# Patient Record
Sex: MALE | Race: WHITE | NOT HISPANIC OR LATINO | Employment: UNEMPLOYED | URBAN - METROPOLITAN AREA
[De-identification: names, ages, dates, MRNs, and addresses within clinical notes are randomized per-mention and may not be internally consistent; named-entity substitution may affect disease eponyms.]

---

## 2023-07-07 ENCOUNTER — TELEPHONE (OUTPATIENT)
Dept: ORTHOPEDIC SURGERY | Facility: CLINIC | Age: 53
End: 2023-07-07

## 2023-07-07 NOTE — TELEPHONE ENCOUNTER
PATIENT SEEN IN THE [facility] ER 7/6/23, RT SHOULDER / HUMERUS INJURY. NOT COMP OR AUTO, XRAY'S [facility], PATIENT IS EXPERIENCING NUMBNESS AND COLD FEELING IN THE ARM. PATIENT REQUESTING APPOINTMENT, PLEASE ADVISE!

## 2023-07-10 ENCOUNTER — APPOINTMENT (OUTPATIENT)
Dept: CARDIOLOGY | Facility: HOSPITAL | Age: 53
DRG: 548 | End: 2023-07-10
Payer: COMMERCIAL

## 2023-07-10 ENCOUNTER — HOSPITAL ENCOUNTER (INPATIENT)
Facility: HOSPITAL | Age: 53
LOS: 13 days | Discharge: HOME HEALTH | DRG: 548 | End: 2023-07-24
Attending: EMERGENCY MEDICINE | Admitting: INTERNAL MEDICINE
Payer: COMMERCIAL

## 2023-07-10 ENCOUNTER — APPOINTMENT (OUTPATIENT)
Dept: CT IMAGING | Facility: HOSPITAL | Age: 53
DRG: 548 | End: 2023-07-10
Payer: COMMERCIAL

## 2023-07-10 ENCOUNTER — APPOINTMENT (OUTPATIENT)
Dept: GENERAL RADIOLOGY | Facility: HOSPITAL | Age: 53
DRG: 548 | End: 2023-07-10
Payer: COMMERCIAL

## 2023-07-10 DIAGNOSIS — L03.114 CELLULITIS OF LEFT UPPER EXTREMITY: ICD-10-CM

## 2023-07-10 DIAGNOSIS — A41.02 SEPSIS DUE TO METHICILLIN RESISTANT STAPHYLOCOCCUS AUREUS (MRSA), UNSPECIFIED WHETHER ACUTE ORGAN DYSFUNCTION PRESENT: ICD-10-CM

## 2023-07-10 DIAGNOSIS — R26.2 DIFFICULTY WALKING: ICD-10-CM

## 2023-07-10 DIAGNOSIS — S49.91XA SHOULDER INJURY, RIGHT, INITIAL ENCOUNTER: ICD-10-CM

## 2023-07-10 DIAGNOSIS — I50.21 ACUTE HFREF (HEART FAILURE WITH REDUCED EJECTION FRACTION): Primary | ICD-10-CM

## 2023-07-10 DIAGNOSIS — S46.209A INJURY OF TENDON OF BICEPS: ICD-10-CM

## 2023-07-10 DIAGNOSIS — Z78.9 DECREASED ACTIVITIES OF DAILY LIVING (ADL): ICD-10-CM

## 2023-07-10 DIAGNOSIS — A41.9 SEPSIS, DUE TO UNSPECIFIED ORGANISM, UNSPECIFIED WHETHER ACUTE ORGAN DYSFUNCTION PRESENT: ICD-10-CM

## 2023-07-10 LAB
ALBUMIN SERPL-MCNC: 2.8 G/DL (ref 3.5–5.2)
ALBUMIN/GLOB SERPL: 0.7 G/DL
ALP SERPL-CCNC: 444 U/L (ref 39–117)
ALT SERPL W P-5'-P-CCNC: 65 U/L (ref 1–41)
ANION GAP SERPL CALCULATED.3IONS-SCNC: 13.5 MMOL/L (ref 5–15)
AST SERPL-CCNC: 50 U/L (ref 1–40)
BASOPHILS # BLD AUTO: 0.1 10*3/MM3 (ref 0–0.2)
BASOPHILS NFR BLD AUTO: 0.8 % (ref 0–1.5)
BILIRUB SERPL-MCNC: 0.9 MG/DL (ref 0–1.2)
BUN SERPL-MCNC: 18 MG/DL (ref 6–20)
BUN/CREAT SERPL: 20.5 (ref 7–25)
CALCIUM SPEC-SCNC: 9.3 MG/DL (ref 8.6–10.5)
CHLORIDE SERPL-SCNC: 100 MMOL/L (ref 98–107)
CO2 SERPL-SCNC: 22.5 MMOL/L (ref 22–29)
CREAT SERPL-MCNC: 0.88 MG/DL (ref 0.76–1.27)
D-LACTATE SERPL-SCNC: 2.5 MMOL/L (ref 0.5–2)
DEPRECATED RDW RBC AUTO: 44.8 FL (ref 37–54)
EGFRCR SERPLBLD CKD-EPI 2021: 103.5 ML/MIN/1.73
EOSINOPHIL # BLD AUTO: 0.28 10*3/MM3 (ref 0–0.4)
EOSINOPHIL NFR BLD AUTO: 2.4 % (ref 0.3–6.2)
ERYTHROCYTE [DISTWIDTH] IN BLOOD BY AUTOMATED COUNT: 12.9 % (ref 12.3–15.4)
GLOBULIN UR ELPH-MCNC: 4.1 GM/DL
GLUCOSE SERPL-MCNC: 89 MG/DL (ref 65–99)
HCT VFR BLD AUTO: 44 % (ref 37.5–51)
HGB BLD-MCNC: 14.5 G/DL (ref 13–17.7)
HOLD SPECIMEN: NORMAL
HOLD SPECIMEN: NORMAL
IMM GRANULOCYTES # BLD AUTO: 0.06 10*3/MM3 (ref 0–0.05)
IMM GRANULOCYTES NFR BLD AUTO: 0.5 % (ref 0–0.5)
INR PPP: 1.26 (ref 0.86–1.15)
LYMPHOCYTES # BLD AUTO: 0.6 10*3/MM3 (ref 0.7–3.1)
LYMPHOCYTES NFR BLD AUTO: 5.1 % (ref 19.6–45.3)
MAGNESIUM SERPL-MCNC: 1.8 MG/DL (ref 1.6–2.6)
MCH RBC QN AUTO: 31.1 PG (ref 26.6–33)
MCHC RBC AUTO-ENTMCNC: 33 G/DL (ref 31.5–35.7)
MCV RBC AUTO: 94.4 FL (ref 79–97)
MONOCYTES # BLD AUTO: 1.25 10*3/MM3 (ref 0.1–0.9)
MONOCYTES NFR BLD AUTO: 10.5 % (ref 5–12)
NEUTROPHILS NFR BLD AUTO: 80.7 % (ref 42.7–76)
NEUTROPHILS NFR BLD AUTO: 9.57 10*3/MM3 (ref 1.7–7)
NRBC BLD AUTO-RTO: 0 /100 WBC (ref 0–0.2)
PLAT MORPH BLD: NORMAL
PLATELET # BLD AUTO: 241 10*3/MM3 (ref 140–450)
PMV BLD AUTO: 9.8 FL (ref 6–12)
POTASSIUM SERPL-SCNC: 4.5 MMOL/L (ref 3.5–5.2)
PROT SERPL-MCNC: 6.9 G/DL (ref 6–8.5)
PROTHROMBIN TIME: 15.8 SECONDS (ref 11.8–14.9)
RBC # BLD AUTO: 4.66 10*6/MM3 (ref 4.14–5.8)
RBC MORPH BLD: NORMAL
SODIUM SERPL-SCNC: 136 MMOL/L (ref 136–145)
TROPONIN T SERPL HS-MCNC: 9 NG/L
WBC MORPH BLD: NORMAL
WBC NRBC COR # BLD: 11.86 10*3/MM3 (ref 3.4–10.8)
WHOLE BLOOD HOLD COAG: NORMAL
WHOLE BLOOD HOLD SPECIMEN: NORMAL

## 2023-07-10 PROCEDURE — 93971 EXTREMITY STUDY: CPT

## 2023-07-10 PROCEDURE — 87186 SC STD MICRODIL/AGAR DIL: CPT | Performed by: EMERGENCY MEDICINE

## 2023-07-10 PROCEDURE — 93005 ELECTROCARDIOGRAM TRACING: CPT | Performed by: EMERGENCY MEDICINE

## 2023-07-10 PROCEDURE — 93005 ELECTROCARDIOGRAM TRACING: CPT

## 2023-07-10 PROCEDURE — 71045 X-RAY EXAM CHEST 1 VIEW: CPT

## 2023-07-10 PROCEDURE — 80053 COMPREHEN METABOLIC PANEL: CPT

## 2023-07-10 PROCEDURE — 70450 CT HEAD/BRAIN W/O DYE: CPT

## 2023-07-10 PROCEDURE — 87147 CULTURE TYPE IMMUNOLOGIC: CPT | Performed by: EMERGENCY MEDICINE

## 2023-07-10 PROCEDURE — 71260 CT THORAX DX C+: CPT

## 2023-07-10 PROCEDURE — 85610 PROTHROMBIN TIME: CPT | Performed by: NURSE PRACTITIONER

## 2023-07-10 PROCEDURE — 87040 BLOOD CULTURE FOR BACTERIA: CPT

## 2023-07-10 PROCEDURE — 85025 COMPLETE CBC W/AUTO DIFF WBC: CPT

## 2023-07-10 PROCEDURE — 93010 ELECTROCARDIOGRAM REPORT: CPT | Performed by: INTERNAL MEDICINE

## 2023-07-10 PROCEDURE — 83735 ASSAY OF MAGNESIUM: CPT

## 2023-07-10 PROCEDURE — 99285 EMERGENCY DEPT VISIT HI MDM: CPT

## 2023-07-10 PROCEDURE — 83605 ASSAY OF LACTIC ACID: CPT

## 2023-07-10 PROCEDURE — 88312 SPECIAL STAINS GROUP 1: CPT | Performed by: EMERGENCY MEDICINE

## 2023-07-10 PROCEDURE — 25510000001 IOPAMIDOL PER 1 ML: Performed by: EMERGENCY MEDICINE

## 2023-07-10 PROCEDURE — 87150 DNA/RNA AMPLIFIED PROBE: CPT | Performed by: EMERGENCY MEDICINE

## 2023-07-10 PROCEDURE — 93971 EXTREMITY STUDY: CPT | Performed by: SURGERY

## 2023-07-10 PROCEDURE — 85007 BL SMEAR W/DIFF WBC COUNT: CPT

## 2023-07-10 PROCEDURE — 84484 ASSAY OF TROPONIN QUANT: CPT

## 2023-07-10 RX ORDER — SODIUM CHLORIDE 0.9 % (FLUSH) 0.9 %
10 SYRINGE (ML) INJECTION AS NEEDED
Status: DISCONTINUED | OUTPATIENT
Start: 2023-07-10 | End: 2023-07-12

## 2023-07-10 RX ADMIN — IOPAMIDOL 100 ML: 755 INJECTION, SOLUTION INTRAVENOUS at 23:23

## 2023-07-11 ENCOUNTER — APPOINTMENT (OUTPATIENT)
Dept: GENERAL RADIOLOGY | Facility: HOSPITAL | Age: 53
DRG: 548 | End: 2023-07-11
Payer: COMMERCIAL

## 2023-07-11 PROBLEM — A41.9 SEPSIS: Status: ACTIVE | Noted: 2023-07-11

## 2023-07-11 LAB
BACTERIA BLD CULT: ABNORMAL
BACTERIA UR QL AUTO: ABNORMAL /HPF
BH CV UPPER VENOUS LEFT INTERNAL JUGULAR AUGMENT: NORMAL
BH CV UPPER VENOUS LEFT INTERNAL JUGULAR COMPRESS: NORMAL
BH CV UPPER VENOUS LEFT INTERNAL JUGULAR PHASIC: NORMAL
BH CV UPPER VENOUS LEFT INTERNAL JUGULAR SPONT: NORMAL
BH CV UPPER VENOUS LEFT SUBCLAVIAN AUGMENT: NORMAL
BH CV UPPER VENOUS LEFT SUBCLAVIAN COMPRESS: NORMAL
BH CV UPPER VENOUS LEFT SUBCLAVIAN PHASIC: NORMAL
BH CV UPPER VENOUS LEFT SUBCLAVIAN SPONT: NORMAL
BH CV UPPER VENOUS RIGHT AXILLARY AUGMENT: NORMAL
BH CV UPPER VENOUS RIGHT AXILLARY COMPRESS: NORMAL
BH CV UPPER VENOUS RIGHT AXILLARY PHASIC: NORMAL
BH CV UPPER VENOUS RIGHT AXILLARY SPONT: NORMAL
BH CV UPPER VENOUS RIGHT BASILIC FOREARM COMPRESS: NORMAL
BH CV UPPER VENOUS RIGHT BASILIC UPPER COMPRESS: NORMAL
BH CV UPPER VENOUS RIGHT BRACHIAL COMPRESS: NORMAL
BH CV UPPER VENOUS RIGHT CEPHALIC FOREARM COMPRESS: NORMAL
BH CV UPPER VENOUS RIGHT CEPHALIC UPPER COMPRESS: NORMAL
BH CV UPPER VENOUS RIGHT INTERNAL JUGULAR AUGMENT: NORMAL
BH CV UPPER VENOUS RIGHT INTERNAL JUGULAR COMPRESS: NORMAL
BH CV UPPER VENOUS RIGHT INTERNAL JUGULAR PHASIC: NORMAL
BH CV UPPER VENOUS RIGHT INTERNAL JUGULAR SPONT: NORMAL
BH CV UPPER VENOUS RIGHT RADIAL COMPRESS: NORMAL
BH CV UPPER VENOUS RIGHT SUBCLAVIAN AUGMENT: NORMAL
BH CV UPPER VENOUS RIGHT SUBCLAVIAN COMPRESS: NORMAL
BH CV UPPER VENOUS RIGHT SUBCLAVIAN PHASIC: NORMAL
BH CV UPPER VENOUS RIGHT SUBCLAVIAN SPONT: NORMAL
BH CV UPPER VENOUS RIGHT ULNAR COMPRESS: NORMAL
BH CV VAS PRELIMINARY FINDINGS SCRIPTING: 1
BILIRUB UR QL STRIP: ABNORMAL
CLARITY UR: CLEAR
COLOR UR: ABNORMAL
D-LACTATE SERPL-SCNC: 1.8 MMOL/L (ref 0.5–2)
GLUCOSE UR STRIP-MCNC: NEGATIVE MG/DL
HGB UR QL STRIP.AUTO: ABNORMAL
HYALINE CASTS UR QL AUTO: ABNORMAL /LPF
KETONES UR QL STRIP: ABNORMAL
LEUKOCYTE ESTERASE UR QL STRIP.AUTO: ABNORMAL
MRSA DNA SPEC QL NAA+PROBE: ABNORMAL
NITRITE UR QL STRIP: NEGATIVE
PH UR STRIP.AUTO: 6.5 [PH] (ref 5–8)
PROT UR QL STRIP: ABNORMAL
QT INTERVAL: 334 MS
RBC # UR STRIP: ABNORMAL /HPF
REF LAB TEST METHOD: ABNORMAL
SP GR UR STRIP: >1.03 (ref 1–1.03)
SQUAMOUS #/AREA URNS HPF: ABNORMAL /HPF
UROBILINOGEN UR QL STRIP: ABNORMAL
WBC # UR STRIP: ABNORMAL /HPF

## 2023-07-11 PROCEDURE — 87641 MR-STAPH DNA AMP PROBE: CPT | Performed by: INTERNAL MEDICINE

## 2023-07-11 PROCEDURE — 73080 X-RAY EXAM OF ELBOW: CPT

## 2023-07-11 PROCEDURE — 25010000002 VANCOMYCIN 5 G RECONSTITUTED SOLUTION: Performed by: EMERGENCY MEDICINE

## 2023-07-11 PROCEDURE — 25010000002 VANCOMYCIN 5 G RECONSTITUTED SOLUTION: Performed by: INTERNAL MEDICINE

## 2023-07-11 PROCEDURE — 81001 URINALYSIS AUTO W/SCOPE: CPT | Performed by: EMERGENCY MEDICINE

## 2023-07-11 PROCEDURE — 25010000002 PIPERACILLIN SOD-TAZOBACTAM PER 1 G: Performed by: INTERNAL MEDICINE

## 2023-07-11 PROCEDURE — 25010000002 HYDROMORPHONE 1 MG/ML SOLUTION: Performed by: INTERNAL MEDICINE

## 2023-07-11 PROCEDURE — 25010000002 HYDROMORPHONE 1 MG/ML SOLUTION: Performed by: EMERGENCY MEDICINE

## 2023-07-11 PROCEDURE — 25010000002 ONDANSETRON PER 1 MG: Performed by: EMERGENCY MEDICINE

## 2023-07-11 PROCEDURE — 83605 ASSAY OF LACTIC ACID: CPT | Performed by: EMERGENCY MEDICINE

## 2023-07-11 PROCEDURE — 99222 1ST HOSP IP/OBS MODERATE 55: CPT | Performed by: INTERNAL MEDICINE

## 2023-07-11 PROCEDURE — 25010000002 PIPERACILLIN SOD-TAZOBACTAM PER 1 G: Performed by: EMERGENCY MEDICINE

## 2023-07-11 RX ORDER — TRAMADOL HYDROCHLORIDE 50 MG/1
50 TABLET ORAL EVERY 6 HOURS PRN
Status: DISCONTINUED | OUTPATIENT
Start: 2023-07-11 | End: 2023-07-12

## 2023-07-11 RX ORDER — NITROGLYCERIN 0.4 MG/1
0.4 TABLET SUBLINGUAL
Status: DISCONTINUED | OUTPATIENT
Start: 2023-07-11 | End: 2023-07-24 | Stop reason: HOSPADM

## 2023-07-11 RX ORDER — POLYETHYLENE GLYCOL 3350 17 G/17G
17 POWDER, FOR SOLUTION ORAL DAILY PRN
Status: DISCONTINUED | OUTPATIENT
Start: 2023-07-11 | End: 2023-07-24 | Stop reason: HOSPADM

## 2023-07-11 RX ORDER — AMOXICILLIN 250 MG
2 CAPSULE ORAL 2 TIMES DAILY
Status: DISCONTINUED | OUTPATIENT
Start: 2023-07-11 | End: 2023-07-24 | Stop reason: HOSPADM

## 2023-07-11 RX ORDER — DICLOFENAC SODIUM 75 MG/1
75 TABLET, DELAYED RELEASE ORAL 2 TIMES DAILY
COMMUNITY
End: 2023-07-24 | Stop reason: HOSPADM

## 2023-07-11 RX ORDER — MECLIZINE HYDROCHLORIDE 25 MG/1
25 TABLET ORAL 3 TIMES DAILY PRN
COMMUNITY

## 2023-07-11 RX ORDER — BISACODYL 10 MG
10 SUPPOSITORY, RECTAL RECTAL DAILY PRN
Status: DISCONTINUED | OUTPATIENT
Start: 2023-07-11 | End: 2023-07-24 | Stop reason: HOSPADM

## 2023-07-11 RX ORDER — SODIUM CHLORIDE 9 MG/ML
40 INJECTION, SOLUTION INTRAVENOUS AS NEEDED
Status: DISCONTINUED | OUTPATIENT
Start: 2023-07-11 | End: 2023-07-12

## 2023-07-11 RX ORDER — SODIUM CHLORIDE 0.9 % (FLUSH) 0.9 %
10 SYRINGE (ML) INJECTION AS NEEDED
Status: DISCONTINUED | OUTPATIENT
Start: 2023-07-11 | End: 2023-07-12

## 2023-07-11 RX ORDER — BISACODYL 5 MG/1
5 TABLET, DELAYED RELEASE ORAL DAILY PRN
Status: DISCONTINUED | OUTPATIENT
Start: 2023-07-11 | End: 2023-07-24 | Stop reason: HOSPADM

## 2023-07-11 RX ORDER — SODIUM CHLORIDE, SODIUM LACTATE, POTASSIUM CHLORIDE, CALCIUM CHLORIDE 600; 310; 30; 20 MG/100ML; MG/100ML; MG/100ML; MG/100ML
100 INJECTION, SOLUTION INTRAVENOUS CONTINUOUS
Status: DISCONTINUED | OUTPATIENT
Start: 2023-07-11 | End: 2023-07-12

## 2023-07-11 RX ORDER — ONDANSETRON 2 MG/ML
4 INJECTION INTRAMUSCULAR; INTRAVENOUS ONCE
Status: COMPLETED | OUTPATIENT
Start: 2023-07-11 | End: 2023-07-11

## 2023-07-11 RX ORDER — ACETAMINOPHEN 325 MG/1
650 TABLET ORAL EVERY 4 HOURS PRN
Status: DISCONTINUED | OUTPATIENT
Start: 2023-07-11 | End: 2023-07-24 | Stop reason: HOSPADM

## 2023-07-11 RX ORDER — SODIUM CHLORIDE 0.9 % (FLUSH) 0.9 %
10 SYRINGE (ML) INJECTION EVERY 12 HOURS SCHEDULED
Status: DISCONTINUED | OUTPATIENT
Start: 2023-07-11 | End: 2023-07-24 | Stop reason: HOSPADM

## 2023-07-11 RX ORDER — ONDANSETRON 2 MG/ML
4 INJECTION INTRAMUSCULAR; INTRAVENOUS EVERY 6 HOURS PRN
Status: DISCONTINUED | OUTPATIENT
Start: 2023-07-11 | End: 2023-07-24 | Stop reason: HOSPADM

## 2023-07-11 RX ORDER — ACETAMINOPHEN 160 MG/5ML
650 SOLUTION ORAL EVERY 4 HOURS PRN
Status: DISCONTINUED | OUTPATIENT
Start: 2023-07-11 | End: 2023-07-24 | Stop reason: HOSPADM

## 2023-07-11 RX ORDER — ACETAMINOPHEN 650 MG/1
650 SUPPOSITORY RECTAL EVERY 4 HOURS PRN
Status: DISCONTINUED | OUTPATIENT
Start: 2023-07-11 | End: 2023-07-24 | Stop reason: HOSPADM

## 2023-07-11 RX ORDER — MULTIPLE VITAMINS W/ MINERALS TAB 9MG-400MCG
1 TAB ORAL DAILY
COMMUNITY
End: 2023-08-02

## 2023-07-11 RX ORDER — NALOXONE HCL 0.4 MG/ML
0.4 VIAL (ML) INJECTION
Status: DISCONTINUED | OUTPATIENT
Start: 2023-07-11 | End: 2023-07-12

## 2023-07-11 RX ADMIN — HYDROMORPHONE HYDROCHLORIDE 0.5 MG: 1 INJECTION, SOLUTION INTRAMUSCULAR; INTRAVENOUS; SUBCUTANEOUS at 14:07

## 2023-07-11 RX ADMIN — SODIUM CHLORIDE 1000 ML: 9 INJECTION, SOLUTION INTRAVENOUS at 00:59

## 2023-07-11 RX ADMIN — SODIUM CHLORIDE, POTASSIUM CHLORIDE, SODIUM LACTATE AND CALCIUM CHLORIDE 100 ML/HR: 600; 310; 30; 20 INJECTION, SOLUTION INTRAVENOUS at 04:52

## 2023-07-11 RX ADMIN — HYDROMORPHONE HYDROCHLORIDE 0.5 MG: 1 INJECTION, SOLUTION INTRAMUSCULAR; INTRAVENOUS; SUBCUTANEOUS at 18:25

## 2023-07-11 RX ADMIN — PIPERACILLIN AND TAZOBACTAM 3.38 G: 3; .375 INJECTION, POWDER, LYOPHILIZED, FOR SOLUTION INTRAVENOUS at 00:59

## 2023-07-11 RX ADMIN — VANCOMYCIN HYDROCHLORIDE 1250 MG: 5 INJECTION, POWDER, LYOPHILIZED, FOR SOLUTION INTRAVENOUS at 18:21

## 2023-07-11 RX ADMIN — HYDROMORPHONE HYDROCHLORIDE 0.5 MG: 1 INJECTION, SOLUTION INTRAMUSCULAR; INTRAVENOUS; SUBCUTANEOUS at 21:22

## 2023-07-11 RX ADMIN — PIPERACILLIN AND TAZOBACTAM 3.38 G: 3; .375 INJECTION, POWDER, LYOPHILIZED, FOR SOLUTION INTRAVENOUS at 18:22

## 2023-07-11 RX ADMIN — PIPERACILLIN AND TAZOBACTAM 3.38 G: 3; .375 INJECTION, POWDER, LYOPHILIZED, FOR SOLUTION INTRAVENOUS at 06:30

## 2023-07-11 RX ADMIN — HYDROMORPHONE HYDROCHLORIDE 1 MG: 1 INJECTION, SOLUTION INTRAMUSCULAR; INTRAVENOUS; SUBCUTANEOUS at 00:58

## 2023-07-11 RX ADMIN — ONDANSETRON 4 MG: 2 INJECTION INTRAMUSCULAR; INTRAVENOUS at 00:58

## 2023-07-11 RX ADMIN — ACETAMINOPHEN 650 MG: 325 TABLET ORAL at 19:21

## 2023-07-11 RX ADMIN — VANCOMYCIN HYDROCHLORIDE 1750 MG: 5 INJECTION, POWDER, LYOPHILIZED, FOR SOLUTION INTRAVENOUS at 04:51

## 2023-07-11 NOTE — ED PROVIDER NOTES
"Time: 9:40 PM EDT  Date of encounter:  7/10/2023  Independent Historian/Clinical History and Information was obtained by:   Patient and Family    History is limited by: N/A      Chief complaint: Right arm pain, swelling and redness        History of Present Illness:  Patient is a 52 y.o. year old male who presents to the emergency department for evaluation of RUE pain and swelling. Fell out of a bucket truck last week. Patient was seen here 7/6/2023 and discharged to follow up with orthopedics.  The wife states that the patient has not felt well all day.  She notes that he has been very fatigued.  The patient notes right upper arm swelling and severe pain. Wife states patient has not had any pain medication today.  They note the swelling starts at the shoulder and progresses down to the elbow.  There is redness of the anterior aspect of the shoulder arm and specifically the medial aspect of the elbow..  He is unable to flex his elbow due to the pain and swelling.  The patient's had chills and muscle aches.  There is no documented fever.  The patient denies any neck pain or headache.  The patient denies any chest pain or shortness of breath.  The patient has no abdominal pain.  Patient denies any back pain    HPI    Patient Care Team  Primary Care Provider: Marilyn Eugene APRN    Past Medical History:     No Known Allergies  Past Medical History:   Diagnosis Date    Alpha 1-antitrypsin PiMS phenotype     \"alpha 1\" per pt and wife. unsure of what type.    Arthritis      Past Surgical History:   Procedure Laterality Date    LIVER BIOPSY      TOOTH EXTRACTION       History reviewed. No pertinent family history.    Home Medications:  Prior to Admission medications    Medication Sig Start Date End Date Taking? Authorizing Provider   HYDROcodone-acetaminophen (NORCO) 5-325 MG per tablet Take 1 tablet by mouth Every 6 (Six) Hours As Needed for Moderate Pain. 7/7/23   Aaron Dixon MD   ketorolac (TORADOL) 10 MG " "tablet Take 1 tablet by mouth Every 6 (Six) Hours As Needed for Moderate Pain. 7/7/23   Shabbir, Yusra, PA   sildenafil (VIAGRA) 100 MG tablet Take 1 tablet by mouth Daily. 3/9/23   Provider, MD Rubia        Social History:   Social History     Tobacco Use    Smoking status: Never    Smokeless tobacco: Never   Vaping Use    Vaping Use: Never used   Substance Use Topics    Alcohol use: Never    Drug use: Never         Review of Systems:  Review of Systems   Constitutional:  Positive for chills and fatigue. Negative for diaphoresis and fever.   HENT:  Negative for congestion, postnasal drip, rhinorrhea and sore throat.    Eyes:  Negative for photophobia.   Respiratory:  Negative for cough, chest tightness and shortness of breath.    Cardiovascular:  Negative for chest pain, palpitations and leg swelling.   Gastrointestinal:  Negative for abdominal pain, diarrhea, nausea and vomiting.   Genitourinary:  Negative for difficulty urinating, dysuria, flank pain, frequency, hematuria and urgency.   Musculoskeletal:  Positive for arthralgias and myalgias. Negative for neck pain and neck stiffness.   Skin:  Positive for rash. Negative for pallor.   Neurological:  Negative for dizziness, syncope, weakness, numbness and headaches.   Hematological:  Negative for adenopathy. Does not bruise/bleed easily.   Psychiatric/Behavioral: Negative.        Physical Exam:  /79 (BP Location: Right arm, Patient Position: Lying)   Pulse 93   Temp 98.1 °F (36.7 °C) (Oral)   Resp 18   Ht 180.3 cm (71\")   SpO2 98%   BMI 26.44 kg/m²         Physical Exam  Vitals and nursing note reviewed.   Constitutional:       General: He is not in acute distress.     Appearance: Normal appearance. He is not ill-appearing, toxic-appearing or diaphoretic.   HENT:      Head: Normocephalic and atraumatic.      Mouth/Throat:      Mouth: Mucous membranes are moist.   Eyes:      Pupils: Pupils are equal, round, and reactive to light.   Cardiovascular: "      Rate and Rhythm: Normal rate and regular rhythm.      Pulses: Normal pulses.           Carotid pulses are 2+ on the right side and 2+ on the left side.       Radial pulses are 2+ on the right side and 2+ on the left side.        Femoral pulses are 2+ on the right side and 2+ on the left side.       Popliteal pulses are 2+ on the right side and 2+ on the left side.        Dorsalis pedis pulses are 2+ on the right side and 2+ on the left side.        Posterior tibial pulses are 2+ on the right side and 2+ on the left side.      Heart sounds: Normal heart sounds. No murmur heard.  Pulmonary:      Effort: Pulmonary effort is normal. No accessory muscle usage, respiratory distress or retractions.      Breath sounds: Normal breath sounds. No wheezing, rhonchi or rales.   Abdominal:      General: Abdomen is flat. There is no distension.      Palpations: Abdomen is soft. There is no mass or pulsatile mass.      Tenderness: There is no abdominal tenderness. There is no right CVA tenderness, left CVA tenderness, guarding or rebound.      Comments: No rigidity   Musculoskeletal:         General: No swelling, tenderness or deformity.      Right shoulder: Swelling and tenderness present. No deformity. Decreased range of motion.      Left shoulder: Normal.      Right upper arm: Swelling, edema and tenderness present.      Left upper arm: Normal.      Right elbow: Swelling and effusion present. Decreased range of motion. Tenderness present.      Left elbow: Normal.      Right forearm: Normal.      Left forearm: Normal.      Cervical back: Neck supple. No tenderness.      Right lower leg: No edema.      Left lower leg: No edema.   Skin:     General: Skin is warm and dry.      Capillary Refill: Capillary refill takes less than 2 seconds.      Coloration: Skin is not jaundiced or pale.      Findings: Rash present. No erythema.             Comments: The patient has redness that extends from the anterior shoulder down to the  elbow.  It is warm to touch blanches.  There is no obvious crepitance or gas.  The worst amount of erythema is located at the anterior medial aspect of the elbow.   Neurological:      General: No focal deficit present.      Mental Status: He is alert and oriented to person, place, and time. Mental status is at baseline.      Cranial Nerves: Cranial nerves 2-12 are intact. No cranial nerve deficit.      Sensory: Sensation is intact. No sensory deficit.      Motor: Motor function is intact. No weakness or pronator drift.      Coordination: Coordination is intact. Coordination normal.   Psychiatric:         Mood and Affect: Mood normal.         Behavior: Behavior normal.                    Procedures:  Procedures      Medical Decision Making:      Comorbidities that affect care:    None    External Notes reviewed:    None      The following orders were placed and all results were independently analyzed by me:  Orders Placed This Encounter   Procedures    Blood Culture - Blood,    Blood Culture - Blood,    MRSA Screen, PCR (Inpatient) - Swab, Nares    Blood Culture ID, PCR - Blood,    XR Chest 1 View    CT Head Without Contrast    CT Chest With Contrast Diagnostic    XR Elbow 3+ View Right    Lactic Acid, Plasma    Power Draw    Comprehensive Metabolic Panel    Single High Sensitivity Troponin T    Magnesium    Urinalysis With Microscopic If Indicated (No Culture) - Urine, Clean Catch    CBC Auto Differential    Protime-INR    Scan Slide    STAT Lactic Acid, Reflex    Urinalysis, Microscopic Only - Urine, Clean Catch    CBC (No Diff)    Comprehensive Metabolic Panel    Vancomycin, Random    Diet: Regular/House Diet; Texture: Regular Texture (IDDSI 7); Fluid Consistency: Thin (IDDSI 0)    Undress & Gown    Vital Signs    Orthostatic Blood Pressure    Continuous Pulse Oximetry    Vital Signs    Vital Signs    Intake & Output    Weigh Patient    Oral Care    Place Sequential Compression Device    Maintain Sequential  Compression Device    Telemetry - Maintain IV Access    Telemetry - Place Orders & Notify Provider of Results When Patient Experiences Acute Chest Pain, Dysrhythmia or Respiratory Distress    Activity - Ad Lorena    Notify Provider (With Default Parameters)    Code Status and Medical Interventions:    Inpatient Hospitalist Consult    Oxygen Therapy- Nasal Cannula; Titrate 1-6 LPM Per SpO2; 90 - 95%    Oxygen Therapy- Nasal Cannula; Titrate 1-6 LPM Per SpO2; 90 - 95%    POC Glucose Once    POC Glucose Once    ECG 12 Lead ED Triage Standing Order; Weak / Dizzy / AMS    Insert Peripheral IV    Insert Peripheral IV    Insert Peripheral IV    Inpatient Admission    Fall Precautions    Fall Precautions    CBC & Differential    Green Top (Gel)    Lavender Top    Gold Top - SST    Light Blue Top       Medications Given in the Emergency Department:  Medications   sodium chloride 0.9 % flush 10 mL (has no administration in time range)   sodium chloride 0.9 % flush 10 mL (has no administration in time range)   sodium chloride 0.9 % flush 10 mL (10 mL Intravenous Not Given 7/12/23 0029)   sodium chloride 0.9 % flush 10 mL (has no administration in time range)   sodium chloride 0.9 % infusion 40 mL (has no administration in time range)   sennosides-docusate (PERICOLACE) 8.6-50 MG per tablet 2 tablet (2 tablets Oral Not Given 7/11/23 2122)     And   polyethylene glycol (MIRALAX) packet 17 g (has no administration in time range)     And   bisacodyl (DULCOLAX) EC tablet 5 mg (has no administration in time range)     And   bisacodyl (DULCOLAX) suppository 10 mg (has no administration in time range)   Pharmacy to Dose Zosyn (has no administration in time range)   Pharmacy to dose vancomycin (has no administration in time range)   nitroglycerin (NITROSTAT) SL tablet 0.4 mg (has no administration in time range)   lactated ringers infusion (100 mL/hr Intravenous New Bag 7/11/23 8078)   acetaminophen (TYLENOL) tablet 650 mg (650 mg Oral  Given 7/11/23 1921)     Or   acetaminophen (TYLENOL) 160 MG/5ML solution 650 mg ( Oral Not Given:  See Alt 7/11/23 1921)     Or   acetaminophen (TYLENOL) suppository 650 mg ( Rectal Not Given:  See Alt 7/11/23 1921)   traMADol (ULTRAM) tablet 50 mg (has no administration in time range)   HYDROmorphone (DILAUDID) injection 0.5 mg (0.5 mg Intravenous Given 7/11/23 2122)     And   naloxone (NARCAN) injection 0.4 mg (has no administration in time range)   ondansetron (ZOFRAN) injection 4 mg (has no administration in time range)   vancomycin 1250 mg/250 mL 0.9% NS IVPB ([facility]) (1,250 mg Intravenous New Bag 7/11/23 1821)   piperacillin-tazobactam (ZOSYN) 3.375 g/100 mL 0.9% NS IVPB ([facility]) (3.375 g Intravenous New Bag 7/12/23 0029)   iopamidol (ISOVUE-370) 76 % injection 100 mL (100 mL Intravenous Given 7/10/23 2323)   sodium chloride 0.9 % bolus 1,000 mL (1,000 mL Intravenous New Bag 7/11/23 0059)   HYDROmorphone (DILAUDID) injection 1 mg (1 mg Intravenous Given 7/11/23 0058)   ondansetron (ZOFRAN) injection 4 mg (4 mg Intravenous Given 7/11/23 0058)   piperacillin-tazobactam (ZOSYN) 3.375 g/100 mL 0.9% NS IVPB ([facility]) (0 g Intravenous Stopped 7/11/23 0129)   vancomycin 1750 mg/500 mL 0.9% NS IVPB ([facility]) (1,750 mg Intravenous New Bag 7/11/23 0451)        ED Course:    The patient was initially evaluated in the triage area where orders were placed. The patient was later dispositioned by Carlyle Lockhart DO.      The patient was advised to stay for completion of workup which includes but is not limited to communication of labs and radiological results, reassessment and plan. The patient was advised that leaving prior to disposition by a provider could result in critical findings that are not communicated to the patient.     ED Course as of 07/12/23 0258   Mon Jul 10, 2023   1015 The patient was seen and examined by me, SMITA Melgar, while in triage. Orders placed. Patient is awaiting disposition.   [AR]      ED Course User  Index  [AR] Natty Valladares, SMITA       Labs:    Lab Results (last 24 hours)       Procedure Component Value Units Date/Time    MRSA Screen, PCR (Inpatient) - Swab, Nares [[id number]]  (Abnormal) Collected: 07/11/23 0629    Specimen: Swab from Nares Updated: 07/11/23 0840     MRSA PCR MRSA Detected    Narrative:      The negative predictive value of this diagnostic test is high and should only be used to consider de-escalating anti-MRSA therapy. A positive result may indicate colonization with MRSA and must be correlated clinically.             Imaging:    No Radiology Exams Resulted Within Past 24 Hours      Differential Diagnosis and Discussion:      Joint Pain: Differential diagnosis includes but is not limited to polyarticular arthritis, gout, tendinitis, hemarthrosis, septic arthritis, rheumatoid arthritis, bursitis, degenerative joint disease, joint effusion, autoimmune disorder, trauma, and occult neoplasm.    All labs were reviewed and interpreted by me.  All X-rays impressions were independently interpreted by me.  CT scan radiology impression was interpreted by me.    MDM  Number of Diagnoses or Management Options  Cellulitis of left upper extremity  Injury of tendon of biceps  Shoulder injury, right, initial encounter  Diagnosis management comments:   i Reviewed the preliminary report of the vascular study done in the right upper extremity it was negative for DVT    Sepsis was not present in the emergency department or on arrival. This is supported as the patient does not either meet two out of the four SIRS criteria or has an obvious bacterial infection.      SIRS criteria considered:   1.                     Temperature > 100.4 or <98.6    2.                     Heart Rate > 90    3.                     Respiratory Rate > 22    4.                     WBC > 12K or <4K.    The patient's CBC was reviewed and shows no abnormalities of critical concern.  Of note, there is no anemia requiring a blood  transfusion and the platelet count is acceptable    The patient's CMP was reviewed and shows no abnormalities of critical concern.  Of note, the patient's sodium and potassium are acceptable.     The patient's renal function including creatinine is preserved.  The patient has a normal anion gap.  Patient had very mild elevation in liver enzymes.    The patient's lactate was 2.5    Blood cultures were ordered and pending at the time of admission    Urinalysis appeared negative for infection    X-ray of the elbow demonstrated no obvious acute fracture or effusion.  The patient did have diffuse soft tissue swelling    The patient was treated with IV fluids, Dilaudid, Zofran and Zosyn for suspected cellulitis.    At the time of admission, the patient was resting comfortably.  The patient did not appear toxic patient's vital signs are stable other than mild tachycardia.    CT of the head demonstrated no acute abnormalities       Amount and/or Complexity of Data Reviewed  Clinical lab tests: reviewed  Tests in the radiology section of CPT®: reviewed  Tests in the medicine section of CPT®: reviewed  Decide to obtain previous medical records or to obtain history from someone other than the patient: yes (i Reviewed the patient's x-ray of the humerus that was performed on July 6, 2023.  There is no evidence of fracture    Reviewed the patient's x-ray of the shoulder on the right formed on July 6.  There is no fracture or malalignment.  There was a right glenohumeral joint effusion)  Discuss the patient with other providers: yes (02:07 EDT  I discussed the case with the hospitalist.  We have discussed the patient's presenting symptoms, laboratory values, imaging and condition at the time of admission.  They will evaluate the patient in the emergency room and admit the patient to the hospital)         Social Determinants of Health:    Patient is independent, reliable, and has access to care.       Disposition and Care  Coordination:    Admit:   Through independent evaluation of the patient's history, physical, and imperical data, the patient meets criteria for observation/admission to the hospital.        Final diagnoses:   Cellulitis of left upper extremity   Shoulder injury, right, initial encounter   Injury of tendon of biceps        ED Disposition       ED Disposition   Decision to Admit    Condition   --    Comment   Level of Care: Remote Telemetry [26]   Diagnosis: Sepsis [[id number]]   Certification: I Certify That Inpatient Hospital Services Are Medically Necessary For Greater Than 2 Midnights                 This medical record created using voice recognition software.             Carlyle Lockhart DO  07/12/23 0258

## 2023-07-11 NOTE — PLAN OF CARE
Goal Outcome Evaluation:      VSS. Complaints of pain, see eMAR. Currently resting with family bedside. No issues at this time.

## 2023-07-11 NOTE — H&P
[facility]  [facility]   Patient Name: Yfn Ray  : 1970  MRN: [id number]  Primary Care Physician:  Marilyn Eugene APRN  Date of admission: 7/10/2023    Subjective   Subjective     Chief Complaint: Right upper extremity pain    HPI:    Yfn Ray is a 52 y.o. male no past medical history presents to the emergency department for evaluation of right upper extremity pain.  Patient states he fell off a truck 5 days ago and was seen initially and discharged outpatient with outpatient follow-up.  States that yesterday he noted his arm swelling and turning red which got progressively worse prompting him to seek further medical attention.  He denies any fevers, chills, sweats, nausea, vomiting, chest pain, shortness of breath, palpitations, abdominal pain diarrhea constipation or dysuria, weakness.  In the emergency department felt to have cellulitis and started on vancomycin and Zosyn and will be admitted for ongoing monitoring management.      Personal History     Past Medical History:  No past medical history on file.  Denies    Past Surgical History:  Denies    Family History:   Denies history of VTE    Social History:   Denies alcohol tobacco or illicit drug use    Home Medications:  HYDROcodone-acetaminophen, ketorolac, and sildenafil    Allergies:  No Known Allergies    Review of Systems   All systems were reviewed and negative except for: Right upper extremity pain, swelling, erythema    Objective   Objective     Vitals:   Temp:  [98.1 °F (36.7 °C)] 98.1 °F (36.7 °C)  Heart Rate:  [] 111  Resp:  [20] 20  BP: ()/(62-76) 109/67    Physical Exam    Constitutional: Awake, alert, no acute distress   Eyes: Pupils equal, sclerae anicteric, no conjunctival injection   HENT: NCAT, mucous membranes moist   Neck: Supple, no thyromegaly, no lymphadenopathy, trachea midline   Respiratory: Clear to auscultation bilaterally, nonlabored respirations     Cardiovascular: RRR, no murmurs, rubs, or gallops, palpable pedal pulses bilaterally   Gastrointestinal: Positive bowel sounds, soft, nontender, nondistended   Musculoskeletal: Limited range of motion right upper extremity   Psychiatric: Appropriate affect, cooperative   Neurologic: Oriented x 3, strength symmetric in all extremities, Cranial Nerves grossly intact to confrontation, speech clear   Skin: Right upper extremity swelling, warmth, erythema    Result Review    Result Review:  I have personally reviewed the results from the time of this admission to 7/11/2023 02:29 EDT and agree with these findings:  [x]  Laboratory  []  Microbiology  [x]  Radiology  []  EKG/Telemetry   []  Cardiology/Vascular   []  Pathology  []  Old records  []  Other:      Assessment & Plan   Assessment / Plan     Assessment/Plan:   right upper extremity cellulitis: Continue vancomycin and Zosyn.  Consider orthopedic consultation.  Supportive care and pain control.  Follow cultures already obtained.      DVT prophylaxis:  SCDs     CODE STATUS:    Code Status (Patient has no pulse and is not breathing): CPR (Attempt to Resuscitate)  Medical Interventions (Patient has pulse or is breathing): Full Support      Admission Status:  I believe this patient meets observation status.    Electronically signed by Trino Curiel Jr, MD, 07/11/23, 2:29 AM EDT.

## 2023-07-11 NOTE — PLAN OF CARE
Goal Outcome Evaluation:         New admit this shift. Ivfs and antibiotics started as ordered. Cellulitis to right arm marked where possible. Pt oriented when awake but has been very drowsy and much difficulty staying awake. Wife at bedside. No extra pain meds given once admitted, as pt has been asleep.

## 2023-07-11 NOTE — ED NOTES
Pt was here last week due to falling off bucket truck, hurting his right arm. Pt was cleared but is now having redness and swelling in right arm and is lethargic and hard to arouse. Pt's wife states he did not take any pain medication today.

## 2023-07-11 NOTE — PROGRESS NOTES
"[facility] Clinical Pharmacy Services: Vancomycin Pharmacokinetic Initial Consult Note    Yfn Ray is a 52 y.o. male who is on day 1 of pharmacy to dose vancomycin for Sepsis.    Consult Information  Consulting Provider: Moisés  Planned Duration of Therapy: 10 days   Was Patient Receiving Prior to Admission/Consult?: No  Loading Dose Ordered or Given: 1750 mg on 23 at 0500  PK/PD Target: -600 mg/L.hr   Relevant ID History: no  Other Antimicrobials: Piperacillin/Tazobactam    Imaging Reviewed?: Yes    Microbiology Data  MRSA PCR performed: 23; Result: Pending  Culture/Source:       Vitals/Labs  Ht: 180.3 cm (71\"); Wt:    Temp (24hrs), Av.3 °F (36.8 °C), Min:98.1 °F (36.7 °C), Max:98.4 °F (36.9 °C)   Estimated Creatinine Clearance: 119.4 mL/min (by C-G formula based on SCr of 0.88 mg/dL).       Results from last 7 days   Lab Units 07/10/23  2154   CREATININE mg/dL 0.88   WBC 10*3/mm3 11.86*     Assessment/Plan:    Vancomycin Dose:  1250 mg IV every 12 hours; which provides the following predicted parameters:  Exposure target: AUC24 (range)400-600 mg/L.hr   AUC24,ss: 555 mg/L.hr  PAUC*: 81 %  Ctrough,ss: 17.2 mg/L  Pconc*: 38 %  Tox.: 13 %  Vanc Random ordered for 23 at 1400  Patient has order for Complete Metabolic Panel    Pharmacy will follow patient's kidney function and will adjust doses and obtain levels as necessary. Thank you for involving pharmacy in this patient's care. Please contact pharmacy with any questions or concerns.                           Carter Best formerly [facility]  Clinical Pharmacist   "

## 2023-07-12 ENCOUNTER — APPOINTMENT (OUTPATIENT)
Dept: MRI IMAGING | Facility: HOSPITAL | Age: 53
DRG: 548 | End: 2023-07-12
Payer: COMMERCIAL

## 2023-07-12 LAB
ALBUMIN SERPL-MCNC: 2.3 G/DL (ref 3.5–5.2)
ALBUMIN/GLOB SERPL: 0.7 G/DL
ALP SERPL-CCNC: 449 U/L (ref 39–117)
ALT SERPL W P-5'-P-CCNC: 40 U/L (ref 1–41)
ANION GAP SERPL CALCULATED.3IONS-SCNC: 9 MMOL/L (ref 5–15)
AST SERPL-CCNC: 24 U/L (ref 1–40)
BILIRUB SERPL-MCNC: 0.6 MG/DL (ref 0–1.2)
BUN SERPL-MCNC: 20 MG/DL (ref 6–20)
BUN/CREAT SERPL: 22.2 (ref 7–25)
CALCIUM SPEC-SCNC: 8.7 MG/DL (ref 8.6–10.5)
CHLORIDE SERPL-SCNC: 101 MMOL/L (ref 98–107)
CO2 SERPL-SCNC: 22 MMOL/L (ref 22–29)
CREAT SERPL-MCNC: 0.9 MG/DL (ref 0.76–1.27)
DEPRECATED RDW RBC AUTO: 45.5 FL (ref 37–54)
EGFRCR SERPLBLD CKD-EPI 2021: 102.8 ML/MIN/1.73
ERYTHROCYTE [DISTWIDTH] IN BLOOD BY AUTOMATED COUNT: 13.2 % (ref 12.3–15.4)
GLOBULIN UR ELPH-MCNC: 3.5 GM/DL
GLUCOSE SERPL-MCNC: 116 MG/DL (ref 65–99)
HCT VFR BLD AUTO: 37.4 % (ref 37.5–51)
HGB BLD-MCNC: 12.4 G/DL (ref 13–17.7)
MCH RBC QN AUTO: 31.1 PG (ref 26.6–33)
MCHC RBC AUTO-ENTMCNC: 33.2 G/DL (ref 31.5–35.7)
MCV RBC AUTO: 93.7 FL (ref 79–97)
PLATELET # BLD AUTO: 271 10*3/MM3 (ref 140–450)
PMV BLD AUTO: 9.7 FL (ref 6–12)
POTASSIUM SERPL-SCNC: 4.1 MMOL/L (ref 3.5–5.2)
PROT SERPL-MCNC: 5.8 G/DL (ref 6–8.5)
RBC # BLD AUTO: 3.99 10*6/MM3 (ref 4.14–5.8)
SODIUM SERPL-SCNC: 132 MMOL/L (ref 136–145)
VANCOMYCIN SERPL-MCNC: 13.7 MCG/ML (ref 5–40)
WBC NRBC COR # BLD: 15.54 10*3/MM3 (ref 3.4–10.8)

## 2023-07-12 PROCEDURE — A9577 INJ MULTIHANCE: HCPCS | Performed by: INTERNAL MEDICINE

## 2023-07-12 PROCEDURE — 25010000002 HYDROMORPHONE 1 MG/ML SOLUTION: Performed by: INTERNAL MEDICINE

## 2023-07-12 PROCEDURE — 80202 ASSAY OF VANCOMYCIN: CPT | Performed by: INTERNAL MEDICINE

## 2023-07-12 PROCEDURE — 99233 SBSQ HOSP IP/OBS HIGH 50: CPT | Performed by: INTERNAL MEDICINE

## 2023-07-12 PROCEDURE — 80053 COMPREHEN METABOLIC PANEL: CPT | Performed by: INTERNAL MEDICINE

## 2023-07-12 PROCEDURE — 73223 MRI JOINT UPR EXTR W/O&W/DYE: CPT

## 2023-07-12 PROCEDURE — 0 GADOBENATE DIMEGLUMINE 529 MG/ML SOLUTION: Performed by: INTERNAL MEDICINE

## 2023-07-12 PROCEDURE — 25010000002 PIPERACILLIN SOD-TAZOBACTAM PER 1 G: Performed by: INTERNAL MEDICINE

## 2023-07-12 PROCEDURE — 85027 COMPLETE CBC AUTOMATED: CPT | Performed by: INTERNAL MEDICINE

## 2023-07-12 PROCEDURE — 25010000002 VANCOMYCIN 5 G RECONSTITUTED SOLUTION: Performed by: INTERNAL MEDICINE

## 2023-07-12 RX ORDER — NALOXONE HCL 0.4 MG/ML
0.4 VIAL (ML) INJECTION
Status: DISCONTINUED | OUTPATIENT
Start: 2023-07-12 | End: 2023-07-24 | Stop reason: HOSPADM

## 2023-07-12 RX ORDER — OXYCODONE AND ACETAMINOPHEN 7.5; 325 MG/1; MG/1
1 TABLET ORAL EVERY 6 HOURS PRN
Status: DISCONTINUED | OUTPATIENT
Start: 2023-07-12 | End: 2023-07-13

## 2023-07-12 RX ADMIN — PIPERACILLIN AND TAZOBACTAM 3.38 G: 3; .375 INJECTION, POWDER, LYOPHILIZED, FOR SOLUTION INTRAVENOUS at 00:29

## 2023-07-12 RX ADMIN — VANCOMYCIN HYDROCHLORIDE 1250 MG: 5 INJECTION, POWDER, LYOPHILIZED, FOR SOLUTION INTRAVENOUS at 17:59

## 2023-07-12 RX ADMIN — SODIUM CHLORIDE, POTASSIUM CHLORIDE, SODIUM LACTATE AND CALCIUM CHLORIDE 100 ML/HR: 600; 310; 30; 20 INJECTION, SOLUTION INTRAVENOUS at 04:50

## 2023-07-12 RX ADMIN — OXYCODONE HYDROCHLORIDE AND ACETAMINOPHEN 1 TABLET: 7.5; 325 TABLET ORAL at 17:59

## 2023-07-12 RX ADMIN — HYDROMORPHONE HYDROCHLORIDE 0.5 MG: 1 INJECTION, SOLUTION INTRAMUSCULAR; INTRAVENOUS; SUBCUTANEOUS at 04:51

## 2023-07-12 RX ADMIN — HYDROMORPHONE HYDROCHLORIDE 0.5 MG: 1 INJECTION, SOLUTION INTRAMUSCULAR; INTRAVENOUS; SUBCUTANEOUS at 22:35

## 2023-07-12 RX ADMIN — GADOBENATE DIMEGLUMINE 15 ML: 529 INJECTION, SOLUTION INTRAVENOUS at 22:03

## 2023-07-12 RX ADMIN — ACETAMINOPHEN 650 MG: 325 TABLET ORAL at 10:56

## 2023-07-12 RX ADMIN — VANCOMYCIN HYDROCHLORIDE 1250 MG: 5 INJECTION, POWDER, LYOPHILIZED, FOR SOLUTION INTRAVENOUS at 05:27

## 2023-07-12 RX ADMIN — Medication 10 ML: at 22:35

## 2023-07-12 RX ADMIN — HYDROMORPHONE HYDROCHLORIDE 0.5 MG: 1 INJECTION, SOLUTION INTRAMUSCULAR; INTRAVENOUS; SUBCUTANEOUS at 13:10

## 2023-07-12 NOTE — PLAN OF CARE
Goal Outcome Evaluation:         Temp up to 101+ at start of shift tonight. Tylenol brought down. Dilaudid x 2 doses for c/o severe pain, 8-9/10. Pt remains quite lethargic. Rouses easily, but nods off to sleep again even while speaking with staff. Wife assisted to toilet x 2 tonight. Right arm redness/heat/edema unchanged. Left arm is beginning to have noted edema as well.   ST on monitor, 100-120's.

## 2023-07-12 NOTE — PROGRESS NOTES
[facility]   Hospitalist Progress Note  Date: 2023  Patient Name: Yfn Ray  : 1970  MRN: [id number]  Date of admission: 7/10/2023      Subjective   Subjective     Chief Complaint: right arm infection     Summary:  52 y.o. male no past medical history presents to the emergency department for evaluation of right upper extremity pain.  Patient states he fell off a truck 5 days ago and was seen initially and discharged outpatient with outpatient follow-up.  States that yesterday he noted his arm swelling and turning red which got progressively worse prompting him to seek further medical attention.  He denies any fevers, chills, sweats, nausea, vomiting, chest pain, shortness of breath, palpitations, abdominal pain diarrhea constipation or dysuria, weakness.  In the emergency department felt to have cellulitis and started on vancomycin and Zosyn and will be admitted for ongoing monitoring management.     Interval Followup:   Patient continues to have significant pain in his right arm, patient's wife suspects a rotator cuff tear given the extreme fall that he had  Patient's blood cultures are growing MRSA.  Patient remains on vancomycin    Review of Systems   All systems were reviewed and negative except for: right shoulder pain and erythema     Objective   Objective     Vitals:   Temp:  [98.1 °F (36.7 °C)-101.6 °F (38.7 °C)] 98.9 °F (37.2 °C)  Heart Rate:  [] 90  Resp:  [16-20] 16  BP: (118-148)/(75-86) 126/84  Flow (L/min):  [1-2] 1  Physical Exam    Constitutional: Awake, alert, mild distress from the pain    Eyes: Pupils equal, sclerae anicteric, no conjunctival injection   HENT: NCAT, mucous membranes moist   Neck: Supple, no thyromegaly, no lymphadenopathy, trachea midline   Respiratory: Clear to auscultation bilaterally, nonlabored respirations    Cardiovascular: RRR, no murmurs, rubs, or gallops, palpable pedal pulses bilaterally   Gastrointestinal: Positive bowel sounds, soft,  nontender, nondistended   Musculoskeletal: Full range of motion except for right shoulder which she is unable to move   Psychiatric: Appropriate affect, cooperative   Neurologic: Oriented x 3, strength symmetric in all extremities, Cranial Nerves grossly intact to confrontation, speech clear   Skin: right axillary region shows erythema and edema. Very TTP    Result Review    Result Review:  I have personally reviewed the results from the time of this admission to 7/12/2023 08:35 EDT and agree with these findings:  [x]  Laboratory  BMP          7/10/2023    21:54 7/12/2023    05:22   BMP   BUN 18  20    Creatinine 0.88  0.90    Sodium 136  132    Potassium 4.5  4.1    Chloride 100  101    CO2 22.5  22.0    Calcium 9.3  8.7      CBC          7/10/2023    21:54 7/12/2023    05:22   CBC   WBC 11.86  15.54    RBC 4.66  3.99    Hemoglobin 14.5  12.4    Hematocrit 44.0  37.4    MCV 94.4  93.7    MCH 31.1  31.1    MCHC 33.0  33.2    RDW 12.9  13.2    Platelets 241  271        [x]  Microbiology  Blood Culture   Date Value Ref Range Status   07/10/2023 Staphylococcus aureus, MRSA (C)  Preliminary     Comment:       Infectious disease consultation is highly recommended to rule out distant foci of infection.  Methicillin resistant Staphylococcus aureus, Patient may be an isolation risk.     BCID, PCR   Date Value Ref Range Status   07/10/2023 (C) Negative by BCID PCR. Culture to Follow. Final    Staph aureus. mecA/C and MREJ (methicillin resistance gene) detected. Identification by BCID2 PCR.     No results found for: CULTURES, HSVCX, URCX  No results found for: EYECULTURE, GCCX, HSVCULTURE, LABHSV  No results found for: LEGIONELLA, MRSACX, MUMPSCX, MYCOPLASCX  No results found for: NOCARDIACX, STOOLCX  No results found for: THROATCX, UNSTIMCULT, URINECX, CULTURE, VZVCULTUR  No results found for: VIRALCULTU, WOUNDCX    []  Radiology  []  EKG/Telemetry   []  Cardiology/Vascular   []  Pathology  []  Old records  []   Other:    Assessment & Plan   Assessment / Plan     Assessment/Plan:  MRSA bacteremia  MRSA cellulitis of right shoulder  Traumatic injury of right shoulder, concern for rotator cuff tear    PLAN  -- Continue patient on IV vancomycin.  We will repeat blood cultures tomorrow  --Obtain echocardiogram.  May need cardiology evaluation for TAYLOR  --Continue pain control.  We will add oral pain medication  --Obtain MRI of right shoulder given inability to move it.     Discussed plan with RN.    DVT prophylaxis:  Mechanical DVT prophylaxis orders are present.    CODE STATUS:   Code Status (Patient has no pulse and is not breathing): CPR (Attempt to Resuscitate)  Medical Interventions (Patient has pulse or is breathing): Full Support        Electronically signed by Raj Allen DO, 07/12/23, 8:35 AM EDT.

## 2023-07-12 NOTE — PLAN OF CARE
Goal Outcome Evaluation:                      Complaints of pain, see eMAR. Continues on IV antibiotics. Currently resting with family bedside.

## 2023-07-12 NOTE — PROGRESS NOTES
"[facility] Clinical Pharmacy Services: Vancomycin Monitoring Note    Yfn Ray is a 52 y.o. male who is on day 2/10 of pharmacy to dose vancomycin for Sepsis.    Previous Vancomycin Dose:   1250 mg IV every  12  hours  Imaging Reviewed?: Yes  Updated Cultures and Sensitivities:   7/10 Blood cx : MRSA   MRSA PCR +    Vitals/Labs  Ht: 180.3 cm (71\"); Wt:     Temp (24hrs), Av.8 °F (37.7 °C), Min:98.1 °F (36.7 °C), Max:101.6 °F (38.7 °C)   Estimated Creatinine Clearance: 116.8 mL/min (by C-G formula based on SCr of 0.9 mg/dL).       Results from last 7 days   Lab Units 23  1402 23  0522 07/10/23  2154   VANCOMYCIN RM mcg/mL 13.70  --   --    CREATININE mg/dL  --  0.90 0.88   WBC 10*3/mm3  --  15.54* 11.86*     Assessment/Plan  Vancomycin random was 13.7. Will continue current regimen.   Current Vancomycin Dose:  1250 mg IV every 12 hours; which provides the following predicted parameters:  AUC24,ss: 496 mg/L.hr  PAUC*: 85 %  Ctrough,ss: 14.9 mg/L  Pconc*: 19 %  Tox.: 10 %  Will need follow-up level toward end of week   We will continue to monitor patient changes and renal function     Thank you for involving pharmacy in this patient's care. Please contact pharmacy with any questions or concerns.    Jasmyn Andrew [facility]  Clinical Pharmacist  "

## 2023-07-13 ENCOUNTER — APPOINTMENT (OUTPATIENT)
Dept: CARDIOLOGY | Facility: HOSPITAL | Age: 53
DRG: 548 | End: 2023-07-13
Payer: COMMERCIAL

## 2023-07-13 ENCOUNTER — HOSPITAL ENCOUNTER (OUTPATIENT)
Facility: HOSPITAL | Age: 53
Setting detail: HOSPITAL OUTPATIENT SURGERY
DRG: 548 | End: 2023-07-13
Attending: STUDENT IN AN ORGANIZED HEALTH CARE EDUCATION/TRAINING PROGRAM | Admitting: STUDENT IN AN ORGANIZED HEALTH CARE EDUCATION/TRAINING PROGRAM
Payer: COMMERCIAL

## 2023-07-13 ENCOUNTER — APPOINTMENT (OUTPATIENT)
Dept: INTERVENTIONAL RADIOLOGY/VASCULAR | Facility: HOSPITAL | Age: 53
DRG: 548 | End: 2023-07-13
Payer: COMMERCIAL

## 2023-07-13 PROBLEM — S49.91XA SHOULDER INJURY, RIGHT, INITIAL ENCOUNTER: Status: ACTIVE | Noted: 2023-07-13

## 2023-07-13 LAB
ANION GAP SERPL CALCULATED.3IONS-SCNC: 8.7 MMOL/L (ref 5–15)
APPEARANCE FLD: ABNORMAL
BACTERIA SPEC AEROBE CULT: ABNORMAL
BACTERIA SPEC AEROBE CULT: ABNORMAL
BH CV ECHO MEAS - AO MAX PG: 10 MMHG
BH CV ECHO MEAS - AO MEAN PG: 6.5 MMHG
BH CV ECHO MEAS - AO ROOT DIAM: 3.1 CM
BH CV ECHO MEAS - AO V2 MAX: 161 CM/SEC
BH CV ECHO MEAS - AO V2 VTI: 30.8 CM
BH CV ECHO MEAS - AVA(I,D): 1.18 CM2
BH CV ECHO MEAS - EDV(CUBED): 283 ML
BH CV ECHO MEAS - EDV(MOD-SP2): 141 ML
BH CV ECHO MEAS - EDV(MOD-SP4): 205 ML
BH CV ECHO MEAS - EF(MOD-BP): 16.4 %
BH CV ECHO MEAS - EF(MOD-SP2): 19.9 %
BH CV ECHO MEAS - EF(MOD-SP4): 17.6 %
BH CV ECHO MEAS - ESV(CUBED): 200.8 ML
BH CV ECHO MEAS - ESV(MOD-SP2): 113 ML
BH CV ECHO MEAS - ESV(MOD-SP4): 169 ML
BH CV ECHO MEAS - FS: 10.8 %
BH CV ECHO MEAS - IVS/LVPW: 0.88 CM
BH CV ECHO MEAS - IVSD: 0.95 CM
BH CV ECHO MEAS - LA DIMENSION: 4.3 CM
BH CV ECHO MEAS - LAT PEAK E' VEL: 11.9 CM/SEC
BH CV ECHO MEAS - LV DIASTOLIC VOL/BSA (35-75): 99.6 CM2
BH CV ECHO MEAS - LV MASS(C)D: 294 GRAMS
BH CV ECHO MEAS - LV MAX PG: 1.88 MMHG
BH CV ECHO MEAS - LV MEAN PG: 0.96 MMHG
BH CV ECHO MEAS - LV SYSTOLIC VOL/BSA (12-30): 82.1 CM2
BH CV ECHO MEAS - LV V1 MAX: 68.6 CM/SEC
BH CV ECHO MEAS - LV V1 VTI: 11.7 CM
BH CV ECHO MEAS - LVIDD: 6.6 CM
BH CV ECHO MEAS - LVIDS: 5.9 CM
BH CV ECHO MEAS - LVOT AREA: 3.1 CM2
BH CV ECHO MEAS - LVOT DIAM: 1.99 CM
BH CV ECHO MEAS - LVPWD: 1.08 CM
BH CV ECHO MEAS - MED PEAK E' VEL: 7.5 CM/SEC
BH CV ECHO MEAS - MR MAX PG: 71.5 MMHG
BH CV ECHO MEAS - MR MAX VEL: 422.7 CM/SEC
BH CV ECHO MEAS - MR MEAN PG: 47.3 MMHG
BH CV ECHO MEAS - MR MEAN VEL: 324.4 CM/SEC
BH CV ECHO MEAS - MR VTI: 132.8 CM
BH CV ECHO MEAS - MV A MAX VEL: 102.2 CM/SEC
BH CV ECHO MEAS - MV DEC SLOPE: 607.8 CM/SEC2
BH CV ECHO MEAS - MV DEC TIME: 0.14 MSEC
BH CV ECHO MEAS - MV E MAX VEL: 79 CM/SEC
BH CV ECHO MEAS - MV E/A: 0.77
BH CV ECHO MEAS - MV MAX PG: 5 MMHG
BH CV ECHO MEAS - MV MEAN PG: 2.34 MMHG
BH CV ECHO MEAS - MV P1/2T: 55.8 MSEC
BH CV ECHO MEAS - MV V2 VTI: 26.5 CM
BH CV ECHO MEAS - MVA(P1/2T): 3.9 CM2
BH CV ECHO MEAS - MVA(VTI): 1.37 CM2
BH CV ECHO MEAS - RVDD: 2.37 CM
BH CV ECHO MEAS - SI(MOD-SP2): 13.6 ML/M2
BH CV ECHO MEAS - SI(MOD-SP4): 17.5 ML/M2
BH CV ECHO MEAS - SV(LVOT): 36.3 ML
BH CV ECHO MEAS - SV(MOD-SP2): 28 ML
BH CV ECHO MEAS - SV(MOD-SP4): 36 ML
BH CV ECHO MEASUREMENTS AVERAGE E/E' RATIO: 8.14
BUN SERPL-MCNC: 19 MG/DL (ref 6–20)
BUN/CREAT SERPL: 24.4 (ref 7–25)
CALCIUM SPEC-SCNC: 8.6 MG/DL (ref 8.6–10.5)
CHLORIDE SERPL-SCNC: 98 MMOL/L (ref 98–107)
CO2 SERPL-SCNC: 24.3 MMOL/L (ref 22–29)
COLOR FLD: ABNORMAL
CREAT SERPL-MCNC: 0.78 MG/DL (ref 0.76–1.27)
DEPRECATED RDW RBC AUTO: 45.7 FL (ref 37–54)
EGFRCR SERPLBLD CKD-EPI 2021: 107.3 ML/MIN/1.73
ERYTHROCYTE [DISTWIDTH] IN BLOOD BY AUTOMATED COUNT: 13.3 % (ref 12.3–15.4)
GLUCOSE SERPL-MCNC: 101 MG/DL (ref 65–99)
GRAM STN SPEC: ABNORMAL
HCT VFR BLD AUTO: 36.9 % (ref 37.5–51)
HGB BLD-MCNC: 12.3 G/DL (ref 13–17.7)
ISOLATED FROM: ABNORMAL
ISOLATED FROM: ABNORMAL
IVRT: 77 MSEC
LEFT ATRIUM VOLUME INDEX: 30.6 ML/M2
LYMPHOCYTES NFR FLD MANUAL: 4 %
MACROPHAGE FLUID: 3 %
MAGNESIUM SERPL-MCNC: 2 MG/DL (ref 1.6–2.6)
MCH RBC QN AUTO: 30.9 PG (ref 26.6–33)
MCHC RBC AUTO-ENTMCNC: 33.3 G/DL (ref 31.5–35.7)
MCV RBC AUTO: 92.7 FL (ref 79–97)
MONOCYTES NFR FLD: 3 %
NEUTROPHILS NFR FLD MANUAL: 90 %
NUC CELL # FLD: ABNORMAL /MM3
PLATELET # BLD AUTO: 297 10*3/MM3 (ref 140–450)
PMV BLD AUTO: 9.6 FL (ref 6–12)
POTASSIUM SERPL-SCNC: 4.6 MMOL/L (ref 3.5–5.2)
RBC # BLD AUTO: 3.98 10*6/MM3 (ref 4.14–5.8)
RBC # FLD AUTO: ABNORMAL /MM3
SODIUM SERPL-SCNC: 131 MMOL/L (ref 136–145)
WBC NRBC COR # BLD: 22.97 10*3/MM3 (ref 3.4–10.8)

## 2023-07-13 PROCEDURE — 87186 SC STD MICRODIL/AGAR DIL: CPT | Performed by: INTERNAL MEDICINE

## 2023-07-13 PROCEDURE — 83735 ASSAY OF MAGNESIUM: CPT | Performed by: INTERNAL MEDICINE

## 2023-07-13 PROCEDURE — 93306 TTE W/DOPPLER COMPLETE: CPT

## 2023-07-13 PROCEDURE — 25010000002 HYDROMORPHONE 1 MG/ML SOLUTION: Performed by: INTERNAL MEDICINE

## 2023-07-13 PROCEDURE — 93306 TTE W/DOPPLER COMPLETE: CPT | Performed by: INTERNAL MEDICINE

## 2023-07-13 PROCEDURE — 89051 BODY FLUID CELL COUNT: CPT | Performed by: INTERNAL MEDICINE

## 2023-07-13 PROCEDURE — 85027 COMPLETE CBC AUTOMATED: CPT | Performed by: INTERNAL MEDICINE

## 2023-07-13 PROCEDURE — 87070 CULTURE OTHR SPECIMN AEROBIC: CPT | Performed by: INTERNAL MEDICINE

## 2023-07-13 PROCEDURE — 77002 NEEDLE LOCALIZATION BY XRAY: CPT

## 2023-07-13 PROCEDURE — 25010000002 VANCOMYCIN 5 G RECONSTITUTED SOLUTION: Performed by: INTERNAL MEDICINE

## 2023-07-13 PROCEDURE — 80048 BASIC METABOLIC PNL TOTAL CA: CPT | Performed by: INTERNAL MEDICINE

## 2023-07-13 PROCEDURE — 99233 SBSQ HOSP IP/OBS HIGH 50: CPT | Performed by: INTERNAL MEDICINE

## 2023-07-13 PROCEDURE — 87040 BLOOD CULTURE FOR BACTERIA: CPT | Performed by: INTERNAL MEDICINE

## 2023-07-13 PROCEDURE — 87147 CULTURE TYPE IMMUNOLOGIC: CPT | Performed by: INTERNAL MEDICINE

## 2023-07-13 PROCEDURE — 87205 SMEAR GRAM STAIN: CPT | Performed by: INTERNAL MEDICINE

## 2023-07-13 RX ORDER — OXYCODONE HYDROCHLORIDE AND ACETAMINOPHEN 5; 325 MG/1; MG/1
1 TABLET ORAL EVERY 6 HOURS PRN
Status: DISCONTINUED | OUTPATIENT
Start: 2023-07-13 | End: 2023-07-14

## 2023-07-13 RX ORDER — VANCOMYCIN/0.9 % SOD CHLORIDE 1.5G/250ML
1500 PLASTIC BAG, INJECTION (ML) INTRAVENOUS EVERY 12 HOURS
Status: DISCONTINUED | OUTPATIENT
Start: 2023-07-13 | End: 2023-07-14

## 2023-07-13 RX ORDER — SODIUM CHLORIDE 9 MG/ML
75 INJECTION, SOLUTION INTRAVENOUS CONTINUOUS
Status: DISCONTINUED | OUTPATIENT
Start: 2023-07-13 | End: 2023-07-18

## 2023-07-13 RX ADMIN — ACETAMINOPHEN 650 MG: 325 TABLET ORAL at 23:59

## 2023-07-13 RX ADMIN — ACETAMINOPHEN 650 MG: 325 TABLET ORAL at 20:03

## 2023-07-13 RX ADMIN — SODIUM CHLORIDE 75 ML/HR: 9 INJECTION, SOLUTION INTRAVENOUS at 15:18

## 2023-07-13 RX ADMIN — Medication 10 ML: at 09:15

## 2023-07-13 RX ADMIN — HYDROMORPHONE HYDROCHLORIDE 0.5 MG: 1 INJECTION, SOLUTION INTRAMUSCULAR; INTRAVENOUS; SUBCUTANEOUS at 17:56

## 2023-07-13 RX ADMIN — HYDROMORPHONE HYDROCHLORIDE 0.5 MG: 1 INJECTION, SOLUTION INTRAMUSCULAR; INTRAVENOUS; SUBCUTANEOUS at 06:37

## 2023-07-13 RX ADMIN — OXYCODONE HYDROCHLORIDE AND ACETAMINOPHEN 1 TABLET: 7.5; 325 TABLET ORAL at 02:58

## 2023-07-13 RX ADMIN — VANCOMYCIN HYDROCHLORIDE 1250 MG: 5 INJECTION, POWDER, LYOPHILIZED, FOR SOLUTION INTRAVENOUS at 06:37

## 2023-07-13 RX ADMIN — Medication 10 ML: at 20:03

## 2023-07-13 RX ADMIN — VANCOMYCIN HYDROCHLORIDE 1500 MG: 5 INJECTION, POWDER, LYOPHILIZED, FOR SOLUTION INTRAVENOUS at 17:54

## 2023-07-13 RX ADMIN — HYDROMORPHONE HYDROCHLORIDE 0.5 MG: 1 INJECTION, SOLUTION INTRAMUSCULAR; INTRAVENOUS; SUBCUTANEOUS at 21:58

## 2023-07-13 RX ADMIN — SENNOSIDES AND DOCUSATE SODIUM 2 TABLET: 50; 8.6 TABLET ORAL at 20:03

## 2023-07-13 NOTE — PLAN OF CARE
Goal Outcome Evaluation:         Vss. Dilaudid and Percocet for pain control. Pt rested well. No other changes. Will continue plan of care. Wife at bedside.

## 2023-07-13 NOTE — PROGRESS NOTES
"[facility] Clinical Pharmacy Services: Vancomycin Monitoring Note    Yfn Ray is a 52 y.o. male who is on day 3/TBD of pharmacy to dose vancomycin for Bacteremia and Sepsis.    Previous Vancomycin Dose:   1250 mg IV every  12  hours  Imaging Reviewed?: Yes  Updated Cultures and Sensitivities:   7/10 Blood cx : MRSA   MRSA PCR +    Vitals/Labs  Ht: 180.3 cm (71\"); Wt:     Temp (24hrs), Av.9 °F (37.2 °C), Min:98.1 °F (36.7 °C), Max:99.9 °F (37.7 °C)   Estimated Creatinine Clearance: 134.8 mL/min (by C-G formula based on SCr of 0.78 mg/dL).       Results from last 7 days   Lab Units 23  0522 23  1402 23  0522 07/10/23  2154   VANCOMYCIN RM mcg/mL  --  13.70  --   --    CREATININE mg/dL 0.78  --  0.90 0.88   WBC 10*3/mm3 22.97*  --  15.54* 11.86*     Assessment/Plan    Current Vancomycin Dose:  1500 mg IV every 12 hours; which provides the following predicted parameters:  AUC24,ss: 531 mg/L.hr  PAUC*: 91 %  Ctrough,ss: 15.5 mg/L  Pconc*: 24 %  Tox.: 11 %  Next level prior to 4th dose of new regimen.   We will continue to monitor patient changes and renal function     Thank you for involving pharmacy in this patient's care. Please contact pharmacy with any questions or concerns.    Jasmyn Andrew, [facility]  Clinical Pharmacist    "

## 2023-07-13 NOTE — PROGRESS NOTES
[facility]   Hospitalist Progress Note  Date: 2023  Patient Name: Yfn Ray  : 1970  MRN: [id number]  Date of admission: 7/10/2023      Subjective   Subjective     Chief Complaint: right arm infection     Summary:  52 y.o. male no past medical history presents to the emergency department for evaluation of right upper extremity pain.  Patient states he fell off a truck 5 days ago and was seen initially and discharged outpatient with outpatient follow-up.  States that yesterday he noted his arm swelling and turning red which got progressively worse prompting him to seek further medical attention.  He denies any fevers, chills, sweats, nausea, vomiting, chest pain, shortness of breath, palpitations, abdominal pain diarrhea constipation or dysuria, weakness.  In the emergency department felt to have cellulitis and started on vancomycin and Zosyn and will be admitted for ongoing monitoring management.     Interval Followup:   Patient's erythema of the arm has improved however he has significant right arm/shoulder pain and is unable to move it at all.  MRI of right shoulder has been done however it has not been read yet  Repeat blood cultures today.  Patient remains on IV antibiotics    Patient is afebrile.  Patient's white blood cell count has jumped up to 22,000 today    Review of Systems   All systems were reviewed and negative except for: right shoulder pain and erythema     Objective   Objective     Vitals:   Temp:  [98.1 °F (36.7 °C)-100.2 °F (37.9 °C)] 98.8 °F (37.1 °C)  Heart Rate:  [] 96  Resp:  [16-20] 18  BP: (119-133)/(60-85) 119/74  Flow (L/min):  [2] 2  Physical Exam    Constitutional: Sleeping in bed. Wife at the bedside    Eyes: Pupils equal, sclerae anicteric, no conjunctival injection   HENT: NCAT, mucous membranes moist   Neck: Supple, no thyromegaly, no lymphadenopathy, trachea midline   Respiratory: Clear to auscultation bilaterally, nonlabored respirations     Cardiovascular: RRR, no murmurs, rubs, or gallops, palpable pedal pulses bilaterally   Gastrointestinal: Positive bowel sounds, soft, nontender, nondistended   Musculoskeletal: Full range of motion except for right shoulder which she is unable to move   Psychiatric: Appropriate affect, cooperative   Neurologic: Oriented x 3, strength symmetric in all extremities, Cranial Nerves grossly intact to confrontation, speech clear   Skin: right axillary region shows erythema and edema. Very TTP    Result Review    Result Review:  I have personally reviewed the results from the time of this admission to 7/13/2023 08:42 EDT and agree with these findings:  [x]  Laboratory  BMP          7/10/2023    21:54 7/12/2023    05:22 7/13/2023    05:22   BMP   BUN 18  20  19    Creatinine 0.88  0.90  0.78    Sodium 136  132  131    Potassium 4.5  4.1  4.6    Chloride 100  101  98    CO2 22.5  22.0  24.3    Calcium 9.3  8.7  8.6      CBC          7/10/2023    21:54 7/12/2023    05:22 7/13/2023    05:22   CBC   WBC 11.86  15.54  22.97    RBC 4.66  3.99  3.98    Hemoglobin 14.5  12.4  12.3    Hematocrit 44.0  37.4  36.9    MCV 94.4  93.7  92.7    MCH 31.1  31.1  30.9    MCHC 33.0  33.2  33.3    RDW 12.9  13.2  13.3    Platelets 241  271  297        [x]  Microbiology  Blood Culture   Date Value Ref Range Status   07/10/2023 Staphylococcus aureus, MRSA (C)  Preliminary     Comment:       Infectious disease consultation is highly recommended to rule out distant foci of infection.  Methicillin resistant Staphylococcus aureus, Patient may be an isolation risk.     BCID, PCR   Date Value Ref Range Status   07/10/2023 (C) Negative by BCID PCR. Culture to Follow. Final    Staph aureus. mecA/C and MREJ (methicillin resistance gene) detected. Identification by BCID2 PCR.     No results found for: CULTURES, HSVCX, URCX  No results found for: EYECULTURE, GCCX, HSVCULTURE, LABHSV  No results found for: LEGIONELLA, MRSACX, MUMPSCX, MYCOPLASCX  No  results found for: NOCARDIACX, STOOLCX  No results found for: THROATCX, UNSTIMCULT, URINECX, CULTURE, VZVCULTUR  No results found for: VIRALCULTU, WOUNDCX    []  Radiology  []  EKG/Telemetry   []  Cardiology/Vascular   []  Pathology  []  Old records  []  Other:    Assessment & Plan   Assessment / Plan     Assessment/Plan:  MRSA bacteremia  MRSA cellulitis of right shoulder  Traumatic injury of right shoulder, concern for rotator cuff tear    PLAN  -- Continue patient on IV vancomycin.  We will repeat blood cultures today   --Echo is done and read is pending.  May need cardiology evaluation for TAYLOR  --Continue pain control.  We will add oral pain medication  -- MRI of right shoulder done and read is currently pending    Discussed plan with RN.    DVT prophylaxis:  Mechanical DVT prophylaxis orders are present.    CODE STATUS:   Code Status (Patient has no pulse and is not breathing): CPR (Attempt to Resuscitate)  Medical Interventions (Patient has pulse or is breathing): Full Support

## 2023-07-13 NOTE — PLAN OF CARE
Goal Outcome Evaluation:         Pt vss. Pt went down to IR for aspiration and injection. Continue plan of care

## 2023-07-14 LAB
ANION GAP SERPL CALCULATED.3IONS-SCNC: 8.8 MMOL/L (ref 5–15)
BUN SERPL-MCNC: 19 MG/DL (ref 6–20)
BUN/CREAT SERPL: 30.2 (ref 7–25)
CALCIUM SPEC-SCNC: 8.4 MG/DL (ref 8.6–10.5)
CHLORIDE SERPL-SCNC: 99 MMOL/L (ref 98–107)
CO2 SERPL-SCNC: 22.2 MMOL/L (ref 22–29)
CREAT SERPL-MCNC: 0.63 MG/DL (ref 0.76–1.27)
DEPRECATED RDW RBC AUTO: 48.4 FL (ref 37–54)
EGFRCR SERPLBLD CKD-EPI 2021: 114.5 ML/MIN/1.73
ERYTHROCYTE [DISTWIDTH] IN BLOOD BY AUTOMATED COUNT: 13.6 % (ref 12.3–15.4)
GLUCOSE SERPL-MCNC: 95 MG/DL (ref 65–99)
HCT VFR BLD AUTO: 39.1 % (ref 37.5–51)
HGB BLD-MCNC: 12.7 G/DL (ref 13–17.7)
MAGNESIUM SERPL-MCNC: 2.2 MG/DL (ref 1.6–2.6)
MCH RBC QN AUTO: 31.1 PG (ref 26.6–33)
MCHC RBC AUTO-ENTMCNC: 32.5 G/DL (ref 31.5–35.7)
MCV RBC AUTO: 95.8 FL (ref 79–97)
NT-PROBNP SERPL-MCNC: 2809 PG/ML (ref 0–900)
PLATELET # BLD AUTO: 308 10*3/MM3 (ref 140–450)
PMV BLD AUTO: 9.3 FL (ref 6–12)
POTASSIUM SERPL-SCNC: 4.3 MMOL/L (ref 3.5–5.2)
RBC # BLD AUTO: 4.08 10*6/MM3 (ref 4.14–5.8)
SODIUM SERPL-SCNC: 130 MMOL/L (ref 136–145)
WBC NRBC COR # BLD: 22.68 10*3/MM3 (ref 3.4–10.8)

## 2023-07-14 PROCEDURE — 25010000002 VANCOMYCIN 1 G RECONSTITUTED SOLUTION: Performed by: STUDENT IN AN ORGANIZED HEALTH CARE EDUCATION/TRAINING PROGRAM

## 2023-07-14 PROCEDURE — 87205 SMEAR GRAM STAIN: CPT | Performed by: STUDENT IN AN ORGANIZED HEALTH CARE EDUCATION/TRAINING PROGRAM

## 2023-07-14 PROCEDURE — 25010000002 HYDROMORPHONE 1 MG/ML SOLUTION: Performed by: NURSE ANESTHETIST, CERTIFIED REGISTERED

## 2023-07-14 PROCEDURE — 87015 SPECIMEN INFECT AGNT CONCNTJ: CPT | Performed by: STUDENT IN AN ORGANIZED HEALTH CARE EDUCATION/TRAINING PROGRAM

## 2023-07-14 PROCEDURE — 83880 ASSAY OF NATRIURETIC PEPTIDE: CPT | Performed by: INTERNAL MEDICINE

## 2023-07-14 PROCEDURE — 83735 ASSAY OF MAGNESIUM: CPT | Performed by: INTERNAL MEDICINE

## 2023-07-14 PROCEDURE — 85027 COMPLETE CBC AUTOMATED: CPT | Performed by: INTERNAL MEDICINE

## 2023-07-14 PROCEDURE — 87070 CULTURE OTHR SPECIMN AEROBIC: CPT | Performed by: STUDENT IN AN ORGANIZED HEALTH CARE EDUCATION/TRAINING PROGRAM

## 2023-07-14 PROCEDURE — 29822 SHO ARTHRS SRG LMTD DBRDMT: CPT | Performed by: STUDENT IN AN ORGANIZED HEALTH CARE EDUCATION/TRAINING PROGRAM

## 2023-07-14 PROCEDURE — 3E1U48Z IRRIGATION OF JOINTS USING IRRIGATING SUBSTANCE, PERCUTANEOUS ENDOSCOPIC APPROACH: ICD-10-PCS | Performed by: STUDENT IN AN ORGANIZED HEALTH CARE EDUCATION/TRAINING PROGRAM

## 2023-07-14 PROCEDURE — 0 MEPERIDINE PER 100 MG: Performed by: NURSE ANESTHETIST, CERTIFIED REGISTERED

## 2023-07-14 PROCEDURE — 25010000002 MIDAZOLAM PER 1MG: Performed by: ANESTHESIOLOGY

## 2023-07-14 PROCEDURE — 99222 1ST HOSP IP/OBS MODERATE 55: CPT | Performed by: STUDENT IN AN ORGANIZED HEALTH CARE EDUCATION/TRAINING PROGRAM

## 2023-07-14 PROCEDURE — 87075 CULTR BACTERIA EXCEPT BLOOD: CPT | Performed by: STUDENT IN AN ORGANIZED HEALTH CARE EDUCATION/TRAINING PROGRAM

## 2023-07-14 PROCEDURE — 25010000002 VANCOMYCIN 5 G RECONSTITUTED SOLUTION: Performed by: STUDENT IN AN ORGANIZED HEALTH CARE EDUCATION/TRAINING PROGRAM

## 2023-07-14 PROCEDURE — 87147 CULTURE TYPE IMMUNOLOGIC: CPT | Performed by: STUDENT IN AN ORGANIZED HEALTH CARE EDUCATION/TRAINING PROGRAM

## 2023-07-14 PROCEDURE — 87186 SC STD MICRODIL/AGAR DIL: CPT | Performed by: STUDENT IN AN ORGANIZED HEALTH CARE EDUCATION/TRAINING PROGRAM

## 2023-07-14 PROCEDURE — 25010000002 HYDROMORPHONE 1 MG/ML SOLUTION: Performed by: INTERNAL MEDICINE

## 2023-07-14 PROCEDURE — 29822 SHO ARTHRS SRG LMTD DBRDMT: CPT | Performed by: PHYSICIAN ASSISTANT

## 2023-07-14 PROCEDURE — 99233 SBSQ HOSP IP/OBS HIGH 50: CPT | Performed by: INTERNAL MEDICINE

## 2023-07-14 PROCEDURE — 25010000002 VANCOMYCIN 5 G RECONSTITUTED SOLUTION: Performed by: INTERNAL MEDICINE

## 2023-07-14 PROCEDURE — 80048 BASIC METABOLIC PNL TOTAL CA: CPT | Performed by: INTERNAL MEDICINE

## 2023-07-14 PROCEDURE — 97602 WOUND(S) CARE NON-SELECTIVE: CPT | Performed by: STUDENT IN AN ORGANIZED HEALTH CARE EDUCATION/TRAINING PROGRAM

## 2023-07-14 PROCEDURE — 25010000002 HYDROMORPHONE 1 MG/ML SOLUTION: Performed by: STUDENT IN AN ORGANIZED HEALTH CARE EDUCATION/TRAINING PROGRAM

## 2023-07-14 RX ORDER — SODIUM CHLORIDE, SODIUM LACTATE, POTASSIUM CHLORIDE, CALCIUM CHLORIDE 600; 310; 30; 20 MG/100ML; MG/100ML; MG/100ML; MG/100ML
9 INJECTION, SOLUTION INTRAVENOUS CONTINUOUS PRN
Status: DISCONTINUED | OUTPATIENT
Start: 2023-07-14 | End: 2023-07-24

## 2023-07-14 RX ORDER — HYDROCODONE BITARTRATE AND ACETAMINOPHEN 5; 325 MG/1; MG/1
1 TABLET ORAL ONCE AS NEEDED
Status: DISCONTINUED | OUTPATIENT
Start: 2023-07-14 | End: 2023-07-14 | Stop reason: HOSPADM

## 2023-07-14 RX ORDER — ACETAMINOPHEN 500 MG
1000 TABLET ORAL ONCE
Status: COMPLETED | OUTPATIENT
Start: 2023-07-14 | End: 2023-07-14

## 2023-07-14 RX ORDER — MIDAZOLAM HYDROCHLORIDE 2 MG/2ML
2 INJECTION, SOLUTION INTRAMUSCULAR; INTRAVENOUS ONCE
Status: COMPLETED | OUTPATIENT
Start: 2023-07-14 | End: 2023-07-14

## 2023-07-14 RX ORDER — ONDANSETRON 2 MG/ML
4 INJECTION INTRAMUSCULAR; INTRAVENOUS ONCE AS NEEDED
Status: DISCONTINUED | OUTPATIENT
Start: 2023-07-14 | End: 2023-07-14 | Stop reason: HOSPADM

## 2023-07-14 RX ORDER — PROMETHAZINE HYDROCHLORIDE 12.5 MG/1
25 TABLET ORAL ONCE AS NEEDED
Status: DISCONTINUED | OUTPATIENT
Start: 2023-07-14 | End: 2023-07-14 | Stop reason: HOSPADM

## 2023-07-14 RX ORDER — PROMETHAZINE HYDROCHLORIDE 25 MG/1
25 SUPPOSITORY RECTAL ONCE AS NEEDED
Status: DISCONTINUED | OUTPATIENT
Start: 2023-07-14 | End: 2023-07-14 | Stop reason: HOSPADM

## 2023-07-14 RX ORDER — OXYCODONE HYDROCHLORIDE AND ACETAMINOPHEN 5; 325 MG/1; MG/1
2 TABLET ORAL EVERY 4 HOURS PRN
Status: DISCONTINUED | OUTPATIENT
Start: 2023-07-14 | End: 2023-07-19 | Stop reason: SDUPTHER

## 2023-07-14 RX ORDER — OXYCODONE HYDROCHLORIDE AND ACETAMINOPHEN 5; 325 MG/1; MG/1
1 TABLET ORAL EVERY 4 HOURS PRN
Status: DISCONTINUED | OUTPATIENT
Start: 2023-07-14 | End: 2023-07-24 | Stop reason: HOSPADM

## 2023-07-14 RX ORDER — MEPERIDINE HYDROCHLORIDE 25 MG/ML
12.5 INJECTION INTRAMUSCULAR; INTRAVENOUS; SUBCUTANEOUS
Status: DISCONTINUED | OUTPATIENT
Start: 2023-07-14 | End: 2023-07-14 | Stop reason: HOSPADM

## 2023-07-14 RX ORDER — HEPARIN SODIUM 5000 [USP'U]/ML
5000 INJECTION, SOLUTION INTRAVENOUS; SUBCUTANEOUS EVERY 8 HOURS SCHEDULED
Status: DISCONTINUED | OUTPATIENT
Start: 2023-07-15 | End: 2023-07-24 | Stop reason: HOSPADM

## 2023-07-14 RX ORDER — VANCOMYCIN HYDROCHLORIDE 1 G/20ML
INJECTION, POWDER, LYOPHILIZED, FOR SOLUTION INTRAVENOUS AS NEEDED
Status: DISCONTINUED | OUTPATIENT
Start: 2023-07-14 | End: 2023-07-14 | Stop reason: HOSPADM

## 2023-07-14 RX ADMIN — HYDROMORPHONE HYDROCHLORIDE 0.5 MG: 1 INJECTION, SOLUTION INTRAMUSCULAR; INTRAVENOUS; SUBCUTANEOUS at 14:30

## 2023-07-14 RX ADMIN — MEPERIDINE HYDROCHLORIDE 12.5 MG: 25 INJECTION INTRAMUSCULAR; INTRAVENOUS; SUBCUTANEOUS at 14:15

## 2023-07-14 RX ADMIN — ACETAMINOPHEN 1000 MG: 500 TABLET ORAL at 11:04

## 2023-07-14 RX ADMIN — HYDROMORPHONE HYDROCHLORIDE 0.5 MG: 1 INJECTION, SOLUTION INTRAMUSCULAR; INTRAVENOUS; SUBCUTANEOUS at 14:52

## 2023-07-14 RX ADMIN — SODIUM CHLORIDE 75 ML/HR: 9 INJECTION, SOLUTION INTRAVENOUS at 10:27

## 2023-07-14 RX ADMIN — VANCOMYCIN HYDROCHLORIDE 1750 MG: 5 INJECTION, POWDER, LYOPHILIZED, FOR SOLUTION INTRAVENOUS at 20:17

## 2023-07-14 RX ADMIN — HYDROMORPHONE HYDROCHLORIDE 0.5 MG: 1 INJECTION, SOLUTION INTRAMUSCULAR; INTRAVENOUS; SUBCUTANEOUS at 10:26

## 2023-07-14 RX ADMIN — SODIUM CHLORIDE 75 ML/HR: 9 INJECTION, SOLUTION INTRAVENOUS at 16:55

## 2023-07-14 RX ADMIN — HYDROMORPHONE HYDROCHLORIDE 0.5 MG: 1 INJECTION, SOLUTION INTRAMUSCULAR; INTRAVENOUS; SUBCUTANEOUS at 05:42

## 2023-07-14 RX ADMIN — Medication 10 ML: at 07:37

## 2023-07-14 RX ADMIN — HYDROMORPHONE HYDROCHLORIDE 0.5 MG: 1 INJECTION, SOLUTION INTRAMUSCULAR; INTRAVENOUS; SUBCUTANEOUS at 16:51

## 2023-07-14 RX ADMIN — OXYCODONE AND ACETAMINOPHEN 2 TABLET: 5; 325 TABLET ORAL at 22:15

## 2023-07-14 RX ADMIN — ACETAMINOPHEN 650 MG: 325 TABLET ORAL at 20:16

## 2023-07-14 RX ADMIN — MIDAZOLAM HYDROCHLORIDE 2 MG: 1 INJECTION, SOLUTION INTRAMUSCULAR; INTRAVENOUS at 11:56

## 2023-07-14 RX ADMIN — Medication 10 ML: at 20:17

## 2023-07-14 RX ADMIN — VANCOMYCIN HYDROCHLORIDE 1500 MG: 5 INJECTION, POWDER, LYOPHILIZED, FOR SOLUTION INTRAVENOUS at 06:05

## 2023-07-14 NOTE — PROGRESS NOTES
[facility]   Hospitalist Progress Note  Date: 2023  Patient Name: Yfn Ray  : 1970  MRN: [id number]  Date of admission: 7/10/2023      Subjective   Subjective     Chief Complaint: right arm infection     Summary:  52 y.o. male no past medical history presents to the emergency department for evaluation of right upper extremity pain.  Patient states he fell off a truck 5 days ago and was seen initially and discharged outpatient with outpatient follow-up.  States that yesterday he noted his arm swelling and turning red which got progressively worse prompting him to seek further medical attention.  He denies any fevers, chills, sweats, nausea, vomiting, chest pain, shortness of breath, palpitations, abdominal pain diarrhea constipation or dysuria, weakness.  In the emergency department felt to have cellulitis and started on vancomycin and Zosyn and will be admitted for ongoing monitoring management.     Interval Followup:   Patient's erythema of the arm has improved however he has significant right arm/shoulder pain and is unable to move it at all.  MRI of right shoulder shows multiple abnormalities as seen in report.  However of concern is patient has abscess/fluid collection.  Patient was sent down to interventional radiology on  and the fluid was purulent in nature.  Patient is going to the OR today for washout of his shoulder.      Patient remains on IV antibiotics.  Blood cultures remain positive    Review of Systems   All systems were reviewed and negative except for: right shoulder pain and erythema     Objective   Objective     Vitals:   Temp:  [98.4 °F (36.9 °C)-101.9 °F (38.8 °C)] 98.4 °F (36.9 °C)  Heart Rate:  [] 76  Resp:  [16-18] 16  BP: (108-145)/() 145/100  Flow (L/min):  [2] 2  Physical Exam    Constitutional: Getting ready to get in wheelchair to be transported to surgery wife at the bedside    Eyes: Pupils equal, sclerae anicteric, no conjunctival  injection   HENT: NCAT, mucous membranes moist   Neck: Supple, no thyromegaly, no lymphadenopathy, trachea midline   Respiratory: Clear to auscultation bilaterally, nonlabored respirations    Cardiovascular: RRR, no murmurs, rubs, or gallops, palpable pedal pulses bilaterally   Gastrointestinal: Positive bowel sounds, soft, nontender, nondistended   Musculoskeletal: Full range of motion except for right shoulder which he is unable to move   Psychiatric: Appropriate affect, cooperative   Neurologic: Oriented x 3, strength symmetric in all extremities, Cranial Nerves grossly intact to confrontation, speech clear   Skin: right axillary region shows erythema and edema. Very TTP    Result Review    Result Review:  I have personally reviewed the results from the time of this admission to 7/14/2023 09:30 EDT and agree with these findings:  [x]  Laboratory  BMP          7/12/2023    05:22 7/13/2023    05:22 7/14/2023    05:36   BMP   BUN 20  19  19    Creatinine 0.90  0.78  0.63    Sodium 132  131  130    Potassium 4.1  4.6  4.3    Chloride 101  98  99    CO2 22.0  24.3  22.2    Calcium 8.7  8.6  8.4      CBC          7/12/2023    05:22 7/13/2023    05:22 7/14/2023    05:36   CBC   WBC 15.54  22.97  22.68    RBC 3.99  3.98  4.08    Hemoglobin 12.4  12.3  12.7    Hematocrit 37.4  36.9  39.1    MCV 93.7  92.7  95.8    MCH 31.1  30.9  31.1    MCHC 33.2  33.3  32.5    RDW 13.2  13.3  13.6    Platelets 271  297  308        [x]  Microbiology  Blood Culture   Date Value Ref Range Status   07/10/2023 Staphylococcus aureus, MRSA (C)  Preliminary     Comment:       Infectious disease consultation is highly recommended to rule out distant foci of infection.  Methicillin resistant Staphylococcus aureus, Patient may be an isolation risk.     BCID, PCR   Date Value Ref Range Status   07/10/2023 (C) Negative by BCID PCR. Culture to Follow. Final    Staph aureus. mecA/C and MREJ (methicillin resistance gene) detected. Identification by  BCID2 PCR.     No results found for: CULTURES, HSVCX, URCX  No results found for: EYECULTURE, GCCX, HSVCULTURE, LABHSV  No results found for: LEGIONELLA, MRSACX, MUMPSCX, MYCOPLASCX  No results found for: NOCARDIACX, STOOLCX  No results found for: THROATCX, UNSTIMCULT, URINECX, CULTURE, VZVCULTUR  No results found for: VIRALCULTU, WOUNDCX    []  Radiology  []  EKG/Telemetry   []  Cardiology/Vascular   []  Pathology  []  Old records  []  Other:    Assessment & Plan   Assessment / Plan     Assessment/Plan:  MRSA bacteremia  Right-sided rotator cuff tear  Septic right shoulder joint due to MRSA  MRSA cellulitis of right shoulder  Traumatic injury of right shoulder, concern for rotator cuff tear  Systolic congestive heart failure with an EF of 20%, new diagnosis    PLAN  -- Continue patient on IV vancomycin.    --Echo is done and is showing significant systolic heart failure.  This is a new diagnosis for the patient.  We will obtain cardiology consult in am since patient is in surgery most of the day   --Continue pain control.    -- MRI of right shoulder reviewed  --ortho consulted and patient to go for washout today     Discussed plan with RN.    DVT prophylaxis:  Mechanical DVT prophylaxis orders are present.    CODE STATUS:   Code Status (Patient has no pulse and is not breathing): CPR (Attempt to Resuscitate)  Medical Interventions (Patient has pulse or is breathing): Full Support

## 2023-07-14 NOTE — CONSULTS
"  [facility]   Consult Note    Patient Name: Yfn Ray  : 1970  MRN: [id number]  Primary Care Physician:  Marilyn Eugene APRN  Referring Physician: No ref. provider found  Date of admission: 7/10/2023    Subjective   Subjective     Reason for Consult/ Chief Complaint: Fall, right shoulder pain, secondary infection    HPI:  Yfn Ray is a 52 y.o. male who sustained a fall onto the right upper extremity over a week ago.  He was standing on a bucket and fell onto an outstretched arm.  He presented to the ED.  No fractures were noted and he had scheduled outpatient follow-up.  He subsequently developed cellulitis and became febrile.  He was admitted to the hospital.  He became septic and an MRI of the right shoulder was obtained.  This was concerning for infection.  A joint aspiration was performed and this was infectious as well.  He denies any pain other than the right upper extremity.  He does not report any prior skin infection.  He did have MRSA growing from his blood cultures and has gram-positive's on the Gram stain from his shoulder aspiration.    Review of Systems   14 Point ROS is negative except as noted above.     Personal History     Past Medical History:   Diagnosis Date   • Alpha 1-antitrypsin PiMS phenotype     \"alpha 1\" per pt and wife. unsure of what type.   • Arthritis        Past Surgical History:   Procedure Laterality Date   • LIVER BIOPSY     • TOOTH EXTRACTION         Family History: family history is not on file. Otherwise pertinent FHx was reviewed and not pertinent to current issue.    Social History:  reports that he has never smoked. He has never used smokeless tobacco. He reports that he does not drink alcohol and does not use drugs.    Home Medications:  HYDROcodone-acetaminophen, diclofenac, ketorolac, meclizine, multivitamin with minerals, and sildenafil    Allergies:  No Known Allergies    Objective    Objective     Vitals:   Temp:  [98.7 °F (37.1 °C)-101.9 °F (38.8 " °C)] 98.7 °F (37.1 °C)  Heart Rate:  [] 74  Resp:  [16-18] 16  BP: (108-140)/(70-90) 130/83  Flow (L/min):  [2] 2    Physical Exam:   Constitutional: Awake, alert   HENT: Atraumatic, Normocephalic   Respiratory: Nonlabored respirations    Cardiovascular: Intact peripheral pulses    Musculoskeletal:     Right upper extremity: Erythema and swelling extending from the shoulder to the axilla and down the biceps anteriorly.  No wounds.  No area of drainage.  Warmth.  Pain with active and passive shoulder motion.  Edema extends into the forearm.  No pain with passive elbow range of motion.  Neurovascular intact in the hand.  Axillary nerve sensation intact.  Palpable radial pulse.     Imaging:  Imaging Results (Last 24 Hours)       Procedure Component Value Units Date/Time    IR Inject/asp large joint or bursa [[id number]] Collected: 07/13/23 1639     Updated: 07/13/23 1651    Narrative:      PROCEDURE: IR INJECT/ASP LARGE JOINT OR BURSA     COMPARISON: None     INDICATIONS: Right SHOULDER Joint effusion. 1 FLUORO IMAGE. 0.2mGy.  FLUORO TIME 0.1 MINUTES.    DIAGNOSTIC.     CONSENT:   Prior to the procedure, the risks, benefits and alternatives were thoroughly explained.    This included the risk of bleeding, infection, and injury to associated structures.  Also the risk   of allergic reaction was explained and the possibility of doing the procedure without intrarticular   contrast.  The patient expressed understanding and wished to continue.  Informed written consent   was obtained.        PROCEDURE:   The skin overlying the anterior aspect of the right shoulder joint was prepped and   draped in normal sterile fashion.  Lidocaine was injected along the anticipated tract of the   needle.  A 20 gauge spinal needle was advanced into the fluid collection adjacent to the right   shoulder.  Five cc of blood tinged cloudy purulent-appearing fluid was aspirated.  The sample sent   to the lab for analysis.  The needle was  withdrawn. The patient demonstrated no complication.      Less than 0.1 minutes fluoroscopy time was utilized.          Impression:       Successful fluoroscopically guided aspiration of fluid collection adjacent to right   shoulder.  5 cubic centimeters of cloudy purulent-appearing fluid was aspirated and sent to the lab   for analysis.         GRETCHEN MOSHER MD         Electronically Signed and Approved By: GRETCHEN MOSHER MD on 7/13/2023 at 16:48                     MRI Shoulder Right With & Without Contrast [[id number]] Collected: 07/13/23 1508     Updated: 07/13/23 1511    Narrative:      PROCEDURE: MRI SHOULDER RIGHT W WO CONTRAST     COMPARISON:  None  INDICATIONS: Shoulder pain, rotator cuff disorder suspected, xray done     CONTRAST: 15 ml  Multihance I.V.     TECHNIQUE: A variety of imaging planes and parameters were utilized for visualization of suspected   pathology.  Images were performed without and with intravenous gadolinium.     FINDINGS:   There is complete disruption of the supraspinatus component of rotator cuff with proximal   retraction of the torn fibers towards the acromial humeral space.  There is also complete   disruption of the superior and middle fibers of the subscapularis component of the cuff.  The   inferior fibers appear to remain at least partially intact.     A large subacromial/subdeltoid bursal collection is seen.  There is also a large joint effusion.    On the postcontrast images, there is thickened nodular synovial enhancement associated with the   joint space as well as the bursa.  There is also an abnormal fluid collection which extends from   the inferior and anterior margin of the joint space and bursa into the overlying deltoid muscular   soft tissues.  These findings extend into the anterior deltoid musculature.  There is significant   edema and enhancement within the surrounding soft tissues.  These findings indicate changes of   septic arthropathy and septic  bursitis with associated developing abscess which extends into the   overlying anterior deltoid musculature.  The fluid collection within the overlying soft tissues   measures approximately 10.4 x 2.6 x 4.6 cm.  The inferior margin fluid collection extends beyond   the field of view for this study.     There is also heterogeneous abnormal bone marrow edema and enhancement involving the proximal   humerus.  These findings indicate changes of developing osteomyelitis.  No large associated   cortical erosion or destruction is seen.     There is marked abnormal signal and morphology involving the proximal biceps tendon.  The findings   suggest changes of tendinopathy and superimposed high-grade partial thickness tear.  There does not   appear to be complete disruption or distal retraction.  The biceps anchor appears to remain at   least partially intact.  There is also medial subluxation of the biceps tendon from the   intertubercular sulcus.     There is abnormal signal involving the superior labrum extending to the posterior superior labrum.    Given the abnormality of the biceps tendon, the findings suggest changes of a slap type 4 tear.     There are underlying changes of mild osteoarthritis involving glenohumeral joint.  There is   irregularity and abnormal signal involving the inferior glenohumeral ligament suggesting changes of   partial injury.  There does not appear to be complete avulsion.  The changes are most pronounced   associated with the posterior component of the inferior glenohumeral ligament.  There is also   likely partial injury of the inferior joint capsule.     The acromioclavicular joint is intact.  Mild hypertrophic age related changes are noted.  There is   moderate to severe atrophy throughout the rotator cuff musculature.  There is also edema and   enhancement which extends into the rotator cuff musculature suggesting spread of infection to   involve the rotator cuff muscular soft tissues.        Impression:         1. Findings indicating changes of septic arthropathy of the glenohumeral joint with associated   septic subacromial/subdeltoid bursitis.  There is marked edema and enhancement within the   surrounding muscular soft tissues as well as overlying subcutaneous soft tissues suggesting   surrounding infectious myositis and soft tissue cellulitis.  A large abscess extends into the   anterior deltoid musculature and soft tissues at the anterior margin of the shoulder.  The abscess   measures up to approximately 10.4 cm.  The abscess extends inferior to the field of view for this   study.  There is also involvement of infection involving the rotator cuff muscular soft tissues.  2. Evidence for associated developing osteomyelitis involving the proximal humerus.  3. Large full-thickness tear involving the entirety of the supraspinatus component of the rotator   cuff with partial retraction of the torn tendon.  There is also full-thickness tear involving the   anterior and middle fibers of the subscapularis component.  The inferior fibers remain at least   partially intact.  4. Evidence for tendinopathy and superimposed high-grade partial-thickness tear involving the   biceps tendon.  There does not appear to be complete disruption or distal retraction.  There is   also partial medial subluxation of the biceps tendon from the intertubercular sulcus.  5. Evidence for a slap type 4 tear of the labrum.  6. Evidence of partial tear of the inferior glenohumeral ligament with partial injury of the   inferior joint capsule.  7. Mild osteoarthritis of the glenohumeral joint.  Mild age related changes of the   acromioclavicular joint are noted.               NEREYDA STRICKLAND MD         Electronically Signed and Approved By: NEREYDA STRICKLAND MD on 7/13/2023 at 15:08                              Result Review    Result Review:  I have personally reviewed the results from the time of this admission to 7/14/2023 07:24 EDT  and agree with these findings:  [x]  Laboratory  []  Microbiology  [x]  Radiology  []  EKG/Telemetry   []  Cardiology/Vascular   []  Pathology  []  Old records  []  Other:      Assessment & Plan   Assessment / Plan     Brief Patient Summary:  Yfn Ray is a 52 y.o. male with MRSA sepsis and right shoulder septic arthritis secondary to hematogenous spread after a fall.    Active Hospital Problems:  Active Hospital Problems    Diagnosis    • **Sepsis    • Shoulder injury, right, initial encounter      Plan:   We discussed his infection and treatment options.  We discussed operative management.  We specifically discussed arthroscopic irrigation and debridement of the right shoulder.  We discussed that accessory incisions may have to be made as he he appears to have infection tracking along the deltopectoral interval.  We discussed that the primary benefit of surgery is treating the infection.  We discussed surgery risk including bleeding, damage to nerves and blood vessels, persistent/recurrent infection, anesthesia risk including mortality, DVT/PE, functional limitations, posttraumatic/postinfectious arthropathy, and need for additional procedures.  He elected to proceed with surgical management and consent was obtained.    N.p.o. for surgery today  Pain control  IV antibiotics per primary team, appreciate assistance  Please hold DVT chemoprophylaxis prior to surgery    Electronically signed by Orville Gallegos MD, 07/14/23, 7:24 AM EDT.

## 2023-07-14 NOTE — PROGRESS NOTES
"[facility] Clinical Pharmacy Services: Vancomycin Monitoring Note    Yfn Ray is a 52 y.o. male who is on day 4/TBD of pharmacy to dose vancomycin for Bacteremia and Sepsis, MRSA bacteremia 2/2 right shoulder septic arthritis 2/2 hematogenous spread after a fall.    Previous Vancomycin Dose:   1500 mg IV every  12  hours  Imaging Reviewed?: Yes  Updated Cultures and Sensitivities:    Body fluid culture, right shoulder: moderate growth of MRSA    Bcx 2: in process   MRSA PCR: positive  7/10 Bcx 2: MRSA    Vitals/Labs  Ht: 180.3 cm (71\"); Wt:   86 kg  Temp (24hrs), Av.7 °F (37.6 °C), Min:98.4 °F (36.9 °C), Max:101.9 °F (38.8 °C)   Estimated Creatinine Clearance: 166.8 mL/min (A) (by C-G formula based on SCr of 0.63 mg/dL (L)).     Results from last 7 days   Lab Units 23  0536 23  0522 23  1402 23  0522   VANCOMYCIN RM mcg/mL  --   --  13.70  --    CREATININE mg/dL 0.63* 0.78  --  0.90   WBC 10*3/mm3 22.68* 22.97*  --  15.54*     Assessment/Plan    Current Vancomycin Dose:  1750 mg IV every 12 hours; which provides the following predicted parameters:  AUC24,ss: 565 mg/L.hr  PAUC*: 95 %  Ctrough,ss: 16 mg/L  Pconc*: 28 %  Tox.: 11 %  --Increasing dose to obtain AUC >500  Next Vanc Trough ordered for 7/15 at 0500 with AM labs; renal function panel x1 ordered for 7/15  We will continue to monitor patient changes and renal function     Thank you for involving pharmacy in this patient's care. Please contact pharmacy with any questions or concerns.    Sandee Mosqueda [facility]  Clinical Pharmacist    "

## 2023-07-14 NOTE — PLAN OF CARE
Goal Outcome Evaluation:      Patient was pleasant from shift. Came from PACU. Alert and oriented. Family at bedside. Bed in lowest locked position. Call light and table within reach. No concerns per patient at this time.   Camila Gonzalez RN

## 2023-07-14 NOTE — OP NOTE
SHOULDER ARTHROSCOPY  Procedure Report    Patient Name:  Yfn Harryots  YOB: 1970    Date of Surgery:  7/14/2023     Indications: Yfn is a 52-year-old male with a history of right shoulder trauma and secondary septic arthritis.  We discussed treatment options.  We discussed the risk, benefits, indications, and alternatives to arthroscopic irrigation and debridement of the right shoulder.  He elected to proceed with surgery and consent was obtained.    Pre-op Diagnosis:   Shoulder injury, right, initial encounter [S49.91XA]  Sepsis, due to unspecified organism, unspecified whether acute organ dysfunction present [A41.9]       Post-Op Diagnosis Codes:     * Shoulder injury, right, initial encounter [S49.91XA]     * Sepsis, due to unspecified organism, unspecified whether acute organ dysfunction present [A41.9]    Procedure/CPT® Codes:      Procedure(s):  SHOULDER ARTHROSCOPY WITH WASHOUT AND DEBRIDMENT          Staff:  Surgeon(s):  Orville Gallegos MD    Assistant: Tamar Abreu PA-C    Anesthesia: General    Estimated Blood Loss:  50 mL    Implants:    Nothing was implanted during the procedure    Specimen:          Specimens       ID Source Type Tests Collected By Collected At Frozen?    1 Shoulder, Right Wound ANAEROBIC CULTURE  WOUND CULTURE   Orville Gallegos MD 7/14/23 1250     Description: right shoulder wound    2 Shoulder, Right Wound ANAEROBIC CULTURE  WOUND CULTURE   Orville Gallegos MD 7/14/23 1321     Description: right anterior shoulder wound                Findings: Purulent septic arthritis, anterior abscess in the deltopectoral interval    Complications: None    Description of Procedure: The patient was met in the proper holding and the operative extremity was marked.  The patient was transferred to the operating room and laid supine on the operating room table.  General anesthesia was induced.  The patient is then carefully placed in the left lateral decubitus position and secured with a  beanbag.  All extremities well-padded.  The right upper extremity was prepped and draped in usual sterile fashion with an arm holding device.  Timeout was taken to ensure the appropriate patient, procedure, and procedural site.  All were in agreement.  The patient was on scheduled vancomycin, no additional antibiotics were administered.  I made a vertical incision for a standard posterior portal.  The arthroscope was inserted into the glenohumeral joint.  Grossly purulent fluid was encountered upon entering the glenohumeral joint.  This was cultured.  A vertical incision for an anterior working portal was created after needle localization.  The shaver was introduced.  I thoroughly irrigated the glenohumeral joint with 12 L of normal saline.  There was full-thickness chondral loss over both the humeral head and glenoid.  The biceps tendon was torn and retracted.  A full-thickness and retracted tear of the supraspinatus tendon was identified.  I moved into the subacromial space and performed a subacromial bursectomy in this region.  No additional purulent fluid was visualized.  I then passed a drain intra-articularly through the anterior portal and sutured this in place.  The arthroscope was removed.  I moved to the anterior arm.  I made a 5 cm incision over the distal third of the deltopectoral interval.  Blunt dissection was carried down into the subcutaneous tissues.  Grossly purulent fluid was encountered in the deltopectoral interval.  This was cultured.  The cephalic vein was retracted with the deltoid.  Purulent fluid extended beneath the deltoid and along the pectoralis major.  I thoroughly irrigated this region with 3 L of normal saline.  I bluntly debrided the tissues with a curette (subcutaneous tissue less than 20 sq cm).  The fluid tracked along the biceps tendon.  This was expressed.  At I then irrigated this interval with Irrisept solution followed by an additional 3 L of normal saline.  No additional  purulent fluid was identified in this region.  I then passed 2 drains through the interval exiting the deltoid and pectoralis major respectively.  1 g of vancomycin powder was applied.  The subcutaneous tissues were closed with 0 PDS.  Skin was closed with 3-0 nylon.  Drains were sutured in place.  Wounds were dressed with Xeroform, 4 x 4, ABD, and tape.  The patient was placed into a sling.  He was extubated and transferred to the recovery room under anesthesia guidance.  All surgical counts were correct.    Assistant: Tamar Abreu PA-C  was responsible for performing the following activities: Retraction, Suction, Irrigation, Suturing, Closing, and Placing Dressing and their skilled assistance was necessary for the success of this case.    Orville Gallegos MD     Date: 7/14/2023  Time: 14:02 EDT

## 2023-07-15 LAB
ALBUMIN SERPL-MCNC: 2 G/DL (ref 3.5–5.2)
ANION GAP SERPL CALCULATED.3IONS-SCNC: 8.1 MMOL/L (ref 5–15)
BACTERIA FLD CULT: ABNORMAL
BACTERIA SPEC AEROBE CULT: ABNORMAL
BUN SERPL-MCNC: 18 MG/DL (ref 6–20)
BUN/CREAT SERPL: 27.7 (ref 7–25)
CALCIUM SPEC-SCNC: 7.9 MG/DL (ref 8.6–10.5)
CHLORIDE SERPL-SCNC: 100 MMOL/L (ref 98–107)
CO2 SERPL-SCNC: 22.9 MMOL/L (ref 22–29)
CREAT SERPL-MCNC: 0.65 MG/DL (ref 0.76–1.27)
DEPRECATED RDW RBC AUTO: 47.1 FL (ref 37–54)
EGFRCR SERPLBLD CKD-EPI 2021: 113.4 ML/MIN/1.73
ERYTHROCYTE [DISTWIDTH] IN BLOOD BY AUTOMATED COUNT: 13.7 % (ref 12.3–15.4)
GLUCOSE SERPL-MCNC: 124 MG/DL (ref 65–99)
GRAM STN SPEC: ABNORMAL
HCT VFR BLD AUTO: 35.2 % (ref 37.5–51)
HGB BLD-MCNC: 11.7 G/DL (ref 13–17.7)
ISOLATED FROM: ABNORMAL
MAGNESIUM SERPL-MCNC: 2 MG/DL (ref 1.6–2.6)
MCH RBC QN AUTO: 31.4 PG (ref 26.6–33)
MCHC RBC AUTO-ENTMCNC: 33.2 G/DL (ref 31.5–35.7)
MCV RBC AUTO: 94.4 FL (ref 79–97)
PHOSPHATE SERPL-MCNC: 3.4 MG/DL (ref 2.5–4.5)
PLATELET # BLD AUTO: 332 10*3/MM3 (ref 140–450)
PMV BLD AUTO: 9.3 FL (ref 6–12)
POTASSIUM SERPL-SCNC: 4.3 MMOL/L (ref 3.5–5.2)
RBC # BLD AUTO: 3.73 10*6/MM3 (ref 4.14–5.8)
SODIUM SERPL-SCNC: 131 MMOL/L (ref 136–145)
VANCOMYCIN TROUGH SERPL-MCNC: 13.9 MCG/ML (ref 5–20)
WBC NRBC COR # BLD: 32.95 10*3/MM3 (ref 3.4–10.8)

## 2023-07-15 PROCEDURE — 25010000002 VANCOMYCIN 5 G RECONSTITUTED SOLUTION: Performed by: STUDENT IN AN ORGANIZED HEALTH CARE EDUCATION/TRAINING PROGRAM

## 2023-07-15 PROCEDURE — 85027 COMPLETE CBC AUTOMATED: CPT | Performed by: INTERNAL MEDICINE

## 2023-07-15 PROCEDURE — 99024 POSTOP FOLLOW-UP VISIT: CPT | Performed by: STUDENT IN AN ORGANIZED HEALTH CARE EDUCATION/TRAINING PROGRAM

## 2023-07-15 PROCEDURE — 83735 ASSAY OF MAGNESIUM: CPT | Performed by: INTERNAL MEDICINE

## 2023-07-15 PROCEDURE — 99233 SBSQ HOSP IP/OBS HIGH 50: CPT | Performed by: INTERNAL MEDICINE

## 2023-07-15 PROCEDURE — 25010000002 HYDROMORPHONE 1 MG/ML SOLUTION: Performed by: STUDENT IN AN ORGANIZED HEALTH CARE EDUCATION/TRAINING PROGRAM

## 2023-07-15 PROCEDURE — 80069 RENAL FUNCTION PANEL: CPT | Performed by: STUDENT IN AN ORGANIZED HEALTH CARE EDUCATION/TRAINING PROGRAM

## 2023-07-15 PROCEDURE — 80202 ASSAY OF VANCOMYCIN: CPT | Performed by: STUDENT IN AN ORGANIZED HEALTH CARE EDUCATION/TRAINING PROGRAM

## 2023-07-15 PROCEDURE — 25010000002 HEPARIN (PORCINE) PER 1000 UNITS: Performed by: STUDENT IN AN ORGANIZED HEALTH CARE EDUCATION/TRAINING PROGRAM

## 2023-07-15 RX ADMIN — HYDROMORPHONE HYDROCHLORIDE 0.5 MG: 1 INJECTION, SOLUTION INTRAMUSCULAR; INTRAVENOUS; SUBCUTANEOUS at 03:31

## 2023-07-15 RX ADMIN — SENNOSIDES AND DOCUSATE SODIUM 2 TABLET: 50; 8.6 TABLET ORAL at 08:45

## 2023-07-15 RX ADMIN — HYDROMORPHONE HYDROCHLORIDE 0.5 MG: 1 INJECTION, SOLUTION INTRAMUSCULAR; INTRAVENOUS; SUBCUTANEOUS at 18:28

## 2023-07-15 RX ADMIN — SODIUM CHLORIDE 75 ML/HR: 9 INJECTION, SOLUTION INTRAVENOUS at 06:03

## 2023-07-15 RX ADMIN — OXYCODONE AND ACETAMINOPHEN 2 TABLET: 5; 325 TABLET ORAL at 10:05

## 2023-07-15 RX ADMIN — SENNOSIDES AND DOCUSATE SODIUM 2 TABLET: 50; 8.6 TABLET ORAL at 22:42

## 2023-07-15 RX ADMIN — SODIUM CHLORIDE 75 ML/HR: 9 INJECTION, SOLUTION INTRAVENOUS at 22:44

## 2023-07-15 RX ADMIN — OXYCODONE AND ACETAMINOPHEN 2 TABLET: 5; 325 TABLET ORAL at 14:24

## 2023-07-15 RX ADMIN — HEPARIN SODIUM 5000 UNITS: 5000 INJECTION INTRAVENOUS; SUBCUTANEOUS at 22:42

## 2023-07-15 RX ADMIN — VANCOMYCIN HYDROCHLORIDE 1750 MG: 5 INJECTION, POWDER, LYOPHILIZED, FOR SOLUTION INTRAVENOUS at 17:16

## 2023-07-15 RX ADMIN — HYDROMORPHONE HYDROCHLORIDE 0.5 MG: 1 INJECTION, SOLUTION INTRAMUSCULAR; INTRAVENOUS; SUBCUTANEOUS at 22:43

## 2023-07-15 RX ADMIN — OXYCODONE AND ACETAMINOPHEN 2 TABLET: 5; 325 TABLET ORAL at 05:19

## 2023-07-15 RX ADMIN — Medication 10 ML: at 22:43

## 2023-07-15 RX ADMIN — Medication 10 ML: at 08:45

## 2023-07-15 RX ADMIN — VANCOMYCIN HYDROCHLORIDE 1750 MG: 5 INJECTION, POWDER, LYOPHILIZED, FOR SOLUTION INTRAVENOUS at 06:03

## 2023-07-15 RX ADMIN — HEPARIN SODIUM 5000 UNITS: 5000 INJECTION INTRAVENOUS; SUBCUTANEOUS at 14:24

## 2023-07-15 RX ADMIN — HEPARIN SODIUM 5000 UNITS: 5000 INJECTION INTRAVENOUS; SUBCUTANEOUS at 05:17

## 2023-07-15 NOTE — PROGRESS NOTES
[facility]     Progress Note    Patient Name: Yfn Ray  : 1970  MRN: [id number]  Primary Care Physician:  Marilyn Eugene APRN  Date of admission: 7/10/2023    Subjective   Subjective     HPI:  Patient reports his pain is improved from prior surgery.  Tmax 101.7 at 1700 yesterday.  No additional fevers overnight.  Denies chest pain or shortness of breath.  Denies numbness.  130 cc out from drains.    Review of Systems   See HPI    Objective   Objective     Vitals:   Temp:  [97.7 °F (36.5 °C)-101.7 °F (38.7 °C)] 98.3 °F (36.8 °C)  Heart Rate:  [] 88  Resp:  [16-23] 23  BP: ()/(56-91) 104/56  Flow (L/min):  [2] 2  Physical Exam    General: Alert, no acute distress   Right upper extremity: Bandages in place with no active drainage noted.  Multiple drains with serosanguineous type output.  Erythema and induration extending down the anterior bicep.  No palpable fluid collections.  Swelling moderate.  Lower arm compartments soft.  Intact active motor function in the hand without deficit.  Axillary sensation intact.  Palpable radial pulse.    Result Review      WBC   Date Value Ref Range Status   07/15/2023 32.95 (C) 3.40 - 10.80 10*3/mm3 Final     RBC   Date Value Ref Range Status   07/15/2023 3.73 (L) 4.14 - 5.80 10*6/mm3 Final     Hemoglobin   Date Value Ref Range Status   07/15/2023 11.7 (L) 13.0 - 17.7 g/dL Final     Hematocrit   Date Value Ref Range Status   07/15/2023 35.2 (L) 37.5 - 51.0 % Final     MCV   Date Value Ref Range Status   07/15/2023 94.4 79.0 - 97.0 fL Final     MCH   Date Value Ref Range Status   07/15/2023 31.4 26.6 - 33.0 pg Final     MCHC   Date Value Ref Range Status   07/15/2023 33.2 31.5 - 35.7 g/dL Final     RDW   Date Value Ref Range Status   07/15/2023 13.7 12.3 - 15.4 % Final     RDW-SD   Date Value Ref Range Status   07/15/2023 47.1 37.0 - 54.0 fl Final     MPV   Date Value Ref Range Status   07/15/2023 9.3 6.0 - 12.0 fL Final     Platelets   Date Value Ref  Range Status   07/15/2023 332 140 - 450 10*3/mm3 Final        Result Review:  I have personally reviewed the results from the time of this admission to 7/15/2023 09:24 EDT and agree with these findings:  [x]  Laboratory  []  Microbiology  [x]  Radiology  []  EKG/Telemetry   []  Cardiology/Vascular   []  Pathology  []  Old records  []  Other:      Assessment & Plan   Assessment / Plan     Brief Patient Summary:  Yfn Ray is a 52 y.o. male with right shoulder septic arthritis and an anterior abscess in the deltopectoral interval extending down the bicep status post arthroscopic I&D on 7/14.  Multiple drains in place.  Blood cultures growing MRSA.  Right shoulder aspirate from 7/13 growing MRSA.  Active Hospital Problems:  Active Hospital Problems    Diagnosis    • **Sepsis    • Shoulder injury, right, initial encounter      Plan:   Hemoglobin 11.7-monitor  White blood cell count 32.95-likely reactive postsurgery, continue to trend  Monitor fevers -101.7 at 1700 on 7/14  Continue IV antibiotics  Recommend infectious disease consultation   Monitor drain output  Light activity as tolerated with right upper extremity  Ice/elevate to help with swelling  Sling as needed  DVT prophylaxis: Subcutaneous heparin  Further care per primary team, appreciate assistance       DVT prophylaxis:  Medical and mechanical DVT prophylaxis orders are present.    CODE STATUS:   Code Status (Patient has no pulse and is not breathing): CPR (Attempt to Resuscitate)  Medical Interventions (Patient has pulse or is breathing): Full Support      Electronically signed by Orville Gallegos MD, 07/15/23, 9:24 AM EDT.

## 2023-07-15 NOTE — PROGRESS NOTES
[facility]   Hospitalist Progress Note  Date: 7/15/2023  Patient Name: Yfn Ray  : 1970  MRN: [id number]  Date of admission: 7/10/2023      Subjective   Subjective     Chief Complaint: right arm infection     Summary:  52 y.o. male no past medical history presents to the emergency department for evaluation of right upper extremity pain.  Patient states he fell off a truck 5 days ago and was seen initially and discharged outpatient with outpatient follow-up.  States that yesterday he noted his arm swelling and turning red which got progressively worse prompting him to seek further medical attention.  He denies any fevers, chills, sweats, nausea, vomiting, chest pain, shortness of breath, palpitations, abdominal pain diarrhea constipation or dysuria, weakness.  In the emergency department felt to have cellulitis and started on vancomycin and Zosyn and will be admitted for ongoing monitoring management.     Interval Followup:     Patient is status post washout of his right shoulder on .  Patient tolerated the procedure well.  Patient has 2 drains in place.  Patient states that his pain is much improved  Patient's blood cultures continue to be positive.  We will repeat tomorrow since patient has now got the infection cleaned out.  Echocardiogram also shows patient's heart function is decreased.  This is a new finding per patient's wife    Wbc count remains elevated     Review of Systems   All systems were reviewed and negative except for: right shoulder pain and erythema     Objective   Objective     Vitals:   Temp:  [97.7 °F (36.5 °C)-101.7 °F (38.7 °C)] 100.4 °F (38 °C)  Heart Rate:  [] 108  Resp:  [16-23] 23  BP: ()/(56-89) 127/75  Flow (L/min):  [2] 2  Physical Exam    Constitutional: Resting in bed. Wife at the bedside    Eyes: Pupils equal, sclerae anicteric, no conjunctival injection   HENT: NCAT, mucous membranes moist   Neck: Supple, no thyromegaly, no lymphadenopathy,  trachea midline   Respiratory: Clear to auscultation bilaterally, nonlabored respirations    Cardiovascular: RRR, no murmurs, rubs, or gallops, palpable pedal pulses bilaterally   Gastrointestinal: Positive bowel sounds, soft, nontender, nondistended   Musculoskeletal: Full range of motion except for right shoulder which he is unable to move   Psychiatric: Appropriate affect, cooperative   Neurologic: Oriented x 3, strength symmetric in all extremities, Cranial Nerves grossly intact to confrontation, speech clear   Skin: erythema has improved   Result Review    Result Review:  I have personally reviewed the results from the time of this admission to 7/15/2023 11:29 EDT and agree with these findings:  [x]  Laboratory  BMP          7/13/2023    05:22 7/14/2023    05:36 7/15/2023    05:07   BMP   BUN 19  19  18    Creatinine 0.78  0.63  0.65    Sodium 131  130  131    Potassium 4.6  4.3  4.3    Chloride 98  99  100    CO2 24.3  22.2  22.9    Calcium 8.6  8.4  7.9      CBC          7/13/2023    05:22 7/14/2023    05:36 7/15/2023    05:07   CBC   WBC 22.97  22.68  32.95    RBC 3.98  4.08  3.73    Hemoglobin 12.3  12.7  11.7    Hematocrit 36.9  39.1  35.2    MCV 92.7  95.8  94.4    MCH 30.9  31.1  31.4    MCHC 33.3  32.5  33.2    RDW 13.3  13.6  13.7    Platelets 297  308  332        [x]  Microbiology  Blood Culture   Date Value Ref Range Status   07/10/2023 Staphylococcus aureus, MRSA (C)  Preliminary     Comment:       Infectious disease consultation is highly recommended to rule out distant foci of infection.  Methicillin resistant Staphylococcus aureus, Patient may be an isolation risk.     BCID, PCR   Date Value Ref Range Status   07/10/2023 (C) Negative by BCID PCR. Culture to Follow. Final    Staph aureus. mecA/C and MREJ (methicillin resistance gene) detected. Identification by BCID2 PCR.     No results found for: CULTURES, HSVCX, URCX  No results found for: EYECULTURE, GCCX, HSVCULTURE, LABHSV  No results found  for: LEGIONELLA, MRSACX, MUMPSCX, MYCOPLASCX  No results found for: NOCARDIACX, STOOLCX  No results found for: THROATCX, UNSTIMCULT, URINECX, CULTURE, VZVCULTUR  No results found for: VIRALCULTU, WOUNDCX    []  Radiology  []  EKG/Telemetry   []  Cardiology/Vascular   []  Pathology  []  Old records  []  Other:    Assessment & Plan   Assessment / Plan     Assessment/Plan:  MRSA bacteremia  Right-sided rotator cuff tear  Septic right shoulder joint due to MRSA  MRSA cellulitis of right shoulder  Traumatic injury of right shoulder, concern for rotator cuff tear  Systolic congestive heart failure with an EF of 20%, new diagnosis    PLAN  -- Continue patient on IV vancomycin.    --Will repeat blood cultures in am. Will call ID again if wbc remains elevated.   --Echo is done and is showing significant systolic heart failure.  This is a new diagnosis for the patient.  We will obtain cardiology consult    --Continue pain control.    -- MRI of right shoulder reviewed  --ortho consulted and patient is s/p washout of shoulder     Discussed plan with RN.    DVT prophylaxis:  Medical and mechanical DVT prophylaxis orders are present.    CODE STATUS:   Code Status (Patient has no pulse and is not breathing): CPR (Attempt to Resuscitate)  Medical Interventions (Patient has pulse or is breathing): Full Support

## 2023-07-15 NOTE — PROGRESS NOTES
"[facility] Clinical Pharmacy Services: Vancomycin Monitoring Note    Yfn Ray is a 52 y.o. male who is on day 5/10 of pharmacy to dose vancomycin for Sepsis.    Previous Vancomycin Dose:   1750 mg IV every  12  hours  Imaging Reviewed?: N/A  Updated Cultures and Sensitivities:   23: WOUND CX, RIGHT SHOULDER: GPC IN PRS AND CLUSTERS  23: WOUND CX, RIGHT SHOULDER: GPC IN PRS & CLUSTERS  23: BODY FLUID CX, RIGHT SHOULDER FLUID: MRSA  23: BLOOD CX, LEFT ARM: GPC IN CLUSTERS  23: BLOOD CX, RIGHT HAND: NGD1  23: MRSA PCR: MRSA DETECTED  7-10-23: BLOOD CX, LEFT ARM: MRSA  7-10-23: BLOOD CX, LEFT ARM: MRSA    Vitals/Labs  Ht: 180.3 cm (70.98\"); Wt: 86 kg (189 lb 9.5 oz)     Temp (24hrs), Av.4 °F (37.4 °C), Min:97.7 °F (36.5 °C), Max:101.7 °F (38.7 °C)    Estimated Creatinine Clearance: 161.7 mL/min (A) (by C-G formula based on SCr of 0.65 mg/dL (L)).       Results from last 7 days   Lab Units 07/15/23  0507 23  0536 23  0522 23  1402   VANCOMYCIN RM mcg/mL  --   --   --  13.70   VANCOMYCIN TR mcg/mL 13.90  --   --   --    CREATININE mg/dL 0.65* 0.63* 0.78  --    WBC 10*3/mm3 32.95* 22.68* 22.97*  --      Assessment/Plan    Current Vancomycin Dose:  1750 mg IV every 12 hours; which provides the following predicted parameters:  AUC24,ss: 490 mg/L.hr  PAUC*: 90 %  Ctrough,ss: 11.4 mg/L  Pconc*: 1 %  Tox.: 7 %  No change in dose needed  We will continue to monitor patient changes and renal function     Thank you for involving pharmacy in this patient's care. Please contact pharmacy with any questions or concerns.    Chanell Gay  Clinical Pharmacist    "

## 2023-07-16 ENCOUNTER — APPOINTMENT (OUTPATIENT)
Dept: CT IMAGING | Facility: HOSPITAL | Age: 53
DRG: 548 | End: 2023-07-16
Payer: COMMERCIAL

## 2023-07-16 ENCOUNTER — APPOINTMENT (OUTPATIENT)
Dept: GENERAL RADIOLOGY | Facility: HOSPITAL | Age: 53
DRG: 548 | End: 2023-07-16
Payer: COMMERCIAL

## 2023-07-16 PROBLEM — I50.22 CHRONIC HFREF (HEART FAILURE WITH REDUCED EJECTION FRACTION): Status: ACTIVE | Noted: 2023-07-16

## 2023-07-16 LAB
ANION GAP SERPL CALCULATED.3IONS-SCNC: 9.3 MMOL/L (ref 5–15)
BACTERIA SPEC AEROBE CULT: ABNORMAL
BACTERIA SPEC AEROBE CULT: ABNORMAL
BILIRUB UR QL STRIP: NEGATIVE
BUN SERPL-MCNC: 16 MG/DL (ref 6–20)
BUN/CREAT SERPL: 24.6 (ref 7–25)
CALCIUM SPEC-SCNC: 7.9 MG/DL (ref 8.6–10.5)
CHLORIDE SERPL-SCNC: 97 MMOL/L (ref 98–107)
CLARITY UR: CLEAR
CO2 SERPL-SCNC: 21.7 MMOL/L (ref 22–29)
COLOR UR: YELLOW
CREAT SERPL-MCNC: 0.65 MG/DL (ref 0.76–1.27)
DEPRECATED RDW RBC AUTO: 47.7 FL (ref 37–54)
EGFRCR SERPLBLD CKD-EPI 2021: 113.4 ML/MIN/1.73
EOSINOPHIL # BLD MANUAL: 0.3 10*3/MM3 (ref 0–0.4)
EOSINOPHIL NFR BLD MANUAL: 1 % (ref 0.3–6.2)
ERYTHROCYTE [DISTWIDTH] IN BLOOD BY AUTOMATED COUNT: 13.6 % (ref 12.3–15.4)
GLUCOSE SERPL-MCNC: 86 MG/DL (ref 65–99)
GLUCOSE UR STRIP-MCNC: NEGATIVE MG/DL
GRAM STN SPEC: ABNORMAL
HCT VFR BLD AUTO: 36.6 % (ref 37.5–51)
HGB BLD-MCNC: 12 G/DL (ref 13–17.7)
HGB UR QL STRIP.AUTO: NEGATIVE
KETONES UR QL STRIP: NEGATIVE
LARGE PLATELETS: ABNORMAL
LEUKOCYTE ESTERASE UR QL STRIP.AUTO: NEGATIVE
LYMPHOCYTES # BLD MANUAL: 2.4 10*3/MM3 (ref 0.7–3.1)
LYMPHOCYTES NFR BLD MANUAL: 2 % (ref 5–12)
MAGNESIUM SERPL-MCNC: 1.9 MG/DL (ref 1.6–2.6)
MCH RBC QN AUTO: 31 PG (ref 26.6–33)
MCHC RBC AUTO-ENTMCNC: 32.8 G/DL (ref 31.5–35.7)
MCV RBC AUTO: 94.6 FL (ref 79–97)
METAMYELOCYTES NFR BLD MANUAL: 2 % (ref 0–0)
MONOCYTES # BLD: 0.6 10*3/MM3 (ref 0.1–0.9)
NEUTROPHILS # BLD AUTO: 26.06 10*3/MM3 (ref 1.7–7)
NEUTROPHILS NFR BLD MANUAL: 83 % (ref 42.7–76)
NEUTS BAND NFR BLD MANUAL: 4 % (ref 0–5)
NITRITE UR QL STRIP: NEGATIVE
PH UR STRIP.AUTO: 6 [PH] (ref 5–8)
PLATELET # BLD AUTO: 388 10*3/MM3 (ref 140–450)
PMV BLD AUTO: 9.8 FL (ref 6–12)
POTASSIUM SERPL-SCNC: 4.5 MMOL/L (ref 3.5–5.2)
PROT UR QL STRIP: NEGATIVE
RBC # BLD AUTO: 3.87 10*6/MM3 (ref 4.14–5.8)
RBC MORPH BLD: NORMAL
SCAN SLIDE: NORMAL
SMALL PLATELETS BLD QL SMEAR: ABNORMAL
SODIUM SERPL-SCNC: 128 MMOL/L (ref 136–145)
SP GR UR STRIP: 1.02 (ref 1–1.03)
TOXIC GRANULATION: ABNORMAL
UROBILINOGEN UR QL STRIP: NORMAL
VARIANT LYMPHS NFR BLD MANUAL: 3 % (ref 0–5)
VARIANT LYMPHS NFR BLD MANUAL: 5 % (ref 19.6–45.3)
WBC NRBC COR # BLD: 29.95 10*3/MM3 (ref 3.4–10.8)

## 2023-07-16 PROCEDURE — 94799 UNLISTED PULMONARY SVC/PX: CPT

## 2023-07-16 PROCEDURE — 94761 N-INVAS EAR/PLS OXIMETRY MLT: CPT

## 2023-07-16 PROCEDURE — 25010000002 HEPARIN (PORCINE) PER 1000 UNITS: Performed by: STUDENT IN AN ORGANIZED HEALTH CARE EDUCATION/TRAINING PROGRAM

## 2023-07-16 PROCEDURE — 73201 CT UPPER EXTREMITY W/DYE: CPT

## 2023-07-16 PROCEDURE — 83735 ASSAY OF MAGNESIUM: CPT | Performed by: INTERNAL MEDICINE

## 2023-07-16 PROCEDURE — 63710000001 DIPHENHYDRAMINE PER 50 MG: Performed by: FAMILY MEDICINE

## 2023-07-16 PROCEDURE — 71045 X-RAY EXAM CHEST 1 VIEW: CPT

## 2023-07-16 PROCEDURE — 25010000002 CEFEPIME PER 500 MG: Performed by: FAMILY MEDICINE

## 2023-07-16 PROCEDURE — 25510000001 IOPAMIDOL PER 1 ML: Performed by: INTERNAL MEDICINE

## 2023-07-16 PROCEDURE — 87040 BLOOD CULTURE FOR BACTERIA: CPT | Performed by: FAMILY MEDICINE

## 2023-07-16 PROCEDURE — 99222 1ST HOSP IP/OBS MODERATE 55: CPT | Performed by: INTERNAL MEDICINE

## 2023-07-16 PROCEDURE — 25010000002 VANCOMYCIN 5 G RECONSTITUTED SOLUTION: Performed by: STUDENT IN AN ORGANIZED HEALTH CARE EDUCATION/TRAINING PROGRAM

## 2023-07-16 PROCEDURE — 99233 SBSQ HOSP IP/OBS HIGH 50: CPT | Performed by: INTERNAL MEDICINE

## 2023-07-16 PROCEDURE — 85007 BL SMEAR W/DIFF WBC COUNT: CPT | Performed by: INTERNAL MEDICINE

## 2023-07-16 PROCEDURE — 80048 BASIC METABOLIC PNL TOTAL CA: CPT | Performed by: INTERNAL MEDICINE

## 2023-07-16 PROCEDURE — 85025 COMPLETE CBC W/AUTO DIFF WBC: CPT | Performed by: INTERNAL MEDICINE

## 2023-07-16 PROCEDURE — 99024 POSTOP FOLLOW-UP VISIT: CPT | Performed by: STUDENT IN AN ORGANIZED HEALTH CARE EDUCATION/TRAINING PROGRAM

## 2023-07-16 PROCEDURE — 81003 URINALYSIS AUTO W/O SCOPE: CPT | Performed by: FAMILY MEDICINE

## 2023-07-16 RX ORDER — DIPHENHYDRAMINE HCL 25 MG
25 CAPSULE ORAL ONCE
Status: COMPLETED | OUTPATIENT
Start: 2023-07-16 | End: 2023-07-16

## 2023-07-16 RX ADMIN — HEPARIN SODIUM 5000 UNITS: 5000 INJECTION INTRAVENOUS; SUBCUTANEOUS at 14:50

## 2023-07-16 RX ADMIN — SACUBITRIL AND VALSARTAN 1 TABLET: 24; 26 TABLET, FILM COATED ORAL at 21:27

## 2023-07-16 RX ADMIN — SENNOSIDES AND DOCUSATE SODIUM 2 TABLET: 50; 8.6 TABLET ORAL at 21:27

## 2023-07-16 RX ADMIN — OXYCODONE AND ACETAMINOPHEN 2 TABLET: 5; 325 TABLET ORAL at 23:06

## 2023-07-16 RX ADMIN — ACETAMINOPHEN 650 MG: 325 TABLET ORAL at 02:23

## 2023-07-16 RX ADMIN — OXYCODONE AND ACETAMINOPHEN 2 TABLET: 5; 325 TABLET ORAL at 14:50

## 2023-07-16 RX ADMIN — SENNOSIDES AND DOCUSATE SODIUM 2 TABLET: 50; 8.6 TABLET ORAL at 09:06

## 2023-07-16 RX ADMIN — IOPAMIDOL 100 ML: 755 INJECTION, SOLUTION INTRAVENOUS at 22:05

## 2023-07-16 RX ADMIN — EMPAGLIFLOZIN 10 MG: 10 TABLET, FILM COATED ORAL at 14:51

## 2023-07-16 RX ADMIN — CEFEPIME 1000 MG: 1 INJECTION, POWDER, FOR SOLUTION INTRAMUSCULAR; INTRAVENOUS at 11:42

## 2023-07-16 RX ADMIN — VANCOMYCIN HYDROCHLORIDE 1750 MG: 5 INJECTION, POWDER, LYOPHILIZED, FOR SOLUTION INTRAVENOUS at 17:23

## 2023-07-16 RX ADMIN — Medication 10 ML: at 21:33

## 2023-07-16 RX ADMIN — HEPARIN SODIUM 5000 UNITS: 5000 INJECTION INTRAVENOUS; SUBCUTANEOUS at 21:27

## 2023-07-16 RX ADMIN — DIPHENHYDRAMINE HYDROCHLORIDE 25 MG: 25 CAPSULE ORAL at 19:37

## 2023-07-16 RX ADMIN — CEFEPIME 1000 MG: 1 INJECTION, POWDER, FOR SOLUTION INTRAMUSCULAR; INTRAVENOUS at 03:33

## 2023-07-16 RX ADMIN — HEPARIN SODIUM 5000 UNITS: 5000 INJECTION INTRAVENOUS; SUBCUTANEOUS at 06:23

## 2023-07-16 RX ADMIN — Medication 10 ML: at 09:07

## 2023-07-16 RX ADMIN — SODIUM CHLORIDE 75 ML/HR: 9 INJECTION, SOLUTION INTRAVENOUS at 11:42

## 2023-07-16 RX ADMIN — OXYCODONE AND ACETAMINOPHEN 2 TABLET: 5; 325 TABLET ORAL at 19:05

## 2023-07-16 RX ADMIN — OXYCODONE AND ACETAMINOPHEN 2 TABLET: 5; 325 TABLET ORAL at 09:06

## 2023-07-16 RX ADMIN — VANCOMYCIN HYDROCHLORIDE 1750 MG: 5 INJECTION, POWDER, LYOPHILIZED, FOR SOLUTION INTRAVENOUS at 06:24

## 2023-07-16 NOTE — PROGRESS NOTES
[facility]     Progress Note    Patient Name: Yfn Ray  : 1970  MRN: [id number]  Primary Care Physician:  Marilyn Eugene APRN  Date of admission: 7/10/2023    Subjective   Subjective     HPI:  No fevers during the day yesterday, but temperature increased to one 1.4 overnight.  This is down to 98.3 this morning.  Cefepime added to his antibiotic regimen.  New blood cultures obtained.  Patient reports his pain is significantly improved from prior to surgery.  He denies any new sources of pain.  90 cc out of his drains in the last 24 hours.    Review of Systems   See HPI    Objective   Objective     Vitals:   Temp:  [98.3 °F (36.8 °C)-101.4 °F (38.6 °C)] 98.3 °F (36.8 °C)  Heart Rate:  [] 101  Resp:  [18-23] 20  BP: (105-136)/(63-83) 134/75  Physical Exam    General: Alert, no acute distress   Right upper extremity: Bandages and 3 drains in place.  Serosanguineous type drainage noted in the drain tubing.  No drainage on the bandages.  No obvious purulence.  Erythema and induration extending down the anterior bicep appears stable.  No palpable fluid collections.  Swelling moderate.  Lower arm compartments soft.  Intact active motor function in the hand without deficit.  Axillary sensation intact.  Palpable radial pulse.    Result Review      WBC   Date Value Ref Range Status   2023 29.95 (C) 3.40 - 10.80 10*3/mm3 Final     RBC   Date Value Ref Range Status   2023 3.87 (L) 4.14 - 5.80 10*6/mm3 Final     Hemoglobin   Date Value Ref Range Status   2023 12.0 (L) 13.0 - 17.7 g/dL Final     Hematocrit   Date Value Ref Range Status   2023 36.6 (L) 37.5 - 51.0 % Final     MCV   Date Value Ref Range Status   2023 94.6 79.0 - 97.0 fL Final     MCH   Date Value Ref Range Status   2023 31.0 26.6 - 33.0 pg Final     MCHC   Date Value Ref Range Status   2023 32.8 31.5 - 35.7 g/dL Final     RDW   Date Value Ref Range Status   2023 13.6 12.3 - 15.4 % Final      RDW-SD   Date Value Ref Range Status   07/16/2023 47.7 37.0 - 54.0 fl Final     MPV   Date Value Ref Range Status   07/16/2023 9.8 6.0 - 12.0 fL Final     Platelets   Date Value Ref Range Status   07/16/2023 388 140 - 450 10*3/mm3 Final     Neutrophils Absolute   Date Value Ref Range Status   07/16/2023 26.06 (H) 1.70 - 7.00 10*3/mm3 Final     Eosinophils Absolute   Date Value Ref Range Status   07/16/2023 0.30 0.00 - 0.40 10*3/mm3 Final        Result Review:  I have personally reviewed the results from the time of this admission to 7/16/2023 09:35 EDT and agree with these findings:  [x]  Laboratory  []  Microbiology  [x]  Radiology  []  EKG/Telemetry   []  Cardiology/Vascular   []  Pathology  []  Old records  []  Other:      Assessment & Plan   Assessment / Plan     Brief Patient Summary:  Yfn Ray is a 52 y.o. male with right shoulder septic arthritis and an anterior abscess in the deltopectoral interval extending down the bicep status post arthroscopic I&D on 7/14.  Multiple drains in place.  Blood cultures growing MRSA.  Right shoulder aspirate from 7/13 growing MRSA.  Active Hospital Problems:  Active Hospital Problems    Diagnosis    • **Sepsis    • Shoulder injury, right, initial encounter      Plan:   If persistent fevers and positive cultures despite initial I&D, may consider advanced imaging distally down the humerus to evaluate for other sources of abscess/infection.  Monitor for now.  Hemoglobin 12.0-monitor  White blood cell count slightly decreased to 2 9.95-continue to trend  Monitor fevers -101.4 at 0300 on 7/16  Continue IV antibiotics  Recommend infectious disease consultation   Monitor drain output  Light activity as tolerated with right upper extremity  Ice/elevate to help with swelling  Sling as needed  DVT prophylaxis: Subcutaneous heparin  Further care per primary team, appreciate assistance       DVT prophylaxis:  Medical and mechanical DVT prophylaxis orders are present.    CODE  STATUS:   Code Status (Patient has no pulse and is not breathing): CPR (Attempt to Resuscitate)  Medical Interventions (Patient has pulse or is breathing): Full Support      Electronically signed by Orville Gallegos MD, 07/16/23, 9:38 AM EDT.

## 2023-07-16 NOTE — CONSULTS
"Cardiology Consult Note  [facility] 5TH FLOOR SURGICAL TELEMETRY UNIT          Patient Identification:  Yfn Ray      [id number]  52 y.o.        male  1970           Reason for Consultation: CHF    PCP: Marilyn Eugene APRN    History of Present Illness:     Patient is a 52-year-old with a prior history of osteoarthritis and alpha 1 antitrypsin mutation who presented with right shoulder plain after a traumatic injury he had been having recurrent issues of this found to have evidence of cellulitis.  Subsequent blood cultures also did show MRSA and the patient has undergone surgery for drainage and still has drain tubes in.  He also had increased swelling in that area as well.  Work-up with echocardiogram for endocarditis had also revealed reduced ejection fraction which is new.  The patient himself has not reported any increase symptoms of dyspnea exertion or problems with PND orthopnea.  He is wife noted that he has had issues with what sounds like some apnea at night longstanding and also just increased fatigue over the last year.  He did have a COVID infection last year as well which was not associated any prolonged hospitalization but he was seen and kept overnight.  He denies any problems with chest discomfort.  He has not had fever chills or night sweats.  He has not had any history of substance abuse    Past History:  Past Medical History:   Diagnosis Date    Alpha 1-antitrypsin PiMS phenotype     \"alpha 1\" per pt and wife. unsure of what type.    Arthritis      Past Surgical History:   Procedure Laterality Date    LIVER BIOPSY      SHOULDER ARTHROSCOPY Right 7/14/2023    Procedure: SHOULDER ARTHROSCOPY WITH WASHOUT AND DEBRIDMENT;  Surgeon: Orville Gallegos MD;  [facility]: [facility] OR [facility] [place] [facility];  Service: Orthopedics;  Laterality: Right;    TOOTH EXTRACTION       No Known Allergies  Social History     Socioeconomic History    Marital status:    Tobacco Use    Smoking status: Never    " Smokeless tobacco: Never   Vaping Use    Vaping Use: Never used   Substance and Sexual Activity    Alcohol use: Never    Drug use: Never    Sexual activity: Defer     Family History   Problem Relation Age of Onset    Malig Hyperthermia Neg Hx        Medications:  Prior to Admission medications    Medication Sig Start Date End Date Taking? Authorizing Provider   meclizine (ANTIVERT) 25 MG tablet Take 1 tablet by mouth 3 (Three) Times a Day As Needed for Dizziness.   Yes Rubia Kahn MD   diclofenac (VOLTAREN) 75 MG EC tablet Take 1 tablet by mouth 2 (Two) Times a Day.    Rubia Kahn MD   HYDROcodone-acetaminophen (NORCO) 5-325 MG per tablet Take 1 tablet by mouth Every 6 (Six) Hours As Needed for Moderate Pain. 7/7/23   Aaron Dixon MD   ketorolac (TORADOL) 10 MG tablet Take 1 tablet by mouth Every 6 (Six) Hours As Needed for Moderate Pain. 7/7/23   Shabbir, Yusra, PA   multivitamin with minerals tablet tablet Take 1 tablet by mouth Daily.    ProviderRubia MD   sildenafil (VIAGRA) 100 MG tablet Take 1 tablet by mouth Daily. 3/9/23   Rubia Kahn MD      Current medications:  cefepime, 1,000 mg, Intravenous, Q8H  heparin (porcine), 5,000 Units, Subcutaneous, Q8H  senna-docusate sodium, 2 tablet, Oral, BID  sodium chloride, 10 mL, Intravenous, Q12H  vancomycin, 1,750 mg, Intravenous, Q12H      Current IV drips:  lactated ringers, 9 mL/hr  Pharmacy to dose vancomycin,   sodium chloride, 75 mL/hr, Last Rate: 75 mL/hr (07/16/23 1142)        Review of Systems   Constitutional: Negative for chills, fever and weight loss.   HENT:  Negative for congestion and nosebleeds.    Cardiovascular:  Negative for orthopnea and paroxysmal nocturnal dyspnea.   Respiratory:  Negative for cough and shortness of breath.    Endocrine: Negative for cold intolerance and heat intolerance.   Skin:  Negative for rash.   Musculoskeletal:  Positive for joint pain. Negative for back pain and muscle  "weakness.   Gastrointestinal:  Negative for abdominal pain, nausea and vomiting.   Genitourinary:  Negative for dysuria and nocturia.   Neurological:  Negative for dizziness and light-headedness.   Psychiatric/Behavioral:  Negative for altered mental status and hallucinations.        Physical Exam    /77 (BP Location: Left arm, Patient Position: Lying)   Pulse 99   Temp 98.6 °F (37 °C) (Oral)   Resp 20   Ht 180.3 cm (70.98\")   Wt 86 kg (189 lb 9.5 oz)   SpO2 98%   BMI 26.46 kg/m²  Body mass index is 26.46 kg/m².   Oxygen saturation   @FLOWAN(10::1)@ SpO2  Min: 98 %  Max: 99 %    General Appearance:   no acute distress  Alert and oriented x3  HENT:   lips not cyanotic  Atraumatic  Neck:  thyroid not enlarged  supple  Respiratory:  no respiratory distress  normal breath sounds  no rales  Cardiovascular:  no jugular venous distention  regular rhythm  apical impulse normal  S1 normal, S2 normal  no S3, no S4   no murmur  no rub, no thrill  no carotid bruit  pedal pulses normal  lower extremity edema: none    Gastrointestinal:   bowel sounds normal  non-tender  no hepatomegaly, no splenomegaly  Musculoskeletal:  no clubbing of fingers.   normocephalic, head atraumatic  Right arm swollen is bandaged and has drains coming out of it  Skin:   warm, dry  No rashes  Neuro/Psychiatric:  normal mood and affect  judgement and insight appropriate      Cardiographics:     ECG  (personally reviewed)    Telemetry:  (personally reviewed) normal sinus rhythm and sinus tachycardia   Results for orders placed during the hospital encounter of 07/10/23    Adult Transthoracic Echo Complete W/ Cont if Necessary Per Protocol    Interpretation Summary    Left ventricular systolic function is severely decreased. Left ventricular ejection fraction appears to be less than 20%.    The left ventricular cavity is moderately dilated.    Left ventricular diastolic function is consistent with (grade I) impaired relaxation.    The left " atrial cavity is mildly dilated.    Abnormal mitral valve structure consistent with dilated annulus.    Mild mitral regurgitation    No overt evidence of cardioembolic source by TTE    No prior echo for comparison         No results found for this or any previous visit.      Cardiolite (Tc-99m Sestamibi) stress test   Lab Review:       CBC          7/14/2023    05:36 7/15/2023    05:07 7/16/2023    02:29   CBC   WBC 22.68  32.95  29.95    RBC 4.08  3.73  3.87    Hemoglobin 12.7  11.7  12.0    Hematocrit 39.1  35.2  36.6    MCV 95.8  94.4  94.6    MCH 31.1  31.4  31.0    MCHC 32.5  33.2  32.8    RDW 13.6  13.7  13.6    Platelets 308  332  388        CMP          7/14/2023    05:36 7/15/2023    05:07 7/16/2023    02:29   CMP   Glucose 95  124  86    BUN 19  18  16    Creatinine 0.63  0.65  0.65    EGFR 114.5  113.4  113.4    Sodium 130  131  128    Potassium 4.3  4.3  4.5    Chloride 99  100  97    Calcium 8.4  7.9  7.9    Albumin  2.0     BUN/Creatinine Ratio 30.2  27.7  24.6    Anion Gap 8.8  8.1  9.3         CARDIAC LABS:      Lab 07/14/23  0536 07/10/23  2154   PROBNP 2,809.0*  --    HSTROP T  --  9   PROTIME  --  15.8*   INR  --  1.26*      No results found for: DIGOXIN   No results found for: TSH        Invalid input(s): LDLCALC  No results found for: POCTROP  No components found for: DDIMERQUAN  Lab Results   Component Value Date    MG 1.9 07/16/2023             CARDIAC LABS:      Lab 07/14/23  0536 07/10/23  2154   PROBNP 2,809.0*  --    HSTROP T  --  9   PROTIME  --  15.8*   INR  --  1.26*        Imaging:  CXR   No acute infiltrate is appreciated.    Assessment:    Sepsis    Chronic HFrEF (heart failure with reduced ejection fraction)    Shoulder injury, right, initial encounter      HFrEF new diagnosis no previous history of systolic dysfunction patient does not appear to be acute decompensated or significantly volume overloaded on exam.  Overall the echocardiogram appears to be considered with a nonischemic  cardiomyopathic process although there does not appear to be a defined clear etiology he did have COVID infection last year or so could be postviral in nature.  At this point appears idiopathic in nature would recommend the start of GDMT therapy with advancement as tolerated.  Patient will also need ischemic work-up would favor heart catheterization but can be done outpatient after infectious issues have resolved as he is not having any ongoing symptoms of ischemia.    Plan:  1.  Starting Entresto 24 6 twice daily today  2.  Jardiance 10 mg daily  3.  Continue advancement of GDMT therapy as tolerated      Thank you for allowing us to share in [facility].            Cristiano Holliday MD   7/16/2023    14:15 EDT

## 2023-07-16 NOTE — PROGRESS NOTES
"[facility] Clinical Pharmacy Services: Vancomycin Monitoring Note    Yfn Ray is a 52 y.o. male who is on day 6/10 of pharmacy to dose vancomycin for Sepsis.    Previous Vancomycin Dose:   1750 mg IV every  12  hours  Imaging Reviewed?: Yes  Updated Cultures and Sensitivities:   23: WOUND CX, RIGHT SHOULDER: MRSA  23: WOUND CX, RIGHT SHOULDER: MRSA  23: BODY FLUID CX, RIGHT SHOULDER FLUID: MRSA  23: BLOOD CX, LEFT ARM: MRSA  23: BLOOD CX, RIGHT HAND: NGD2  23: MRSA PCR: MRSA DETECTED  7-10-23: BLOOD CX, LEFT ARM: MRSA  7-10-23: BLOOD CX, LEFT ARM: MRSA    Vitals/Labs  Ht: 180.3 cm (70.98\"); Wt: 86 kg (189 lb 9.5 oz)     Temp (24hrs), Av.4 °F (37.4 °C), Min:98.3 °F (36.8 °C), Max:101.4 °F (38.6 °C)    Estimated Creatinine Clearance: 161.7 mL/min (A) (by C-G formula based on SCr of 0.65 mg/dL (L)).       Results from last 7 days   Lab Units 23  0229 07/15/23  0507 23  0536 23  0522 23  1402   VANCOMYCIN RM mcg/mL  --   --   --   --  13.70   VANCOMYCIN TR mcg/mL  --  13.90  --   --   --    CREATININE mg/dL 0.65* 0.65* 0.63*   < >  --    WBC 10*3/mm3 29.95* 32.95* 22.68*   < >  --     < > = values in this interval not displayed.     Assessment/Plan    Current Vancomycin Dose:  1750 mg IV every 12 hours; which provides the following predicted parameters:  AUC24,ss: 490 mg/L.hr  PAUC*: 90 %  Ctrough,ss: 11.3 mg/L  Pconc*: 0 %  Tox.: 7 %  No change in dose needed  We will continue to monitor patient changes and renal function     Thank you for involving pharmacy in this patient's care. Please contact pharmacy with any questions or concerns.    Chanell Gay  Clinical Pharmacist    "

## 2023-07-16 NOTE — PLAN OF CARE
Goal Outcome Evaluation:     Pt screened positive for sepsis during the shift. Pt had temp of 101.4 x 1 during shift. Cefepime added per MD. Wife at bedside. Ice packs provided PRN.

## 2023-07-16 NOTE — PROGRESS NOTES
I was notified by the nursing staff that the patient had become positive during their sepsis screening evaluation.  His white blood cell count increased from 22-32 and it began running a fever greater than 102 this evening.    I obtained a urine analysis which was subsequently negative and a chest x-ray which showed no active infiltrates.  Despite this I did broaden the patient's antibiotics adding cefepime and obtaining new blood cultures.

## 2023-07-16 NOTE — PROGRESS NOTES
[facility]   Hospitalist Progress Note  Date: 2023  Patient Name: Yfn Ray  : 1970  MRN: [id number]  Date of admission: 7/10/2023      Subjective   Subjective     Chief Complaint: right arm infection     Summary:  52 y.o. male no past medical history presents to the emergency department for evaluation of right upper extremity pain.  Patient states he fell off a truck 5 days ago and was seen initially and discharged outpatient with outpatient follow-up.  States that yesterday he noted his arm swelling and turning red which got progressively worse prompting him to seek further medical attention.  He denies any fevers, chills, sweats, nausea, vomiting, chest pain, shortness of breath, palpitations, abdominal pain diarrhea constipation or dysuria, weakness.  In the emergency department felt to have cellulitis and started on vancomycin and Zosyn and will be admitted for ongoing monitoring management.     Interval Followup:     Patient is status post washout of his right shoulder on .  Patient tolerated the procedure well.  Patient has 3 drains in place.  Patient states that his pain is much improved  Patient's blood cultures continue to be positive.  Repeat ones are pending   Echocardiogram also shows patient's heart function is decreased.  This is a new finding per patient's wife. Cardiology consulted  He continues to have a fever     Wbc count remains elevated     Review of Systems   All systems were reviewed and negative except for: right shoulder pain and erythema     Objective   Objective     Vitals:   Temp:  [98.3 °F (36.8 °C)-101.4 °F (38.6 °C)] 98.3 °F (36.8 °C)  Heart Rate:  [] 101  Resp:  [18-23] 20  BP: (105-136)/(63-83) 134/75  Physical Exam    Constitutional: Resting in bed. Wife at the bedside    Eyes: Pupils equal, sclerae anicteric, no conjunctival injection   HENT: NCAT, mucous membranes moist   Neck: Supple, no thyromegaly, no lymphadenopathy, trachea  midline   Respiratory: Clear to auscultation bilaterally, nonlabored respirations    Cardiovascular: RRR, no murmurs, rubs, or gallops, palpable pedal pulses bilaterally   Gastrointestinal: Positive bowel sounds, soft, nontender, nondistended   Musculoskeletal: Full range of motion except for right shoulder which he is unable to move. Dressings and drains in place    Psychiatric: Appropriate affect, cooperative   Neurologic: Oriented x 3, strength symmetric in all extremities, Cranial Nerves grossly intact to confrontation, speech clear   Skin: erythema has improved   Result Review    Result Review:  I have personally reviewed the results from the time of this admission to 7/16/2023 10:46 EDT and agree with these findings:  [x]  Laboratory  BMP          7/14/2023    05:36 7/15/2023    05:07 7/16/2023    02:29   BMP   BUN 19  18  16    Creatinine 0.63  0.65  0.65    Sodium 130  131  128    Potassium 4.3  4.3  4.5    Chloride 99  100  97    CO2 22.2  22.9  21.7    Calcium 8.4  7.9  7.9      CBC          7/14/2023    05:36 7/15/2023    05:07 7/16/2023    02:29   CBC   WBC 22.68  32.95  29.95    RBC 4.08  3.73  3.87    Hemoglobin 12.7  11.7  12.0    Hematocrit 39.1  35.2  36.6    MCV 95.8  94.4  94.6    MCH 31.1  31.4  31.0    MCHC 32.5  33.2  32.8    RDW 13.6  13.7  13.6    Platelets 308  332  388        [x]  Microbiology  Blood Culture   Date Value Ref Range Status   07/10/2023 Staphylococcus aureus, MRSA (C)  Preliminary     Comment:       Infectious disease consultation is highly recommended to rule out distant foci of infection.  Methicillin resistant Staphylococcus aureus, Patient may be an isolation risk.     BCID, PCR   Date Value Ref Range Status   07/10/2023 (C) Negative by BCID PCR. Culture to Follow. Final    Staph aureus. mecA/C and MREJ (methicillin resistance gene) detected. Identification by BCID2 PCR.     No results found for: CULTURES, HSVCX, URCX  No results found for: EYECULTURE, GCCX, HSVCULTURE,  LABHSV  No results found for: LEGIONELLA, MRSACX, MUMPSCX, MYCOPLASCX  No results found for: NOCARDIACX, STOOLCX  No results found for: THROATCX, UNSTIMCULT, URINECX, CULTURE, VZVCULTUR  No results found for: VIRALCULTU, WOUNDCX    []  Radiology  []  EKG/Telemetry   []  Cardiology/Vascular   []  Pathology  []  Old records  []  Other:    Assessment & Plan   Assessment / Plan     Assessment/Plan:  MRSA bacteremia  Right-sided rotator cuff tear  Septic right shoulder joint due to MRSA  MRSA cellulitis of right shoulder  Traumatic injury of right shoulder, concern for rotator cuff tear  Systolic congestive heart failure with an EF of 20%, new diagnosis    PLAN  -- Continue patient on IV vancomycin.  Patient was started on cefepime overnight given his continued fever  --We will reach out to infectious disease to discuss plan of care.  Patient may require repeat imaging of his right arm further down into his forearm.  --Repeat cultures are pending.   --Echo is done and is showing significant systolic heart failure.  This is a new diagnosis for the patient.  We will obtain cardiology consult    --Continue pain control.    -- MRI of right shoulder reviewed  --ortho consulted and patient is s/p washout of shoulder     Discussed plan with RN.    DVT prophylaxis:  Medical and mechanical DVT prophylaxis orders are present.    CODE STATUS:   Code Status (Patient has no pulse and is not breathing): CPR (Attempt to Resuscitate)  Medical Interventions (Patient has pulse or is breathing): Full Support

## 2023-07-17 LAB
ANION GAP SERPL CALCULATED.3IONS-SCNC: 8 MMOL/L (ref 5–15)
BASOPHILS # BLD AUTO: 0.13 10*3/MM3 (ref 0–0.2)
BASOPHILS NFR BLD AUTO: 0.5 % (ref 0–1.5)
BUN SERPL-MCNC: 12 MG/DL (ref 6–20)
BUN/CREAT SERPL: 19.7 (ref 7–25)
CALCIUM SPEC-SCNC: 8.4 MG/DL (ref 8.6–10.5)
CHLORIDE SERPL-SCNC: 100 MMOL/L (ref 98–107)
CO2 SERPL-SCNC: 24 MMOL/L (ref 22–29)
CREAT SERPL-MCNC: 0.61 MG/DL (ref 0.76–1.27)
DEPRECATED RDW RBC AUTO: 48.5 FL (ref 37–54)
EGFRCR SERPLBLD CKD-EPI 2021: 115.6 ML/MIN/1.73
EOSINOPHIL # BLD AUTO: 0.66 10*3/MM3 (ref 0–0.4)
EOSINOPHIL NFR BLD AUTO: 2.6 % (ref 0.3–6.2)
ERYTHROCYTE [DISTWIDTH] IN BLOOD BY AUTOMATED COUNT: 13.9 % (ref 12.3–15.4)
GLUCOSE SERPL-MCNC: 89 MG/DL (ref 65–99)
HCT VFR BLD AUTO: 35.3 % (ref 37.5–51)
HGB BLD-MCNC: 11.7 G/DL (ref 13–17.7)
IMM GRANULOCYTES # BLD AUTO: 1.94 10*3/MM3 (ref 0–0.05)
IMM GRANULOCYTES NFR BLD AUTO: 7.5 % (ref 0–0.5)
LYMPHOCYTES # BLD AUTO: 2.77 10*3/MM3 (ref 0.7–3.1)
LYMPHOCYTES NFR BLD AUTO: 10.7 % (ref 19.6–45.3)
MAGNESIUM SERPL-MCNC: 2.2 MG/DL (ref 1.6–2.6)
MCH RBC QN AUTO: 31.5 PG (ref 26.6–33)
MCHC RBC AUTO-ENTMCNC: 33.1 G/DL (ref 31.5–35.7)
MCV RBC AUTO: 95.1 FL (ref 79–97)
MONOCYTES # BLD AUTO: 1.37 10*3/MM3 (ref 0.1–0.9)
MONOCYTES NFR BLD AUTO: 5.3 % (ref 5–12)
NEUTROPHILS NFR BLD AUTO: 18.92 10*3/MM3 (ref 1.7–7)
NEUTROPHILS NFR BLD AUTO: 73.4 % (ref 42.7–76)
NRBC BLD AUTO-RTO: 0 /100 WBC (ref 0–0.2)
PLATELET # BLD AUTO: 498 10*3/MM3 (ref 140–450)
PMV BLD AUTO: 9.4 FL (ref 6–12)
POTASSIUM SERPL-SCNC: 4.5 MMOL/L (ref 3.5–5.2)
RBC # BLD AUTO: 3.71 10*6/MM3 (ref 4.14–5.8)
SODIUM SERPL-SCNC: 132 MMOL/L (ref 136–145)
WBC NRBC COR # BLD: 25.79 10*3/MM3 (ref 3.4–10.8)

## 2023-07-17 PROCEDURE — 97161 PT EVAL LOW COMPLEX 20 MIN: CPT

## 2023-07-17 PROCEDURE — 83735 ASSAY OF MAGNESIUM: CPT | Performed by: INTERNAL MEDICINE

## 2023-07-17 PROCEDURE — 99232 SBSQ HOSP IP/OBS MODERATE 35: CPT | Performed by: INTERNAL MEDICINE

## 2023-07-17 PROCEDURE — 25010000002 HEPARIN (PORCINE) PER 1000 UNITS: Performed by: STUDENT IN AN ORGANIZED HEALTH CARE EDUCATION/TRAINING PROGRAM

## 2023-07-17 PROCEDURE — 80048 BASIC METABOLIC PNL TOTAL CA: CPT | Performed by: INTERNAL MEDICINE

## 2023-07-17 PROCEDURE — 25010000002 VANCOMYCIN 5 G RECONSTITUTED SOLUTION: Performed by: STUDENT IN AN ORGANIZED HEALTH CARE EDUCATION/TRAINING PROGRAM

## 2023-07-17 PROCEDURE — 63710000001 DIPHENHYDRAMINE PER 50 MG: Performed by: FAMILY MEDICINE

## 2023-07-17 PROCEDURE — 99233 SBSQ HOSP IP/OBS HIGH 50: CPT | Performed by: INTERNAL MEDICINE

## 2023-07-17 PROCEDURE — 99024 POSTOP FOLLOW-UP VISIT: CPT | Performed by: STUDENT IN AN ORGANIZED HEALTH CARE EDUCATION/TRAINING PROGRAM

## 2023-07-17 PROCEDURE — 85025 COMPLETE CBC W/AUTO DIFF WBC: CPT | Performed by: INTERNAL MEDICINE

## 2023-07-17 PROCEDURE — 25010000002 HYDROMORPHONE 1 MG/ML SOLUTION: Performed by: STUDENT IN AN ORGANIZED HEALTH CARE EDUCATION/TRAINING PROGRAM

## 2023-07-17 PROCEDURE — 97165 OT EVAL LOW COMPLEX 30 MIN: CPT

## 2023-07-17 RX ORDER — DIPHENHYDRAMINE HCL 25 MG
25 CAPSULE ORAL 3 TIMES DAILY PRN
Status: DISCONTINUED | OUTPATIENT
Start: 2023-07-17 | End: 2023-07-24 | Stop reason: HOSPADM

## 2023-07-17 RX ORDER — CARVEDILOL 3.12 MG/1
3.12 TABLET ORAL EVERY 12 HOURS SCHEDULED
Status: DISCONTINUED | OUTPATIENT
Start: 2023-07-17 | End: 2023-07-24 | Stop reason: HOSPADM

## 2023-07-17 RX ORDER — DIPHENHYDRAMINE HYDROCHLORIDE, ZINC ACETATE 2; .1 G/100G; G/100G
1 CREAM TOPICAL 3 TIMES DAILY PRN
Status: DISCONTINUED | OUTPATIENT
Start: 2023-07-17 | End: 2023-07-24 | Stop reason: HOSPADM

## 2023-07-17 RX ADMIN — SENNOSIDES AND DOCUSATE SODIUM 2 TABLET: 50; 8.6 TABLET ORAL at 09:38

## 2023-07-17 RX ADMIN — DIPHENHYDRAMINE HYDROCHLORIDE, ZINC ACETATE 1 APPLICATION: 2; .1 CREAM TOPICAL at 19:40

## 2023-07-17 RX ADMIN — DIPHENHYDRAMINE HYDROCHLORIDE 25 MG: 25 CAPSULE ORAL at 18:41

## 2023-07-17 RX ADMIN — VANCOMYCIN HYDROCHLORIDE 1750 MG: 5 INJECTION, POWDER, LYOPHILIZED, FOR SOLUTION INTRAVENOUS at 05:25

## 2023-07-17 RX ADMIN — HEPARIN SODIUM 5000 UNITS: 5000 INJECTION INTRAVENOUS; SUBCUTANEOUS at 05:25

## 2023-07-17 RX ADMIN — HEPARIN SODIUM 5000 UNITS: 5000 INJECTION INTRAVENOUS; SUBCUTANEOUS at 13:07

## 2023-07-17 RX ADMIN — OXYCODONE AND ACETAMINOPHEN 2 TABLET: 5; 325 TABLET ORAL at 04:42

## 2023-07-17 RX ADMIN — DIPHENHYDRAMINE HYDROCHLORIDE 25 MG: 25 CAPSULE ORAL at 06:29

## 2023-07-17 RX ADMIN — VANCOMYCIN HYDROCHLORIDE 1750 MG: 5 INJECTION, POWDER, LYOPHILIZED, FOR SOLUTION INTRAVENOUS at 19:42

## 2023-07-17 RX ADMIN — SODIUM CHLORIDE 75 ML/HR: 9 INJECTION, SOLUTION INTRAVENOUS at 01:44

## 2023-07-17 RX ADMIN — SODIUM CHLORIDE 75 ML/HR: 9 INJECTION, SOLUTION INTRAVENOUS at 14:45

## 2023-07-17 RX ADMIN — HYDROMORPHONE HYDROCHLORIDE 0.5 MG: 1 INJECTION, SOLUTION INTRAMUSCULAR; INTRAVENOUS; SUBCUTANEOUS at 07:58

## 2023-07-17 RX ADMIN — CARVEDILOL 3.12 MG: 3.12 TABLET, FILM COATED ORAL at 09:38

## 2023-07-17 RX ADMIN — Medication 10 ML: at 19:39

## 2023-07-17 RX ADMIN — SACUBITRIL AND VALSARTAN 1 TABLET: 24; 26 TABLET, FILM COATED ORAL at 22:33

## 2023-07-17 RX ADMIN — Medication 10 ML: at 22:38

## 2023-07-17 RX ADMIN — EMPAGLIFLOZIN 10 MG: 10 TABLET, FILM COATED ORAL at 09:38

## 2023-07-17 RX ADMIN — ACETAMINOPHEN 650 MG: 325 TABLET ORAL at 22:34

## 2023-07-17 RX ADMIN — SENNOSIDES AND DOCUSATE SODIUM 2 TABLET: 50; 8.6 TABLET ORAL at 22:33

## 2023-07-17 RX ADMIN — SACUBITRIL AND VALSARTAN 1 TABLET: 24; 26 TABLET, FILM COATED ORAL at 09:38

## 2023-07-17 RX ADMIN — CARVEDILOL 3.12 MG: 3.12 TABLET, FILM COATED ORAL at 22:33

## 2023-07-17 RX ADMIN — HEPARIN SODIUM 5000 UNITS: 5000 INJECTION INTRAVENOUS; SUBCUTANEOUS at 22:32

## 2023-07-17 NOTE — PLAN OF CARE
Goal Outcome Evaluation:  Plan of Care Reviewed With: (P) patient           Outcome Evaluation: (P) At baseline, patient is independent with ADLs, transfers, and gait. Today he does not present with any limitations that impede his ability to safely ambulate and transfer. Inpatient PT services are not indicated at this time.      Anticipated Discharge Disposition (PT): (P) home with outpatient therapy services

## 2023-07-17 NOTE — PLAN OF CARE
Goal Outcome Evaluation:  Plan of Care Reviewed With: patient, spouse        Progress: no change  Outcome Evaluation: Patient presents with limitations in self-care, endurance, and RUE ROM/strength. He  would benefit from continued skilled occupational therapy services to maximize independence with ADLs/functional transfers.      Anticipated Discharge Disposition (OT): home with assist

## 2023-07-17 NOTE — PROGRESS NOTES
[facility]     Progress Note    Patient Name: Yfn Ray  : 1970  MRN: [id number]  Primary Care Physician:  Marilyn Eugene APRN  Date of admission: 7/10/2023    Subjective   Subjective     HPI:  Afebrile for 24 hours.  CT of the right upper extremity change last night.  Pain continues to steadily improve.  No chest pain or shortness of breath this morning.    Review of Systems   See HPI    Objective   Objective     Vitals:   Temp:  [98.1 °F (36.7 °C)-98.9 °F (37.2 °C)] 98.9 °F (37.2 °C)  Heart Rate:  [] 88  Resp:  [16-20] 16  BP: (122-138)/(69-82) 122/69  Flow (L/min):  [2] 2  Physical Exam    General: Alert, no acute distress   Right upper extremity: Bandages and 3 drains removed without complication.  Serous type drainage from the incisions.  No redness proximally.  Induration down the anterior bicep and erythema is improving.  No palpable fluid collections.  No pain with passive elbow range of motion.  Sensation intact in the axillary nerve distribution.  Full active motor function in the hand.  Palpable radial pulse.    Result Review      WBC   Date Value Ref Range Status   2023 25.79 (H) 3.40 - 10.80 10*3/mm3 Final     RBC   Date Value Ref Range Status   2023 3.71 (L) 4.14 - 5.80 10*6/mm3 Final     Hemoglobin   Date Value Ref Range Status   2023 11.7 (L) 13.0 - 17.7 g/dL Final     Hematocrit   Date Value Ref Range Status   2023 35.3 (L) 37.5 - 51.0 % Final     MCV   Date Value Ref Range Status   2023 95.1 79.0 - 97.0 fL Final     MCH   Date Value Ref Range Status   2023 31.5 26.6 - 33.0 pg Final     MCHC   Date Value Ref Range Status   2023 33.1 31.5 - 35.7 g/dL Final     RDW   Date Value Ref Range Status   2023 13.9 12.3 - 15.4 % Final     RDW-SD   Date Value Ref Range Status   2023 48.5 37.0 - 54.0 fl Final     MPV   Date Value Ref Range Status   2023 9.4 6.0 - 12.0 fL Final     Platelets   Date Value Ref Range Status    07/17/2023 498 (H) 140 - 450 10*3/mm3 Final     Neutrophil %   Date Value Ref Range Status   07/17/2023 73.4 42.7 - 76.0 % Final     Lymphocyte %   Date Value Ref Range Status   07/17/2023 10.7 (L) 19.6 - 45.3 % Final     Monocyte %   Date Value Ref Range Status   07/17/2023 5.3 5.0 - 12.0 % Final     Eosinophil %   Date Value Ref Range Status   07/17/2023 2.6 0.3 - 6.2 % Final     Basophil %   Date Value Ref Range Status   07/17/2023 0.5 0.0 - 1.5 % Final     Immature Grans %   Date Value Ref Range Status   07/17/2023 7.5 (H) 0.0 - 0.5 % Final     Neutrophils, Absolute   Date Value Ref Range Status   07/17/2023 18.92 (H) 1.70 - 7.00 10*3/mm3 Final     Lymphocytes, Absolute   Date Value Ref Range Status   07/17/2023 2.77 0.70 - 3.10 10*3/mm3 Final     Monocytes, Absolute   Date Value Ref Range Status   07/17/2023 1.37 (H) 0.10 - 0.90 10*3/mm3 Final     Eosinophils, Absolute   Date Value Ref Range Status   07/17/2023 0.66 (H) 0.00 - 0.40 10*3/mm3 Final     Basophils, Absolute   Date Value Ref Range Status   07/17/2023 0.13 0.00 - 0.20 10*3/mm3 Final     Immature Grans, Absolute   Date Value Ref Range Status   07/17/2023 1.94 (H) 0.00 - 0.05 10*3/mm3 Final     nRBC   Date Value Ref Range Status   07/17/2023 0.0 0.0 - 0.2 /100 WBC Final        Result Review:  I have personally reviewed the results from the time of this admission to 7/17/2023 07:34 EDT and agree with these findings:  [x]  Laboratory  []  Microbiology  [x]  Radiology  []  EKG/Telemetry   []  Cardiology/Vascular   []  Pathology  []  Old records  []  Other:      Assessment & Plan   Assessment / Plan     Brief Patient Summary:  Yfn Ray is a 52 y.o. male with right shoulder septic arthritis and an anterior abscess in the deltopectoral interval extending down the bicep status post arthroscopic I&D on 7/14.  Multiple drains in place.  Blood cultures growing MRSA.  Right shoulder aspirate from 7/13 growing MRSA.  Drains removed 7/17.  Active Hospital  Problems:  Active Hospital Problems    Diagnosis    • **Sepsis    • Chronic HFrEF (heart failure with reduced ejection fraction)    • Shoulder injury, right, initial encounter      Plan:   I reviewed the CT scan this morning.  No definitive abscess, but there is some fluid along the biceps in the anterior aspect of the arm.  Clinically appears improved this morning, will continue to monitor.  Hemoglobin 11.7-monitor  White blood cell count down to 25.79-continue to monitor  Afebrile x24 hours-continue to monitor  Continue IV antibiotics  Light activity as tolerated with right upper extremity  Ice/elevate to help with swelling  Sling as needed  Reinforce dressings as needed  DVT prophylaxis: Subcutaneous heparin  Further care per primary team, appreciate assistance       DVT prophylaxis:  Medical and mechanical DVT prophylaxis orders are present.    CODE STATUS:   Code Status (Patient has no pulse and is not breathing): CPR (Attempt to Resuscitate)  Medical Interventions (Patient has pulse or is breathing): Full Support    Electronically signed by Orville Gallegos MD, 07/17/23, 7:37 AM EDT.

## 2023-07-17 NOTE — PROGRESS NOTES
CARDIOLOGY  INPATIENT PROGRESS NOTE                [facility] 5TH FLOOR SURGICAL TELEMETRY UNIT    7/17/2023      PATIENT IDENTIFICATION:   Name:  Yfn Ray      MRN:  [id number]     52 y.o.  male                 SUBJECTIVE:   Patient with no big events overnight denies any shortness of breath issues this morning    OBJECTIVE:  Vitals:    07/16/23 2255 07/16/23 2349 07/17/23 0317 07/17/23 0740   BP: 138/82  122/69 123/68   BP Location: Left arm  Left arm Left arm   Patient Position: Lying  Lying Lying   Pulse: 102 91 88 91   Resp: 16 16 16 20   Temp: 98.1 °F (36.7 °C)  98.9 °F (37.2 °C) 98.5 °F (36.9 °C)   TempSrc: Oral  Oral Oral   SpO2: 97% 97% 94% 98%   Weight:       Height:               Body mass index is 26.46 kg/m².    Intake/Output Summary (Last 24 hours) at 7/17/2023 0918  Last data filed at 7/17/2023 0535  Gross per 24 hour   Intake 85300.25 ml   Output 4530 ml   Net 5661.25 ml           Physical Exam  General Appearance:   no acute distress  Alert and oriented x3  HENT:   lips not cyanotic  Atraumatic  Neck:  No jvd   supple  Respiratory:  no respiratory distress  normal breath sounds  no rales  Cardiovascular:    regular rhythm  no S3, no S4   no murmur  no rub  lower extremity edema: Trace to mild  Skin:   warm, dry  No rashes      No Known Allergies  Scheduled meds:  empagliflozin, 10 mg, Oral, Daily  heparin (porcine), 5,000 Units, Subcutaneous, Q8H  sacubitril-valsartan, 1 tablet, Oral, Q12H  senna-docusate sodium, 2 tablet, Oral, BID  sodium chloride, 10 mL, Intravenous, Q12H  vancomycin, 1,750 mg, Intravenous, Q12H      IV meds:                      lactated ringers, 9 mL/hr  Pharmacy to dose vancomycin,   sodium chloride, 75 mL/hr, Last Rate: 75 mL/hr (07/17/23 0144)      Data Review:  CBC          7/15/2023    05:07 7/16/2023    02:29 7/17/2023    05:43   CBC   WBC 32.95  29.95  25.79    RBC 3.73  3.87  3.71    Hemoglobin 11.7  12.0  11.7    Hematocrit 35.2  36.6  35.3    MCV 94.4   94.6  95.1    MCH 31.4  31.0  31.5    MCHC 33.2  32.8  33.1    RDW 13.7  13.6  13.9    Platelets 332  388  498      CMP          7/15/2023    05:07 7/16/2023    02:29 7/17/2023    05:43   CMP   Glucose 124  86  89    BUN 18  16  12    Creatinine 0.65  0.65  0.61    EGFR 113.4  113.4  115.6    Sodium 131  128  132    Potassium 4.3  4.5  4.5    Chloride 100  97  100    Calcium 7.9  7.9  8.4    Albumin 2.0      BUN/Creatinine Ratio 27.7  24.6  19.7    Anion Gap 8.1  9.3  8.0       CARDIAC LABS:      Lab 07/14/23  0536 07/10/23  2154   PROBNP 2,809.0*  --    HSTROP T  --  9   PROTIME  --  15.8*   INR  --  1.26*        No results found for: DIGOXIN   No results found for: TSH        Invalid input(s): LDLCALC  No results found for: POCTROP  Lab Results   Component Value Date    TROPONINT 9 07/10/2023   (  Lab Results   Component Value Date    MG 2.2 07/17/2023     Results for orders placed during the hospital encounter of 07/10/23    Adult Transthoracic Echo Complete W/ Cont if Necessary Per Protocol    Interpretation Summary    Left ventricular systolic function is severely decreased. Left ventricular ejection fraction appears to be less than 20%.    The left ventricular cavity is moderately dilated.    Left ventricular diastolic function is consistent with (grade I) impaired relaxation.    The left atrial cavity is mildly dilated.    Abnormal mitral valve structure consistent with dilated annulus.    Mild mitral regurgitation    No overt evidence of cardioembolic source by TTE    No prior echo for comparison           ASSESSMENT:    Sepsis    Chronic HFrEF (heart failure with reduced ejection fraction)    Shoulder injury, right, initial encounter        PLAN:  1.  Add Coreg 3.125 twice daily          Cristiano Holliday MD  7/17/2023    09:18 EDT

## 2023-07-17 NOTE — PLAN OF CARE
Goal Outcome Evaluation:      Patient pleasant through shift. No complaints of pain at this time. Sleeping in between care. VSS. Monitoring temperature. Alert and oriented. Bed in lowest locked position. Call light and table within reach.   Camila Gonzalez RN

## 2023-07-17 NOTE — THERAPY EVALUATION
"Patient Name: Yfn Ray  : 1970    MRN: [id number]                              Today's Date: 2023       Admit Date: 7/10/2023    Visit Dx:     ICD-10-CM ICD-9-CM   1. Cellulitis of left upper extremity  L03.114 682.3   2. Shoulder injury, right, initial encounter  S49.91XA 959.2   3. Injury of tendon of biceps  S46.209A 959.2   4. Sepsis, due to unspecified organism, unspecified whether acute organ dysfunction present  A41.9 038.9     995.91   5. Difficulty walking  R26.2 719.7   6. Decreased activities of daily living (ADL)  Z78.9 V49.89     Patient Active Problem List   Diagnosis    Sepsis    Shoulder injury, right, initial encounter    Chronic HFrEF (heart failure with reduced ejection fraction)     Past Medical History:   Diagnosis Date    Alpha 1-antitrypsin PiMS phenotype     \"alpha 1\" per pt and wife. unsure of what type.    Arthritis      Past Surgical History:   Procedure Laterality Date    LIVER BIOPSY      SHOULDER ARTHROSCOPY Right 2023    Procedure: SHOULDER ARTHROSCOPY WITH WASHOUT AND DEBRIDMENT;  Surgeon: Orville Gallegos MD;  Location: [facility] OR [facility];  Service: Orthopedics;  Laterality: Right;    TOOTH EXTRACTION        General Information       Row Name 23 1506          OT Time and Intention    Document Type evaluation  -LF     Mode of Treatment individual therapy;occupational therapy  -       Row Name 23 1506          General Information    Patient Profile Reviewed yes  -LF     Prior Level of Function --  (I) with ADLs, ambulated w/o a device, has a walk-in shower where he stands to shower, elevated commode, stands to groom, drives, no home, and has his own tree trimming service.  -LF     Existing Precautions/Restrictions fall  -LF     Barriers to Rehab none identified  -LF       Row Name 23 1506          Occupational Profile    Reason for Services/Referral (Occupational Profile) Patient is a 52 year old male currently status post right shoulder arthroscopy " with washout and debridement on July 10th, 2023. Occupational therapy consulted due to recent decline in ADLs/functional transfers. No previous occupational therapy services for current condition.  -       Row Name 07/17/23 1506          Living Environment    People in Home spouse;child(adrianna), adult  -       Row Name 07/17/23 1506          Home Main Entrance    Number of Stairs, Main Entrance five  -     Stair Railings, Main Entrance railings on both sides of stairs  -[facility] Name 07/17/23 1506          Cognition    Orientation Status (Cognition) oriented x 4  -       Row Name 07/17/23 1506          Safety Issues, Functional Mobility    Impairments Affecting Function (Mobility) pain;range of motion (ROM);strength;endurance/activity tolerance  -               User Key  (r) = Recorded By, (t) = Taken By, (c) = Cosigned By      Initials Name Provider Type     Anju Salgado OT Occupational Therapist                     Mobility/ADL's       Row Name 07/17/23 1510          Bed Mobility    Bed Mobility bed mobility (all) activities  -     All Activities, [place] (Bed Mobility) independent  -     Supine-Sit [place] (Bed Mobility) independent  -[facility] Name 07/17/23 1510          Transfers    Transfers sit-stand transfer;stand-sit transfer  -[facility] Name 07/17/23 1510          Sit-Stand Transfer    Sit-Stand [place] (Transfers) supervision  -[facility] Name 07/17/23 1510          Stand-Sit Transfer    Stand-Sit [place] (Transfers) supervision  -LF       Row Name 07/17/23 1510          Activities of Daily Living    BADL Assessment/Intervention bathing;upper body dressing;lower body dressing;grooming;feeding;toileting  -[facility] Name 07/17/23 1510          Bathing Assessment/Intervention    [place] Level (Bathing) bathing skills;upper body;lower body;moderate assist (50% patient effort)  -[facility] Name 07/17/23 1510          Upper Body Dressing  Assessment/Training    [place] Level (Upper Body Dressing) upper body dressing skills;moderate assist (50% patient effort)  -[facility] Name 07/17/23 1510          Lower Body Dressing Assessment/Training    [place] Level (Lower Body Dressing) lower body dressing skills;moderate assist (50% patient effort)  -LF       Row Name 07/17/23 1510          Grooming Assessment/Training    [place] Level (Grooming) grooming skills;set up  -LF       Row Name 07/17/23 1510          Self-Feeding Assessment/Training    [place] Level (Feeding) feeding skills;set up  -LF       Row Name 07/17/23 1510          Toileting Assessment/Training    [place] Level (Toileting) toileting skills;minimum assist (75% patient effort)  -               User Key  (r) = Recorded By, (t) = Taken By, (c) = Cosigned By      Initials Name Provider Type     Anju Salgado OT Occupational Therapist                   Obj/Interventions       Row Name 07/17/23 1512          Sensory Assessment (Somatosensory)    Sensory Assessment (Somatosensory) UE sensation intact  -LF       Row Name 07/17/23 1512          Vision Assessment/Intervention    Visual Impairment/Limitations WFL  -LF       Row Name 07/17/23 1512          Range of Motion Comprehensive    Comment, General Range of Motion Full AROM of LUE throughout. Full AROM of right wrist/hand and full AAROM of right elbow, and PROM of right shoulder.  -LF       Row Name 07/17/23 1512          Strength Comprehensive (MMT)    Comment, General Manual Muscle Testing (MMT) Assessment 5/5 LUE throughout and 5/5 right ; further proximal testing deferred due to recent sx and pain from torn r.cuff.  -LF       Row Name 07/17/23 1512          Motor Skills    Motor Skills coordination;functional endurance  -     Coordination WFL  -     Functional Endurance Fair+  -LF       Row Name 07/17/23 1512          Balance    Balance Assessment sitting dynamic balance;standing dynamic balance  -      Dynamic Sitting Balance supervision  -LF     Dynamic Standing Balance supervision  -LF               User Key  (r) = Recorded By, (t) = Taken By, (c) = Cosigned By      Initials Name Provider Type     Anju Salgado, OT Occupational Therapist                   Goals/Plan       Row Name 07/17/23 1515          Bed Mobility Goal 1 (OT)    Activity/Assistive Device (Bed Mobility Goal 1, OT) --  -LF     [place] Level/Cues Needed (Bed Mobility Goal 1, OT) --  -LF     Time Frame (Bed Mobility Goal 1, OT) --  -LF       Row Name 07/17/23 1515          Transfer Goal 1 (OT)    Activity/Assistive Device (Transfer Goal 1, OT) transfers, all  -LF     [place] Level/Cues Needed (Transfer Goal 1, OT) independent  -LF     Time Frame (Transfer Goal 1, OT) long term goal (LTG);10 days  -       Row Name 07/17/23 1515          Bathing Goal 1 (OT)    Activity/Device (Bathing Goal 1, OT) bathing skills, all  -LF     [place] Level/Cues Needed (Bathing Goal 1, OT) independent  -LF     Time Frame (Bathing Goal 1, OT) long term goal (LTG);10 days  -       Row Name 07/17/23 1515          Dressing Goal 1 (OT)    Activity/Device (Dressing Goal 1, OT) dressing skills, all  -LF     [place]/Cues Needed (Dressing Goal 1, OT) independent  -LF     Time Frame (Dressing Goal 1, OT) long term goal (LTG);10 days  -       Row Name 07/17/23 1515          Toileting Goal 1 (OT)    Activity/Device (Toileting Goal 1, OT) toileting skills, all  -LF     [place] Level/Cues Needed (Toileting Goal 1, OT) independent  -LF     Time Frame (Toileting Goal 1, OT) long term goal (LTG);10 days  -       Row Name 07/17/23 1515          Grooming Goal 1 (OT)    Activity/Device (Grooming Goal 1, OT) grooming skills, all  -LF     [place] (Grooming Goal 1, OT) independent  -LF     Time Frame (Grooming Goal 1, OT) long term goal (LTG);10 days  -       Row Name 07/17/23 1515          Problem Specific Goal 1 (OT)    Problem Specific  Goal 1 (OT) Patient will demonstrate good endurance to support ADLs/functional transfers.  -     Time Frame (Problem Specific Goal 1, OT) long term goal (LTG);10 days  -       Row Name 07/17/23 1518          Therapy Assessment/Plan (OT)    Planned Therapy Interventions (OT) activity tolerance training;patient/caregiver education/training;BADL retraining;functional balance retraining;occupation/activity based interventions;ROM/therapeutic exercise;strengthening exercise;transfer/mobility retraining  -               User Key  (r) = Recorded By, (t) = Taken By, (c) = Cosigned By      Initials Name Provider Type     Anju Salgado OT Occupational Therapist                   Clinical Impression       Row Name 07/17/23 1514          Pain Assessment    Additional Documentation Pain Scale: FACES Pre/Post-Treatment (Group)  -LF       Row Name 07/17/23 1514          Pain Scale: FACES Pre/Post-Treatment    Pain: FACES Scale, Pretreatment 2-->hurts little bit  -LF     Posttreatment Pain Rating 4-->hurts little more  -LF     Pain Location - Side/Orientation Right  -     Pain Location - shoulder  -LF       Row Name 07/17/23 1514          Plan of Care Review    Plan of Care Reviewed With patient;spouse  -     Progress no change  -     Outcome Evaluation Patient presents with limitations in self-care, endurance, and RUE ROM/strength. He  would benefit from continued skilled occupational therapy services to maximize independence with ADLs/functional transfers.  -       Row Name 07/17/23 1513          Therapy Assessment/Plan (OT)    Patient/Family Therapy Goal Statement (OT) To maximize independence.  -     Rehab Potential (OT) good, to achieve stated therapy goals  -     Criteria for Skilled Therapeutic Interventions Met (OT) yes;meets criteria;skilled treatment is necessary  -     Therapy Frequency (OT) 5 times/wk  -       Row Name 07/17/23 5585          Therapy Plan Review/Discharge Plan (OT)     Anticipated Discharge Disposition (OT) home with assist  -       Row Name 07/17/23 1514          Vital Signs    O2 Delivery Pre Treatment nasal cannula  -LF     O2 Delivery Intra Treatment nasal cannula  -LF     O2 Delivery Post Treatment nasal cannula  -LF               User Key  (r) = Recorded By, (t) = Taken By, (c) = Cosigned By      Initials Name Provider Type    LF Anju Salgado, OT Occupational Therapist                   Outcome Measures       Row Name 07/17/23 1515          How much help from another is currently needed...    Putting on and taking off regular lower body clothing? 2  -LF     Bathing (including washing, rinsing, and drying) 2  -LF     Toileting (which includes using toilet bed pan or urinal) 3  -LF     Putting on and taking off regular upper body clothing 2  -LF     Taking care of personal grooming (such as brushing teeth) 3  -LF     Eating meals 4  -LF     AM-PAC 6 Clicks Score (OT) 16  -LF       Row Name 07/17/23 1100 07/17/23 0715       How much help from another person do you currently need...    Turning from your back to your side while in flat bed without using bedrails? 4 (P)   -KD 4  -LW    Moving from lying on back to sitting on the side of a flat bed without bedrails? 4 (P)   -KD 3  -LW    Moving to and from a bed to a chair (including a wheelchair)? 4 (P)   -KD 3  -LW    Standing up from a chair using your arms (e.g., wheelchair, bedside chair)? 4 (P)   -KD 3  -LW    Climbing 3-5 steps with a railing? 4 (P)   -KD 3  -LW    To walk in hospital room? 3 (P)   -KD 3  -LW    AM-PAC 6 Clicks Score (PT) 23 (P)   -KD 19  -LW    Highest level of mobility 7 --> Walked 25 feet or more (P)   -KD 6 --> Walked 10 steps or more  -LW      Row Name 07/17/23 1515 07/17/23 1100       Functional Assessment    Outcome Measure Options AM-PAC 6 Clicks Daily Activity (OT);Optimal Instrument  -LF AM-PAC 6 Clicks Basic Mobility (PT) (P)   -KD      Row Name 07/17/23 1515          Optimal Instrument     Optimal Instrument Optimal - 3  -LF     Bending/Stooping 2  -LF     Standing 2  -LF     Reaching 3  -LF     From the list, choose the 3 activities you would most like to be able to do without any difficulty Bending/stooping;Standing;Reaching  -LF     Total Score Optimal - 3 7  -LF               User Key  (r) = Recorded By, (t) = Taken By, (c) = Cosigned By      Initials Name Provider Type     Anju Salgado, TODD Occupational Therapist    Camila Josue, RN Registered Nurse    Debra Doran, PT Student PT Student                    Occupational Therapy Education       Title: PT OT SLP Therapies (Done)       Topic: Occupational Therapy (Done)       Point: ADL training (Done)       Description:   Instruct learner(s) on proper safety adaptation and remediation techniques during self care or transfers.   Instruct in proper use of assistive devices.                  Learning Progress Summary             Patient Acceptance, E,TB, VU by  at 7/17/2023 1517   Significant Other Acceptance, E,TB, VU by  at 7/17/2023 1517                         Point: Precautions (Done)       Description:   Instruct learner(s) on prescribed precautions during self-care and functional transfers.                  Learning Progress Summary             Patient Acceptance, E,TB, VU by  at 7/17/2023 1517   Significant Other Acceptance, E,TB, VU by  at 7/17/2023 1517                         Point: Body mechanics (Done)       Description:   Instruct learner(s) on proper positioning and spine alignment during self-care, functional mobility activities and/or exercises.                  Learning Progress Summary             Patient Acceptance, E,TB, VU by  at 7/17/2023 1517   Significant Other Acceptance, E,TB, VU by  at 7/17/2023 1517                                         User Key       Initials Effective Dates Name Provider Type Discipline     06/16/21 -  Anju Salgado OT Occupational Therapist OT                  OT  Recommendation and Plan  Planned Therapy Interventions (OT): activity tolerance training, patient/caregiver education/training, BADL retraining, functional balance retraining, occupation/activity based interventions, ROM/therapeutic exercise, strengthening exercise, transfer/mobility retraining  Therapy Frequency (OT): 5 times/wk  Plan of Care Review  Plan of Care Reviewed With: patient, spouse  Progress: no change  Outcome Evaluation: Patient presents with limitations in self-care, endurance, and RUE ROM/strength. He  would benefit from continued skilled occupational therapy services to maximize independence with ADLs/functional transfers.     Time Calculation:    Time Calculation- OT       Row Name 07/17/23 1517             Time Calculation- OT    OT Received On 07/17/23  -LF      OT Goal Re-Cert Due Date 07/26/23  -LF         Untimed Charges    OT Eval/Re-eval Minutes 34  -LF         Total Minutes    Untimed Charges Total Minutes 34  -LF       Total Minutes 34  -LF                User Key  (r) = Recorded By, (t) = Taken By, (c) = Cosigned By      Initials Name Provider Type    LF Anju Salgado OT Occupational Therapist                  Therapy Charges for Today       Code Description Service Date Service Provider Modifiers Qty    [id number] HC OT EVAL LOW COMPLEXITY 3 7/17/2023 Anju Salgado OT GO 1                 Anju Salgado OT  7/17/2023

## 2023-07-17 NOTE — PROGRESS NOTES
[facility]   Hospitalist Progress Note  Date: 2023  Patient Name: Yfn Ray  : 1970  MRN: [id number]  Date of admission: 7/10/2023      Subjective   Subjective     Chief Complaint: right arm infection     Summary:  52 y.o. male no past medical history presents to the emergency department for evaluation of right upper extremity pain.  Patient states he fell off a truck 5 days ago and was seen initially and discharged outpatient with outpatient follow-up.  States that yesterday he noted his arm swelling and turning red which got progressively worse prompting him to seek further medical attention.  He denies any fevers, chills, sweats, nausea, vomiting, chest pain, shortness of breath, palpitations, abdominal pain diarrhea constipation or dysuria, weakness.  In the emergency department felt to have cellulitis and started on vancomycin and Zosyn and will be admitted for ongoing monitoring management.     Interval Followup:     Patient is status post washout of his right shoulder on .  Patient tolerated the procedure well.  Patient has 3 drains in place which have been removed today.   Patient's blood cultures finally are showing no growth.  Continue vancomycin.  Will eventually need a PICC line placed for outpatient IV antibiotics  Echocardiogram also shows patient's heart function is decreased.  This is a new finding per patient's wife. Cardiology consulted.  Patient will need left heart catheterization once acute issues resolve  Patient is afebrile  Patient's white blood cell count is improving  Patient had CT scanning of his arm on .  No definitive abscess.  At this time orthopedics is holding off on further surgery.        Review of Systems   All systems were reviewed and negative except for: right shoulder pain and rash on back     Objective   Objective     Vitals:   Temp:  [98.1 °F (36.7 °C)-98.9 °F (37.2 °C)] 98.5 °F (36.9 °C)  Heart Rate:  [] 91  Resp:  [16-20] 20  BP:  (122-138)/(68-82) 123/68  Flow (L/min):  [1-2] 1  Physical Exam    Constitutional: Resting in bed. Wife at the bedside    Eyes: Pupils equal, sclerae anicteric, no conjunctival injection   HENT: NCAT, mucous membranes moist   Neck: Supple, no thyromegaly, no lymphadenopathy, trachea midline   Respiratory: Clear to auscultation bilaterally, nonlabored respirations    Cardiovascular: RRR, no murmurs, rubs, or gallops, palpable pedal pulses bilaterally   Gastrointestinal: Positive bowel sounds, soft, nontender, nondistended   Musculoskeletal: Full range of motion except for right shoulder which he is unable to move. Dressings in place. Drain removed    Psychiatric: Appropriate affect, cooperative   Neurologic: Oriented x 3, strength symmetric in all extremities, Cranial Nerves grossly intact to confrontation, speech clear   Skin: erythema has improved.   Result Review    Result Review:  I have personally reviewed the results from the time of this admission to 7/17/2023 08:07 EDT and agree with these findings:  [x]  Laboratory  BMP          7/15/2023    05:07 7/16/2023    02:29 7/17/2023    05:43   BMP   BUN 18  16  12    Creatinine 0.65  0.65  0.61    Sodium 131  128  132    Potassium 4.3  4.5  4.5    Chloride 100  97  100    CO2 22.9  21.7  24.0    Calcium 7.9  7.9  8.4      CBC          7/15/2023    05:07 7/16/2023    02:29 7/17/2023    05:43   CBC   WBC 32.95  29.95  25.79    RBC 3.73  3.87  3.71    Hemoglobin 11.7  12.0  11.7    Hematocrit 35.2  36.6  35.3    MCV 94.4  94.6  95.1    MCH 31.4  31.0  31.5    MCHC 33.2  32.8  33.1    RDW 13.7  13.6  13.9    Platelets 332  388  498        [x]  Microbiology  Blood Culture   Date Value Ref Range Status   07/10/2023 Staphylococcus aureus, MRSA (C)  Preliminary     Comment:       Infectious disease consultation is highly recommended to rule out distant foci of infection.  Methicillin resistant Staphylococcus aureus, Patient may be an isolation risk.     BCID, PCR   Date  Value Ref Range Status   07/10/2023 (C) Negative by BCID PCR. Culture to Follow. Final    Staph aureus. mecA/C and MREJ (methicillin resistance gene) detected. Identification by BCID2 PCR.     No results found for: CULTURES, HSVCX, URCX  No results found for: EYECULTURE, GCCX, HSVCULTURE, LABHSV  No results found for: LEGIONELLA, MRSACX, MUMPSCX, MYCOPLASCX  No results found for: NOCARDIACX, STOOLCX  No results found for: THROATCX, UNSTIMCULT, URINECX, CULTURE, VZVCULTUR  No results found for: VIRALCULTU, WOUNDCX    []  Radiology  []  EKG/Telemetry   []  Cardiology/Vascular   []  Pathology  []  Old records  []  Other:    Assessment & Plan   Assessment / Plan     Assessment/Plan:  MRSA bacteremia  Right-sided rotator cuff tear  Septic right shoulder joint due to MRSA  MRSA cellulitis of right shoulder  Traumatic injury of right shoulder, concern for rotator cuff tear  Systolic congestive heart failure with an EF of 20%, new diagnosis    PLAN  -- Continue patient on IV vancomycin.  At some point will need a PICC line  -- Discussed plan with infectious disease.  Reviewed CT imaging of right arm  --Repeat cultures are showing no growth currently.  --Echo is done and is showing significant systolic heart failure.  This is a new diagnosis for the patient.  Cardiology consulted.  Patient has been started on Coreg, Jardiance and Entresto.  Patient will need outpatient heart catheterization once acute issues resolved.  --Continue pain control.    -- MRI of right shoulder reviewed  --ortho consulted and patient is s/p washout of shoulder     Discussed plan with RN.    DVT prophylaxis:  Medical and mechanical DVT prophylaxis orders are present.    CODE STATUS:   Code Status (Patient has no pulse and is not breathing): CPR (Attempt to Resuscitate)  Medical Interventions (Patient has pulse or is breathing): Full Support

## 2023-07-17 NOTE — THERAPY EVALUATION
"Acute Care - Physical Therapy Initial Evaluation   [place]     Patient Name: Yfn Ray  : 1970  MRN: [id number]  Today's Date: 2023      Visit Dx: Admit date: 7/10/2023     Referring Physician: Raj Allen DO     Surgery Date:7/10/2023 - 2023   Procedure(s) (LRB):  SHOULDER ARTHROSCOPY WITH WASHOUT AND DEBRIDMENT (Right)       ICD-10-CM ICD-9-CM   1. Cellulitis of left upper extremity  L03.114 682.3   2. Shoulder injury, right, initial encounter  S49.91XA 959.2   3. Injury of tendon of biceps  S46.209A 959.2   4. Sepsis, due to unspecified organism, unspecified whether acute organ dysfunction present  A41.9 038.9     995.91   5. Difficulty walking  R26.2 719.7     Patient Active Problem List   Diagnosis    Sepsis    Shoulder injury, right, initial encounter    Chronic HFrEF (heart failure with reduced ejection fraction)     Past Medical History:   Diagnosis Date    Alpha 1-antitrypsin PiMS phenotype     \"alpha 1\" per pt and wife. unsure of what type.    Arthritis      Past Surgical History:   Procedure Laterality Date    LIVER BIOPSY      SHOULDER ARTHROSCOPY Right 2023    Procedure: SHOULDER ARTHROSCOPY WITH WASHOUT AND DEBRIDMENT;  Surgeon: Orville Gallegos MD;  [place] [facility] [place] [facility] [place]  Service: Orthopedics;  Laterality: Right;    TOOTH EXTRACTION       PT Assessment (last 12 hours)       PT Evaluation and Treatment       Row Name 23 1024          Physical Therapy Time and Intention    Subjective Information complains of;nausea/vomiting (P)   -KD     Document Type evaluation (P)   -KD     Mode of Treatment individual therapy;physical therapy (P)   -KD     Patient Effort good (P)   -KD       Row Name 23 1024          General Information    Patient Profile Reviewed yes (P)   -KD     Patient Observations alert;cooperative;agree to therapy (P)   -KD     Prior Level of Function independent:;all household mobility;ADL's;driving;bathing;dressing;grooming (P)   -KD     Equipment " Currently Used at Home none (P)   -KD     Existing Precautions/Restrictions fall (P)   -KD     Barriers to Rehab none identified (P)   -KD       Row Name 07/17/23 1024          Living Environment    Current Living Arrangements home (P)   -KD     Home Accessibility stairs to enter home (P)   -KD     People in Home spouse (P)   -KD     Primary Care Provided by self (P)   -KD       Row Name 07/17/23 1024          Home Main Entrance    Number of Stairs, Main Entrance two (P)   -KD       Row Name 07/17/23 1024          Home Use of Assistive/Adaptive Equipment    Equipment Currently Used at Home none (P)   -KD       Row Name 07/17/23 1024          Range of Motion (ROM)    Range of Motion bilateral lower extremities;ROM is WFL (P)   -KD       [facility] Name 07/17/23 1024          Strength (Manual Muscle Testing)    Strength (Manual Muscle Testing) bilateral lower extremities (P)   BLE: 4+/5  -KD       Row Name 07/17/23 1024          Bed Mobility    Bed Mobility supine-sit;sit-supine;scooting/bridging (P)   -KD     Scooting/Bridging [place] (Bed Mobility) independent (P)   -KD     Supine-Sit [place] (Bed Mobility) independent (P)   -KD     Sit-Supine [place] (Bed Mobility) independent (P)   -KD       Row Name 07/17/23 1024          Transfers    Transfers sit-stand transfer;stand-sit transfer (P)   -KD       Row Name 07/17/23 1024          Sit-Stand Transfer    Sit-Stand [place] (Transfers) independent (P)   -KD     Assistive Device (Sit-Stand Transfers) other (see comments) (P)   none  -KD       Row Name 07/17/23 1024          Stand-Sit Transfer    Stand-Sit [place] (Transfers) independent (P)   -KD     Assistive Device (Stand-Sit Transfers) other (see comments) (P)   none  -KD       Row Name 07/17/23 1024          Gait/Stairs (Locomotion)    Gait/Stairs Locomotion gait/ambulation independence (P)   -KD     [place] Level (Gait) standby assist (P)   -KD     Assistive Device (Gait) other (see comments)  (P)   none  -KD     Distance in Feet (Gait) 600 (P)   -KD     Pattern (Gait) step-through (P)   -KD     Deviations/Abnormal Patterns (Gait) gait speed decreased;stride length decreased;reji decreased (P)   -KD       Row Name 07/17/23 1024          Balance    Balance Assessment standing dynamic balance (P)   -KD     Dynamic Standing Balance standby assist (P)   -KD     Position/Device Used, Standing Balance unsupported (P)   -KD       Row Name             Wound Right shoulder Incision    Wound - Properties Group Side: Right  -NC Location: shoulder  -NC Primary Wound Type: Incision  -NC    Retired Wound - Properties Group Side: Right  -NC Location: shoulder  -NC Primary Wound Type: Incision  -NC    Retired Wound - Properties Group Side: Right  -NC Location: shoulder  -NC Primary Wound Type: Incision  -NC      Row Name 07/17/23 1024          Plan of Care Review    Plan of Care Reviewed With patient (P)   -KD     Outcome Evaluation At baseline, patient is independent with ADLs, transfers, and gait. Today he does not present with any limitations that impede his ability to safely ambulate and transfer. Inpatient PT services are not indicated at this time. (P)   -KD       Row Name 07/17/23 1024          Therapy Assessment/Plan (PT)    Criteria for Skilled Interventions Met (PT) no;no problems identified which require skilled intervention (P)   -KD     Therapy Frequency (PT) evaluation only (P)   -KD       Row Name 07/17/23 1024          PT Evaluation Complexity    History, PT Evaluation Complexity no personal factors and/or comorbidities (P)   -KD     Examination of Body Systems (PT Eval Complexity) total of 4 or more elements (P)   -KD     Clinical Presentation (PT Evaluation Complexity) stable (P)   -KD     Clinical Decision Making (PT Evaluation Complexity) low complexity (P)   -KD     Overall Complexity (PT Evaluation Complexity) low complexity (P)   -KD               User Key  (r) = Recorded By, (t) = Taken By, (c)  = Cosigned By      Initials Name Provider Type    Julianna Jimenez, RN Registered Nurse    Debra Doran, PT Student PT Student                      PT Recommendation and Plan  Anticipated Discharge Disposition (PT): (P) home with outpatient therapy services  Therapy Frequency (PT): (P) evaluation only  Plan of Care Reviewed With: (P) patient  Outcome Evaluation: (P) At baseline, patient is independent with ADLs, transfers, and gait. Today he does not present with any limitations that impede his ability to safely ambulate and transfer. Inpatient PT services are not indicated at this time.   Outcome Measures       Row Name 07/17/23 1100             How much help from another person do you currently need...    Turning from your back to your side while in flat bed without using bedrails? 4 (P)   -KD      Moving from lying on back to sitting on the side of a flat bed without bedrails? 4 (P)   -KD      Moving to and from a bed to a chair (including a wheelchair)? 4 (P)   -KD      Standing up from a chair using your arms (e.g., wheelchair, bedside chair)? 4 (P)   -KD      Climbing 3-5 steps with a railing? 4 (P)   -KD      To walk in hospital room? 3 (P)   -KD      AM-PAC 6 Clicks Score (PT) 23 (P)   -KD         Functional Assessment    Outcome Measure Options AM-PAC 6 Clicks Basic Mobility (PT) (P)   -KD                User Key  (r) = Recorded By, (t) = Taken By, (c) = Cosigned By      Initials Name Provider Type    Debra Doran, PT Student PT Student                     Time Calculation:    PT Charges       Row Name 07/17/23 1121             Time Calculation    PT Received On 07/17/23 (P)   -KD         Untimed Charges    PT Eval/Re-eval Minutes 40 (P)   -KD         Total Minutes    Untimed Charges Total Minutes 40 (P)   -KD       Total Minutes 40 (P)   -KD                User Key  (r) = Recorded By, (t) = Taken By, (c) = Cosigned By      Initials Name Provider Type    Debra Doran, PT Student PT Student                       PT G-Codes  Outcome Measure Options: (P) AM-PAC 6 Clicks Basic Mobility (PT)  AM-PAC 6 Clicks Score (PT): (P) 23    Debra Valladares, PT Student  7/17/2023

## 2023-07-18 PROBLEM — I50.21 ACUTE HFREF (HEART FAILURE WITH REDUCED EJECTION FRACTION): Status: ACTIVE | Noted: 2023-07-18

## 2023-07-18 LAB
ANION GAP SERPL CALCULATED.3IONS-SCNC: 9.2 MMOL/L (ref 5–15)
BACTERIA SPEC AEROBE CULT: NORMAL
BUN SERPL-MCNC: 14 MG/DL (ref 6–20)
BUN/CREAT SERPL: 23.7 (ref 7–25)
CALCIUM SPEC-SCNC: 8.1 MG/DL (ref 8.6–10.5)
CHLORIDE SERPL-SCNC: 101 MMOL/L (ref 98–107)
CO2 SERPL-SCNC: 21.8 MMOL/L (ref 22–29)
CREAT SERPL-MCNC: 0.59 MG/DL (ref 0.76–1.27)
CRP SERPL-MCNC: 9.22 MG/DL (ref 0–0.5)
DEPRECATED RDW RBC AUTO: 46.9 FL (ref 37–54)
EGFRCR SERPLBLD CKD-EPI 2021: 116.7 ML/MIN/1.73
ERYTHROCYTE [DISTWIDTH] IN BLOOD BY AUTOMATED COUNT: 13.8 % (ref 12.3–15.4)
GLUCOSE SERPL-MCNC: 105 MG/DL (ref 65–99)
HCT VFR BLD AUTO: 36.4 % (ref 37.5–51)
HGB BLD-MCNC: 11.9 G/DL (ref 13–17.7)
MAGNESIUM SERPL-MCNC: 2.1 MG/DL (ref 1.6–2.6)
MCH RBC QN AUTO: 30.6 PG (ref 26.6–33)
MCHC RBC AUTO-ENTMCNC: 32.7 G/DL (ref 31.5–35.7)
MCV RBC AUTO: 93.6 FL (ref 79–97)
PLATELET # BLD AUTO: 682 10*3/MM3 (ref 140–450)
PMV BLD AUTO: 9.1 FL (ref 6–12)
POTASSIUM SERPL-SCNC: 4.4 MMOL/L (ref 3.5–5.2)
RBC # BLD AUTO: 3.89 10*6/MM3 (ref 4.14–5.8)
SODIUM SERPL-SCNC: 132 MMOL/L (ref 136–145)
VANCOMYCIN TROUGH SERPL-MCNC: 14.79 MCG/ML (ref 5–20)
WBC NRBC COR # BLD: 22.98 10*3/MM3 (ref 3.4–10.8)

## 2023-07-18 PROCEDURE — 25010000002 HEPARIN (PORCINE) PER 1000 UNITS: Performed by: STUDENT IN AN ORGANIZED HEALTH CARE EDUCATION/TRAINING PROGRAM

## 2023-07-18 PROCEDURE — 25010000002 VANCOMYCIN 5 G RECONSTITUTED SOLUTION: Performed by: INTERNAL MEDICINE

## 2023-07-18 PROCEDURE — 99231 SBSQ HOSP IP/OBS SF/LOW 25: CPT | Performed by: INTERNAL MEDICINE

## 2023-07-18 PROCEDURE — 80202 ASSAY OF VANCOMYCIN: CPT | Performed by: STUDENT IN AN ORGANIZED HEALTH CARE EDUCATION/TRAINING PROGRAM

## 2023-07-18 PROCEDURE — 86140 C-REACTIVE PROTEIN: CPT | Performed by: STUDENT IN AN ORGANIZED HEALTH CARE EDUCATION/TRAINING PROGRAM

## 2023-07-18 PROCEDURE — 25010000002 VANCOMYCIN 5 G RECONSTITUTED SOLUTION: Performed by: STUDENT IN AN ORGANIZED HEALTH CARE EDUCATION/TRAINING PROGRAM

## 2023-07-18 PROCEDURE — 99232 SBSQ HOSP IP/OBS MODERATE 35: CPT | Performed by: INTERNAL MEDICINE

## 2023-07-18 PROCEDURE — 85027 COMPLETE CBC AUTOMATED: CPT | Performed by: INTERNAL MEDICINE

## 2023-07-18 PROCEDURE — 94762 N-INVAS EAR/PLS OXIMTRY CONT: CPT

## 2023-07-18 PROCEDURE — 25010000002 HYDROMORPHONE 1 MG/ML SOLUTION: Performed by: STUDENT IN AN ORGANIZED HEALTH CARE EDUCATION/TRAINING PROGRAM

## 2023-07-18 PROCEDURE — 80048 BASIC METABOLIC PNL TOTAL CA: CPT | Performed by: INTERNAL MEDICINE

## 2023-07-18 PROCEDURE — 99024 POSTOP FOLLOW-UP VISIT: CPT | Performed by: STUDENT IN AN ORGANIZED HEALTH CARE EDUCATION/TRAINING PROGRAM

## 2023-07-18 PROCEDURE — 83735 ASSAY OF MAGNESIUM: CPT | Performed by: INTERNAL MEDICINE

## 2023-07-18 PROCEDURE — 94799 UNLISTED PULMONARY SVC/PX: CPT

## 2023-07-18 RX ADMIN — CARVEDILOL 3.12 MG: 3.12 TABLET, FILM COATED ORAL at 20:46

## 2023-07-18 RX ADMIN — Medication 10 ML: at 20:46

## 2023-07-18 RX ADMIN — SACUBITRIL AND VALSARTAN 1 TABLET: 24; 26 TABLET, FILM COATED ORAL at 20:46

## 2023-07-18 RX ADMIN — HEPARIN SODIUM 5000 UNITS: 5000 INJECTION INTRAVENOUS; SUBCUTANEOUS at 05:52

## 2023-07-18 RX ADMIN — HYDROMORPHONE HYDROCHLORIDE 0.5 MG: 1 INJECTION, SOLUTION INTRAMUSCULAR; INTRAVENOUS; SUBCUTANEOUS at 11:43

## 2023-07-18 RX ADMIN — HEPARIN SODIUM 5000 UNITS: 5000 INJECTION INTRAVENOUS; SUBCUTANEOUS at 22:06

## 2023-07-18 RX ADMIN — OXYCODONE AND ACETAMINOPHEN 2 TABLET: 5; 325 TABLET ORAL at 09:33

## 2023-07-18 RX ADMIN — VANCOMYCIN HYDROCHLORIDE 1750 MG: 5 INJECTION, POWDER, LYOPHILIZED, FOR SOLUTION INTRAVENOUS at 05:26

## 2023-07-18 RX ADMIN — SODIUM CHLORIDE 75 ML/HR: 9 INJECTION, SOLUTION INTRAVENOUS at 08:42

## 2023-07-18 RX ADMIN — VANCOMYCIN HYDROCHLORIDE 1750 MG: 5 INJECTION, POWDER, LYOPHILIZED, FOR SOLUTION INTRAVENOUS at 18:41

## 2023-07-18 RX ADMIN — CARVEDILOL 3.12 MG: 3.12 TABLET, FILM COATED ORAL at 08:42

## 2023-07-18 RX ADMIN — SENNOSIDES AND DOCUSATE SODIUM 2 TABLET: 50; 8.6 TABLET ORAL at 08:42

## 2023-07-18 RX ADMIN — OXYCODONE AND ACETAMINOPHEN 2 TABLET: 5; 325 TABLET ORAL at 20:46

## 2023-07-18 RX ADMIN — HEPARIN SODIUM 5000 UNITS: 5000 INJECTION INTRAVENOUS; SUBCUTANEOUS at 15:54

## 2023-07-18 RX ADMIN — EMPAGLIFLOZIN 10 MG: 10 TABLET, FILM COATED ORAL at 08:42

## 2023-07-18 RX ADMIN — DIPHENHYDRAMINE HYDROCHLORIDE, ZINC ACETATE 1 APPLICATION: 2; .1 CREAM TOPICAL at 11:41

## 2023-07-18 RX ADMIN — SENNOSIDES AND DOCUSATE SODIUM 2 TABLET: 50; 8.6 TABLET ORAL at 20:46

## 2023-07-18 RX ADMIN — SACUBITRIL AND VALSARTAN 1 TABLET: 24; 26 TABLET, FILM COATED ORAL at 08:42

## 2023-07-18 NOTE — CONSULTS
"Nutrition Services    Patient Name: Yfn Ray  YOB: 1970  MRN: [id number]  Admission date: 7/10/2023      CLINICAL NUTRITION ASSESSMENT      Reason for Assessment  LOS   H&P:    Past Medical History:   Diagnosis Date    Alpha 1-antitrypsin PiMS phenotype     \"alpha 1\" per pt and wife. unsure of what type.    Arthritis         Current Problems:   Active Hospital Problems    Diagnosis     **Sepsis     Chronic HFrEF (heart failure with reduced ejection fraction)     Shoulder injury, right, initial encounter         Nutrition/Diet History         Narrative     Pt followed by RD for LOS x 8 days. Pt is at low risk per nutrition risk screening. No acute nutrition concerns or interventions at this time. RD will continue to follow and monitor per protocol.     Anthropometrics        Current Height, Weight Height: 180.3 cm (70.98\")  Weight: 86 kg (189 lb 9.5 oz)   Current BMI Body mass index is 26.46 kg/m².       Weight Hx  Wt Readings from Last 30 Encounters:   07/14/23 2301 86 kg (189 lb 9.5 oz)   07/06/23 2057 86 kg (189 lb 9.5 oz)            Wt Change Observation Stable x 2 weeks     Estimated/Assessed Needs       Energy Requirements 25-30 kcal/kg   EST Needs (kcal/day) 5311-0736 kcal       Protein Requirements 0.8-1.0 g/kg   EST Daily Needs (g/day) 69-86 g       Fluid Requirements 25 ml/kg    Estimated Needs (mL/day) 2150 ml     Labs/Medications         Pertinent Labs Reviewed.   Results from last 7 days   Lab Units 07/18/23  0518 07/17/23  0543 07/16/23  0229 07/13/23  0522 07/12/23  0522   SODIUM mmol/L 132* 132* 128*   < > 132*   POTASSIUM mmol/L 4.4 4.5 4.5   < > 4.1   CHLORIDE mmol/L 101 100 97*   < > 101   CO2 mmol/L 21.8* 24.0 21.7*   < > 22.0   BUN mg/dL 14 12 16   < > 20   CREATININE mg/dL 0.59* 0.61* 0.65*   < > 0.90   CALCIUM mg/dL 8.1* 8.4* 7.9*   < > 8.7   BILIRUBIN mg/dL  --   --   --   --  0.6   ALK PHOS U/L  --   --   --   --  449*   ALT (SGPT) U/L  --   --   --   --  40   AST (SGOT) " U/L  --   --   --   --  24   GLUCOSE mg/dL 105* 89 86   < > 116*    < > = values in this interval not displayed.     Results from last 7 days   Lab Units 07/18/23  0518 07/17/23  0543 07/16/23  0229 07/15/23  0507   MAGNESIUM mg/dL 2.1 2.2 1.9 2.0   PHOSPHORUS mg/dL  --   --   --  3.4   HEMOGLOBIN g/dL 11.9* 11.7* 12.0* 11.7*   HEMATOCRIT % 36.4* 35.3* 36.6* 35.2*     No results found for: COVID19  No results found for: HGBA1C      Pertinent Medications Reviewed.     Current Nutrition Orders & Evaluation of Intake       Oral Nutrition     Current PO Diet Diet: Regular/House Diet; Texture: Regular Texture (IDDSI 7); Fluid Consistency: Thin (IDDSI 0)   Supplement Orders Placed This Encounter      DIET MESSAGE Caregiver guest tray      Dietary Nutrition Supplements Boost Plus (Ensure Plus); vanilla       Malnutrition Severity Assessment                Nutrition Diagnosis         Nutrition Dx Problem 1 No nutrition diagnosis at this time.     Nutrition Intervention         No intervention indicated.      Medical Nutrition Therapy/Nutrition Education          Learner     Readiness N/A  N/A     Method     Response N/A  N/A     Monitor/Evaluation        Monitor Per protocol.     Nutrition Discharge Plan         No nutrition needs identified at this time.     Electronically signed by:  Emily Vera RD  07/18/23 15:19 EDT

## 2023-07-18 NOTE — PROGRESS NOTES
[facility]   Hospitalist Progress Note  Date: 2023  Patient Name: Yfn Ray  : 1970  MRN: [id number]  Date of admission: 7/10/2023      Subjective   Subjective     Chief Complaint: right arm infection     Summary:  52 y.o. male no past medical history presents to the emergency department for evaluation of right upper extremity pain.  Patient states he fell off a truck 5 days ago and was seen initially and discharged outpatient with outpatient follow-up.  States that yesterday he noted his arm swelling and turning red which got progressively worse prompting him to seek further medical attention.  He denies any fevers, chills, sweats, nausea, vomiting, chest pain, shortness of breath, palpitations, abdominal pain diarrhea constipation or dysuria, weakness.  In the emergency department felt to have cellulitis and started on vancomycin and Zosyn and will be admitted for ongoing monitoring management.     Interval Followup:   Tmax 101.1.  Did feel feverish last night.  Continues to have rash all over the back.  Not itching  Patient is status post washout of his right shoulder on .  Patient tolerated the procedure well.     Patient's blood cultures finally are showing no growth.  Continue vancomycin.  Will eventually need a PICC line placed for outpatient IV antibiotics  Echocardiogram also shows patient's heart function is decreased.  This is a new finding per patient's wife. Cardiology consulted.  Patient will need left heart catheterization once acute issues resolve  Patient's white blood cell count is improving  Patient had CT scanning of his arm on .  No definitive abscess.  At this time orthopedics is holding off on further surgery.        Review of Systems   All systems were reviewed and negative except for: right shoulder pain and rash on back     Objective   Objective     Vitals:   Temp:  [97.6 °F (36.4 °C)-101.1 °F (38.4 °C)] 97.9 °F (36.6 °C)  Heart Rate:  [76-93] 76  Resp:   [16-18] 18  BP: ()/(57-80) 94/57  Physical Exam    Constitutional: Resting in bed. Wife at the bedside    Eyes: Pupils equal, sclerae anicteric, no conjunctival injection   HENT: NCAT, mucous membranes moist   Neck: Supple, no thyromegaly, no lymphadenopathy, trachea midline   Respiratory: Clear to auscultation bilaterally, nonlabored respirations    Cardiovascular: RRR, no murmurs, rubs, or gallops, palpable pedal pulses bilaterally   Gastrointestinal: Positive bowel sounds, soft, nontender, nondistended   Musculoskeletal: Full range of motion except for right shoulder which he is unable to move. Dressings in place. Drain removed.  Remain swollen right upper extremity with redness proximally.  Wound dressed   Psychiatric: Appropriate affect, cooperative   Neurologic: Oriented x 3, strength symmetric in all extremities, Cranial Nerves grossly intact to confrontation, speech clear   Skin: erythema all over the back  Result Review    Result Review:  I have personally reviewed the results from the time of this admission to 7/18/2023 17:05 EDT and agree with these findings:  [x]  Laboratory  BMP          7/16/2023    02:29 7/17/2023    05:43 7/18/2023    05:18   BMP   BUN 16  12  14    Creatinine 0.65  0.61  0.59    Sodium 128  132  132    Potassium 4.5  4.5  4.4    Chloride 97  100  101    CO2 21.7  24.0  21.8    Calcium 7.9  8.4  8.1      CBC          7/16/2023    02:29 7/17/2023    05:43 7/18/2023    05:18   CBC   WBC 29.95  25.79  22.98    RBC 3.87  3.71  3.89    Hemoglobin 12.0  11.7  11.9    Hematocrit 36.6  35.3  36.4    MCV 94.6  95.1  93.6    MCH 31.0  31.5  30.6    MCHC 32.8  33.1  32.7    RDW 13.6  13.9  13.8    Platelets 388  498  682      [x]  Microbiology  Blood Culture   Date Value Ref Range Status   07/10/2023 Staphylococcus aureus, MRSA (C)  Preliminary     Comment:       Infectious disease consultation is highly recommended to rule out distant foci of infection.  Methicillin resistant  Staphylococcus aureus, Patient may be an isolation risk.     BCID, PCR   Date Value Ref Range Status   07/10/2023 (C) Negative by BCID PCR. Culture to Follow. Final    Staph aureus. mecA/C and MREJ (methicillin resistance gene) detected. Identification by BCID2 PCR.     No results found for: CULTURES, HSVCX, URCX  No results found for: EYECULTURE, GCCX, HSVCULTURE, LABHSV  No results found for: LEGIONELLA, MRSACX, MUMPSCX, MYCOPLASCX  No results found for: NOCARDIACX, STOOLCX  No results found for: THROATCX, UNSTIMCULT, URINECX, CULTURE, VZVCULTUR  No results found for: VIRALCULTU, WOUNDCX    []  Radiology  []  EKG/Telemetry   []  Cardiology/Vascular   []  Pathology  []  Old records  []  Other:    Assessment & Plan   Assessment / Plan     Assessment/Plan:  MRSA bacteremia  Right-sided rotator cuff tear  Septic right shoulder joint due to MRSA.  S/p right shoulder washout July 14  MRSA cellulitis of right shoulder  Traumatic injury of right shoulder, concern for rotator cuff tear  Systolic congestive heart failure with an EF of 20%, new diagnosis    PLAN  -- Continue patient on IV vancomycin.  At some point will need a PICC line  -- Discussed plan with infectious disease.  Reviewed CT imaging of right arm  --Repeat cultures are showing no growth currently.  --Echo is done and is showing significant systolic heart failure.  This is a new diagnosis for the patient.  Cardiology consulted.  Patient has been started on Coreg, Jardiance and Entresto.  Patient will need outpatient heart catheterization once acute issues resolved.  --Continue pain control.    -- MRI of right shoulder reviewed  --ortho consulted and patient is s/p washout of shoulder   -DC IV fluid  -White cell count improving.  Trend CBC  -Pain control with IV and oral narcotics  Discussed plan with RN and .  Home health with IV antibiotics when stable.    DVT prophylaxis:  Medical and mechanical DVT prophylaxis orders are present.    CODE  STATUS:   Code Status (Patient has no pulse and is not breathing): CPR (Attempt to Resuscitate)  Medical Interventions (Patient has pulse or is breathing): Full Support          Electronically signed by Jae Andrews MD, 07/18/23, 5:09 PM EDT.

## 2023-07-18 NOTE — PLAN OF CARE
Goal Outcome Evaluation:      Patient resting in bed throughout shift, wife at bedside. IV fluids and antibiotics continue. Patient tolerating food and liquids. R arm ROM remains significantly limited due to pain. Dressing in place to R shoulder, no drainage. Patient reports pain is managed with tylenol. Temp 101.1 early in shift, VS otherwise stable. Ice packs, cool cloths, PO ice and fluids, tylenol provided. Rash to back appears worse with sweat and clears when washed with cool cloth. Diphenhydramine cream applied.

## 2023-07-18 NOTE — PROGRESS NOTES
[facility]     Progress Note    Patient Name: Yfn Ray  : 1970  MRN: [id number]  Primary Care Physician:  Marilyn Eugene APRN  Date of admission: 7/10/2023    Subjective   Subjective     HPI:  Febrile to 101.1 at 2100.  Now resolved.  Painful but controlled.  Denies new areas of pain.  Denies chest pain or shortness of breath currently.    Review of Systems   See HPI    Objective   Objective     Vitals:   Temp:  [98.5 °F (36.9 °C)-101.1 °F (38.4 °C)] 99 °F (37.2 °C)  Heart Rate:  [] 92  Resp:  [16-20] 16  BP: (106-148)/(57-75) 118/69  Flow (L/min):  [1] 1  Physical Exam    General: Alert, no acute distress   Right upper extremity: Incisions evaluated.  Mild serous and seropurulent drainage from the anterior drain site.  No palpable fluid collections.  Minimal expressible fluid.  Erythema and induration down the anterior arm is improving.  No pain with passive elbow range of motion.  Mild swelling extends to the hand.  Neurovascular intact.  Compartments soft.    Result Review      WBC   Date Value Ref Range Status   2023 22.98 (H) 3.40 - 10.80 10*3/mm3 Final     RBC   Date Value Ref Range Status   2023 3.89 (L) 4.14 - 5.80 10*6/mm3 Final     Hemoglobin   Date Value Ref Range Status   2023 11.9 (L) 13.0 - 17.7 g/dL Final     Hematocrit   Date Value Ref Range Status   2023 36.4 (L) 37.5 - 51.0 % Final     MCV   Date Value Ref Range Status   2023 93.6 79.0 - 97.0 fL Final     MCH   Date Value Ref Range Status   2023 30.6 26.6 - 33.0 pg Final     MCHC   Date Value Ref Range Status   2023 32.7 31.5 - 35.7 g/dL Final     RDW   Date Value Ref Range Status   2023 13.8 12.3 - 15.4 % Final     RDW-SD   Date Value Ref Range Status   2023 46.9 37.0 - 54.0 fl Final     MPV   Date Value Ref Range Status   2023 9.1 6.0 - 12.0 fL Final     Platelets   Date Value Ref Range Status   2023 682 (H) 140 - 450 10*3/mm3 Final     Neutrophil %   Date  Value Ref Range Status   07/17/2023 73.4 42.7 - 76.0 % Final     Lymphocyte %   Date Value Ref Range Status   07/17/2023 10.7 (L) 19.6 - 45.3 % Final     Monocyte %   Date Value Ref Range Status   07/17/2023 5.3 5.0 - 12.0 % Final     Eosinophil %   Date Value Ref Range Status   07/17/2023 2.6 0.3 - 6.2 % Final     Basophil %   Date Value Ref Range Status   07/17/2023 0.5 0.0 - 1.5 % Final     Immature Grans %   Date Value Ref Range Status   07/17/2023 7.5 (H) 0.0 - 0.5 % Final     Neutrophils, Absolute   Date Value Ref Range Status   07/17/2023 18.92 (H) 1.70 - 7.00 10*3/mm3 Final     Lymphocytes, Absolute   Date Value Ref Range Status   07/17/2023 2.77 0.70 - 3.10 10*3/mm3 Final     Monocytes, Absolute   Date Value Ref Range Status   07/17/2023 1.37 (H) 0.10 - 0.90 10*3/mm3 Final     Eosinophils, Absolute   Date Value Ref Range Status   07/17/2023 0.66 (H) 0.00 - 0.40 10*3/mm3 Final     Basophils, Absolute   Date Value Ref Range Status   07/17/2023 0.13 0.00 - 0.20 10*3/mm3 Final     Immature Grans, Absolute   Date Value Ref Range Status   07/17/2023 1.94 (H) 0.00 - 0.05 10*3/mm3 Final     nRBC   Date Value Ref Range Status   07/17/2023 0.0 0.0 - 0.2 /100 WBC Final        Result Review:  I have personally reviewed the results from the time of this admission to 7/18/2023 06:56 EDT and agree with these findings:  [x]  Laboratory  []  Microbiology  [x]  Radiology  []  EKG/Telemetry   []  Cardiology/Vascular   []  Pathology  []  Old records  []  Other:      Assessment & Plan   Assessment / Plan     Brief Patient Summary:  Yfn Ray is a 52 y.o. male with right shoulder septic arthritis and an anterior abscess in the deltopectoral interval extending down the bicep status post arthroscopic I&D on 7/14.  Multiple drains in place.  Blood cultures growing MRSA.  Right shoulder aspirate from 7/13 growing MRSA.  Drains removed 7/17.  Active Hospital Problems:  Active Hospital Problems    Diagnosis    • **Sepsis    •  Chronic HFrEF (heart failure with reduced ejection fraction)    • Shoulder injury, right, initial encounter      Plan:   Trend CRP  Monitor fevers.  101.1 at 2100 on 7/17.  Hemoglobin 11.9-monitor  White blood cell count trending down-continue to monitor   Continue IV antibiotics  Light activity as tolerated with right upper extremity  Ice/elevate to help with swelling  Sling as needed  Daily dressing changes reinforce as needed  DVT prophylaxis: Subcutaneous heparin  Further care per primary team, appreciate assistance    Dispo: No discharge plans at this time, continue to monitor clinically.       DVT prophylaxis:  Medical and mechanical DVT prophylaxis orders are present.    CODE STATUS:   Code Status (Patient has no pulse and is not breathing): CPR (Attempt to Resuscitate)  Medical Interventions (Patient has pulse or is breathing): Full Support    Electronically signed by Orville Gallegos MD, 07/18/23, 6:59 AM EDT.

## 2023-07-18 NOTE — PLAN OF CARE
Goal Outcome Evaluation:               Patient not feeling well this morning. Complaints of a headache and general malaise. Relief with PRN medication. Feeling improved by the afternoon. Ambulated well in the shook with wife at bedside. Bed in lowest locked position. Call light and tray table within reach.

## 2023-07-18 NOTE — PROGRESS NOTES
"[facility] Clinical Pharmacy Services: Vancomycin Monitoring Note    Yfn Ray is a 52 y.o. male who is on day 8 of pharmacy to dose vancomycin for Bacteremia and Bone and/or Joint Infection.    Previous Vancomycin Dose:   1750 mg IV every  12  hours  Imaging Reviewed?: Yes  Updated Cultures and Sensitivities:   23: BLOOD CX-NGTD  23: WOUND CX- RIGHT SHOULDER: MRSA  23: WOUND CX RIGHT SHOULDER: MRSA  23: BODY FLUID CX- RIGHT SHOULDER FLUID: MRSA  23: BLOOD CX-1/2  MRSA  7-10-23: BLOOD CX-2/2 MRSA    Vitals/Labs  Ht: 180.3 cm (70.98\"); Wt: 86 kg (189 lb 9.5 oz)   Temp (24hrs), Av.2 °F (37.3 °C), Min:97.6 °F (36.4 °C), Max:101.1 °F (38.4 °C)   Estimated Creatinine Clearance: 178.2 mL/min (A) (by C-G formula based on SCr of 0.59 mg/dL (L)).       Results from last 7 days   Lab Units 23  0518 23  0543 23  0229 07/15/23  0507 23  0522 23  1402   VANCOMYCIN RM mcg/mL  --   --   --   --   --  13.70   VANCOMYCIN TR mcg/mL 14.79  --   --  13.90  --   --    CREATININE mg/dL 0.59* 0.61* 0.65* 0.65*   < >  --    WBC 10*3/mm3 22.98* 25.79* 29.95* 32.95*   < >  --     < > = values in this interval not displayed.     Assessment/Plan    Current Vancomycin Dose:  1750 mg IV every 12 hours; which provides the following predicted parameters:  Exposure target: AUC24 (range)400-600 mg/L.hr   AUC24,ss: 503 mg/L.hr  PAUC*: 93 %  Ctrough,ss: 12.2 mg/L  Pconc*: 0 %  Next Vanc Trough planned in 2-3 days   We will continue to monitor patient changes and renal function       Thank you for involving pharmacy in this patient's care. Please contact pharmacy with any questions or concerns.    Rowan Monique [facility]  Clinical Pharmacist      "

## 2023-07-18 NOTE — PROGRESS NOTES
CARDIOLOGY  INPATIENT PROGRESS NOTE                [facility] 5TH FLOOR SURGICAL TELEMETRY UNIT    7/18/2023      PATIENT IDENTIFICATION:   Name:  Yfn Ray      MRN:  [id number]     52 y.o.  male                 SUBJECTIVE:   Patient not feeling well in general but does not have a focal complaint this morning  OBJECTIVE:  Vitals:    07/18/23 0747 07/18/23 0930 07/18/23 1138 07/18/23 1537   BP: 126/80  114/75 94/57   BP Location: Left arm  Left arm Left arm   Patient Position: Lying  Lying Lying   Pulse: 93 87 84 76   Resp: 16 18 18   Temp: 98.8 °F (37.1 °C)  97.8 °F (36.6 °C) 97.9 °F (36.6 °C)   TempSrc: Oral  Axillary Oral   SpO2: 95% 95% 94% 97%   Weight:       Height:               Body mass index is 26.46 kg/m².    Intake/Output Summary (Last 24 hours) at 7/18/2023 1552  Last data filed at 7/18/2023 0928  Gross per 24 hour   Intake 2033.75 ml   Output 600 ml   Net 1433.75 ml       Telemetry: Normal sinus rhythm    Physical Exam  General Appearance:   no acute distress  Alert and oriented x3  HENT:   lips not cyanotic  Atraumatic  Neck:  No jvd   supple  Respiratory:  no respiratory distress  normal breath sounds  no rales  Cardiovascular:    regular rhythm  no S3, no S4   no murmur  no rub  lower extremity edema: none    Skin:   warm, dry  No rashes      No Known Allergies  Scheduled meds:  carvedilol, 3.125 mg, Oral, Q12H  empagliflozin, 10 mg, Oral, Daily  heparin (porcine), 5,000 Units, Subcutaneous, Q8H  sacubitril-valsartan, 1 tablet, Oral, Q12H  senna-docusate sodium, 2 tablet, Oral, BID  sodium chloride, 10 mL, Intravenous, Q12H  vancomycin, 1,750 mg, Intravenous, Q12H      IV meds:                      lactated ringers, 9 mL/hr  Pharmacy to dose vancomycin,       Data Review:  CBC          7/16/2023    02:29 7/17/2023    05:43 7/18/2023    05:18   CBC   WBC 29.95  25.79  22.98    RBC 3.87  3.71  3.89    Hemoglobin 12.0  11.7  11.9    Hematocrit 36.6  35.3  36.4    MCV 94.6  95.1  93.6     MCH 31.0  31.5  30.6    MCHC 32.8  33.1  32.7    RDW 13.6  13.9  13.8    Platelets 388  498  682      CMP          7/16/2023    02:29 7/17/2023    05:43 7/18/2023    05:18   CMP   Glucose 86  89  105    BUN 16  12  14    Creatinine 0.65  0.61  0.59    EGFR 113.4  115.6  116.7    Sodium 128  132  132    Potassium 4.5  4.5  4.4    Chloride 97  100  101    Calcium 7.9  8.4  8.1    BUN/Creatinine Ratio 24.6  19.7  23.7    Anion Gap 9.3  8.0  9.2       CARDIAC LABS:      Lab 07/14/23  0536   PROBNP 2,809.0*        No results found for: DIGOXIN   No results found for: TSH        Invalid input(s): LDLCALC  No results found for: POCTROP  Lab Results   Component Value Date    TROPONINT 9 07/10/2023   (  Lab Results   Component Value Date    MG 2.1 07/18/2023     Results for orders placed during the hospital encounter of 07/10/23    Adult Transthoracic Echo Complete W/ Cont if Necessary Per Protocol    Interpretation Summary    Left ventricular systolic function is severely decreased. Left ventricular ejection fraction appears to be less than 20%.    The left ventricular cavity is moderately dilated.    Left ventricular diastolic function is consistent with (grade I) impaired relaxation.    The left atrial cavity is mildly dilated.    Abnormal mitral valve structure consistent with dilated annulus.    Mild mitral regurgitation    No overt evidence of cardioembolic source by TTE    No prior echo for comparison           ASSESSMENT:    Sepsis    Chronic HFrEF (heart failure with reduced ejection fraction)    Shoulder injury, right, initial encounter        PLAN:  1.  Continue with Coreg 3.2 therapy  2.  Jardiance 10 mg daily  3.  Entresto 24-26 twice daily we will hold off any further changes as patient is not feeling well in general he appears to be stable from a volume standpoint          Cristiano Holliday MD  7/18/2023    15:52 EDT

## 2023-07-18 NOTE — PROGRESS NOTES
"Enter Query Response Below      Query Response: Nonexcisional debridement             If applicable, please update the problem list.       Patient: Yfn Turner        : 1970  Account: [id number]           Admit Date:         How to Respond to this query:       a. Click New Note     b. Answer query within the yellow box.                c. Update the Problem List, if applicable.      If you have any questions about this query contact me at: Sincerely,    Gautam Wallace RN, BSN  Clinical Documentation Integrity  [facility]  [email or web address]       ,      52 year old male presented with right shoulder pain after fall from a truck, found to have right rotator cuff tear with MRSA bacteremia and sepsis.  \" Shoulder Arthroscopy With Washout And Debridement\" was performed 2023.  Operative note states \"The cephalic vein was retracted with the deltoid.  Purulent fluid extended beneath the deltoid and along the pectoralis  major.  I thoroughly irrigated this region with 3 L of normal saline.  I bluntly debrided the tissues with a curette.  The fluid tracked along the biceps tendon.\"  For greater specification of the procedure performed, can  type and level  of debridement  be further clarified?    *Excisional debridement down to and including ________(please specify depth)  *Non-excisional debridement   *Other_______  *Clinically undetermined    By submitting this query, we are merely seeking further clarification of documentation to accurately reflect all conditions that you are monitoring, evaluating, treating or that extend the hospitalization or utilize additional resources of care. Please utilize your independent clinical judgment when addressing the question(s) above.     This query and your response, once completed, will be entered into the legal medical record.    Sincerely,  Gautam Wallace  Clinical Documentation Integrity Program     "

## 2023-07-19 LAB
BACTERIA SPEC ANAEROBE CULT: NORMAL
BACTERIA SPEC ANAEROBE CULT: NORMAL
BASOPHILS # BLD AUTO: 0.12 10*3/MM3 (ref 0–0.2)
BASOPHILS NFR BLD AUTO: 0.6 % (ref 0–1.5)
CRP SERPL-MCNC: 5.32 MG/DL (ref 0–0.5)
DEPRECATED RDW RBC AUTO: 47.9 FL (ref 37–54)
EOSINOPHIL # BLD AUTO: 0.35 10*3/MM3 (ref 0–0.4)
EOSINOPHIL NFR BLD AUTO: 1.6 % (ref 0.3–6.2)
ERYTHROCYTE [DISTWIDTH] IN BLOOD BY AUTOMATED COUNT: 14 % (ref 12.3–15.4)
HCT VFR BLD AUTO: 35 % (ref 37.5–51)
HGB BLD-MCNC: 11.5 G/DL (ref 13–17.7)
IMM GRANULOCYTES # BLD AUTO: 0.96 10*3/MM3 (ref 0–0.05)
IMM GRANULOCYTES NFR BLD AUTO: 4.5 % (ref 0–0.5)
LYMPHOCYTES # BLD AUTO: 3.17 10*3/MM3 (ref 0.7–3.1)
LYMPHOCYTES NFR BLD AUTO: 14.9 % (ref 19.6–45.3)
MCH RBC QN AUTO: 31.2 PG (ref 26.6–33)
MCHC RBC AUTO-ENTMCNC: 32.9 G/DL (ref 31.5–35.7)
MCV RBC AUTO: 94.9 FL (ref 79–97)
MONOCYTES # BLD AUTO: 1.7 10*3/MM3 (ref 0.1–0.9)
MONOCYTES NFR BLD AUTO: 8 % (ref 5–12)
NEUTROPHILS NFR BLD AUTO: 15.03 10*3/MM3 (ref 1.7–7)
NEUTROPHILS NFR BLD AUTO: 70.4 % (ref 42.7–76)
NRBC BLD AUTO-RTO: 0 /100 WBC (ref 0–0.2)
PLATELET # BLD AUTO: 816 10*3/MM3 (ref 140–450)
PMV BLD AUTO: 9.1 FL (ref 6–12)
RBC # BLD AUTO: 3.69 10*6/MM3 (ref 4.14–5.8)
RBC MORPH BLD: NORMAL
SMALL PLATELETS BLD QL SMEAR: NORMAL
VANCOMYCIN TROUGH SERPL-MCNC: 17.6 MCG/ML (ref 5–20)
WBC MORPH BLD: NORMAL
WBC NRBC COR # BLD: 21.33 10*3/MM3 (ref 3.4–10.8)

## 2023-07-19 PROCEDURE — 85025 COMPLETE CBC W/AUTO DIFF WBC: CPT | Performed by: INTERNAL MEDICINE

## 2023-07-19 PROCEDURE — 99024 POSTOP FOLLOW-UP VISIT: CPT | Performed by: STUDENT IN AN ORGANIZED HEALTH CARE EDUCATION/TRAINING PROGRAM

## 2023-07-19 PROCEDURE — 25010000002 HEPARIN (PORCINE) PER 1000 UNITS: Performed by: STUDENT IN AN ORGANIZED HEALTH CARE EDUCATION/TRAINING PROGRAM

## 2023-07-19 PROCEDURE — 86140 C-REACTIVE PROTEIN: CPT | Performed by: STUDENT IN AN ORGANIZED HEALTH CARE EDUCATION/TRAINING PROGRAM

## 2023-07-19 PROCEDURE — 80202 ASSAY OF VANCOMYCIN: CPT | Performed by: STUDENT IN AN ORGANIZED HEALTH CARE EDUCATION/TRAINING PROGRAM

## 2023-07-19 PROCEDURE — 85007 BL SMEAR W/DIFF WBC COUNT: CPT | Performed by: INTERNAL MEDICINE

## 2023-07-19 PROCEDURE — 94799 UNLISTED PULMONARY SVC/PX: CPT

## 2023-07-19 PROCEDURE — 25010000002 VANCOMYCIN 5 G RECONSTITUTED SOLUTION: Performed by: INTERNAL MEDICINE

## 2023-07-19 PROCEDURE — 99231 SBSQ HOSP IP/OBS SF/LOW 25: CPT | Performed by: INTERNAL MEDICINE

## 2023-07-19 PROCEDURE — 94761 N-INVAS EAR/PLS OXIMETRY MLT: CPT

## 2023-07-19 PROCEDURE — 99232 SBSQ HOSP IP/OBS MODERATE 35: CPT | Performed by: INTERNAL MEDICINE

## 2023-07-19 RX ORDER — MULTIPLE VITAMINS W/ MINERALS TAB 9MG-400MCG
1 TAB ORAL DAILY
Status: DISCONTINUED | OUTPATIENT
Start: 2023-07-19 | End: 2023-07-24 | Stop reason: HOSPADM

## 2023-07-19 RX ADMIN — Medication 10 ML: at 21:03

## 2023-07-19 RX ADMIN — CARVEDILOL 3.12 MG: 3.12 TABLET, FILM COATED ORAL at 09:52

## 2023-07-19 RX ADMIN — SACUBITRIL AND VALSARTAN 1 TABLET: 24; 26 TABLET, FILM COATED ORAL at 21:02

## 2023-07-19 RX ADMIN — HEPARIN SODIUM 5000 UNITS: 5000 INJECTION INTRAVENOUS; SUBCUTANEOUS at 07:36

## 2023-07-19 RX ADMIN — EMPAGLIFLOZIN 10 MG: 10 TABLET, FILM COATED ORAL at 09:52

## 2023-07-19 RX ADMIN — MULTIPLE VITAMINS W/ MINERALS TAB 1 TABLET: TAB at 14:29

## 2023-07-19 RX ADMIN — SENNOSIDES AND DOCUSATE SODIUM 2 TABLET: 50; 8.6 TABLET ORAL at 09:52

## 2023-07-19 RX ADMIN — OXYCODONE AND ACETAMINOPHEN 1 TABLET: 5; 325 TABLET ORAL at 21:03

## 2023-07-19 RX ADMIN — SACUBITRIL AND VALSARTAN 1 TABLET: 24; 26 TABLET, FILM COATED ORAL at 09:52

## 2023-07-19 RX ADMIN — VANCOMYCIN HYDROCHLORIDE 1750 MG: 5 INJECTION, POWDER, LYOPHILIZED, FOR SOLUTION INTRAVENOUS at 07:36

## 2023-07-19 RX ADMIN — VANCOMYCIN HYDROCHLORIDE 1750 MG: 5 INJECTION, POWDER, LYOPHILIZED, FOR SOLUTION INTRAVENOUS at 18:05

## 2023-07-19 RX ADMIN — OXYCODONE AND ACETAMINOPHEN 2 TABLET: 5; 325 TABLET ORAL at 02:19

## 2023-07-19 RX ADMIN — HEPARIN SODIUM 5000 UNITS: 5000 INJECTION INTRAVENOUS; SUBCUTANEOUS at 21:02

## 2023-07-19 RX ADMIN — HEPARIN SODIUM 5000 UNITS: 5000 INJECTION INTRAVENOUS; SUBCUTANEOUS at 14:29

## 2023-07-19 RX ADMIN — CARVEDILOL 3.12 MG: 3.12 TABLET, FILM COATED ORAL at 21:02

## 2023-07-19 NOTE — PROGRESS NOTES
CARDIOLOGY  INPATIENT PROGRESS NOTE                [facility] 5TH FLOOR SURGICAL TELEMETRY UNIT    7/19/2023      PATIENT IDENTIFICATION:   Name:  Yfn Ray      MRN:  [id number]     52 y.o.  male                 SUBJECTIVE:   Patient feeling better this morning no events overnight  OBJECTIVE:  Vitals:    07/18/23 2231 07/19/23 0211 07/19/23 0700 07/19/23 0736   BP: 109/61 108/59  102/68   BP Location: Left arm Left arm  Left arm   Patient Position: Lying Lying  Lying   Pulse: 103 96  90   Resp: 16 16  16   Temp: 98.6 °F (37 °C) 98.6 °F (37 °C)  98.1 °F (36.7 °C)   TempSrc: Oral Oral  Oral   SpO2: 95% 97% 93% 95%   Weight:       Height:               Body mass index is 26.46 kg/m².    Intake/Output Summary (Last 24 hours) at 7/19/2023 0854  Last data filed at 7/19/2023 0211  Gross per 24 hour   Intake 960 ml   Output 1175 ml   Net -215 ml       Telemetry: Normal sinus rhythm brief SVT about 10 beats in duration    Physical Exam  General Appearance:   no acute distress  Alert and oriented x3  HENT:   lips not cyanotic  Atraumatic  Neck:  No jvd   supple  Respiratory:  no respiratory distress  normal breath sounds  no rales  Cardiovascular:    regular rhythm  no S3, no S4   no murmur  no rub  lower extremity edema: none    Skin:   warm, dry  No rashes      No Known Allergies  Scheduled meds:  carvedilol, 3.125 mg, Oral, Q12H  empagliflozin, 10 mg, Oral, Daily  heparin (porcine), 5,000 Units, Subcutaneous, Q8H  sacubitril-valsartan, 1 tablet, Oral, Q12H  senna-docusate sodium, 2 tablet, Oral, BID  sodium chloride, 10 mL, Intravenous, Q12H  vancomycin, 1,750 mg, Intravenous, Q12H      IV meds:                      lactated ringers, 9 mL/hr  Pharmacy to dose vancomycin,       Data Review:  CBC          7/17/2023    05:43 7/18/2023    05:18 7/19/2023    06:09   CBC   WBC 25.79  22.98  21.33    RBC 3.71  3.89  3.69    Hemoglobin 11.7  11.9  11.5    Hematocrit 35.3  36.4  35.0    MCV 95.1  93.6  94.9    MCH  31.5  30.6  31.2    MCHC 33.1  32.7  32.9    RDW 13.9  13.8  14.0    Platelets 498  682  816      CMP          7/16/2023    02:29 7/17/2023    05:43 7/18/2023    05:18   CMP   Glucose 86  89  105    BUN 16  12  14    Creatinine 0.65  0.61  0.59    EGFR 113.4  115.6  116.7    Sodium 128  132  132    Potassium 4.5  4.5  4.4    Chloride 97  100  101    Calcium 7.9  8.4  8.1    BUN/Creatinine Ratio 24.6  19.7  23.7    Anion Gap 9.3  8.0  9.2       CARDIAC LABS:      Lab 07/14/23  0536   PROBNP 2,809.0*        No results found for: DIGOXIN   No results found for: TSH        Invalid input(s): LDLCALC  No results found for: POCTROP  Lab Results   Component Value Date    TROPONINT 9 07/10/2023   (  Lab Results   Component Value Date    MG 2.1 07/18/2023     Results for orders placed during the hospital encounter of 07/10/23    Adult Transthoracic Echo Complete W/ Cont if Necessary Per Protocol    Interpretation Summary    Left ventricular systolic function is severely decreased. Left ventricular ejection fraction appears to be less than 20%.    The left ventricular cavity is moderately dilated.    Left ventricular diastolic function is consistent with (grade I) impaired relaxation.    The left atrial cavity is mildly dilated.    Abnormal mitral valve structure consistent with dilated annulus.    Mild mitral regurgitation    No overt evidence of cardioembolic source by TTE    No prior echo for comparison           ASSESSMENT:    Sepsis    Chronic HFrEF (heart failure with reduced ejection fraction)    Shoulder injury, right, initial encounter    Acute HFrEF (heart failure with reduced ejection fraction)        PLAN:  1.  Continue with his current GDMT therapy of Coreg, Jardiance, and Entresto given low blood pressure will not add Aldactone at this point.  Patient looks compensated from a volume standpoint we will plan on ischemic work-up after patient has been treated for his infectious issues as an outpatient as an  outpatient          Cristiano Holliday MD  7/19/2023    08:54 EDT

## 2023-07-19 NOTE — PLAN OF CARE
Goal Outcome Evaluation:            Resting in bed. Family at bedside. Up with assistance. Pain controlled

## 2023-07-19 NOTE — PROGRESS NOTES
[facility]     Progress Note    Patient Name: Yfn Ray  : 1970  MRN: [id number]  Primary Care Physician:  Marilyn Eugene APRN  Date of admission: 7/10/2023    Subjective   Subjective     HPI:  Afebrile overnight.  Feels much improved this morning.  Up and ambulatory with wife yesterday evening.  Denies chest pain or shortness of breath.    Review of Systems   See HPI    Objective   Objective     Vitals:   Temp:  [97.8 °F (36.6 °C)-99.2 °F (37.3 °C)] 98.6 °F (37 °C)  Heart Rate:  [] 96  Resp:  [16-18] 16  BP: ()/(57-80) 108/59  Physical Exam    General: Alert, no acute distress   Right upper extremity: Dressings in place with no active drainage noted.  No erythema.  Induration down the anterior arm continues to improve.  No pain with passive elbow range of motion.  Mild swelling extends to the hand.  Neurovascular intact.  Compartments soft.    Result Review      WBC   Date Value Ref Range Status   2023 21.33 (H) 3.40 - 10.80 10*3/mm3 Final     RBC   Date Value Ref Range Status   2023 3.69 (L) 4.14 - 5.80 10*6/mm3 Final     Hemoglobin   Date Value Ref Range Status   2023 11.5 (L) 13.0 - 17.7 g/dL Final     Hematocrit   Date Value Ref Range Status   2023 35.0 (L) 37.5 - 51.0 % Final     MCV   Date Value Ref Range Status   2023 94.9 79.0 - 97.0 fL Final     MCH   Date Value Ref Range Status   2023 31.2 26.6 - 33.0 pg Final     MCHC   Date Value Ref Range Status   2023 32.9 31.5 - 35.7 g/dL Final     RDW   Date Value Ref Range Status   2023 14.0 12.3 - 15.4 % Final     RDW-SD   Date Value Ref Range Status   2023 47.9 37.0 - 54.0 fl Final     MPV   Date Value Ref Range Status   2023 9.1 6.0 - 12.0 fL Final     Platelets   Date Value Ref Range Status   2023 816 (H) 140 - 450 10*3/mm3 Final     Neutrophil %   Date Value Ref Range Status   2023 70.4 42.7 - 76.0 % Final     Lymphocyte %   Date Value Ref Range Status    07/19/2023 14.9 (L) 19.6 - 45.3 % Final     Monocyte %   Date Value Ref Range Status   07/19/2023 8.0 5.0 - 12.0 % Final     Eosinophil %   Date Value Ref Range Status   07/19/2023 1.6 0.3 - 6.2 % Final     Basophil %   Date Value Ref Range Status   07/19/2023 0.6 0.0 - 1.5 % Final     Immature Grans %   Date Value Ref Range Status   07/19/2023 4.5 (H) 0.0 - 0.5 % Final     Neutrophils, Absolute   Date Value Ref Range Status   07/19/2023 15.03 (H) 1.70 - 7.00 10*3/mm3 Final     Lymphocytes, Absolute   Date Value Ref Range Status   07/19/2023 3.17 (H) 0.70 - 3.10 10*3/mm3 Final     Monocytes, Absolute   Date Value Ref Range Status   07/19/2023 1.70 (H) 0.10 - 0.90 10*3/mm3 Final     Eosinophils, Absolute   Date Value Ref Range Status   07/19/2023 0.35 0.00 - 0.40 10*3/mm3 Final     Basophils, Absolute   Date Value Ref Range Status   07/19/2023 0.12 0.00 - 0.20 10*3/mm3 Final     Immature Grans, Absolute   Date Value Ref Range Status   07/19/2023 0.96 (H) 0.00 - 0.05 10*3/mm3 Final     nRBC   Date Value Ref Range Status   07/19/2023 0.0 0.0 - 0.2 /100 WBC Final        Result Review:  I have personally reviewed the results from the time of this admission to 7/19/2023 07:36 EDT and agree with these findings:  [x]  Laboratory  []  Microbiology  [x]  Radiology  []  EKG/Telemetry   []  Cardiology/Vascular   []  Pathology  []  Old records  []  Other:      Assessment & Plan   Assessment / Plan     Brief Patient Summary:  Yfn Ray is a 52 y.o. male with right shoulder septic arthritis and an anterior abscess in the deltopectoral interval extending down the bicep status post arthroscopic I&D on 7/14.  Multiple drains in place.  Blood cultures growing MRSA.  Right shoulder aspirate from 7/13 growing MRSA.  Drains removed 7/17.  Active Hospital Problems:  Active Hospital Problems    Diagnosis    • **Sepsis    • Acute HFrEF (heart failure with reduced ejection fraction)    • Chronic HFrEF (heart failure with reduced  ejection fraction)    • Shoulder injury, right, initial encounter      Plan:   CRP trending down-5.32 this morning  Afebrile for 24 hours  Hemoglobin 11.5-monitor  White blood cell count trending down-continue to monitor   Continue IV antibiotics  Light activity as tolerated with right upper extremity  Ice/elevate to help with swelling  Sling as needed  Daily dressing changes reinforce as needed  DVT prophylaxis: Subcutaneous heparin  Further care per primary team, appreciate assistance    Dispo: No discharge plans at this time, continue to monitor clinically.       DVT prophylaxis:  Medical and mechanical DVT prophylaxis orders are present.    CODE STATUS:   Code Status (Patient has no pulse and is not breathing): CPR (Attempt to Resuscitate)  Medical Interventions (Patient has pulse or is breathing): Full Support    Electronically signed by Orville Gallegos MD, 07/19/23, 7:36 AM EDT.

## 2023-07-19 NOTE — PROGRESS NOTES
"[facility] Clinical Pharmacy Services: Vancomycin Monitoring Note    Yfn Ray is a 52 y.o. male who is on day 9 of pharmacy to dose vancomycin for Sepsis.    Previous Vancomycin Dose:   1750 mg IV every  12  hours  Imaging Reviewed?: N/A  Updated Cultures and Sensitivities:   23: BLOOD CX, RIGHT ARM: NGD3  23: BLOOD CX, RIGHT HAND: NGD3  23: ANAEROBIC CX, RIGHT SHOULDER WOUND: NGD5  23: WOUND CX, RIGHT SHOULDER: MRSA  23: ANAEROBIC CX, RIGHT SHOULDER WOUND: NGD5  23: WOUND CX, RIGHT SHOULDER: MRSA  23: BODY FLUID CX, RIGHT SHOULDER FLUID: MRSA  23: BLOOD CX, LEFT ARM: MRSA  23: BLOOD CX, RIGHT HAND: NGD5  23: MRSA PCR: MRSA DETECTED  7-10-23: BLOOD CX, LEFT ARM: MRSA  7-10-23: BLOOD CX, LEFT ARM: MRSA    Vitals/Labs  Ht: 180.3 cm (70.98\"); Wt: 86 kg (189 lb 9.5 oz)     Temp (24hrs), Av.4 °F (36.9 °C), Min:97.8 °F (36.6 °C), Max:99.2 °F (37.3 °C)    Estimated Creatinine Clearance: 178.2 mL/min (A) (by C-G formula based on SCr of 0.59 mg/dL (L)).       Results from last 7 days   Lab Units 23  0609 23  0518 23  0543 23  0229 07/15/23  0507 23  0522 23  1402   VANCOMYCIN RM mcg/mL  --   --   --   --   --   --  13.70   VANCOMYCIN TR mcg/mL  --  14.79  --   --  13.90  --   --    CREATININE mg/dL  --  0.59* 0.61* 0.65* 0.65*   < >  --    WBC 10*3/mm3 21.33* 22.98* 25.79* 29.95* 32.95*   < >  --     < > = values in this interval not displayed.     Assessment/Plan    Current Vancomycin Dose:  1750 mg IV every 12 hours; which provides the following predicted parameters:  AUC24,ss: 502 mg/L.hr  PAUC*: 93 %  Ctrough,ss: 12.1 mg/L  Pconc*: 0 %  Tox.: 7 %  Next Vanc Trough ordered for today at 1700  We will continue to monitor patient changes and renal function     Thank you for involving pharmacy in this patient's care. Please contact pharmacy with any questions or concerns.    Chanell Gay  Clinical Pharmacist    "

## 2023-07-19 NOTE — PROGRESS NOTES
[facility]   Hospitalist Progress Note  Date: 2023  Patient Name: Yfn Ray  : 1970  MRN: [id number]  Date of admission: 7/10/2023      Subjective   Subjective     Chief Complaint: right arm infection     Summary:  52 y.o. male no past medical history presents to the emergency department for evaluation of right upper extremity pain.  Patient states he fell off a truck 5 days ago and was seen initially and discharged outpatient with outpatient follow-up.  States that yesterday he noted his arm swelling and turning red which got progressively worse prompting him to seek further medical attention.  He denies any fevers, chills, sweats, nausea, vomiting, chest pain, shortness of breath, palpitations, abdominal pain diarrhea constipation or dysuria, weakness.  In the emergency department felt to have cellulitis and started on vancomycin and Zosyn and will be admitted for ongoing monitoring management.     Interval Followup:   Afebrile.  Still at times feel like having feverish with chills and gets exhausted afterward these episodes last for a short time.  Continues to have rash all over the back improving with less itching  Patient is status post washout of his right shoulder on .  Patient tolerated the procedure well.     Patient's blood cultures finally are showing no growth.  Continue vancomycin.  Will eventually need a PICC line placed for outpatient IV antibiotics  Echocardiogram also shows patient's heart function is decreased.  This is a new finding per patient's wife. Cardiology consulted.  Patient will need left heart catheterization once acute issues resolve  Patient's white blood cell count is improving  Patient had CT scanning of his arm on .  No definitive abscess.  At this time orthopedics is holding off on further surgery.  Wife thinks patient is getting jaundiced.      Review of Systems   All systems were reviewed and negative except for: right shoulder pain and rash on  back     Objective   Objective     Vitals:   Temp:  [98.1 °F (36.7 °C)-99.2 °F (37.3 °C)] 98.8 °F (37.1 °C)  Heart Rate:  [] 85  Resp:  [16-18] 18  BP: ()/(49-68) 87/49  Physical Exam    Constitutional: Resting in bed. Wife at the bedside    Eyes: Pupils equal, sclerae anicteric, no conjunctival injection   HENT: NCAT, mucous membranes moist   Neck: Supple, no thyromegaly, no lymphadenopathy, trachea midline   Respiratory: Clear to auscultation bilaterally, nonlabored respirations    Cardiovascular: RRR, no murmurs, rubs, or gallops, palpable pedal pulses bilaterally   Gastrointestinal: Positive bowel sounds, soft, nontender, nondistended   Musculoskeletal: Full range of motion except for right shoulder which he is unable to move. Dressings in place. Drain removed.  Remain swollen right upper extremity with redness proximally.  Wound dressed   Psychiatric: Appropriate affect, cooperative   Neurologic: Oriented x 3, strength symmetric in all extremities, Cranial Nerves grossly intact to confrontation, speech clear   Skin: erythema all over the back  Result Review    Result Review:  I have personally reviewed the results from the time of this admission to 7/19/2023 18:23 EDT and agree with these findings:  [x]  Laboratory  BMP          7/16/2023    02:29 7/17/2023    05:43 7/18/2023    05:18   BMP   BUN 16  12  14    Creatinine 0.65  0.61  0.59    Sodium 128  132  132    Potassium 4.5  4.5  4.4    Chloride 97  100  101    CO2 21.7  24.0  21.8    Calcium 7.9  8.4  8.1      CBC          7/17/2023    05:43 7/18/2023    05:18 7/19/2023    06:09   CBC   WBC 25.79  22.98  21.33    RBC 3.71  3.89  3.69    Hemoglobin 11.7  11.9  11.5    Hematocrit 35.3  36.4  35.0    MCV 95.1  93.6  94.9    MCH 31.5  30.6  31.2    MCHC 33.1  32.7  32.9    RDW 13.9  13.8  14.0    Platelets 498  682  816    [x]  Microbiology  Blood Culture   Date Value Ref Range Status   07/10/2023 Staphylococcus aureus, MRSA (C)  Preliminary      Comment:       Infectious disease consultation is highly recommended to rule out distant foci of infection.  Methicillin resistant Staphylococcus aureus, Patient may be an isolation risk.     BCID, PCR   Date Value Ref Range Status   07/10/2023 (C) Negative by BCID PCR. Culture to Follow. Final    Staph aureus. mecA/C and MREJ (methicillin resistance gene) detected. Identification by BCID2 PCR.     No results found for: CULTURES, HSVCX, URCX  No results found for: EYECULTURE, GCCX, HSVCULTURE, LABHSV  No results found for: LEGIONELLA, MRSACX, MUMPSCX, MYCOPLASCX  No results found for: NOCARDIACX, STOOLCX  No results found for: THROATCX, UNSTIMCULT, URINECX, CULTURE, VZVCULTUR  No results found for: VIRALCULTU, WOUNDCX    []  Radiology  []  EKG/Telemetry   []  Cardiology/Vascular   []  Pathology  []  Old records  []  Other:    Assessment & Plan   Assessment / Plan     Assessment/Plan:  MRSA bacteremia  Right-sided rotator cuff tear  Septic right shoulder joint due to MRSA.  S/p right shoulder washout July 14  MRSA cellulitis of right shoulder  Traumatic injury of right shoulder, concern for rotator cuff tear  Systolic congestive heart failure with an EF of 20%, new diagnosis    PLAN  -- Continue patient on IV vancomycin.  At some point will need a PICC line  -- Discussed plan with infectious disease.  Reviewed CT imaging of right arm  --Repeat cultures are showing no growth currently since July 16.  --Echo is done and is showing significant systolic heart failure.  This is a new diagnosis for the patient.  Cardiology consulted.  Patient has been started on Coreg, Jardiance and Entresto.  Not able to add more medications due to soft blood pressure.  Patient will need outpatient heart catheterization once acute issues resolved.  --Check hepatic function panel in a.m.  -- MRI of right shoulder reviewed  --ortho consulted and patient is s/p washout of shoulder creatinine CPR improving  -White cell count improving.  Trend  CBC  -Pain control with IV and oral narcotics  Discussed plan with RN and .  Home health with IV antibiotics when stable and cleared by Ortho.    DVT prophylaxis:  Medical and mechanical DVT prophylaxis orders are present.    CODE STATUS:   Code Status (Patient has no pulse and is not breathing): CPR (Attempt to Resuscitate)  Medical Interventions (Patient has pulse or is breathing): Full Support        Electronically signed by Jae Andrews MD, 07/19/23, 6:26 PM EDT.

## 2023-07-20 LAB
ALBUMIN SERPL-MCNC: 2.3 G/DL (ref 3.5–5.2)
ALP SERPL-CCNC: 340 U/L (ref 39–117)
ALT SERPL W P-5'-P-CCNC: 41 U/L (ref 1–41)
AST SERPL-CCNC: 38 U/L (ref 1–40)
BASOPHILS # BLD AUTO: 0.16 10*3/MM3 (ref 0–0.2)
BASOPHILS NFR BLD AUTO: 0.8 % (ref 0–1.5)
BILIRUB CONJ SERPL-MCNC: <0.2 MG/DL (ref 0–0.3)
BILIRUB INDIRECT SERPL-MCNC: ABNORMAL MG/DL
BILIRUB SERPL-MCNC: 0.3 MG/DL (ref 0–1.2)
CRP SERPL-MCNC: 3.53 MG/DL (ref 0–0.5)
DEPRECATED RDW RBC AUTO: 47.6 FL (ref 37–54)
EOSINOPHIL # BLD AUTO: 0.26 10*3/MM3 (ref 0–0.4)
EOSINOPHIL NFR BLD AUTO: 1.3 % (ref 0.3–6.2)
ERYTHROCYTE [DISTWIDTH] IN BLOOD BY AUTOMATED COUNT: 13.9 % (ref 12.3–15.4)
HCT VFR BLD AUTO: 33.4 % (ref 37.5–51)
HGB BLD-MCNC: 11.1 G/DL (ref 13–17.7)
IMM GRANULOCYTES # BLD AUTO: 0.78 10*3/MM3 (ref 0–0.05)
IMM GRANULOCYTES NFR BLD AUTO: 3.8 % (ref 0–0.5)
LYMPHOCYTES # BLD AUTO: 3.43 10*3/MM3 (ref 0.7–3.1)
LYMPHOCYTES NFR BLD AUTO: 16.6 % (ref 19.6–45.3)
MCH RBC QN AUTO: 31.4 PG (ref 26.6–33)
MCHC RBC AUTO-ENTMCNC: 33.2 G/DL (ref 31.5–35.7)
MCV RBC AUTO: 94.6 FL (ref 79–97)
MONOCYTES # BLD AUTO: 1.81 10*3/MM3 (ref 0.1–0.9)
MONOCYTES NFR BLD AUTO: 8.7 % (ref 5–12)
NEUTROPHILS NFR BLD AUTO: 14.25 10*3/MM3 (ref 1.7–7)
NEUTROPHILS NFR BLD AUTO: 68.8 % (ref 42.7–76)
NRBC BLD AUTO-RTO: 0 /100 WBC (ref 0–0.2)
PLATELET # BLD AUTO: 869 10*3/MM3 (ref 140–450)
PMV BLD AUTO: 8.9 FL (ref 6–12)
PROT SERPL-MCNC: 6.4 G/DL (ref 6–8.5)
RBC # BLD AUTO: 3.53 10*6/MM3 (ref 4.14–5.8)
WBC NRBC COR # BLD: 20.69 10*3/MM3 (ref 3.4–10.8)

## 2023-07-20 PROCEDURE — C1751 CATH, INF, PER/CENT/MIDLINE: HCPCS

## 2023-07-20 PROCEDURE — 99231 SBSQ HOSP IP/OBS SF/LOW 25: CPT | Performed by: INTERNAL MEDICINE

## 2023-07-20 PROCEDURE — 99232 SBSQ HOSP IP/OBS MODERATE 35: CPT | Performed by: INTERNAL MEDICINE

## 2023-07-20 PROCEDURE — 80076 HEPATIC FUNCTION PANEL: CPT | Performed by: INTERNAL MEDICINE

## 2023-07-20 PROCEDURE — 85025 COMPLETE CBC W/AUTO DIFF WBC: CPT | Performed by: INTERNAL MEDICINE

## 2023-07-20 PROCEDURE — 86140 C-REACTIVE PROTEIN: CPT | Performed by: STUDENT IN AN ORGANIZED HEALTH CARE EDUCATION/TRAINING PROGRAM

## 2023-07-20 PROCEDURE — 97110 THERAPEUTIC EXERCISES: CPT

## 2023-07-20 PROCEDURE — 25010000002 VANCOMYCIN 5 G RECONSTITUTED SOLUTION: Performed by: INTERNAL MEDICINE

## 2023-07-20 PROCEDURE — 25010000002 HEPARIN (PORCINE) PER 1000 UNITS: Performed by: STUDENT IN AN ORGANIZED HEALTH CARE EDUCATION/TRAINING PROGRAM

## 2023-07-20 PROCEDURE — 02HV33Z INSERTION OF INFUSION DEVICE INTO SUPERIOR VENA CAVA, PERCUTANEOUS APPROACH: ICD-10-PCS | Performed by: INTERNAL MEDICINE

## 2023-07-20 PROCEDURE — 99024 POSTOP FOLLOW-UP VISIT: CPT | Performed by: STUDENT IN AN ORGANIZED HEALTH CARE EDUCATION/TRAINING PROGRAM

## 2023-07-20 RX ORDER — SODIUM CHLORIDE 0.9 % (FLUSH) 0.9 %
10 SYRINGE (ML) INJECTION EVERY 12 HOURS SCHEDULED
Status: DISCONTINUED | OUTPATIENT
Start: 2023-07-20 | End: 2023-07-24 | Stop reason: HOSPADM

## 2023-07-20 RX ORDER — SODIUM CHLORIDE 0.9 % (FLUSH) 0.9 %
20 SYRINGE (ML) INJECTION AS NEEDED
Status: DISCONTINUED | OUTPATIENT
Start: 2023-07-20 | End: 2023-07-24 | Stop reason: HOSPADM

## 2023-07-20 RX ORDER — SODIUM CHLORIDE 0.9 % (FLUSH) 0.9 %
10 SYRINGE (ML) INJECTION AS NEEDED
Status: DISCONTINUED | OUTPATIENT
Start: 2023-07-20 | End: 2023-07-24 | Stop reason: HOSPADM

## 2023-07-20 RX ORDER — SODIUM CHLORIDE 9 MG/ML
40 INJECTION, SOLUTION INTRAVENOUS AS NEEDED
Status: DISCONTINUED | OUTPATIENT
Start: 2023-07-20 | End: 2023-07-24 | Stop reason: HOSPADM

## 2023-07-20 RX ADMIN — OXYCODONE AND ACETAMINOPHEN 1 TABLET: 5; 325 TABLET ORAL at 01:25

## 2023-07-20 RX ADMIN — SENNOSIDES AND DOCUSATE SODIUM 2 TABLET: 50; 8.6 TABLET ORAL at 21:34

## 2023-07-20 RX ADMIN — Medication 10 ML: at 21:34

## 2023-07-20 RX ADMIN — SACUBITRIL AND VALSARTAN 1 TABLET: 24; 26 TABLET, FILM COATED ORAL at 21:33

## 2023-07-20 RX ADMIN — VANCOMYCIN HYDROCHLORIDE 1750 MG: 5 INJECTION, POWDER, LYOPHILIZED, FOR SOLUTION INTRAVENOUS at 05:25

## 2023-07-20 RX ADMIN — OXYCODONE AND ACETAMINOPHEN 1 TABLET: 5; 325 TABLET ORAL at 17:18

## 2023-07-20 RX ADMIN — OXYCODONE AND ACETAMINOPHEN 1 TABLET: 5; 325 TABLET ORAL at 21:33

## 2023-07-20 RX ADMIN — HEPARIN SODIUM 5000 UNITS: 5000 INJECTION INTRAVENOUS; SUBCUTANEOUS at 05:24

## 2023-07-20 RX ADMIN — SACUBITRIL AND VALSARTAN 1 TABLET: 24; 26 TABLET, FILM COATED ORAL at 08:48

## 2023-07-20 RX ADMIN — OXYCODONE AND ACETAMINOPHEN 1 TABLET: 5; 325 TABLET ORAL at 13:07

## 2023-07-20 RX ADMIN — HEPARIN SODIUM 5000 UNITS: 5000 INJECTION INTRAVENOUS; SUBCUTANEOUS at 21:33

## 2023-07-20 RX ADMIN — OXYCODONE AND ACETAMINOPHEN 1 TABLET: 5; 325 TABLET ORAL at 05:24

## 2023-07-20 RX ADMIN — HEPARIN SODIUM 5000 UNITS: 5000 INJECTION INTRAVENOUS; SUBCUTANEOUS at 13:07

## 2023-07-20 RX ADMIN — EMPAGLIFLOZIN 10 MG: 10 TABLET, FILM COATED ORAL at 08:48

## 2023-07-20 RX ADMIN — MULTIPLE VITAMINS W/ MINERALS TAB 1 TABLET: TAB at 08:48

## 2023-07-20 RX ADMIN — CARVEDILOL 3.12 MG: 3.12 TABLET, FILM COATED ORAL at 21:34

## 2023-07-20 RX ADMIN — CARVEDILOL 3.12 MG: 3.12 TABLET, FILM COATED ORAL at 08:48

## 2023-07-20 RX ADMIN — VANCOMYCIN HYDROCHLORIDE 1750 MG: 5 INJECTION, POWDER, LYOPHILIZED, FOR SOLUTION INTRAVENOUS at 17:13

## 2023-07-20 NOTE — PROGRESS NOTES
"[facility] Clinical Pharmacy Services: Vancomycin Monitoring Note    Yfn Ray is a 52 y.o. male who is on day 10 of pharmacy to dose vancomycin for Sepsis.    Previous Vancomycin Dose:   1750 mg IV every  12  hours  Imaging Reviewed?: N/A  Updated Cultures and Sensitivities:   Blood Culture   Date Value Ref Range Status   2023 No growth at 3 days  Preliminary   2023 No growth at 3 days  Preliminary     No results found for: BCIDPCR, CXREFLEX, CSFCX, CULTURETIS  No results found for: CULTURES, HSVCX, URCX  No results found for: EYECULTURE, GCCX, HSVCULTURE, LABHSV  No results found for: LEGIONELLA, MRSACX, MUMPSCX, MYCOPLASCX  No results found for: NOCARDIACX, STOOLCX  No results found for: THROATCX, UNSTIMCULT, URINECX, CULTURE, VZVCULTUR  Wound Culture   Date Value Ref Range Status   2023 Scant growth (1+) Staphylococcus aureus, MRSA (A)  Final     Comment:       Methicillin resistant Staphylococcus aureus, Patient may be an isolation risk.        Vitals/Labs  Ht: 180.3 cm (70.98\"); Wt: 86 kg (189 lb 9.5 oz)     Temp (24hrs), Av °F (37.2 °C), Min:98.1 °F (36.7 °C), Max:99.7 °F (37.6 °C)    Estimated Creatinine Clearance: 178.2 mL/min (A) (by C-G formula based on SCr of 0.59 mg/dL (L)).       Results from last 7 days   Lab Units 23  1715 23  0609 23  0518 23  0543 23  0229 07/15/23  0507   VANCOMYCIN TR mcg/mL 17.60  --  14.79  --   --  13.90   CREATININE mg/dL  --   --  0.59* 0.61* 0.65* 0.65*   WBC 10*3/mm3  --  21.33* 22.98* 25.79* 29.95* 32.95*     Assessment/Plan    Current Vancomycin Dose:  1750 mg IV every 12 hours; which provides the following predicted parameters:  Exposure target: AUC24 (range)400-600 mg/L.hr   AUC24,ss: 531 mg/L.hr  PAUC*: 97 %  Ctrough,ss: 13.5 mg/L  Pconc*: 0 %  Tox.: 9 %  2. Will keep the current dose Vancomycin 1750 mg IV every 12 hours   3. No vancomycin level order at this time , will order when it is indicated   4.We will " continue to monitor patient changes and renal function     Thank you for involving pharmacy in this patient's care. Please contact pharmacy with any questions or concerns.    Carter Best [facility]  Clinical Pharmacist

## 2023-07-20 NOTE — PROGRESS NOTES
CARDIOLOGY  INPATIENT PROGRESS NOTE                [facility] 5TH FLOOR SURGICAL TELEMETRY UNIT    7/20/2023      PATIENT IDENTIFICATION:   Name:  Yfn Ray      MRN:  [id number]     52 y.o.  male                 SUBJECTIVE:   Patient doing well this morning no complaints or problems blood pressures on the low side  OBJECTIVE:  Vitals:    07/19/23 1733 07/19/23 1857 07/19/23 2218 07/20/23 0513   BP: (!) 87/49 122/86 99/58 106/67   BP Location: Left arm Left arm Left arm Left arm   Patient Position: Lying Sitting Lying Lying   Pulse: 85 108 91 79   Resp: 18 18 16 18   Temp: 98.8 °F (37.1 °C) 99.5 °F (37.5 °C) 99.7 °F (37.6 °C) 98.5 °F (36.9 °C)   TempSrc: Oral Oral Oral Oral   SpO2: 94% 99% 99% 94%   Weight:       Height:               Body mass index is 26.46 kg/m².    Intake/Output Summary (Last 24 hours) at 7/20/2023 0816  Last data filed at 7/20/2023 0524  Gross per 24 hour   Intake 1940 ml   Output 825 ml   Net 1115 ml       Telemetry: Normal sinus rhythm    Physical Exam  General Appearance:   no acute distress  Alert and oriented x3  HENT:   lips not cyanotic  Atraumatic  Neck:  No jvd   supple  Respiratory:  no respiratory distress  normal breath sounds  no rales  Cardiovascular:    regular rhythm  no S3, no S4   no murmur  no rub  lower extremity edema: none    Skin:   warm, dry  No rashes      No Known Allergies  Scheduled meds:  carvedilol, 3.125 mg, Oral, Q12H  empagliflozin, 10 mg, Oral, Daily  heparin (porcine), 5,000 Units, Subcutaneous, Q8H  multivitamin with minerals, 1 tablet, Oral, Daily  sacubitril-valsartan, 1 tablet, Oral, Q12H  senna-docusate sodium, 2 tablet, Oral, BID  sodium chloride, 10 mL, Intravenous, Q12H  vancomycin, 1,750 mg, Intravenous, Q12H      IV meds:                      lactated ringers, 9 mL/hr  Pharmacy to dose vancomycin,       Data Review:  CBC          7/18/2023    05:18 7/19/2023    06:09 7/20/2023    04:27   CBC   WBC 22.98  21.33  20.69    RBC 3.89  3.69   3.53    Hemoglobin 11.9  11.5  11.1    Hematocrit 36.4  35.0  33.4    MCV 93.6  94.9  94.6    MCH 30.6  31.2  31.4    MCHC 32.7  32.9  33.2    RDW 13.8  14.0  13.9    Platelets 682  816  869      CMP          7/17/2023    05:43 7/18/2023    05:18 7/20/2023    04:27   CMP   Glucose 89  105     BUN 12  14     Creatinine 0.61  0.59     EGFR 115.6  116.7     Sodium 132  132     Potassium 4.5  4.4     Chloride 100  101     Calcium 8.4  8.1     Total Protein   6.4    Albumin   2.3    Total Bilirubin   0.3    Alkaline Phosphatase   340    AST (SGOT)   38    ALT (SGPT)   41    BUN/Creatinine Ratio 19.7  23.7     Anion Gap 8.0  9.2        CARDIAC LABS:      Lab 07/14/23  0536   PROBNP 2,809.0*        No results found for: DIGOXIN   No results found for: TSH        Invalid input(s): LDLCALC  No results found for: POCTROP  Lab Results   Component Value Date    TROPONINT 9 07/10/2023   (  Lab Results   Component Value Date    MG 2.1 07/18/2023     Results for orders placed during the hospital encounter of 07/10/23    Adult Transthoracic Echo Complete W/ Cont if Necessary Per Protocol    Interpretation Summary    Left ventricular systolic function is severely decreased. Left ventricular ejection fraction appears to be less than 20%.    The left ventricular cavity is moderately dilated.    Left ventricular diastolic function is consistent with (grade I) impaired relaxation.    The left atrial cavity is mildly dilated.    Abnormal mitral valve structure consistent with dilated annulus.    Mild mitral regurgitation    No overt evidence of cardioembolic source by TTE    No prior echo for comparison           ASSESSMENT:    Sepsis    Chronic HFrEF (heart failure with reduced ejection fraction)    Shoulder injury, right, initial encounter    Acute HFrEF (heart failure with reduced ejection fraction)        PLAN:  1.  Continue with current GDMT therapy for CHF no further additional medications due to low blood pressure.  Patient can be  discharged from a cardiac standpoint recommend follow-up in the next couple weeks patient will need heart catheterization for ischemic work-up infectious issues revolve          Cristiano Holliday MD  7/20/2023    08:16 EDT

## 2023-07-20 NOTE — PROGRESS NOTES
[facility]     Progress Note    Patient Name: Yfn Ray  : 1970  MRN: [id number]  Primary Care Physician:  Marilyn Eugene APRN  Date of admission: 7/10/2023    Subjective   Subjective     HPI:  No events overnight.  Up and ambulatory.  Painful but controlled.  Denies chest pain or shortness of breath.    Review of Systems   See HPI    Objective   Objective     Vitals:   Temp:  [98.1 °F (36.7 °C)-99.7 °F (37.6 °C)] 98.5 °F (36.9 °C)  Heart Rate:  [] 79  Resp:  [16-18] 18  BP: ()/(49-86) 106/67  Physical Exam    General: Alert, no acute distress   Right upper extremity: No drainage noted.  No cellulitis.  Induration down the anterior arm continues to improve.  No pain with passive elbow range of motion.  Tolerates gentle glenohumeral joint motion.  Mild swelling extends to the hand.  Neurovascular intact.  Compartments soft.    Result Review      WBC   Date Value Ref Range Status   2023 20.69 (H) 3.40 - 10.80 10*3/mm3 Final     RBC   Date Value Ref Range Status   2023 3.53 (L) 4.14 - 5.80 10*6/mm3 Final     Hemoglobin   Date Value Ref Range Status   2023 11.1 (L) 13.0 - 17.7 g/dL Final     Hematocrit   Date Value Ref Range Status   2023 33.4 (L) 37.5 - 51.0 % Final     MCV   Date Value Ref Range Status   2023 94.6 79.0 - 97.0 fL Final     MCH   Date Value Ref Range Status   2023 31.4 26.6 - 33.0 pg Final     MCHC   Date Value Ref Range Status   2023 33.2 31.5 - 35.7 g/dL Final     RDW   Date Value Ref Range Status   2023 13.9 12.3 - 15.4 % Final     RDW-SD   Date Value Ref Range Status   2023 47.6 37.0 - 54.0 fl Final     MPV   Date Value Ref Range Status   2023 8.9 6.0 - 12.0 fL Final     Platelets   Date Value Ref Range Status   2023 869 (H) 140 - 450 10*3/mm3 Final     Neutrophil %   Date Value Ref Range Status   2023 68.8 42.7 - 76.0 % Final     Lymphocyte %   Date Value Ref Range Status   2023 16.6 (L) 19.6  - 45.3 % Final     Monocyte %   Date Value Ref Range Status   07/20/2023 8.7 5.0 - 12.0 % Final     Eosinophil %   Date Value Ref Range Status   07/20/2023 1.3 0.3 - 6.2 % Final     Basophil %   Date Value Ref Range Status   07/20/2023 0.8 0.0 - 1.5 % Final     Immature Grans %   Date Value Ref Range Status   07/20/2023 3.8 (H) 0.0 - 0.5 % Final     Neutrophils, Absolute   Date Value Ref Range Status   07/20/2023 14.25 (H) 1.70 - 7.00 10*3/mm3 Final     Lymphocytes, Absolute   Date Value Ref Range Status   07/20/2023 3.43 (H) 0.70 - 3.10 10*3/mm3 Final     Monocytes, Absolute   Date Value Ref Range Status   07/20/2023 1.81 (H) 0.10 - 0.90 10*3/mm3 Final     Eosinophils, Absolute   Date Value Ref Range Status   07/20/2023 0.26 0.00 - 0.40 10*3/mm3 Final     Basophils, Absolute   Date Value Ref Range Status   07/20/2023 0.16 0.00 - 0.20 10*3/mm3 Final     Immature Grans, Absolute   Date Value Ref Range Status   07/20/2023 0.78 (H) 0.00 - 0.05 10*3/mm3 Final     nRBC   Date Value Ref Range Status   07/20/2023 0.0 0.0 - 0.2 /100 WBC Final        Result Review:  I have personally reviewed the results from the time of this admission to 7/20/2023 07:05 EDT and agree with these findings:  [x]  Laboratory  []  Microbiology  [x]  Radiology  []  EKG/Telemetry   []  Cardiology/Vascular   []  Pathology  []  Old records  []  Other:      Assessment & Plan   Assessment / Plan     Brief Patient Summary:  Yfn Ray is a 52 y.o. male with right shoulder septic arthritis and an anterior abscess in the deltopectoral interval extending down the bicep status post arthroscopic I&D on 7/14.  Multiple drains in place.  Blood cultures growing MRSA.  Right shoulder aspirate from 7/13 growing MRSA.  Drains removed 7/17.  Active Hospital Problems:  Active Hospital Problems    Diagnosis    • **Sepsis    • Acute HFrEF (heart failure with reduced ejection fraction)    • Chronic HFrEF (heart failure with reduced ejection fraction)    • Shoulder  injury, right, initial encounter      Plan:   CRP trending down-3.53 this morning  Afebrile for 48 hours  Hemoglobin 11.1 - monitor  White blood cell count trending down-continue to monitor   Continue IV antibiotics  Light activity as tolerated with right upper extremity  Ice/elevate to help with swelling  Sling as needed  Daily dressing changes reinforce as needed  DVT prophylaxis: Subcutaneous heparin  Further care per primary team, appreciate assistance    Dispo: No discharge plans at this time, continue to monitor clinically.       DVT prophylaxis:  Medical and mechanical DVT prophylaxis orders are present.    CODE STATUS:   Code Status (Patient has no pulse and is not breathing): CPR (Attempt to Resuscitate)  Medical Interventions (Patient has pulse or is breathing): Full Support    Electronically signed by Orville Gallegos MD, 07/20/23, 7:07 AM EDT.

## 2023-07-20 NOTE — CONSULTS
Procedure: Insertion of Peripherally Inserted Central Catheter    Indications:  Long Term IV therapy, 4 weeks IV Vancomycin    Procedure Details   Education, Including what a PICC line is, why it is used, how it is placed, and how to manage and care for the PICC line was completed. Patient's questions and concerns were answered and addressed. Risks of PICC lines, during and after insertion, were discussed, and informed consent was obtained for the procedure.      An active time out was conducted with patient's nurse using 2 patient identifiers.    After using ultrasound to identify an appropriate vein, maximum sterile technique was used including antiseptics, cap, gloves, gown, hand hygiene, mask, and sheet.    PICC SIZE: 4 FR/18G PICC inserted to the L Basilic vein per hospital protocol.   Blood return:  yes    Findings:  Catheter inserted to 41 cm, with 0 cm. Exposed. Mid upper arm circumference is 35 cm.   Catheter was flushed with 20 cc NS. Patient did tolerate procedure well.    Additional information:  3CG technology used to confirm placement    PICC Brochure given to patient with teaching instruction.    Damion Bach RN

## 2023-07-20 NOTE — PROGRESS NOTES
[facility]   Hospitalist Progress Note  Date: 2023  Patient Name: Yfn Ray  : 1970  MRN: [id number]  Date of admission: 7/10/2023      Subjective   Subjective     Chief Complaint: right arm infection     Summary:  52 y.o. male no past medical history presents to the emergency department for evaluation of right upper extremity pain.  Patient states he fell off a truck 5 days ago and was seen initially and discharged outpatient with outpatient follow-up.  States that yesterday he noted his arm swelling and turning red which got progressively worse prompting him to seek further medical attention.  He denies any fevers, chills, sweats, nausea, vomiting, chest pain, shortness of breath, palpitations, abdominal pain diarrhea constipation or dysuria, weakness.  In the emergency department felt to have cellulitis and started on vancomycin and Zosyn and will be admitted for ongoing monitoring management.   Patient evaluated by orthopedic surgery and cardiology.Patient is status post washout of his right shoulder on  with MRSA from the infected shoulder.  Cardiology signed off with recommendation for left heart cath in next couple of weeks once infection has cleared to evaluate for new systolic CHF.    Interval Followup:   Afebrile.  Still at times feel like having feverish with chills and gets exhausted afterward these episodes last for a short time.  Rash much better per patient he gets it with pain medications and not due to antibiotics    Patient's white blood cell count is improving  Patient had CT scanning of his arm on .  No definitive abscess.  At this time orthopedics is holding off on further surgery.  Wife thinks patient is getting jaundiced.  Liver enzymes and bilirubin within normal range.  Positive BM    Review of Systems   All systems were reviewed and negative except for: right shoulder pain and rash on back     Objective   Objective     Vitals:   Temp:  [98.2 °F (36.8  °C)-99.7 °F (37.6 °C)] 98.8 °F (37.1 °C)  Heart Rate:  [] 91  Resp:  [16-18] 18  BP: ()/(45-86) 88/54  Physical Exam    Constitutional: Resting in bed. Wife at the bedside    Eyes: Pupils equal, sclerae anicteric, no conjunctival injection   HENT: NCAT, mucous membranes moist   Neck: Supple, no thyromegaly, no lymphadenopathy, trachea midline   Respiratory: Clear to auscultation bilaterally, nonlabored respirations    Cardiovascular: RRR, no murmurs, rubs, or gallops, palpable pedal pulses bilaterally   Gastrointestinal: Positive bowel sounds, soft, nontender, nondistended   Musculoskeletal: Full range of motion except for right shoulder which he is unable to move. Dressings in place. Drain removed.  Remain swollen right upper extremity with redness proximally.  Wound dressed   Psychiatric: Appropriate affect, cooperative   Neurologic: Oriented x 3, strength symmetric in all extremities, Cranial Nerves grossly intact to confrontation, speech clear   Skin: erythema all over the back  Result Review    Result Review:  I have personally reviewed the results from the time of this admission to 7/20/2023 17:19 EDT and agree with these findings:  [x]  Laboratory  BMP          7/16/2023    02:29 7/17/2023    05:43 7/18/2023    05:18   BMP   BUN 16  12  14    Creatinine 0.65  0.61  0.59    Sodium 128  132  132    Potassium 4.5  4.5  4.4    Chloride 97  100  101    CO2 21.7  24.0  21.8    Calcium 7.9  8.4  8.1      CBC          7/18/2023    05:18 7/19/2023    06:09 7/20/2023    04:27   CBC   WBC 22.98  21.33  20.69    RBC 3.89  3.69  3.53    Hemoglobin 11.9  11.5  11.1    Hematocrit 36.4  35.0  33.4    MCV 93.6  94.9  94.6    MCH 30.6  31.2  31.4    MCHC 32.7  32.9  33.2    RDW 13.8  14.0  13.9    Platelets 682  816  869    [x]  Microbiology  Blood Culture   Date Value Ref Range Status   07/10/2023 Staphylococcus aureus, MRSA (C)  Preliminary     Comment:       Infectious disease consultation is highly recommended  to rule out distant foci of infection.  Methicillin resistant Staphylococcus aureus, Patient may be an isolation risk.     BCID, PCR   Date Value Ref Range Status   07/10/2023 (C) Negative by BCID PCR. Culture to Follow. Final    Staph aureus. mecA/C and MREJ (methicillin resistance gene) detected. Identification by BCID2 PCR.     No results found for: CULTURES, HSVCX, URCX  No results found for: EYECULTURE, GCCX, HSVCULTURE, LABHSV  No results found for: LEGIONELLA, MRSACX, MUMPSCX, MYCOPLASCX  No results found for: NOCARDIACX, STOOLCX  No results found for: THROATCX, UNSTIMCULT, URINECX, CULTURE, VZVCULTUR  No results found for: VIRALCULTU, WOUNDCX    []  Radiology  []  EKG/Telemetry   []  Cardiology/Vascular   []  Pathology  []  Old records  []  Other:    Assessment & Plan   Assessment / Plan     Assessment/Plan:  MRSA bacteremia.  Subsequent blood culture negative since July 16  Right-sided rotator cuff tear  Septic right shoulder joint due to MRSA.  S/p right shoulder washout July 14  MRSA cellulitis of right shoulder  Traumatic injury of right shoulder, concern for rotator cuff tear  Systolic congestive heart failure with an EF of 20%, new diagnosis.  Stable    PLAN  -- Continue patient on IV vancomycin.   -PICC line placed for 4 weeks of IV antibiotics  -- Previous hospitalist discussed plan with infectious disease.  Reviewed CT imaging of right arm  --Repeat cultures are showing no growth currently since July 16.  --Echo is showing significant systolic heart failure.  This is a new diagnosis for the patient.  Cardiology consulted.  Patient has been started on Coreg, Jardiance and Entresto.  Not able to add more medications due to soft blood pressure.  Patient will need outpatient heart catheterization once acute issues resolved.  -- MRI of right shoulder reviewed  --ortho consulted and patient is s/p washout of shoulder creatinine CPR improving  -White cell count improving.  Trend CBC.  -Bowel regimen  -Pain  control with IV and oral narcotics  Discussed plan with RN and .  Home health with IV antibiotics when  cleared by Ortho.    DVT prophylaxis:  Medical and mechanical DVT prophylaxis orders are present.    CODE STATUS:   Code Status (Patient has no pulse and is not breathing): CPR (Attempt to Resuscitate)  Medical Interventions (Patient has pulse or is breathing): Full Support        Electronically signed by Jae Andrews MD, 07/20/23, 5:23 PM EDT.

## 2023-07-20 NOTE — THERAPY TREATMENT NOTE
"Patient Name: Yfn Ray  : 1970    MRN: [id number]                              Today's Date: 2023       Admit Date: 7/10/2023    Visit Dx:     ICD-10-CM ICD-9-CM   1. Cellulitis of left upper extremity  L03.114 682.3   2. Shoulder injury, right, initial encounter  S49.91XA 959.2   3. Injury of tendon of biceps  S46.209A 959.2   4. Sepsis, due to unspecified organism, unspecified whether acute organ dysfunction present  A41.9 038.9     995.91   5. Difficulty walking  R26.2 719.7   6. Decreased activities of daily living (ADL)  Z78.9 V49.89     Patient Active Problem List   Diagnosis    Sepsis    Shoulder injury, right, initial encounter    Chronic HFrEF (heart failure with reduced ejection fraction)    Acute HFrEF (heart failure with reduced ejection fraction)     Past Medical History:   Diagnosis Date    Alpha 1-antitrypsin PiMS phenotype     \"alpha 1\" per pt and wife. unsure of what type.    Arthritis      Past Surgical History:   Procedure Laterality Date    LIVER BIOPSY      SHOULDER ARTHROSCOPY Right 2023    Procedure: SHOULDER ARTHROSCOPY WITH WASHOUT AND DEBRIDMENT;  Surgeon: Orville Gallegos MD;  Location: [facility] OR [facility] [place];  Service: Orthopedics;  Laterality: Right;    TOOTH EXTRACTION        General Information       Row Name 23 1512          OT Time and Intention    Document Type therapy note (daily note)  -LF     Mode of Treatment individual therapy;occupational therapy  -LF               User Key  (r) = Recorded By, (t) = Taken By, (c) = Cosigned By      Initials Name Provider Type    LF Anju Salgado OT Occupational Therapist                     Mobility/ADL's       Row Name 23 1512          Bed Mobility    Bed Mobility supine-sit;sit-supine  -LF     Supine-Sit [place] (Bed Mobility) independent  -LF     Sit-Supine [place] (Bed Mobility) independent  -LF     Comment, (Bed Mobility) Pt completed supine to sit in preparation for RUE ROM on EOB.  -LF               " User Key  (r) = Recorded By, (t) = Taken By, (c) = Cosigned By      Initials Name Provider Type     Anju Salgado OT Occupational Therapist                   Obj/Interventions       Row Name 07/20/23 1516          Shoulder (Therapeutic Exercise)    Shoulder (Therapeutic Exercise) PROM (passive range of motion)  -     Shoulder PROM (Therapeutic Exercise) right;flexion;extension;external rotation;internal rotation;horizontal aBduction/aDduction;5 repetitions  Only able to tolerate ~10° of each exercise, 2 sets x 5 reps  -       Row Name 07/20/23 1516          Elbow/Forearm (Therapeutic Exercise)    Elbow/Forearm (Therapeutic Exercise) AAROM (active assistive range of motion)  -     Elbow/Forearm AAROM (Therapeutic Exercise) right;flexion;extension;supination;pronation;10 repetitions  x 2 sets  -       Row Name 07/20/23 1516          Wrist (Therapeutic Exercise)    Wrist (Therapeutic Exercise) AROM (active range of motion)  -     Wrist AROM (Therapeutic Exercise) right;flexion;extension;ulnar deviation;radial deviation;10 repetitions;2 sets  -       Row Name 07/20/23 1516          Hand (Therapeutic Exercise)    Hand (Therapeutic Exercise) AROM (active range of motion)  -     Hand AROM/AAROM (Therapeutic Exercise) right;finger flexion;finger extension;10 repetitions;2 sets  -       Row Name 07/20/23 1516          Motor Skills    Motor Skills functional endurance  -LF     Functional Endurance Fair+  -     Therapeutic Exercise shoulder;elbow/forearm;wrist;hand  -       Row Name 07/20/23 1516          Balance    Balance Assessment sitting dynamic balance  -     Dynamic Sitting Balance independent  -LF     Position, Sitting Balance unsupported;sitting edge of bed  -     Balance Interventions sitting;dynamic;UE activity with balance activity  -               User Key  (r) = Recorded By, (t) = Taken By, (c) = Cosigned By      Initials Name Provider Type    LF Anju Salgado OT Occupational  Therapist                   Goals/Plan    No documentation.                  Clinical Impression       Row Name 07/20/23 1517          Pain Assessment    Pain Intervention(s) Nursing Notified  -     Additional Documentation Pain Scale: FACES Pre/Post-Treatment (Group)  -       Row Name 07/20/23 1517          Pain Scale: FACES Pre/Post-Treatment    Pain: FACES Scale, Pretreatment 2-->hurts little bit  -     Posttreatment Pain Rating 4-->hurts little more  -LF       Row Name 07/20/23 1517          Plan of Care Review    Plan of Care Reviewed With patient;spouse  -     Progress improving  -     Outcome Evaluation Patient agreeable to RUE ROM exercises on EOB, only able to tolerate ~10° PROM of shoulder and active assist of elbow. Education provided on positioning and use of sling with functional transfers/mobility to support RUE and decrease pain. He would benefit from continued OT services to maximize independence.  -       Row Name 07/20/23 1517          Vital Signs    O2 Delivery Pre Treatment room air  -     O2 Delivery Intra Treatment room air  -LF     O2 Delivery Post Treatment room air  -               User Key  (r) = Recorded By, (t) = Taken By, (c) = Cosigned By      Initials Name Provider Type    LF Anju Salgado, OT Occupational Therapist                   Outcome Measures       Row Name 07/20/23 1519          How much help from another is currently needed...    Putting on and taking off regular lower body clothing? 2  -LF     Bathing (including washing, rinsing, and drying) 2  -LF     Toileting (which includes using toilet bed pan or urinal) 3  -LF     Putting on and taking off regular upper body clothing 2  -LF     Taking care of personal grooming (such as brushing teeth) 3  -LF     Eating meals 4  -LF     AM-PAC 6 Clicks Score (OT) 16  -LF       Row Name 07/20/23 0910          How much help from another person do you currently need...    Turning from your back to your side while in flat  bed without using bedrails? 4  -AR     Moving from lying on back to sitting on the side of a flat bed without bedrails? 4  -AR     Moving to and from a bed to a chair (including a wheelchair)? 4  -AR     Standing up from a chair using your arms (e.g., wheelchair, bedside chair)? 4  -AR     Climbing 3-5 steps with a railing? 3  -AR     To walk in hospital room? 4  -AR     AM-PAC 6 Clicks Score (PT) 23  -AR     Highest level of mobility 7 --> Walked 25 feet or more  -AR       Row Name 07/20/23 1519          Functional Assessment    Outcome Measure Options AM-PAC 6 Clicks Daily Activity (OT);Optimal Instrument  -LF       Row Name 07/20/23 1519          Optimal Instrument    Optimal Instrument Optimal - 3  -LF     Bending/Stooping 2  -LF     Standing 2  -LF     Reaching 3  -LF     From the list, choose the 3 activities you would most like to be able to do without any difficulty Bending/stooping;Standing;Reaching  -LF     Total Score Optimal - 3 7  -LF               User Key  (r) = Recorded By, (t) = Taken By, (c) = Cosigned By      Initials Name Provider Type    Bindu Duffy, RN Registered Nurse    Anju Vizcarra OT Occupational Therapist                    Occupational Therapy Education       Title: PT OT SLP Therapies (Done)       Topic: Occupational Therapy (Done)       Point: ADL training (Done)       Description:   Instruct learner(s) on proper safety adaptation and remediation techniques during self care or transfers.   Instruct in proper use of assistive devices.                  Learning Progress Summary             Patient Acceptance, E,TB, VU by  at 7/17/2023 1517   Significant Other Acceptance, E,TB, VU by  at 7/17/2023 1517                         Point: Precautions (Done)       Description:   Instruct learner(s) on prescribed precautions during self-care and functional transfers.                  Learning Progress Summary             Patient Acceptance, E,TB, VU by  at 7/17/2023 1517    Significant Other Acceptance, E,TB, VU by  at 7/17/2023 1517                         Point: Body mechanics (Done)       Description:   Instruct learner(s) on proper positioning and spine alignment during self-care, functional mobility activities and/or exercises.                  Learning Progress Summary             Patient Acceptance, E,TB, VU by  at 7/17/2023 1517   Significant Other Acceptance, E,TB, VU by  at 7/17/2023 1517                                         User Key       Initials Effective Dates Name Provider Type Discipline     06/16/21 -  Anju Salgado OT Occupational Therapist OT                  OT Recommendation and Plan  Planned Therapy Interventions (OT): activity tolerance training, patient/caregiver education/training, BADL retraining, functional balance retraining, occupation/activity based interventions, ROM/therapeutic exercise, strengthening exercise, transfer/mobility retraining  Therapy Frequency (OT): 5 times/wk  Plan of Care Review  Plan of Care Reviewed With: patient, spouse  Progress: improving  Outcome Evaluation: Patient agreeable to RUE ROM exercises on EOB, only able to tolerate ~10° PROM of shoulder and active assist of elbow. Education provided on positioning and use of sling with functional transfers/mobility to support RUE and decrease pain. He would benefit from continued OT services to maximize independence.     Time Calculation:   Evaluation Complexity (OT)  Review Occupational Profile/Medical/Therapy History Complexity: brief/low complexity  Assessment, Occupational Performance/Identification of Deficit Complexity: 1-3 performance deficits  Clinical Decision Making Complexity (OT): problem focused assessment/low complexity  Overall Complexity of Evaluation (OT): low complexity     Time Calculation- OT       Row Name 07/20/23 1520             Time Calculation- OT    OT Received On 07/20/23  -      OT Goal Re-Cert Due Date 07/26/23  [facility]         Timed Charges     45315 - OT Therapeutic Exercise Minutes 20  -LF      60515 - OT Therapeutic Activity Minutes 5  -LF         Total Minutes    Timed Charges Total Minutes 25  -LF       Total Minutes 25  -LF                User Key  (r) = Recorded By, (t) = Taken By, (c) = Cosigned By      Initials Name Provider Type    Anju Vizcarra OT Occupational Therapist                  Therapy Charges for Today       Code Description Service Date Service Provider Modifiers Qty    40972817483 HC OT THER PROC EA 15 MIN 7/20/2023 Anju Salgado OT GO 2                 Anju Salgado OT  7/20/2023

## 2023-07-21 PROBLEM — M00.011 STAPHYLOCOCCAL ARTHRITIS OF RIGHT SHOULDER: Status: ACTIVE | Noted: 2023-07-21

## 2023-07-21 PROBLEM — B95.62 MRSA BACTEREMIA: Status: RESOLVED | Noted: 2023-07-21 | Resolved: 2023-07-21

## 2023-07-21 PROBLEM — B95.62 MRSA BACTEREMIA: Status: ACTIVE | Noted: 2023-07-21

## 2023-07-21 PROBLEM — A41.9 SEPSIS: Status: RESOLVED | Noted: 2023-07-11 | Resolved: 2023-07-21

## 2023-07-21 PROBLEM — S49.91XA SHOULDER INJURY, RIGHT, INITIAL ENCOUNTER: Status: RESOLVED | Noted: 2023-07-13 | Resolved: 2023-07-21

## 2023-07-21 PROBLEM — R78.81 MRSA BACTEREMIA: Status: ACTIVE | Noted: 2023-07-21

## 2023-07-21 PROBLEM — R78.81 MRSA BACTEREMIA: Status: RESOLVED | Noted: 2023-07-21 | Resolved: 2023-07-21

## 2023-07-21 PROBLEM — I50.21 ACUTE HFREF (HEART FAILURE WITH REDUCED EJECTION FRACTION): Status: RESOLVED | Noted: 2023-07-18 | Resolved: 2023-07-21

## 2023-07-21 LAB
ANION GAP SERPL CALCULATED.3IONS-SCNC: 7.4 MMOL/L (ref 5–15)
BACTERIA SPEC AEROBE CULT: NORMAL
BACTERIA SPEC AEROBE CULT: NORMAL
BASOPHILS # BLD AUTO: 0.12 10*3/MM3 (ref 0–0.2)
BASOPHILS NFR BLD AUTO: 0.6 % (ref 0–1.5)
BUN SERPL-MCNC: 16 MG/DL (ref 6–20)
BUN/CREAT SERPL: 21.3 (ref 7–25)
CALCIUM SPEC-SCNC: 8.3 MG/DL (ref 8.6–10.5)
CHLORIDE SERPL-SCNC: 99 MMOL/L (ref 98–107)
CO2 SERPL-SCNC: 24.6 MMOL/L (ref 22–29)
CREAT SERPL-MCNC: 0.75 MG/DL (ref 0.76–1.27)
CRP SERPL-MCNC: 3.94 MG/DL (ref 0–0.5)
DEPRECATED RDW RBC AUTO: 48.9 FL (ref 37–54)
EGFRCR SERPLBLD CKD-EPI 2021: 108.6 ML/MIN/1.73
EOSINOPHIL # BLD AUTO: 0.23 10*3/MM3 (ref 0–0.4)
EOSINOPHIL NFR BLD AUTO: 1.1 % (ref 0.3–6.2)
ERYTHROCYTE [DISTWIDTH] IN BLOOD BY AUTOMATED COUNT: 14 % (ref 12.3–15.4)
GLUCOSE SERPL-MCNC: 102 MG/DL (ref 65–99)
HCT VFR BLD AUTO: 30.7 % (ref 37.5–51)
HGB BLD-MCNC: 10.2 G/DL (ref 13–17.7)
IMM GRANULOCYTES # BLD AUTO: 0.52 10*3/MM3 (ref 0–0.05)
IMM GRANULOCYTES NFR BLD AUTO: 2.5 % (ref 0–0.5)
LYMPHOCYTES # BLD AUTO: 2.2 10*3/MM3 (ref 0.7–3.1)
LYMPHOCYTES NFR BLD AUTO: 10.5 % (ref 19.6–45.3)
MCH RBC QN AUTO: 31.9 PG (ref 26.6–33)
MCHC RBC AUTO-ENTMCNC: 33.2 G/DL (ref 31.5–35.7)
MCV RBC AUTO: 95.9 FL (ref 79–97)
MONOCYTES # BLD AUTO: 1.78 10*3/MM3 (ref 0.1–0.9)
MONOCYTES NFR BLD AUTO: 8.5 % (ref 5–12)
NEUTROPHILS NFR BLD AUTO: 16.02 10*3/MM3 (ref 1.7–7)
NEUTROPHILS NFR BLD AUTO: 76.8 % (ref 42.7–76)
NRBC BLD AUTO-RTO: 0 /100 WBC (ref 0–0.2)
PLATELET # BLD AUTO: 840 10*3/MM3 (ref 140–450)
PMV BLD AUTO: 8.7 FL (ref 6–12)
POTASSIUM SERPL-SCNC: 4.8 MMOL/L (ref 3.5–5.2)
RBC # BLD AUTO: 3.2 10*6/MM3 (ref 4.14–5.8)
RBC MORPH BLD: NORMAL
SMALL PLATELETS BLD QL SMEAR: NORMAL
SODIUM SERPL-SCNC: 131 MMOL/L (ref 136–145)
WBC MORPH BLD: NORMAL
WBC NRBC COR # BLD: 20.87 10*3/MM3 (ref 3.4–10.8)

## 2023-07-21 PROCEDURE — 86140 C-REACTIVE PROTEIN: CPT | Performed by: INTERNAL MEDICINE

## 2023-07-21 PROCEDURE — 80048 BASIC METABOLIC PNL TOTAL CA: CPT | Performed by: INTERNAL MEDICINE

## 2023-07-21 PROCEDURE — 85025 COMPLETE CBC W/AUTO DIFF WBC: CPT | Performed by: INTERNAL MEDICINE

## 2023-07-21 PROCEDURE — 25010000002 VANCOMYCIN 5 G RECONSTITUTED SOLUTION: Performed by: INTERNAL MEDICINE

## 2023-07-21 PROCEDURE — 99024 POSTOP FOLLOW-UP VISIT: CPT | Performed by: STUDENT IN AN ORGANIZED HEALTH CARE EDUCATION/TRAINING PROGRAM

## 2023-07-21 PROCEDURE — 85007 BL SMEAR W/DIFF WBC COUNT: CPT | Performed by: INTERNAL MEDICINE

## 2023-07-21 PROCEDURE — 99231 SBSQ HOSP IP/OBS SF/LOW 25: CPT | Performed by: INTERNAL MEDICINE

## 2023-07-21 PROCEDURE — 25010000002 HEPARIN (PORCINE) PER 1000 UNITS: Performed by: STUDENT IN AN ORGANIZED HEALTH CARE EDUCATION/TRAINING PROGRAM

## 2023-07-21 PROCEDURE — 97110 THERAPEUTIC EXERCISES: CPT

## 2023-07-21 PROCEDURE — 99239 HOSP IP/OBS DSCHRG MGMT >30: CPT | Performed by: INTERNAL MEDICINE

## 2023-07-21 RX ORDER — OXYCODONE HYDROCHLORIDE AND ACETAMINOPHEN 5; 325 MG/1; MG/1
1 TABLET ORAL EVERY 4 HOURS PRN
Qty: 30 TABLET | Refills: 0 | Status: SHIPPED | OUTPATIENT
Start: 2023-07-21

## 2023-07-21 RX ORDER — CARVEDILOL 3.12 MG/1
3.12 TABLET ORAL EVERY 12 HOURS SCHEDULED
Qty: 60 TABLET | Refills: 0 | Status: SHIPPED | OUTPATIENT
Start: 2023-07-21 | End: 2023-08-02 | Stop reason: SDUPTHER

## 2023-07-21 RX ORDER — ASPIRIN 325 MG
325 TABLET, DELAYED RELEASE (ENTERIC COATED) ORAL DAILY
Qty: 30 TABLET | Refills: 0 | Status: SHIPPED | OUTPATIENT
Start: 2023-07-21 | End: 2023-08-20

## 2023-07-21 RX ORDER — NALOXONE HYDROCHLORIDE 4 MG/.1ML
SPRAY NASAL
Qty: 2 EACH | Refills: 0 | Status: SHIPPED | OUTPATIENT
Start: 2023-07-21

## 2023-07-21 RX ORDER — AMOXICILLIN 250 MG
2 CAPSULE ORAL 2 TIMES DAILY
Qty: 20 TABLET | Refills: 0 | Status: SHIPPED | OUTPATIENT
Start: 2023-07-21

## 2023-07-21 RX ADMIN — Medication 10 ML: at 22:48

## 2023-07-21 RX ADMIN — VANCOMYCIN HYDROCHLORIDE 1750 MG: 5 INJECTION, POWDER, LYOPHILIZED, FOR SOLUTION INTRAVENOUS at 06:05

## 2023-07-21 RX ADMIN — SENNOSIDES AND DOCUSATE SODIUM 2 TABLET: 50; 8.6 TABLET ORAL at 22:48

## 2023-07-21 RX ADMIN — VANCOMYCIN HYDROCHLORIDE 1750 MG: 5 INJECTION, POWDER, LYOPHILIZED, FOR SOLUTION INTRAVENOUS at 17:34

## 2023-07-21 RX ADMIN — CARVEDILOL 3.12 MG: 3.12 TABLET, FILM COATED ORAL at 22:47

## 2023-07-21 RX ADMIN — MULTIPLE VITAMINS W/ MINERALS TAB 1 TABLET: TAB at 08:46

## 2023-07-21 RX ADMIN — HEPARIN SODIUM 5000 UNITS: 5000 INJECTION INTRAVENOUS; SUBCUTANEOUS at 06:05

## 2023-07-21 RX ADMIN — CARVEDILOL 3.12 MG: 3.12 TABLET, FILM COATED ORAL at 08:46

## 2023-07-21 RX ADMIN — OXYCODONE AND ACETAMINOPHEN 1 TABLET: 5; 325 TABLET ORAL at 13:32

## 2023-07-21 RX ADMIN — SACUBITRIL AND VALSARTAN 1 TABLET: 24; 26 TABLET, FILM COATED ORAL at 08:46

## 2023-07-21 RX ADMIN — Medication 10 ML: at 08:46

## 2023-07-21 RX ADMIN — EMPAGLIFLOZIN 10 MG: 10 TABLET, FILM COATED ORAL at 08:46

## 2023-07-21 RX ADMIN — SACUBITRIL AND VALSARTAN 1 TABLET: 24; 26 TABLET, FILM COATED ORAL at 22:48

## 2023-07-21 RX ADMIN — HEPARIN SODIUM 5000 UNITS: 5000 INJECTION INTRAVENOUS; SUBCUTANEOUS at 22:48

## 2023-07-21 NOTE — THERAPY TREATMENT NOTE
"Patient Name: Yfn Ray  : 1970    MRN: [id number]                              Today's Date: 2023       Admit Date: 7/10/2023    Visit Dx:     ICD-10-CM ICD-9-CM   1. Cellulitis of left upper extremity  L03.114 682.3   2. Shoulder injury, right, initial encounter  S49.91XA 959.2   3. Injury of tendon of biceps  S46.209A 959.2   4. Sepsis, due to unspecified organism, unspecified whether acute organ dysfunction present  A41.9 038.9     995.91   5. Difficulty walking  R26.2 719.7   6. Decreased activities of daily living (ADL)  Z78.9 V49.89   7. Sepsis due to methicillin resistant Staphylococcus aureus (MRSA), unspecified whether acute organ dysfunction present  A41.02 038.12     995.91     Patient Active Problem List   Diagnosis    Sepsis    Shoulder injury, right, initial encounter    Chronic HFrEF (heart failure with reduced ejection fraction)    Acute HFrEF (heart failure with reduced ejection fraction)     Past Medical History:   Diagnosis Date    Alpha 1-antitrypsin PiMS phenotype     \"alpha 1\" per pt and wife. unsure of what type.    Arthritis      Past Surgical History:   Procedure Laterality Date    LIVER BIOPSY      SHOULDER ARTHROSCOPY Right 2023    Procedure: SHOULDER ARTHROSCOPY WITH WASHOUT AND DEBRIDMENT;  Surgeon: Orville Gallegos MD;  Location: [facility] [place];  Service: Orthopedics;  Laterality: Right;    TOOTH EXTRACTION        General Information       Row Name 23 1421          OT Time and Intention    Document Type therapy note (daily note)  -LF     Mode of Treatment individual therapy;occupational therapy  -LF               User Key  (r) = Recorded By, (t) = Taken By, (c) = Cosigned By      Initials Name Provider Type    LF Anju Salgado OT Occupational Therapist                     Mobility/ADL's       Row Name 23 1421          Bed Mobility    Bed Mobility supine-sit;sit-supine  -LF     Supine-Sit [place] (Bed Mobility) independent  -LF     Sit-Supine " [place] (Bed Mobility) independent  -     Comment, (Bed Mobility) Pt completed supine to sit in preparation for RUE ROM on EOB.  -               User Key  (r) = Recorded By, (t) = Taken By, (c) = Cosigned By      Initials Name Provider Type     Anju Salgado OT Occupational Therapist                   Obj/Interventions       Row Name 07/21/23 1422          Shoulder (Therapeutic Exercise)    Shoulder (Therapeutic Exercise) PROM (passive range of motion)  -     Shoulder PROM (Therapeutic Exercise) right;flexion;extension;external rotation;internal rotation;aBduction;5 repetitions  Continues to only be able to tolerate ~10° of each exercise, 2 sets x 5 reps  -       Row Name 07/21/23 1422          Elbow/Forearm (Therapeutic Exercise)    Elbow/Forearm (Therapeutic Exercise) AAROM (active assistive range of motion)  -     Elbow/Forearm AAROM (Therapeutic Exercise) right;flexion;extension;supination;pronation  2 sets x 10 reps  -       Row Name 07/21/23 1422          Wrist (Therapeutic Exercise)    Wrist (Therapeutic Exercise) AROM (active range of motion)  -     Wrist AROM (Therapeutic Exercise) right;flexion;extension;ulnar deviation;radial deviation;10 repetitions;2 sets  -       Row Name 07/21/23 1422          Hand (Therapeutic Exercise)    Hand (Therapeutic Exercise) AROM (active range of motion)  -     Hand AROM/AAROM (Therapeutic Exercise) right;finger flexion;finger extension;10 repetitions;2 sets  -       Row Name 07/21/23 1422          Motor Skills    Motor Skills functional endurance  -     Functional Endurance Fair+  -     Therapeutic Exercise shoulder;elbow/forearm;wrist;hand  -       Row Name 07/21/23 1422          Balance    Balance Assessment sitting dynamic balance  -     Dynamic Sitting Balance independent  -LF     Position, Sitting Balance unsupported;sitting edge of bed  -     Balance Interventions sitting;dynamic;UE activity with balance activity  -                User Key  (r) = Recorded By, (t) = Taken By, (c) = Cosigned By      Initials Name Provider Type    LF Anju Salgado OT Occupational Therapist                   Goals/Plan    No documentation.                  Clinical Impression       Row Name 07/21/23 1420          Pain Assessment    Additional Documentation Pain Scale: FACES Pre/Post-Treatment (Group)  -LF       Row Name 07/21/23 1426          Pain Scale: FACES Pre/Post-Treatment    Pain: FACES Scale, Pretreatment 2-->hurts little bit  -LF     Posttreatment Pain Rating 4-->hurts little more  -LF     Pain Location - Side/Orientation Right  -     Pain Location - shoulder  -LF       Row Name 07/21/23 1427          Plan of Care Review    Plan of Care Reviewed With patient;spouse  -     Progress improving  -     Outcome Evaluation Patient continues to only bt able to tolerate ~10° of each PROM exercise of right shoulder. He performed AAROM of right elbow himself, with min cues for technique, followed by AROM of right wrist/hand. OT provided re-education on positioning and use of sling with functional transfers/mobility to support RUE, minimize pain, and provide comfort. Ice applied to RUE at conclusion of session. He would benefit from continued OT services to maximize independence.  -       Row Name 07/21/23 1421          Vital Signs    O2 Delivery Pre Treatment room air  -LF     O2 Delivery Intra Treatment room air  -LF     O2 Delivery Post Treatment room air  -LF               User Key  (r) = Recorded By, (t) = Taken By, (c) = Cosigned By      Initials Name Provider Type    LF Anju Salgado OT Occupational Therapist                   Outcome Measures       Row Name 07/21/23 1427          How much help from another is currently needed...    Putting on and taking off regular lower body clothing? 2  -LF     Bathing (including washing, rinsing, and drying) 2  -LF     Toileting (which includes using toilet bed pan or urinal) 3  -LF     Putting on and  taking off regular upper body clothing 2  -LF     Taking care of personal grooming (such as brushing teeth) 3  -LF     Eating meals 4  -LF     AM-PAC 6 Clicks Score (OT) 16  -LF       Row Name 07/21/23 1428          Functional Assessment    Outcome Measure Options AM-PAC 6 Clicks Daily Activity (OT);Optimal Instrument  -LF       Row Name 07/21/23 1428          Optimal Instrument    Optimal Instrument Optimal - 3  -LF     Bending/Stooping 2  -LF     Standing 2  -LF     Reaching 3  -LF     From the list, choose the 3 activities you would most like to be able to do without any difficulty Bending/stooping;Standing;Reaching  -LF     Total Score Optimal - 3 7  -LF               User Key  (r) = Recorded By, (t) = Taken By, (c) = Cosigned By      Initials Name Provider Type     Anju Salgado OT Occupational Therapist                    Occupational Therapy Education       Title: PT OT SLP Therapies (Done)       Topic: Occupational Therapy (Done)       Point: ADL training (Done)       Description:   Instruct learner(s) on proper safety adaptation and remediation techniques during self care or transfers.   Instruct in proper use of assistive devices.                  Learning Progress Summary             Patient Acceptance, E,TB, VU by  at 7/17/2023 1517   Significant Other Acceptance, E,TB, VU by  at 7/17/2023 1517                         Point: Precautions (Done)       Description:   Instruct learner(s) on prescribed precautions during self-care and functional transfers.                  Learning Progress Summary             Patient Acceptance, E,TB, VU by  at 7/17/2023 1517   Significant Other Acceptance, E,TB, VU by  at 7/17/2023 1517                         Point: Body mechanics (Done)       Description:   Instruct learner(s) on proper positioning and spine alignment during self-care, functional mobility activities and/or exercises.                  Learning Progress Summary             Patient Acceptance,  E,TB, VU by  at 7/17/2023 1517   Significant Other Acceptance, E,TB, VU by  at 7/17/2023 1517                                         User Key       Initials Effective Dates Name Provider Type Discipline     06/16/21 -  Anju Salgado OT Occupational Therapist OT                  OT Recommendation and Plan  Planned Therapy Interventions (OT): activity tolerance training, patient/caregiver education/training, BADL retraining, functional balance retraining, occupation/activity based interventions, ROM/therapeutic exercise, strengthening exercise, transfer/mobility retraining  Therapy Frequency (OT): 5 times/wk  Plan of Care Review  Plan of Care Reviewed With: patient, spouse  Progress: improving  Outcome Evaluation: Patient continues to only bt able to tolerate ~10° of each PROM exercise of right shoulder. He performed AAROM of right elbow himself, with min cues for technique, followed by AROM of right wrist/hand. OT provided re-education on positioning and use of sling with functional transfers/mobility to support RUE, minimize pain, and provide comfort. Ice applied to RUE at conclusion of session. He would benefit from continued OT services to maximize independence.     Time Calculation:   Evaluation Complexity (OT)  Review Occupational Profile/Medical/Therapy History Complexity: brief/low complexity  Assessment, Occupational Performance/Identification of Deficit Complexity: 1-3 performance deficits  Clinical Decision Making Complexity (OT): problem focused assessment/low complexity  Overall Complexity of Evaluation (OT): low complexity     Time Calculation- OT       Row Name 07/21/23 1428             Time Calculation- OT    OT Received On 07/21/23  -LF      OT Goal Re-Cert Due Date 07/26/23  -LF         Timed Charges    66792 - OT Therapeutic Exercise Minutes 20  -LF      99553 - OT Therapeutic Activity Minutes 5  -LF         Total Minutes    Timed Charges Total Minutes 25  -LF       Total Minutes 25  -LF                 User Key  (r) = Recorded By, (t) = Taken By, (c) = Cosigned By      Initials Name Provider Type     Anju Salgado OT Occupational Therapist                  Therapy Charges for Today       Code Description Service Date Service Provider Modifiers Qty    01594279215  OT THER PROC EA 15 MIN 7/20/2023 Anju Salgado OT GO 2    21376514176 HC OT THER PROC EA 15 MIN 7/21/2023 Anju Salgado OT GO 2                 Anju Salgado OT  7/21/2023

## 2023-07-21 NOTE — DISCHARGE SUMMARY
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  Subsequent blood culture negative since   Right-sided rotator cuff tear  Septic right shoulder joint due to MRSA.  S/p right shoulder washout   MRSA cellulitis of right shoulder  Traumatic injury of right shoulder, concern for rotator cuff tear  Systolic congestive heart failure with an EF of 20%, new diagnosis.  Stable  Hospital Course     Hospital Course:  Yfn Ray is a 52 y.o. male no past medical history presents to the emergency department for evaluation of right upper extremity pain.  Patient states he fell off a truck 5 days ago and was seen initially and discharged outpatient with outpatient follow-up.  States that yesterday he noted his arm swelling and turning red which got progressively worse prompting him to seek further medical attention.  He denies any fevers, chills, sweats, nausea, vomiting, chest pain, shortness of breath, palpitations, abdominal pain diarrhea constipation or dysuria, weakness.  In the emergency department felt to have cellulitis and started on vancomycin and Zosyn and  will be admitted for ongoing monitoring management.   Patient evaluated by orthopedic surgery and cardiology.Patient is status post washout of his right shoulder on July 14 with MRSA from the infected shoulder by orthopedic surgery.  Cardiology signed off with recommendation for left heart cath in next couple of weeks once infection has cleared to evaluate for new systolic CHF.     Interval Followup:   Afebrile.  Resting comfortably at the time of my rounds.  Wife at bedside   rash much better per patient he gets it with pain medications and not due to vancomycin.    Patient's white blood cell count is improving  Patient had CT scanning of his arm on July 16.  No definitive abscess.   Patient cleared by orthopedic surgery to be released   positive BM           DISCHARGE Follow Up Recommendations for labs and diagnostics: Have PCP check CBC and CRP in 3 days.  Follow with PCP, cardiology, orthopedic surgery and home health.  IV vancomycin until August 16 via PICC line.  Dose adjusted prior to discharge.  Wound care and restriction as per orthopedic surgery.      Day of Discharge     Vital Signs:  Temp:  [97.2 °F (36.2 °C)-98.4 °F (36.9 °C)] 97.2 °F (36.2 °C)  Heart Rate:  [] 100  Resp:  [16-18] 16  BP: (100-133)/(52-94) 133/94    Physical Exam:    Constitutional: Resting in bed. Wife at the bedside               Eyes: Pupils equal, sclerae anicteric, no conjunctival injection              HENT: NCAT, mucous membranes moist              Neck: Supple, no thyromegaly, no lymphadenopathy, trachea midline              Respiratory: Clear to auscultation bilaterally, nonlabored respirations               Cardiovascular: RRR, no murmurs, rubs, or gallops, palpable pedal pulses bilaterally              Gastrointestinal: Positive bowel sounds, soft, nontender, nondistended              Musculoskeletal: Full range of motion except for right shoulder which he is unable to move. Dressings in place. Drain removed.  Remain  swollen right upper extremity with redness proximally.  Wound dressed              Psychiatric: Appropriate affect, cooperative              Neurologic: Oriented x 3, strength symmetric in all extremities, Cranial Nerves grossly intact to confrontation, speech clear              Skin: erythema  over the back improved    Discharge Details        Discharge Medications        New Medications        Instructions Start Date   aspirin 325 MG EC tablet   325 mg, Oral, Daily      carvedilol 3.125 MG tablet  Commonly known as: COREG   3.125 mg, Oral, Every 12 Hours Scheduled      empagliflozin 10 MG tablet tablet  Commonly known as: JARDIANCE   10 mg, Oral, Daily   Start Date: July 22, 2023     naloxone 4 MG/0.1ML nasal spray  Commonly known as: NARCAN   Call 911. Don't prime. Elmo in 1 nostril for overdose. Repeat in 2-3 minutes in other nostril if no or minimal breathing/responsiveness.      oxyCODONE-acetaminophen 5-325 MG per tablet  Commonly known as: PERCOCET   1 tablet, Oral, Every 4 Hours PRN      PHARMACY TO DOSE VANCOMYCIN   Does not apply, Continuous PRN      sacubitril-valsartan 24-26 MG tablet  Commonly known as: ENTRESTO   1 tablet, Oral, Every 12 Hours Scheduled      Stimulant Laxative 8.6-50 MG per tablet  Generic drug: sennosides-docusate   2 tablets, Oral, 2 Times Daily      vancomycin   1,750 mg, Intravenous, Every 12 Hours             Continue These Medications        Instructions Start Date   meclizine 25 MG tablet  Commonly known as: ANTIVERT   25 mg, Oral, 3 Times Daily PRN      multivitamin with minerals tablet tablet   1 tablet, Oral, Daily      sildenafil 100 MG tablet  Commonly known as: VIAGRA   1 tablet, Oral, Daily             Stop These Medications      diclofenac 75 MG EC tablet  Commonly known as: VOLTAREN     HYDROcodone-acetaminophen 5-325 MG per tablet  Commonly known as: NORCO     ketorolac 10 MG tablet  Commonly known as: TORADOL              No Known Allergies    Discharge  Disposition:  Home-Health Care c.  In private vehicle with wife.    Diet:  Diet Instructions       Diet: Cardiac Diets, Fluid Restriction (240 mL/tray) Diets; Low Sodium (2g); Regular Texture (IDDSI 7); Thin (IDDSI 0); 1500 mL/day      Discharge Diet:  Cardiac Diets  Fluid Restriction (240 mL/tray) Diets       Cardiac Diet: Low Sodium (2g)    Texture: Regular Texture (IDDSI 7)    Fluid Consistency: Thin (IDDSI 0)    Fluid Restriction Diet (240 mL/tray): 1500 mL/day            Discharge Activity:   Activity Instructions       Other Activity Instructions      Activity Instructions: As per Ortho            CODE STATUS:  Code Status and Medical Interventions:   Ordered at: 07/11/23 0208     Code Status (Patient has no pulse and is not breathing):    CPR (Attempt to Resuscitate)     Medical Interventions (Patient has pulse or is breathing):    Full Support         No future appointments.    Additional Instructions for the Follow-ups that You Need to Schedule       Discharge Follow-up with PCP   As directed       Currently Documented PCP:    Marilyn Eugene APRN    PCP Phone Number:    [phone]     Follow Up Details: 3 days         Discharge Follow-up with Specified Provider: Dr. Gallegos; 1 Week   As directed      To: Dr. Gallegos    Follow Up: 1 Week         Discharge Follow-up with Specified Provider: Dr. Holliday; 1 Month   As directed      To: Dr. Holliday    Follow Up: 1 Month                 Pertinent  and/or Most Recent Results     PROCEDURES:   Procedures:    * SHOULDER ARTHROSCOPY WITH WASHOUT AND DEBRIDMENT     LAB RESULTS:      Lab 07/21/23  0605 07/20/23  0427 07/19/23  0609 07/18/23  0518 07/17/23  0543 07/16/23  0229   WBC 20.87* 20.69* 21.33* 22.98* 25.79* 29.95*   HEMOGLOBIN 10.2* 11.1* 11.5* 11.9* 11.7* 12.0*   HEMATOCRIT 30.7* 33.4* 35.0* 36.4* 35.3* 36.6*   PLATELETS 840* 869* 816* 682* 498* 388   NEUTROS ABS 16.02* 14.25* 15.03*  --  18.92* 26.06*   IMMATURE GRANS (ABS) 0.52* 0.78* 0.96*  --  1.94*  --     LYMPHS ABS 2.20 3.43* 3.17*  --  2.77  --    MONOS ABS 1.78* 1.81* 1.70*  --  1.37*  --    EOS ABS 0.23 0.26 0.35  --  0.66* 0.30   MCV 95.9 94.6 94.9 93.6 95.1 94.6   CRP 3.94* 3.53* 5.32* 9.22*  --   --          Lab 07/21/23  0605 07/18/23  0518 07/17/23  0543 07/16/23  0229 07/15/23  0507   SODIUM 131* 132* 132* 128* 131*   POTASSIUM 4.8 4.4 4.5 4.5 4.3   CHLORIDE 99 101 100 97* 100   CO2 24.6 21.8* 24.0 21.7* 22.9   ANION GAP 7.4 9.2 8.0 9.3 8.1   BUN 16 14 12 16 18   CREATININE 0.75* 0.59* 0.61* 0.65* 0.65*   EGFR 108.6 116.7 115.6 113.4 113.4   GLUCOSE 102* 105* 89 86 124*   CALCIUM 8.3* 8.1* 8.4* 7.9* 7.9*   MAGNESIUM  --  2.1 2.2 1.9 2.0   PHOSPHORUS  --   --   --   --  3.4         Lab 07/20/23  0427 07/15/23  0507   TOTAL PROTEIN 6.4  --    ALBUMIN 2.3* 2.0*   ALT (SGPT) 41  --    AST (SGOT) 38  --    BILIRUBIN 0.3  --    BILIRUBIN DIRECT <0.2  --    ALK PHOS 340*  --                      Brief Urine Lab Results  (Last result in the past 365 days)        Color   Clarity   Blood   Leuk Est   Nitrite   Protein   CREAT   Urine HCG        07/16/23 0105 Yellow   Clear   Negative   Negative   Negative   Negative                 Microbiology Results (last 10 days)       Procedure Component Value - Date/Time    Blood Culture - Blood, Arm, Right [[id number]]  (Normal) Collected: 07/16/23 0610    Lab Status: Final result Specimen: Blood from Arm, Right Updated: 07/21/23 0630     Blood Culture No growth at 5 days    Blood Culture - Blood, Hand, Right [[id number]]  (Normal) Collected: 07/16/23 0610    Lab Status: Final result Specimen: Blood from Hand, Right Updated: 07/21/23 0630     Blood Culture No growth at 5 days    Anaerobic Culture - Wound, Shoulder, Right [[id number]]  (Normal) Collected: 07/14/23 1321    Lab Status: Final result Specimen: Wound from Shoulder, Right Updated: 07/19/23 0652     Anaerobic Culture No anaerobes isolated at 5 days    Wound Culture - Wound, Shoulder, Right [[id number]]  (Abnormal)  Collected: 07/14/23 1321    Lab Status: Final result Specimen: Wound from Shoulder, Right Updated: 07/16/23 0724     Wound Culture Scant growth (1+) Staphylococcus aureus, MRSA     Comment:   Methicillin resistant Staphylococcus aureus, Patient may be an isolation risk.        Gram Stain Rare (1+) Gram positive cocci in pairs and clusters      WBCs seen    Narrative:      Refer to previous wound culture collected on 07/14/2023 1250 for MICs    Anaerobic Culture - Wound, Shoulder, Right [[id number]]  (Normal) Collected: 07/14/23 1250    Lab Status: Final result Specimen: Wound from Shoulder, Right Updated: 07/19/23 0652     Anaerobic Culture No anaerobes isolated at 5 days    Wound Culture - Wound, Shoulder, Right [[id number]]  (Abnormal)  (Susceptibility) Collected: 07/14/23 1250    Lab Status: Final result Specimen: Wound from Shoulder, Right Updated: 07/16/23 0724     Wound Culture Scant growth (1+) Staphylococcus aureus, MRSA     Comment:   Methicillin resistant Staphylococcus aureus, Patient may be an isolation risk.        Gram Stain Rare (1+) Gram positive cocci in pairs and clusters      WBCs seen    Susceptibility        Staphylococcus aureus, MRSA      KAROLINA      Clindamycin Susceptible      Erythromycin Resistant      Inducible Clindamycin Resistance Negative      Oxacillin Resistant      Rifampin Susceptible      Tetracycline Susceptible      Trimethoprim + Sulfamethoxazole Susceptible      Vancomycin Susceptible                       Susceptibility Comments       Staphylococcus aureus, MRSA    This isolate does not demonstrate inducible clindamycin resistance in vitro.                 Body Fluid Culture - Body Fluid, Shoulder, Right [[id number]]  (Abnormal)  (Susceptibility) Collected: 07/13/23 1630    Lab Status: Final result Specimen: Body Fluid from Shoulder, Right Updated: 07/15/23 0759     Body Fluid Culture Moderate growth (3+) Staphylococcus aureus, MRSA     Comment:   Methicillin resistant  Staphylococcus aureus, Patient may be an isolation risk.        Gram Stain Many (4+) WBCs seen      Few (2+) Gram positive cocci in pairs    Susceptibility        Staphylococcus aureus, MRSA      KAROLINA      Gentamicin Susceptible      Oxacillin Resistant      Rifampin Susceptible      Vancomycin Susceptible                       Susceptibility Comments       Staphylococcus aureus, MRSA    This isolate does not demonstrate inducible clindamycin resistance in vitro.                 Blood Culture - Blood, Arm, Left [[id number]]  (Abnormal) Collected: 07/13/23 1333    Lab Status: Final result Specimen: Blood from Arm, Left Updated: 07/15/23 0805     Blood Culture Staphylococcus aureus, MRSA     Comment:   Infectious disease consultation is highly recommended to rule out distant foci of infection.  Methicillin resistant Staphylococcus aureus, Patient may be an isolation risk.        Isolated from Aerobic Bottle     Gram Stain Aerobic Bottle Gram positive cocci in clusters    Narrative:      Refer to previous blood culture collected on 07/10/2023 2154 for MICS.    Blood Culture - Blood, Hand, Right [[id number]]  (Normal) Collected: 07/13/23 1333    Lab Status: Final result Specimen: Blood from Hand, Right Updated: 07/18/23 1400     Blood Culture No growth at 5 days            XR Shoulder 2+ View Right    Result Date: 7/6/2023  Impression:   No acute fracture or acute malalignment is identified.   Please note that portions of this note were completed with a voice recognition program.  KODI HERNANDEZ JR, MD       Electronically Signed and Approved By: KODI HERNANDEZ JR, MD on 7/06/2023 at 22:19              XR Humerus Right    Result Date: 7/6/2023  Impression:  No acute fracture or acute malalignment is identified.     Please note that portions of this note were completed with a voice recognition program.  KODI HERNANDEZ JR, MD       Electronically Signed and Approved By: KODI HERNANDEZ JR, MD on 7/06/2023 at 22:17               XR Elbow 3+ View Right    Result Date: 7/11/2023  Impression:   No acute fracture or acute malalignment is identified.  No subacute fracture is suggested.  There is nonspecific diffuse soft tissue swelling.  Please see above comments for further detail.     Please note that portions of this note were completed with a voice recognition program.  KODI HERNANDEZ JR, MD       Electronically Signed and Approved By: KODI HERNANDEZ JR, MD on 7/11/2023 at 2:12              CT Head Without Contrast    Result Date: 7/11/2023  Impression:  No acute brain abnormality is seen. Specifically, no acute intracranial hemorrhage, no acute infarct, no intracranial mass lesion, and no acute intracranial mass effect are appreciated.   Please note that portions of this note were completed with a voice recognition program.  KODI HERNANDEZ JR, MD       Electronically Signed and Approved By: KODI HERNANDEZ JR, MD on 7/11/2023 at 0:46              CT Chest With Contrast Diagnostic    Result Date: 7/11/2023  Impression:   1. The study is motion-limited.  2. There is suspected acute-to-subacute soft tissue contusion centered about the right shoulder.  There may be a positive right shoulder joint effusion.  No definite acute or subacute fracture is seen in this region or elsewhere.  No dislocation is suggested.  Please note that this exam was not tailored in order to evaluate the right shoulder girdle.  3. Subsegmental atelectasis is suggested in the lungs bilaterally.  Pneumonia and/or pulmonary contusion(s) is thought to be less likely.  4. No pneumothorax is seen.  No pneumomediastinum.  5. Again, no definite acute (or subacute) displaced rib fracture is seen.  6. Chronic calcified granulomatous disease involves the chest and abdomen.  7. There are coronary artery calcifications.  8. Again, the study is limited for several reasons.  For instance, the patient's upper extremities in the scan field of view obscure detail.  Also, as mentioned  above, there is motion artifact on the exam obscuring detail.  9. Please see above comments for further detail.     Please note that portions of this note were completed with a voice recognition program.  KODI HERNANDEZ JR, MD       Electronically Signed and Approved By: KODI HERNANDEZ JR, MD on 7/11/2023 at 1:01              XR Chest 1 View    Result Date: 7/16/2023  Impression:   No acute infiltrate is appreciated.      Please note that portions of this note were completed with a voice recognition program.  KODI HERNANDEZ JR, MD       Electronically Signed and Approved By: KODI HERNANDEZ JR, MD on 7/16/2023 at 1:45              XR Chest 1 View    Result Date: 7/10/2023  Impression:  No acute infiltrate is appreciated.     Please note that portions of this note were completed with a voice recognition program.  KODI HERNANDEZ JR, MD       Electronically Signed and Approved By: KODI HERNANDEZ JR, MD on 7/10/2023 at 23:43              MRI Shoulder Right With & Without Contrast    Result Date: 7/13/2023  Impression:   1. Findings indicating changes of septic arthropathy of the glenohumeral joint with associated septic subacromial/subdeltoid bursitis.  There is marked edema and enhancement within the surrounding muscular soft tissues as well as overlying subcutaneous soft tissues suggesting surrounding infectious myositis and soft tissue cellulitis.  A large abscess extends into the anterior deltoid musculature and soft tissues at the anterior margin of the shoulder.  The abscess measures up to approximately 10.4 cm.  The abscess extends inferior to the field of view for this study.  There is also involvement of infection involving the rotator cuff muscular soft tissues. 2. Evidence for associated developing osteomyelitis involving the proximal humerus. 3. Large full-thickness tear involving the entirety of the supraspinatus component of the rotator cuff with partial retraction of the torn tendon.  There is also  full-thickness tear involving the anterior and middle fibers of the subscapularis component.  The inferior fibers remain at least partially intact. 4. Evidence for tendinopathy and superimposed high-grade partial-thickness tear involving the biceps tendon.  There does not appear to be complete disruption or distal retraction.  There is also partial medial subluxation of the biceps tendon from the intertubercular sulcus. 5. Evidence for a slap type 4 tear of the labrum. 6. Evidence of partial tear of the inferior glenohumeral ligament with partial injury of the inferior joint capsule. 7. Mild osteoarthritis of the glenohumeral joint.  Mild age related changes of the acromioclavicular joint are noted.      NEREYDA STRICKLAND MD       Electronically Signed and Approved By: NEREYDA STRICKLAND MD on 7/13/2023 at 15:08             CT Upper Extremity Right With Contrast    Result Date: 7/17/2023  Impression:   An extensive age-indeterminate, but probably subacute-to-chronic, infectious/inflammatory process is suspected involving the soft tissues of the right shoulder girdle and the right upper extremity, as detailed above.  Focal areas of low attenuation are seen, which may represent abscesses or phlegmons, including those that are located intramuscularly.  There are enlarged reactive lymph nodes, especially involving the right axillary region.  Reactive right-sided epitrochlear lymph nodes are possible.  No definite suppurative or necrotic adenopathy is seen.  Septic arthritis, involving the right elbow, cannot be excluded.  A percutaneous drain is in place about the proximal right humerus, extending inferiorly, laterally, and anteriorly into the upper-to-mid right arm, as discussed.  Small foci of gas are seen along the anterolateral aspect of the right shoulder and proximal right.  Please see above comments for further detail.   Please note that portions of this note were completed with a voice recognition program.  KODI HOLLIDAY  MARY PATEL MD       Electronically Signed and Approved By: KODI HERNANDEZ JR, MD on 7/17/2023 at 6:08             IR Inject/asp large joint or bursa    Result Date: 7/13/2023  Impression:  Successful fluoroscopically guided aspiration of fluid collection adjacent to right shoulder.  5 cubic centimeters of cloudy purulent-appearing fluid was aspirated and sent to the lab for analysis.    GRETCHEN MOSHER MD       Electronically Signed and Approved By: GRETCHEN MOSHER MD on 7/13/2023 at 16:48              Results for orders placed during the hospital encounter of 07/10/23    Duplex Venous Upper Extremity - Right CAR    Interpretation Summary  •  Normal right upper extremity venous duplex scan.      Results for orders placed during the hospital encounter of 07/10/23    Duplex Venous Upper Extremity - Right CAR    Interpretation Summary  •  Normal right upper extremity venous duplex scan.      Results for orders placed during the hospital encounter of 07/10/23    Adult Transthoracic Echo Complete W/ Cont if Necessary Per Protocol    Interpretation Summary  •  Left ventricular systolic function is severely decreased. Left ventricular ejection fraction appears to be less than 20%.  •  The left ventricular cavity is moderately dilated.  •  Left ventricular diastolic function is consistent with (grade I) impaired relaxation.  •  The left atrial cavity is mildly dilated.  •  Abnormal mitral valve structure consistent with dilated annulus.  •  Mild mitral regurgitation  •  No overt evidence of cardioembolic source by TTE  •  No prior echo for comparison      Imaging Results (All)       Procedure Component Value Units Date/Time    CT Upper Extremity Right With Contrast [[id number]] Collected: 07/17/23 0608     Updated: 07/17/23 0611    Narrative:      PROCEDURE: CT UPPER EXTREMITY RIGHT W CONTRAST     COMPARISONS: 7/12/2023 (right shoulder MRI, w/o & w/); 7/6/2023 (right shoulder & right humerus   x-rays).      INDICATIONS: Septic arthritis of the right shoulder.     PROTOCOL:   CT imaging protocol performed.      RADIATION:   Total DLP: 653.1 mGy*cm    MA and/or KV were/was adjusted to minimize radiation dose.       CONTRAST: 90 mL Isovue 370 I.V.     TECHNIQUE:   After obtaining the patient's consent, multi-planar CT images (880 CT images) of the right upper   extremity, chest, abdomen, and pelvis were created with non-ionic intravenous contrast.  Please   note that the study was performed as if it were a chest CT exam, off-centered, to include the right   upper extremity.  Please note that portions of the left chest, left abdomen, and left pelvis are   excluded from the field of view.     FINDINGS:   There is diffuse soft tissue prominence involving the right shoulder girdle and the right upper   extremity with irregular areas of enhancement.  Enhancement is seen, especially anteromedial to the   right glenohumeral joint and about the right scapula.  There may be fluid collections as with   abscesses or phlegmons in these areas including those within intramuscular locations.  One of the   larger intramuscular fluid collections or phlegmons involves the right-sided supraspinatus muscle,   such as seen on image 19 of series 201, image 103 of series 202, and image 110 of series 203.  It   measures 4.5 x 2.6 x 3.3 cm.  It has an estimated total volume a 31.3 mL.  It has a CT number of 10   Hounsfield units or less as measured on image 105 of series 202.  Similar findings were seen on the   recent right shoulder MRI study.  There is a percutaneous drainage catheter in place about the   proximal right humerus.  This catheter does not drain the above-mentioned right supraspinatus   low-density.  Fluid collection/phlegmon involving the lateral aspect of the right pectoralis major   muscle and the right deltoid muscle is possible.  Ectopic gas collections are seen in these areas   on approximately axial images 55 through 70 of  series 201.  They may be related to the percutaneous   drainage catheter being in place.  Gas associated with an infectious process cannot be excluded.    Nonspecific diffuse subcutaneous edema extends into the right upper extremity to the included   distal right forearm.  This finding is nonspecific.  It may represent dependent edema.  Edema   associated with infectious/inflammatory cellulitis is possible.  Edema associated chronic venous   insufficiency or lymphatic obstruction cannot be excluded.  Please correlate clinically.  Please   note that the entire right upper extremity is not included in the field of view.  Fluid collection   or phlegmon related to the anterior aspect of the biceps brachii muscle on the right is possible   measuring about 3.5 x 2.2 x 10.3 cm in transverse, anteroposterior, and craniocaudal extent,   respectively, as seen on image 137 of series 201 and image 46 of series 203.  It has an estimated   total volume of approximately 65.2 mL.  It has a CT number of approximately 18 Hounsfield units or   slightly less, as seen on image 136 of series 201.  The aforementioned percutaneous drainage   catheter is thought to the located within portions of this fluid collection.  Again focal fluid   collections or phlegmons may involve the more distal right upper extremity.  There is a possible   right elbow joint effusion.  Septic arthritis involving the right elbow joint cannot be excluded.    There are suspected loose bodies in the right elbow joint space.  Joint space narrowing is seen.    Enthesopathic changes involve the right elbow, as well.  Again there are degenerative changes   involving the right glenohumeral joint and the right acromioclavicular joint.  No definite acute   fracture is seen.  No dislocation.  The right glenohumeral joint effusion is thought to be better   appreciated on the recent MRI study.  No definite unintended retained foreign bodies are   appreciated in these areas.      Additionally, mild subsegmental atelectasis involves the lungs.  No pleural effusion.  No   pneumothorax.  There is chronic calcified granulomatous disease of the chest.  The imaged central   tracheobronchial tree is well aerated without filling defect.  Diffuse hepatic steatosis is noted.    There may be hepatomegaly.  The maximum craniocaudal dimension of the liver is 19.7 cm.  Probably   no splenomegaly.  The spleen is partially imaged.  There is a small to moderate hiatal hernia.    Nodular structures are related to the lower esophagus within the posterior mediastinum and may   represent prominent lymph nodes.  Formed stool is seen throughout the colon.  There may be fecal   stasis (or fecal retention) as with constipation.  Fecal impaction cannot be excluded.  There are   colonic diverticula.  No acute diverticulitis.  No definite stercoral ulceration.  No   pneumoperitoneum or pneumatosis.  No pericolonic or Perirectal fluid collections are seen to   suggest abscess.  The urinary bladder is partially imaged and is underdistended.  The appendix is   not clearly identified.  No acute pyelonephritis.  No renal stones or hydronephrosis.  No definite   ureterolithiasis.  Probably no adrenal mass.  There is minimal nodularity of the adrenal glands,   which maintain adreniform configuration.  No acute pancreatitis or cholecystitis.         Impression:         An extensive age-indeterminate, but probably subacute-to-chronic, infectious/inflammatory process   is suspected involving the soft tissues of the right shoulder girdle and the right upper extremity,   as detailed above.  Focal areas of low attenuation are seen, which may represent abscesses or   phlegmons, including those that are located intramuscularly.  There are enlarged reactive lymph   nodes, especially involving the right axillary region.  Reactive right-sided epitrochlear lymph   nodes are possible.  No definite suppurative or necrotic adenopathy is  seen.  Septic arthritis,   involving the right elbow, cannot be excluded.  A percutaneous drain is in place about the proximal   right humerus, extending inferiorly, laterally, and anteriorly into the upper-to-mid right arm, as   discussed.  Small foci of gas are seen along the anterolateral aspect of the right shoulder and   proximal right.  Please see above comments for further detail.        Please note that portions of this note were completed with a voice recognition program.     KODI HERNANDEZ JR, MD         Electronically Signed and Approved By: KODI HERNANDEZ JR, MD on 7/17/2023 at 6:08                     XR Chest 1 View [[id number]] Collected: 07/16/23 0146     Updated: 07/16/23 0149    Narrative:      PROCEDURE: XR CHEST 1 VW     COMPARISON: 7/10/2023.     INDICATIONS: Possible sepsis.     FINDINGS:  Two views (AP semi-upright portable projections) of the chest reveal borderline cardiac   enlargement and no acute infiltrate.  No pleural effusion or pneumothorax is seen.  Chronic   calcified granulomatous disease involves the chest.  External artifacts obscure detail.       Impression:        No acute infiltrate is appreciated.                 Please note that portions of this note were completed with a voice recognition program.     KODI HERNANDEZ JR, MD         Electronically Signed and Approved By: KODI HERNANDEZ JR, MD on 7/16/2023 at 1:45                        IR Inject/asp large joint or bursa [[id number]] Collected: 07/13/23 1639     Updated: 07/13/23 1651    Narrative:      PROCEDURE: IR INJECT/ASP LARGE JOINT OR BURSA     COMPARISON: None     INDICATIONS: Right SHOULDER Joint effusion. 1 FLUORO IMAGE. 0.2mGy.  FLUORO TIME 0.1 MINUTES.    DIAGNOSTIC.     CONSENT:   Prior to the procedure, the risks, benefits and alternatives were thoroughly explained.    This included the risk of bleeding, infection, and injury to associated structures.  Also the risk   of allergic reaction was explained and the  possibility of doing the procedure without intrarticular   contrast.  The patient expressed understanding and wished to continue.  Informed written consent   was obtained.        PROCEDURE:   The skin overlying the anterior aspect of the right shoulder joint was prepped and   draped in normal sterile fashion.  Lidocaine was injected along the anticipated tract of the   needle.  A 20 gauge spinal needle was advanced into the fluid collection adjacent to the right   shoulder.  Five cc of blood tinged cloudy purulent-appearing fluid was aspirated.  The sample sent   to the lab for analysis.  The needle was withdrawn. The patient demonstrated no complication.      Less than 0.1 minutes fluoroscopy time was utilized.          Impression:       Successful fluoroscopically guided aspiration of fluid collection adjacent to right   shoulder.  5 cubic centimeters of cloudy purulent-appearing fluid was aspirated and sent to the lab   for analysis.         GRETCHEN MOSHER MD         Electronically Signed and Approved By: GRETCHEN MOSHER MD on 7/13/2023 at 16:48                     MRI Shoulder Right With & Without Contrast [[id number]] Collected: 07/13/23 1508     Updated: 07/13/23 1511    Narrative:      PROCEDURE: MRI SHOULDER RIGHT W WO CONTRAST     COMPARISON:  None  INDICATIONS: Shoulder pain, rotator cuff disorder suspected, xray done     CONTRAST: 15 ml  Multihance I.V.     TECHNIQUE: A variety of imaging planes and parameters were utilized for visualization of suspected   pathology.  Images were performed without and with intravenous gadolinium.     FINDINGS:   There is complete disruption of the supraspinatus component of rotator cuff with proximal   retraction of the torn fibers towards the acromial humeral space.  There is also complete   disruption of the superior and middle fibers of the subscapularis component of the cuff.  The   inferior fibers appear to remain at least partially intact.     A large  subacromial/subdeltoid bursal collection is seen.  There is also a large joint effusion.    On the postcontrast images, there is thickened nodular synovial enhancement associated with the   joint space as well as the bursa.  There is also an abnormal fluid collection which extends from   the inferior and anterior margin of the joint space and bursa into the overlying deltoid muscular   soft tissues.  These findings extend into the anterior deltoid musculature.  There is significant   edema and enhancement within the surrounding soft tissues.  These findings indicate changes of   septic arthropathy and septic bursitis with associated developing abscess which extends into the   overlying anterior deltoid musculature.  The fluid collection within the overlying soft tissues   measures approximately 10.4 x 2.6 x 4.6 cm.  The inferior margin fluid collection extends beyond   the field of view for this study.     There is also heterogeneous abnormal bone marrow edema and enhancement involving the proximal   humerus.  These findings indicate changes of developing osteomyelitis.  No large associated   cortical erosion or destruction is seen.     There is marked abnormal signal and morphology involving the proximal biceps tendon.  The findings   suggest changes of tendinopathy and superimposed high-grade partial thickness tear.  There does not   appear to be complete disruption or distal retraction.  The biceps anchor appears to remain at   least partially intact.  There is also medial subluxation of the biceps tendon from the   intertubercular sulcus.     There is abnormal signal involving the superior labrum extending to the posterior superior labrum.    Given the abnormality of the biceps tendon, the findings suggest changes of a slap type 4 tear.     There are underlying changes of mild osteoarthritis involving glenohumeral joint.  There is   irregularity and abnormal signal involving the inferior glenohumeral ligament  suggesting changes of   partial injury.  There does not appear to be complete avulsion.  The changes are most pronounced   associated with the posterior component of the inferior glenohumeral ligament.  There is also   likely partial injury of the inferior joint capsule.     The acromioclavicular joint is intact.  Mild hypertrophic age related changes are noted.  There is   moderate to severe atrophy throughout the rotator cuff musculature.  There is also edema and   enhancement which extends into the rotator cuff musculature suggesting spread of infection to   involve the rotator cuff muscular soft tissues.       Impression:         1. Findings indicating changes of septic arthropathy of the glenohumeral joint with associated   septic subacromial/subdeltoid bursitis.  There is marked edema and enhancement within the   surrounding muscular soft tissues as well as overlying subcutaneous soft tissues suggesting   surrounding infectious myositis and soft tissue cellulitis.  A large abscess extends into the   anterior deltoid musculature and soft tissues at the anterior margin of the shoulder.  The abscess   measures up to approximately 10.4 cm.  The abscess extends inferior to the field of view for this   study.  There is also involvement of infection involving the rotator cuff muscular soft tissues.  2. Evidence for associated developing osteomyelitis involving the proximal humerus.  3. Large full-thickness tear involving the entirety of the supraspinatus component of the rotator   cuff with partial retraction of the torn tendon.  There is also full-thickness tear involving the   anterior and middle fibers of the subscapularis component.  The inferior fibers remain at least   partially intact.  4. Evidence for tendinopathy and superimposed high-grade partial-thickness tear involving the   biceps tendon.  There does not appear to be complete disruption or distal retraction.  There is   also partial medial subluxation  of the biceps tendon from the intertubercular sulcus.  5. Evidence for a slap type 4 tear of the labrum.  6. Evidence of partial tear of the inferior glenohumeral ligament with partial injury of the   inferior joint capsule.  7. Mild osteoarthritis of the glenohumeral joint.  Mild age related changes of the   acromioclavicular joint are noted.               NEREYDA STRICKLAND MD         Electronically Signed and Approved By: NEREYDA STRICKLAND MD on 7/13/2023 at 15:08                     XR Elbow 3+ View Right [[id number]] Collected: 07/11/23 0212     Updated: 07/11/23 0215    Narrative:      PROCEDURE: XR ELBOW 3+ VW RIGHT     COMPARISON: 7/6/2023.     INDICATIONS:   RIGHT ELBOW PAIN/SWELLING/REDNESS FELL OUT OF BUCKET TRUCK 1 WEEK AGO.     FINDINGS:   Four (4) views were obtained.  No acute fracture or acute malalignment is identified.  No subacute   fracture is appreciated.  Diffuse soft tissue swelling is seen and is nonspecific.  It may be   related to an acute-to-subacute contusion.  Cellulitis cannot be excluded.  There are probably   moderate-to-severe degenerative changes of the right elbow.  Enthesopathic changes are also   present.  There is a possible 1.8 cm loose body (anteriorly) in the joint space, seen previously.    There may be a 1.3 cm loose body in the posterior joint space (versus enthesopathy).  Similar   findings were seen previously.  No subcutaneous emphysema.  No retained radiopaque foreign body.  A   right elbow joint effusion is possible.  If symptoms or clinical concerns persist, consider imaging   follow-up.       Impression:         No acute fracture or acute malalignment is identified.  No subacute fracture is suggested.  There   is nonspecific diffuse soft tissue swelling.  Please see above comments for further detail.              Please note that portions of this note were completed with a voice recognition program.     KODI HERNANDEZ JR, MD         Electronically Signed and Approved By:  KODI HERNANDEZ JR, MD on 7/11/2023 at 2:12                        CT Chest With Contrast Diagnostic [[id number]] Collected: 07/11/23 0101     Updated: 07/11/23 0105    Narrative:      PROCEDURE: CT CHEST W CONTRAST DIAGNOSTIC     COMPARISON: None.     INDICATIONS: H/O FALL (4 DAYS AGO) PERSISTENT RIGHT ARM & RIGHT CHEST PAIN.     TECHNIQUE: After obtaining the patient's consent, 664 CT images were obtained with non-ionic   intravenous contrast material.       PROTOCOL:   Standard CT imaging protocol performed.      RADIATION:   Total DLP: 466.5mGy*cm.    Automated exposure control was utilized to minimize radiation dose.   CONTRAST: 100 mL Isovue 370 I.V.     FINDINGS: Age-indeterminate bibasilar atelectasis and/or infiltrate(s) and/or pulmonary   contusion(s) is (are) seen with subsegmental atelectasis favored.  These findings are predominantly   present within the bilateral lower lobes and in the lingula.  No pneumothorax is seen.  No   pneumomediastinum.  Minimal if any pleural effusion is seen.  No pericardial effusion.  There may   be borderline cardiac enlargement.  There is a small hiatal hernia.  Coronary artery calcifications   are present.  No thoracic aortic aneurysm or dissection.  The contrast bolus in the pulmonary   arteries is limited, precluding assessment for pulmonary embolism.  The patient's upper extremities   in the scan field of view obscure detail.  There is also motion artifact on the exam.  The best   possible images were obtained.  There is a suspected chronic vertebral compression fracture at   approximately L1.  No definite acute fractures are seen.  There are degenerative changes throughout   the imaged spine, especially within the lower thoracic and upper lumbar spine.  There is mild   kyphosis centered at the thoracolumbar junction.  No thyroid enlargement.  The central   tracheobronchial tree is well aerated without filling defect.  No definite suspicious pulmonary   nodules are seen.   The motion artifact and the bibasilar airspace disease may obscure such   findings.  No definite chest wall contusion is seen.  No ectopic chest wall gas collections are   suggested.  There is soft tissue swelling centered about the right shoulder, which suggests   acute-to-subacute contusion(s).  There may be a right glenohumeral joint effusion.  No definite   acute (or subacute) fracture is seen in this region.  There is diffuse hepatic steatosis.  No   definite acute findings are seen in the partially imaged upper abdomen.       Impression:         1. The study is motion-limited.    2. There is suspected acute-to-subacute soft tissue contusion centered about the right shoulder.    There may be a positive right shoulder joint effusion.  No definite acute or subacute fracture is   seen in this region or elsewhere.  No dislocation is suggested.  Please note that this exam was not   tailored in order to evaluate the right shoulder girdle.    3. Subsegmental atelectasis is suggested in the lungs bilaterally.  Pneumonia and/or pulmonary   contusion(s) is thought to be less likely.    4. No pneumothorax is seen.  No pneumomediastinum.    5. Again, no definite acute (or subacute) displaced rib fracture is seen.    6. Chronic calcified granulomatous disease involves the chest and abdomen.    7. There are coronary artery calcifications.    8. Again, the study is limited for several reasons.  For instance, the patient's upper extremities   in the scan field of view obscure detail.  Also, as mentioned above, there is motion artifact on   the exam obscuring detail.    9. Please see above comments for further detail.             Please note that portions of this note were completed with a voice recognition program.     KODI HERNANDEZ JR, MD         Electronically Signed and Approved By: KODI HERNANDEZ JR, MD on 7/11/2023 at 1:01                        CT Head Without Contrast [[id number]] Collected: 07/11/23 0047     Updated:  07/11/23 0050    Narrative:      PROCEDURE: CT HEAD WO CONTRAST     COMPARISON: None.     INDICATIONS: AMS (altered mental status).     PROTOCOL:   Standard imaging protocol performed.      RADIATION:   Total DLP: 1,017.2mGy*cm.    MA and/or KV were/was adjusted to minimize radiation dose.      TECHNIQUE: After obtaining the patient's consent, 130 CT images were obtained without non-ionic   intravenous contrast material.      DISCUSSION:   A routine nonenhanced head CT was performed.  No acute brain abnormality is   identified.  No acute intracranial hemorrhage.  No acute infarct is seen.  No acute skull fracture.    No midline shift or acute intracranial mass effect is seen.  The ventricular size and   configuration are within normal limits.  No significant acute findings are seen with regard to the   imaged orbits, paranasal sinuses, or middle ear clefts.  There is mild chronic rightward nasal   septal deviation with an associated nasal spur.  Please note that the entire level of the foramen   magnum is not included in the field of view.       Impression:       No acute brain abnormality is seen. Specifically, no acute intracranial hemorrhage,   no acute infarct, no intracranial mass lesion, and no acute intracranial mass effect are   appreciated.        Please note that portions of this note were completed with a voice recognition program.     KODI HERNANDEZ JR, MD         Electronically Signed and Approved By: KODI HERNANDEZ JR, MD on 7/11/2023 at 0:46                        XR Chest 1 View [[id number]] Collected: 07/10/23 2344     Updated: 07/10/23 2347    Narrative:      PROCEDURE: XR CHEST 1 VW     COMPARISON: 7/10/2023.     INDICATIONS: Weak/Dizzy/AMS.     FINDINGS: A single frontal (AP or PA upright portable) chest radiograph reveals borderline cardiac   enlargement. No pneumothorax is seen.  There is suspected chronic asymmetric elevation right   diaphragm. Chronic calcified granulomatous disease involves  the chest.  There may be minimal   subsegmental atelectasis and/or fibrosis in the lung bases.  Acute infiltrate is thought to be less   likely.       Impression:       No acute infiltrate is appreciated.              Please note that portions of this note were completed with a voice recognition program.     KODI HERNANDEZ JR, MD         Electronically Signed and Approved By: KODI HERNANDEZ JR, MD on 7/10/2023 at 23:43                                 Labs Pending at Discharge:          Time spent on Discharge including face to face service: 35 minutes  Part of this note may be an electronic transcription/translation of spoken language to printed text using the Dragon Dictation System.     TElectronically signed by Jae Andrews MD, 07/21/23, 3:57 PM EDT.    Addendum;  Patient discharged was canceled on July 21 due to increasing drainage noticing right shoulder surgical incision.  Patient continued on the same treatment.  Today's blood pressure is much improved.  White cell count continues to trend down along with CPR.  No more drainage from the shoulder.  Cleared by orthopedic surgery released.  Started on Aldactone because of stable blood pressure.  IV Vanco dose has been adjusted as per pharmacy.  Prescription for new dose entered in epic and paper copy given to .  All questions answered with wife at bedside.    Electronically signed by Jae Andrews MD, 07/24/23, 2:46 PM EDT.

## 2023-07-21 NOTE — PROGRESS NOTES
CARDIOLOGY  INPATIENT PROGRESS NOTE                [facility] 5TH FLOOR SURGICAL TELEMETRY UNIT    7/21/2023      PATIENT IDENTIFICATION:   Name:  Yfn Ray      MRN:  [id number]     52 y.o.  male                 SUBJECTIVE:   Patient is doing well no significant issues overnight still with some shoulder pain  OBJECTIVE:  Vitals:    07/20/23 2228 07/21/23 0236 07/21/23 0745 07/21/23 1210   BP: 121/57 100/54 109/70 133/94   BP Location: Left arm Left arm Right arm Right arm   Patient Position: Lying Lying Lying Lying   Pulse: 100 108 92 100   Resp: 18 16 18 16   Temp: 98.4 °F (36.9 °C) 97.9 °F (36.6 °C) 98.1 °F (36.7 °C) 97.2 °F (36.2 °C)   TempSrc: Oral Oral Oral Oral   SpO2: 97% 96% 97% 97%   Weight:       Height:               Body mass index is 26.46 kg/m².    Intake/Output Summary (Last 24 hours) at 7/21/2023 1357  Last data filed at 7/21/2023 1300  Gross per 24 hour   Intake 760 ml   Output 2000 ml   Net -1240 ml       Telemetry: Normal sinus rhythm    Physical Exam  General Appearance:   no acute distress  Alert and oriented x3  HENT:   lips not cyanotic  Atraumatic  Neck:  No jvd   supple  Respiratory:  no respiratory distress  normal breath sounds  no rales  Cardiovascular:    regular rhythm  no S3, no S4   no murmur  no rub  lower extremity edema: none    Skin:   warm, dry  No rashes      No Known Allergies  Scheduled meds:  carvedilol, 3.125 mg, Oral, Q12H  empagliflozin, 10 mg, Oral, Daily  heparin (porcine), 5,000 Units, Subcutaneous, Q8H  multivitamin with minerals, 1 tablet, Oral, Daily  sacubitril-valsartan, 1 tablet, Oral, Q12H  senna-docusate sodium, 2 tablet, Oral, BID  sodium chloride, 10 mL, Intravenous, Q12H  sodium chloride, 10 mL, Intravenous, Q12H  vancomycin, 1,750 mg, Intravenous, Q12H      IV meds:                      lactated ringers, 9 mL/hr  Pharmacy to dose vancomycin,       Data Review:  JM          7/19/2023    06:09 7/20/2023    04:27 7/21/2023    06:05   CBC   WBC  21.33  20.69  20.87    RBC 3.69  3.53  3.20    Hemoglobin 11.5  11.1  10.2    Hematocrit 35.0  33.4  30.7    MCV 94.9  94.6  95.9    MCH 31.2  31.4  31.9    MCHC 32.9  33.2  33.2    RDW 14.0  13.9  14.0    Platelets 816  869  840      CMP          7/18/2023    05:18 7/20/2023    04:27 7/21/2023    06:05   CMP   Glucose 105   102    BUN 14   16    Creatinine 0.59   0.75    EGFR 116.7   108.6    Sodium 132   131    Potassium 4.4   4.8    Chloride 101   99    Calcium 8.1   8.3    Total Protein  6.4     Albumin  2.3     Total Bilirubin  0.3     Alkaline Phosphatase  340     AST (SGOT)  38     ALT (SGPT)  41     BUN/Creatinine Ratio 23.7   21.3    Anion Gap 9.2   7.4       CARDIAC LABS:         No results found for: DIGOXIN   No results found for: TSH        Invalid input(s): LDLCALC  No results found for: POCTROP  Lab Results   Component Value Date    TROPONINT 9 07/10/2023   (  Lab Results   Component Value Date    MG 2.1 07/18/2023     Results for orders placed during the hospital encounter of 07/10/23    Adult Transthoracic Echo Complete W/ Cont if Necessary Per Protocol    Interpretation Summary    Left ventricular systolic function is severely decreased. Left ventricular ejection fraction appears to be less than 20%.    The left ventricular cavity is moderately dilated.    Left ventricular diastolic function is consistent with (grade I) impaired relaxation.    The left atrial cavity is mildly dilated.    Abnormal mitral valve structure consistent with dilated annulus.    Mild mitral regurgitation    No overt evidence of cardioembolic source by TTE    No prior echo for comparison           ASSESSMENT:    Sepsis    Chronic HFrEF (heart failure with reduced ejection fraction)    Shoulder injury, right, initial encounter    Acute HFrEF (heart failure with reduced ejection fraction)        PLAN:  1.  Continue with his current CHF treatment we will hold off on further titration of any medications given his low normal  blood pressures.  Patient will need ischemic work-up once finished treatment for his septic arthritis.  We will have patient follow-up in the heart failure clinic on discharge.  We will continue to follow at a distance please feel free to call with any further questions          Cristiano Holliday MD  7/21/2023    13:57 EDT

## 2023-07-21 NOTE — PLAN OF CARE
Goal Outcome Evaluation:      Discharging home to care of spouse. Discharge instructions provided and discussed. Provided teaching on picc line to wife for management. Selina Patel RN

## 2023-07-21 NOTE — PLAN OF CARE
"Goal Outcome Evaluation:         PT PAIN CONTROLLED W PO MEDS, DRESSING CHANGE DONE PER ORDERS, NO NEW COMPLAINTS. RASH ON BACK IS IMPROVING. PT IS EXPRESSING CONCERNS OF FEELING \"COOPED UP\" AND HE WOULD LIKE TO BE ABLE TO GET A LITTLE FRESH AIR AND SUNSHINE. Erma Siegel RN               "

## 2023-07-21 NOTE — PROGRESS NOTES
"[facility]: Vancomycin Monitoring Note    Yfn Ray is a 52 y.o. male who is on day  of pharmacy to dose vancomycin for Sepsis.    Previous Vancomycin Dose:   1750 mg IV every  12  hours  Imaging Reviewed?: N/A  Updated Cultures and Sensitivities:   23: BLOOD CX, RIGHT ARM: NGD5  23: BLOOD CX, RIGHT HAND: NGD5  23: ANAEROBID WOUND CX, RIGHT SHOULDER: NO ANAEROBES DAY 23: WOUND CX, RIGHT SHOULDER: MRSA  23: ANAEROBIC CX, RIGHT SHOULDER WOUND: NGD5  23: WOUND CX, RIGHT SHOULDER: MRSA  23: FLUID CX, RIGHT SHOULDER FLUID: MRSA  23: BLOOD CX, LEFT ARM: MRSA  7-10-23: BLOOD CX, RIGHT HAND: NGD5    Vitals/Labs  Ht: 180.3 cm (70.98\"); Wt: 86 kg (189 lb 9.5 oz)     Temp (24hrs), Av.3 °F (36.8 °C), Min:97.9 °F (36.6 °C), Max:98.8 °F (37.1 °C)    Estimated Creatinine Clearance: 140.1 mL/min (A) (by C-G formula based on SCr of 0.75 mg/dL (L)).       Results from last 7 days   Lab Units 23  0605 23  0427 23  1715 23  0609 23  0518 23  0543 23  0229 07/15/23  0507   VANCOMYCIN TR mcg/mL  --   --  17.60  --  14.79  --   --  13.90   CREATININE mg/dL 0.75*  --   --   --  0.59* 0.61*   < > 0.65*   WBC 10*3/mm3 20.87* 20.69*  --  21.33* 22.98* 25.79*   < > 32.95*    < > = values in this interval not displayed.     Assessment/Plan    Current Vancomycin Dose:  1750 mg IV every 12 hours; which provides the following predicted parameters:  AUC24,ss: 565 mg/L.hr  PAUC*: 99 %  Ctrough,ss: 14.8 mg/L  Pconc*: 2 %  Tox.: 10 %  No change in dose needed  We will continue to monitor patient changes and renal function     Thank you for involving pharmacy in this patient's care. Please contact pharmacy with any questions or concerns.    Chanell Gay  Clinical Pharmacist    "

## 2023-07-21 NOTE — PROGRESS NOTES
[facility]     Progress Note    Patient Name: Yfn Ray  : 1970  MRN: [id number]  Primary Care Physician:  Marilyn Eugene APRN  Date of admission: 7/10/2023    Subjective   Subjective     HPI:  No events overnight.  Pain well controlled this morning.  Denies chest pain or shortness of breath    Review of Systems   See HPI    Objective   Objective     Vitals:   Temp:  [97.9 °F (36.6 °C)-98.8 °F (37.1 °C)] 97.9 °F (36.6 °C)  Heart Rate:  [] 108  Resp:  [16-18] 16  BP: ()/(45-71) 100/54  Physical Exam    General: Alert, no acute distress   Cardiac: Intact peripheral pulse   Nonlabored respiration   Right upper extremity: No areas of drainage.  No cellulitis.  Induration in the anterior arm is stable.  No palpable fluid collections.  No pain with passive elbow range of motion.  Tolerates gentle glenohumeral joint motion.  Mild swelling extends to the hand.  Neurovascular intact.  Compartments soft.    Result Review      WBC   Date Value Ref Range Status   2023 20.87 (H) 3.40 - 10.80 10*3/mm3 Final     RBC   Date Value Ref Range Status   2023 3.20 (L) 4.14 - 5.80 10*6/mm3 Final     Hemoglobin   Date Value Ref Range Status   2023 10.2 (L) 13.0 - 17.7 g/dL Final     Hematocrit   Date Value Ref Range Status   2023 30.7 (L) 37.5 - 51.0 % Final     MCV   Date Value Ref Range Status   2023 95.9 79.0 - 97.0 fL Final     MCH   Date Value Ref Range Status   2023 31.9 26.6 - 33.0 pg Final     MCHC   Date Value Ref Range Status   2023 33.2 31.5 - 35.7 g/dL Final     RDW   Date Value Ref Range Status   2023 14.0 12.3 - 15.4 % Final     RDW-SD   Date Value Ref Range Status   2023 48.9 37.0 - 54.0 fl Final     MPV   Date Value Ref Range Status   2023 8.7 6.0 - 12.0 fL Final     Platelets   Date Value Ref Range Status   2023 840 (H) 140 - 450 10*3/mm3 Final     Neutrophil %   Date Value Ref Range Status   2023 76.8 (H) 42.7 - 76.0 %  Final     Lymphocyte %   Date Value Ref Range Status   07/21/2023 10.5 (L) 19.6 - 45.3 % Final     Monocyte %   Date Value Ref Range Status   07/21/2023 8.5 5.0 - 12.0 % Final     Eosinophil %   Date Value Ref Range Status   07/21/2023 1.1 0.3 - 6.2 % Final     Basophil %   Date Value Ref Range Status   07/21/2023 0.6 0.0 - 1.5 % Final     Immature Grans %   Date Value Ref Range Status   07/21/2023 2.5 (H) 0.0 - 0.5 % Final     Neutrophils, Absolute   Date Value Ref Range Status   07/21/2023 16.02 (H) 1.70 - 7.00 10*3/mm3 Final     Lymphocytes, Absolute   Date Value Ref Range Status   07/21/2023 2.20 0.70 - 3.10 10*3/mm3 Final     Monocytes, Absolute   Date Value Ref Range Status   07/21/2023 1.78 (H) 0.10 - 0.90 10*3/mm3 Final     Eosinophils, Absolute   Date Value Ref Range Status   07/21/2023 0.23 0.00 - 0.40 10*3/mm3 Final     Basophils, Absolute   Date Value Ref Range Status   07/21/2023 0.12 0.00 - 0.20 10*3/mm3 Final     Immature Grans, Absolute   Date Value Ref Range Status   07/21/2023 0.52 (H) 0.00 - 0.05 10*3/mm3 Final     nRBC   Date Value Ref Range Status   07/21/2023 0.0 0.0 - 0.2 /100 WBC Final        Result Review:  I have personally reviewed the results from the time of this admission to 7/21/2023 07:26 EDT and agree with these findings:  [x]  Laboratory  []  Microbiology  [x]  Radiology  []  EKG/Telemetry   []  Cardiology/Vascular   []  Pathology  []  Old records  []  Other:      Assessment & Plan   Assessment / Plan     Brief Patient Summary:  Yfn Ray is a 52 y.o. male with right shoulder septic arthritis and an anterior abscess in the deltopectoral interval extending down the bicep status post arthroscopic I&D on 7/14.  Multiple drains in place.  Blood cultures growing MRSA.  Right shoulder aspirate from 7/13 growing MRSA.  Drains removed 7/17.  Active Hospital Problems:  Active Hospital Problems    Diagnosis    • **Sepsis    • Acute HFrEF (heart failure with reduced ejection fraction)    •  Chronic HFrEF (heart failure with reduced ejection fraction)    • Shoulder injury, right, initial encounter      Plan:   CRP stable at 3.94  Afebrile for 72 hours  Hemoglobin 10.2- monitor  White blood cell count stable at 20.87  Continue IV antibiotics  Light activity as tolerated with right upper extremity  Ice/elevate to help with swelling  Sling as needed  Daily dressing changes reinforce as needed  DVT prophylaxis: Subcutaneous heparin  Further care per primary team, appreciate assistance    Dispo: Afebrile for 72 hours.  May begin discharge planning.  1 week follow-up for reevaluation with new labs.         DVT prophylaxis:  Medical and mechanical DVT prophylaxis orders are present.    CODE STATUS:   Code Status (Patient has no pulse and is not breathing): CPR (Attempt to Resuscitate)  Medical Interventions (Patient has pulse or is breathing): Full Support    Electronically signed by Orville Gallegos MD, 07/21/23, 7:29 AM EDT.

## 2023-07-21 NOTE — DISCHARGE INSTRUCTIONS
Orthopedic instructions: Perform daily dry dressing changes to the right upper extremity incisions.  You may shower.  Do not soak or scrub the area.  Allow to air dry completely and apply a new dressing each day.  Do not apply any topical lotions or creams.  You may use the arm for light activities as tolerated.  Use your sling as needed.  Ice and elevate to help with pain and swelling.  Monitor for increasing redness, drainage, swelling, fevers, chills.  Take your antibiotics as prescribed.  1 week orthopedic follow-up for reevaluation.      Have BMP, CBC and CRP checked by PCP in 3 days.  
negative...

## 2023-07-22 LAB
ALBUMIN SERPL-MCNC: 2.6 G/DL (ref 3.5–5.2)
ALBUMIN/GLOB SERPL: 0.6 G/DL
ALP SERPL-CCNC: 304 U/L (ref 39–117)
ALT SERPL W P-5'-P-CCNC: 68 U/L (ref 1–41)
ANION GAP SERPL CALCULATED.3IONS-SCNC: 9.4 MMOL/L (ref 5–15)
AST SERPL-CCNC: 68 U/L (ref 1–40)
BASOPHILS # BLD AUTO: 0.16 10*3/MM3 (ref 0–0.2)
BASOPHILS NFR BLD AUTO: 0.9 % (ref 0–1.5)
BILIRUB SERPL-MCNC: 0.2 MG/DL (ref 0–1.2)
BUN SERPL-MCNC: 20 MG/DL (ref 6–20)
BUN/CREAT SERPL: 23.5 (ref 7–25)
CALCIUM SPEC-SCNC: 8.4 MG/DL (ref 8.6–10.5)
CHLORIDE SERPL-SCNC: 101 MMOL/L (ref 98–107)
CO2 SERPL-SCNC: 22.6 MMOL/L (ref 22–29)
CREAT SERPL-MCNC: 0.85 MG/DL (ref 0.76–1.27)
CRP SERPL-MCNC: 4 MG/DL (ref 0–0.5)
DEPRECATED RDW RBC AUTO: 49.8 FL (ref 37–54)
EGFRCR SERPLBLD CKD-EPI 2021: 104.6 ML/MIN/1.73
EOSINOPHIL # BLD AUTO: 0.27 10*3/MM3 (ref 0–0.4)
EOSINOPHIL NFR BLD AUTO: 1.4 % (ref 0.3–6.2)
ERYTHROCYTE [DISTWIDTH] IN BLOOD BY AUTOMATED COUNT: 14.1 % (ref 12.3–15.4)
GLOBULIN UR ELPH-MCNC: 4.5 GM/DL
GLUCOSE SERPL-MCNC: 140 MG/DL (ref 65–99)
HCT VFR BLD AUTO: 32 % (ref 37.5–51)
HGB BLD-MCNC: 10.4 G/DL (ref 13–17.7)
IMM GRANULOCYTES # BLD AUTO: 0.3 10*3/MM3 (ref 0–0.05)
IMM GRANULOCYTES NFR BLD AUTO: 1.6 % (ref 0–0.5)
LYMPHOCYTES # BLD AUTO: 2.11 10*3/MM3 (ref 0.7–3.1)
LYMPHOCYTES NFR BLD AUTO: 11.2 % (ref 19.6–45.3)
MCH RBC QN AUTO: 31.6 PG (ref 26.6–33)
MCHC RBC AUTO-ENTMCNC: 32.5 G/DL (ref 31.5–35.7)
MCV RBC AUTO: 97.3 FL (ref 79–97)
MONOCYTES # BLD AUTO: 1.4 10*3/MM3 (ref 0.1–0.9)
MONOCYTES NFR BLD AUTO: 7.4 % (ref 5–12)
NEUTROPHILS NFR BLD AUTO: 14.57 10*3/MM3 (ref 1.7–7)
NEUTROPHILS NFR BLD AUTO: 77.5 % (ref 42.7–76)
NRBC BLD AUTO-RTO: 0 /100 WBC (ref 0–0.2)
PLATELET # BLD AUTO: 869 10*3/MM3 (ref 140–450)
PMV BLD AUTO: 8.7 FL (ref 6–12)
POTASSIUM SERPL-SCNC: 4.5 MMOL/L (ref 3.5–5.2)
PROT SERPL-MCNC: 7.1 G/DL (ref 6–8.5)
RBC # BLD AUTO: 3.29 10*6/MM3 (ref 4.14–5.8)
SODIUM SERPL-SCNC: 133 MMOL/L (ref 136–145)
VANCOMYCIN SERPL-MCNC: 23.67 MCG/ML (ref 5–40)
WBC NRBC COR # BLD: 18.81 10*3/MM3 (ref 3.4–10.8)

## 2023-07-22 PROCEDURE — 99024 POSTOP FOLLOW-UP VISIT: CPT | Performed by: STUDENT IN AN ORGANIZED HEALTH CARE EDUCATION/TRAINING PROGRAM

## 2023-07-22 PROCEDURE — 80053 COMPREHEN METABOLIC PANEL: CPT | Performed by: STUDENT IN AN ORGANIZED HEALTH CARE EDUCATION/TRAINING PROGRAM

## 2023-07-22 PROCEDURE — 25010000002 HEPARIN (PORCINE) PER 1000 UNITS: Performed by: STUDENT IN AN ORGANIZED HEALTH CARE EDUCATION/TRAINING PROGRAM

## 2023-07-22 PROCEDURE — 80202 ASSAY OF VANCOMYCIN: CPT | Performed by: INTERNAL MEDICINE

## 2023-07-22 PROCEDURE — 86140 C-REACTIVE PROTEIN: CPT | Performed by: STUDENT IN AN ORGANIZED HEALTH CARE EDUCATION/TRAINING PROGRAM

## 2023-07-22 PROCEDURE — 85025 COMPLETE CBC W/AUTO DIFF WBC: CPT | Performed by: INTERNAL MEDICINE

## 2023-07-22 PROCEDURE — 99232 SBSQ HOSP IP/OBS MODERATE 35: CPT | Performed by: INTERNAL MEDICINE

## 2023-07-22 PROCEDURE — 25010000002 VANCOMYCIN 5 G RECONSTITUTED SOLUTION: Performed by: INTERNAL MEDICINE

## 2023-07-22 RX ADMIN — SACUBITRIL AND VALSARTAN 1 TABLET: 24; 26 TABLET, FILM COATED ORAL at 22:18

## 2023-07-22 RX ADMIN — OXYCODONE AND ACETAMINOPHEN 1 TABLET: 5; 325 TABLET ORAL at 22:17

## 2023-07-22 RX ADMIN — CARVEDILOL 3.12 MG: 3.12 TABLET, FILM COATED ORAL at 09:39

## 2023-07-22 RX ADMIN — CARVEDILOL 3.12 MG: 3.12 TABLET, FILM COATED ORAL at 22:29

## 2023-07-22 RX ADMIN — Medication 10 ML: at 22:18

## 2023-07-22 RX ADMIN — EMPAGLIFLOZIN 10 MG: 10 TABLET, FILM COATED ORAL at 09:39

## 2023-07-22 RX ADMIN — Medication 10 ML: at 22:19

## 2023-07-22 RX ADMIN — HEPARIN SODIUM 5000 UNITS: 5000 INJECTION INTRAVENOUS; SUBCUTANEOUS at 16:19

## 2023-07-22 RX ADMIN — Medication 10 ML: at 09:40

## 2023-07-22 RX ADMIN — VANCOMYCIN HYDROCHLORIDE 1750 MG: 5 INJECTION, POWDER, LYOPHILIZED, FOR SOLUTION INTRAVENOUS at 05:58

## 2023-07-22 RX ADMIN — OXYCODONE AND ACETAMINOPHEN 1 TABLET: 5; 325 TABLET ORAL at 13:38

## 2023-07-22 RX ADMIN — HEPARIN SODIUM 5000 UNITS: 5000 INJECTION INTRAVENOUS; SUBCUTANEOUS at 22:17

## 2023-07-22 RX ADMIN — HEPARIN SODIUM 5000 UNITS: 5000 INJECTION INTRAVENOUS; SUBCUTANEOUS at 05:59

## 2023-07-22 RX ADMIN — MULTIPLE VITAMINS W/ MINERALS TAB 1 TABLET: TAB at 09:39

## 2023-07-22 RX ADMIN — SENNOSIDES AND DOCUSATE SODIUM 2 TABLET: 50; 8.6 TABLET ORAL at 09:39

## 2023-07-22 RX ADMIN — SACUBITRIL AND VALSARTAN 1 TABLET: 24; 26 TABLET, FILM COATED ORAL at 09:39

## 2023-07-22 NOTE — PROGRESS NOTES
[facility]   Hospitalist Progress Note  Date: 2023  Patient Name: Yfn Ray  : 1970  MRN: [id number]  Date of admission: 7/10/2023      Subjective   Subjective     Chief Complaint: right arm infection     Summary:  52 y.o. male no past medical history presents to the emergency department for evaluation of right upper extremity pain.  Patient states he fell off a truck 5 days ago and was seen initially and discharged outpatient with outpatient follow-up.  States that yesterday he noted his arm swelling and turning red which got progressively worse prompting him to seek further medical attention.  He denies any fevers, chills, sweats, nausea, vomiting, chest pain, shortness of breath, palpitations, abdominal pain diarrhea constipation or dysuria, weakness.  In the emergency department felt to have cellulitis and started on vancomycin and Zosyn and will be admitted for ongoing monitoring management.   Patient evaluated by orthopedic surgery and cardiology.Patient is status post washout of his right shoulder on  with MRSA from the infected shoulder.  Cardiology signed off with recommendation for left heart cath in next couple of weeks once infection has cleared to evaluate for new systolic CHF.    Interval Followup:   Discharge home  canceled on  as patient noticed to have increased drainage from right shoulder.  Afebrile.  Still at times feel like having feverish with chills and gets exhausted afterward these episodes last for a short time.  Rash much better per patient he gets it with pain medications and not due to antibiotics    Patient's white blood cell count is improving  Patient had CT scanning of his arm on .  No definitive abscess.  At this time orthopedics is holding off on further surgery.  Denies any shortness of air.  Has been ambulating  Positive BM    Review of Systems   All systems were reviewed and negative except for: right shoulder pain and rash on back      Objective   Objective     Vitals:   Temp:  [97.8 °F (36.6 °C)-99.3 °F (37.4 °C)] 97.8 °F (36.6 °C)  Heart Rate:  [] 80  Resp:  [18] 18  BP: ()/(54-91) 96/65  Physical Exam    Constitutional: Resting in bed. Wife at the bedside    Eyes: Pupils equal, sclerae anicteric, no conjunctival injection   HENT: NCAT, mucous membranes moist   Neck: Supple, no thyromegaly, no lymphadenopathy, trachea midline   Respiratory: Clear to auscultation bilaterally, nonlabored respirations    Cardiovascular: RRR, no murmurs, rubs, or gallops, palpable pedal pulses bilaterally   Gastrointestinal: Positive bowel sounds, soft, nontender, nondistended   Musculoskeletal: Full range of motion except for right shoulder which he is unable to move. Dressings in place. Drain removed.  Remain swollen right upper extremity with redness proximally.  Wound dressed   Psychiatric: Appropriate affect, cooperative   Neurologic: Oriented x 3, strength symmetric in all extremities, Cranial Nerves grossly intact to confrontation, speech clear   Skin: erythema all over the back  Result Review    Result Review:  I have personally reviewed the results from the time of this admission to 7/22/2023 17:18 EDT and agree with these findings:  [x]  Laboratory  BMP          7/18/2023    05:18 7/21/2023    06:05 7/22/2023    04:48   BMP   BUN 14  16  20    Creatinine 0.59  0.75  0.85    Sodium 132  131  133    Potassium 4.4  4.8  4.5    Chloride 101  99  101    CO2 21.8  24.6  22.6    Calcium 8.1  8.3  8.4      CBC          7/20/2023    04:27 7/21/2023    06:05 7/22/2023    04:48   CBC   WBC 20.69  20.87  18.81    RBC 3.53  3.20  3.29    Hemoglobin 11.1  10.2  10.4    Hematocrit 33.4  30.7  32.0    MCV 94.6  95.9  97.3    MCH 31.4  31.9  31.6    MCHC 33.2  33.2  32.5    RDW 13.9  14.0  14.1    Platelets 869  840  869    [x]  Microbiology  Blood Culture   Date Value Ref Range Status   07/10/2023 Staphylococcus aureus, MRSA (C)  Preliminary     Comment:        Infectious disease consultation is highly recommended to rule out distant foci of infection.  Methicillin resistant Staphylococcus aureus, Patient may be an isolation risk.     BCID, PCR   Date Value Ref Range Status   07/10/2023 (C) Negative by BCID PCR. Culture to Follow. Final    Staph aureus. mecA/C and MREJ (methicillin resistance gene) detected. Identification by BCID2 PCR.     No results found for: CULTURES, HSVCX, URCX  No results found for: EYECULTURE, GCCX, HSVCULTURE, LABHSV  No results found for: LEGIONELLA, MRSACX, MUMPSCX, MYCOPLASCX  No results found for: NOCARDIACX, STOOLCX  No results found for: THROATCX, UNSTIMCULT, URINECX, CULTURE, VZVCULTUR  No results found for: VIRALCULTU, WOUNDCX    []  Radiology  []  EKG/Telemetry   []  Cardiology/Vascular   []  Pathology  []  Old records  []  Other:    Assessment & Plan   Assessment / Plan     Assessment/Plan:  MRSA bacteremia.  Subsequent blood culture negative since July 16  Right-sided rotator cuff tear  Septic right shoulder joint due to MRSA.  S/p right shoulder washout July 14  MRSA cellulitis of right shoulder  Traumatic injury of right shoulder, concern for rotator cuff tear  Systolic congestive heart failure with an EF of 20%, new diagnosis.  Stable    PLAN  -- Continue patient on IV vancomycin.   -PICC line placed for 4 weeks of IV antibiotics  -- Previous hospitalist discussed plan with infectious disease.  Reviewed CT imaging of right arm  --Repeat cultures are showing no growth currently since July 16.  --Echo is showing significant systolic heart failure.  This is a new diagnosis for the patient.  Cardiology consulted.  Patient has been started on Coreg, Jardiance and Entresto.  Not able to add more medications due to soft blood pressure.  Patient will need outpatient heart catheterization once acute issues resolved.  -- MRI of right shoulder reviewed  --ortho consulted and patient is s/p washout of shoulder creatinine CPR  improving  -White cell count improving.  Trend CBC.  -Bowel regimen  -Pain control with IV and oral narcotics  Discussed plan with RN and .  Home health with IV antibiotics when  cleared by Ortho.    DVT prophylaxis:  Medical and mechanical DVT prophylaxis orders are present.    CODE STATUS:   Code Status (Patient has no pulse and is not breathing): CPR (Attempt to Resuscitate)  Medical Interventions (Patient has pulse or is breathing): Full Support        Electronically signed by Jae Andrews MD, 07/22/23, 5:20 PM EDT.

## 2023-07-22 NOTE — PLAN OF CARE
Goal Outcome Evaluation:      Pain well controlled with prn medications. Dressing changed once this afternoon, incision had moderate amount of serosanguinous drainage with small amount of purulent draining. No significant redness noted. VSS

## 2023-07-22 NOTE — PROGRESS NOTES
[facility]     Progress Note    Patient Name: Yfn Rya  : 1970  MRN: [id number]  Primary Care Physician:  Marilyn Eugene APRN  Date of admission: 7/10/2023    Subjective   Subjective     HPI:  Patient reports increasing swelling near the anterior drain site last night.  He then had new drainage expressed from this area that was initially tan and is changed to a yellow/clear consistency.  His pain has improved since the drainage began.  No fevers overnight.  Denies chest pain or shortness of breath.    Review of Systems   See HPI    Objective   Objective     Vitals:   Temp:  [97.2 °F (36.2 °C)-99.3 °F (37.4 °C)] 99.3 °F (37.4 °C)  Heart Rate:  [] 108  Resp:  [16-18] 18  BP: (110-133)/(54-94) 127/90  Physical Exam    General: Alert, no acute distress   Cardiac: Intact peripheral pulse   Nonlabored respiration   Right upper extremity: Clear/yellow appearing drainage from the anterior drain site.  No cellulitis.  No palpable abscess.  Induration down the anterior arm is stable.  No pain with passive elbow range of motion.  Tolerates gentle glenohumeral joint motion.  Mild swelling extends to the hand.  Neurovascular intact.  Compartments soft.    Result Review      WBC   Date Value Ref Range Status   2023 18.81 (H) 3.40 - 10.80 10*3/mm3 Final     RBC   Date Value Ref Range Status   2023 3.29 (L) 4.14 - 5.80 10*6/mm3 Final     Hemoglobin   Date Value Ref Range Status   2023 10.4 (L) 13.0 - 17.7 g/dL Final     Hematocrit   Date Value Ref Range Status   2023 32.0 (L) 37.5 - 51.0 % Final     MCV   Date Value Ref Range Status   2023 97.3 (H) 79.0 - 97.0 fL Final     MCH   Date Value Ref Range Status   2023 31.6 26.6 - 33.0 pg Final     MCHC   Date Value Ref Range Status   2023 32.5 31.5 - 35.7 g/dL Final     RDW   Date Value Ref Range Status   2023 14.1 12.3 - 15.4 % Final     RDW-SD   Date Value Ref Range Status   2023 49.8 37.0 - 54.0 fl Final      MPV   Date Value Ref Range Status   07/22/2023 8.7 6.0 - 12.0 fL Final     Platelets   Date Value Ref Range Status   07/22/2023 869 (H) 140 - 450 10*3/mm3 Final     Neutrophil %   Date Value Ref Range Status   07/22/2023 77.5 (H) 42.7 - 76.0 % Final     Lymphocyte %   Date Value Ref Range Status   07/22/2023 11.2 (L) 19.6 - 45.3 % Final     Monocyte %   Date Value Ref Range Status   07/22/2023 7.4 5.0 - 12.0 % Final     Eosinophil %   Date Value Ref Range Status   07/22/2023 1.4 0.3 - 6.2 % Final     Basophil %   Date Value Ref Range Status   07/22/2023 0.9 0.0 - 1.5 % Final     Immature Grans %   Date Value Ref Range Status   07/22/2023 1.6 (H) 0.0 - 0.5 % Final     Neutrophils, Absolute   Date Value Ref Range Status   07/22/2023 14.57 (H) 1.70 - 7.00 10*3/mm3 Final     Lymphocytes, Absolute   Date Value Ref Range Status   07/22/2023 2.11 0.70 - 3.10 10*3/mm3 Final     Monocytes, Absolute   Date Value Ref Range Status   07/22/2023 1.40 (H) 0.10 - 0.90 10*3/mm3 Final     Eosinophils, Absolute   Date Value Ref Range Status   07/22/2023 0.27 0.00 - 0.40 10*3/mm3 Final     Basophils, Absolute   Date Value Ref Range Status   07/22/2023 0.16 0.00 - 0.20 10*3/mm3 Final     Immature Grans, Absolute   Date Value Ref Range Status   07/22/2023 0.30 (H) 0.00 - 0.05 10*3/mm3 Final     nRBC   Date Value Ref Range Status   07/22/2023 0.0 0.0 - 0.2 /100 WBC Final        Result Review:  I have personally reviewed the results from the time of this admission to 7/22/2023 08:18 EDT and agree with these findings:  [x]  Laboratory  []  Microbiology  [x]  Radiology  []  EKG/Telemetry   []  Cardiology/Vascular   []  Pathology  []  Old records  []  Other:      Assessment & Plan   Assessment / Plan     Brief Patient Summary:  Yfn Ray is a 52 y.o. male with right shoulder septic arthritis and an anterior abscess in the deltopectoral interval extending down the bicep status post arthroscopic I&D on 7/14.  Multiple drains in place.   Blood cultures growing MRSA.  Right shoulder aspirate from 7/13 growing MRSA.  Drains removed 7/17.  Active Hospital Problems:  Active Hospital Problems    Diagnosis     Staphylococcal arthritis of right shoulder     Chronic HFrEF (heart failure with reduced ejection fraction)      Plan:   Monitor new drainage  Reinforce with dry dressings as needed  White blood cell count down to 18.81 from 20.87-continue to monitor  New labs in the morning  Remains afebrile  Continue IV antibiotics  Light activity as tolerated with right upper extremity  Ice/elevate to help with swelling  Sling as needed  DVT prophylaxis: Subcutaneous heparin  Further care per primary team, appreciate assistance    Dispo: Hold discharge as we continue to monitor    DVT prophylaxis:  Medical and mechanical DVT prophylaxis orders are present.    CODE STATUS:   Code Status (Patient has no pulse and is not breathing): CPR (Attempt to Resuscitate)  Medical Interventions (Patient has pulse or is breathing): Full Support    Electronically signed by Orville Gallegos MD, 07/22/23, 8:21 AM EDT.

## 2023-07-22 NOTE — PROGRESS NOTES
"[facility] Clinical Pharmacy Services: Vancomycin Monitoring Note    Yfn Ray is a 52 y.o. male who is on day  of pharmacy to dose vancomycin for Sepsis.    Previous Vancomycin Dose:   1750 mg IV every  12  hours  Imaging Reviewed?: N/A  Updated Cultures and Sensitivities:   23: BLOOD CX, RIGHT ARM: NGD5  23: BLOOD CX, RIGHT HAND: NGD5  23: ANAEROBID WOUND CX, RIGHT SHOULDER: NO ANAEROBES DAY 5  23: WOUND CX, RIGHT SHOULDER: MRSA  23: ANAEROBIC CX, RIGHT SHOULDER WOUND: NGD5  23: WOUND CX, RIGHT SHOULDER: MRSA  23: FLUID CX, RIGHT SHOULDER FLUID: MRSA  23: BLOOD CX, LEFT ARM: MRSA  7-10-23: BLOOD CX, RIGHT HAND: NGD5    Vitals/Labs  Ht: 180.3 cm (70.98\"); Wt: 86 kg (189 lb 9.5 oz)     Temp (24hrs), Av.7 °F (37.1 °C), Min:97.8 °F (36.6 °C), Max:99.3 °F (37.4 °C)    Estimated Creatinine Clearance: 123.7 mL/min (by C-G formula based on SCr of 0.85 mg/dL).       Results from last 7 days   Lab Units 23  0448 23  0605 23  0427 23  1715 23  0609 23  0518   VANCOMYCIN RM mcg/mL 23.67  --   --   --   --   --    VANCOMYCIN TR mcg/mL  --   --   --  17.60  --  14.79   CREATININE mg/dL 0.85 0.75*  --   --   --  0.59*   WBC 10*3/mm3 18.81* 20.87* 20.69*  --    < > 22.98*    < > = values in this interval not displayed.     Assessment/Plan  Vancomycin level this AM reported as 23.67.  Dose adjusted to 1250mg q12 hours starting at 0000 per [employer] recommendation.    Current Vancomycin Dose:  1250 mg IV every 12 hours; which provides the following predicted parameters:    Regimen: 1250 mg IV every 12 hours.  Start time: 17:58 on 2023  Exposure target: AUC24 (range)400-600 mg/L.hr   AUC24,ss: 514 mg/L.hr  PAUC*: 97 %  Ctrough,ss: 15.3 mg/L  Pconc*: 1 %  Tox.: 11 %    We will continue to monitor patient changes and renal function     Thank you for involving pharmacy in this patient's care. Please contact pharmacy with any questions or " concerns.    David Ferrara  Clinical Pharmacist

## 2023-07-22 NOTE — NURSING NOTE
"When discharging patient, providing education to spouse on wound care. Removed dressing and observed worsened redness and swelling just distal to the incision site. Arm is warm to touch and patient reports more pain in arm since this morning. Dr. Gallegos and Dr. Nelson were notified of findings and discharge was discontinued until further evaluation. Patients wife expressed that she \"wanted to stay another night anyway to prevent having to go to the ER.\" Selina Patel RN    "

## 2023-07-23 LAB
BASOPHILS # BLD AUTO: 0.12 10*3/MM3 (ref 0–0.2)
BASOPHILS NFR BLD AUTO: 0.8 % (ref 0–1.5)
CRP SERPL-MCNC: 2.42 MG/DL (ref 0–0.5)
DEPRECATED RDW RBC AUTO: 50.2 FL (ref 37–54)
EOSINOPHIL # BLD AUTO: 0.27 10*3/MM3 (ref 0–0.4)
EOSINOPHIL NFR BLD AUTO: 1.9 % (ref 0.3–6.2)
ERYTHROCYTE [DISTWIDTH] IN BLOOD BY AUTOMATED COUNT: 14 % (ref 12.3–15.4)
HCT VFR BLD AUTO: 31.2 % (ref 37.5–51)
HGB BLD-MCNC: 10 G/DL (ref 13–17.7)
IMM GRANULOCYTES # BLD AUTO: 0.25 10*3/MM3 (ref 0–0.05)
IMM GRANULOCYTES NFR BLD AUTO: 1.8 % (ref 0–0.5)
LYMPHOCYTES # BLD AUTO: 2.54 10*3/MM3 (ref 0.7–3.1)
LYMPHOCYTES NFR BLD AUTO: 17.9 % (ref 19.6–45.3)
MCH RBC QN AUTO: 31.3 PG (ref 26.6–33)
MCHC RBC AUTO-ENTMCNC: 32.1 G/DL (ref 31.5–35.7)
MCV RBC AUTO: 97.5 FL (ref 79–97)
MONOCYTES # BLD AUTO: 1.49 10*3/MM3 (ref 0.1–0.9)
MONOCYTES NFR BLD AUTO: 10.5 % (ref 5–12)
NEUTROPHILS NFR BLD AUTO: 67.1 % (ref 42.7–76)
NEUTROPHILS NFR BLD AUTO: 9.51 10*3/MM3 (ref 1.7–7)
NRBC BLD AUTO-RTO: 0 /100 WBC (ref 0–0.2)
PLATELET # BLD AUTO: 824 10*3/MM3 (ref 140–450)
PMV BLD AUTO: 8.7 FL (ref 6–12)
RBC # BLD AUTO: 3.2 10*6/MM3 (ref 4.14–5.8)
VANCOMYCIN TROUGH SERPL-MCNC: 14.23 MCG/ML (ref 5–20)
WBC NRBC COR # BLD: 14.18 10*3/MM3 (ref 3.4–10.8)

## 2023-07-23 PROCEDURE — 25010000002 VANCOMYCIN 5 G RECONSTITUTED SOLUTION: Performed by: INTERNAL MEDICINE

## 2023-07-23 PROCEDURE — 86140 C-REACTIVE PROTEIN: CPT | Performed by: STUDENT IN AN ORGANIZED HEALTH CARE EDUCATION/TRAINING PROGRAM

## 2023-07-23 PROCEDURE — 25010000002 HEPARIN (PORCINE) PER 1000 UNITS: Performed by: STUDENT IN AN ORGANIZED HEALTH CARE EDUCATION/TRAINING PROGRAM

## 2023-07-23 PROCEDURE — 99232 SBSQ HOSP IP/OBS MODERATE 35: CPT | Performed by: INTERNAL MEDICINE

## 2023-07-23 PROCEDURE — 25010000002 HYDROMORPHONE 1 MG/ML SOLUTION: Performed by: STUDENT IN AN ORGANIZED HEALTH CARE EDUCATION/TRAINING PROGRAM

## 2023-07-23 PROCEDURE — 80202 ASSAY OF VANCOMYCIN: CPT | Performed by: INTERNAL MEDICINE

## 2023-07-23 PROCEDURE — 85025 COMPLETE CBC W/AUTO DIFF WBC: CPT | Performed by: INTERNAL MEDICINE

## 2023-07-23 RX ADMIN — EMPAGLIFLOZIN 10 MG: 10 TABLET, FILM COATED ORAL at 09:00

## 2023-07-23 RX ADMIN — HYDROMORPHONE HYDROCHLORIDE 0.5 MG: 1 INJECTION, SOLUTION INTRAMUSCULAR; INTRAVENOUS; SUBCUTANEOUS at 21:30

## 2023-07-23 RX ADMIN — CARVEDILOL 3.12 MG: 3.12 TABLET, FILM COATED ORAL at 09:00

## 2023-07-23 RX ADMIN — Medication 10 ML: at 09:00

## 2023-07-23 RX ADMIN — OXYCODONE AND ACETAMINOPHEN 1 TABLET: 5; 325 TABLET ORAL at 05:42

## 2023-07-23 RX ADMIN — CARVEDILOL 3.12 MG: 3.12 TABLET, FILM COATED ORAL at 21:30

## 2023-07-23 RX ADMIN — HEPARIN SODIUM 5000 UNITS: 5000 INJECTION INTRAVENOUS; SUBCUTANEOUS at 05:42

## 2023-07-23 RX ADMIN — HEPARIN SODIUM 5000 UNITS: 5000 INJECTION INTRAVENOUS; SUBCUTANEOUS at 21:30

## 2023-07-23 RX ADMIN — OXYCODONE AND ACETAMINOPHEN 1 TABLET: 5; 325 TABLET ORAL at 09:51

## 2023-07-23 RX ADMIN — VANCOMYCIN HYDROCHLORIDE 1250 MG: 5 INJECTION, POWDER, LYOPHILIZED, FOR SOLUTION INTRAVENOUS at 00:48

## 2023-07-23 RX ADMIN — SACUBITRIL AND VALSARTAN 1 TABLET: 24; 26 TABLET, FILM COATED ORAL at 21:30

## 2023-07-23 RX ADMIN — VANCOMYCIN HYDROCHLORIDE 1250 MG: 5 INJECTION, POWDER, LYOPHILIZED, FOR SOLUTION INTRAVENOUS at 12:14

## 2023-07-23 RX ADMIN — MULTIPLE VITAMINS W/ MINERALS TAB 1 TABLET: TAB at 09:00

## 2023-07-23 RX ADMIN — SACUBITRIL AND VALSARTAN 1 TABLET: 24; 26 TABLET, FILM COATED ORAL at 09:00

## 2023-07-23 RX ADMIN — HEPARIN SODIUM 5000 UNITS: 5000 INJECTION INTRAVENOUS; SUBCUTANEOUS at 13:39

## 2023-07-23 RX ADMIN — Medication 10 ML: at 21:30

## 2023-07-23 RX ADMIN — VANCOMYCIN HYDROCHLORIDE 1250 MG: 5 INJECTION, POWDER, LYOPHILIZED, FOR SOLUTION INTRAVENOUS at 23:42

## 2023-07-23 NOTE — PROGRESS NOTES
"[facility] Clinical Pharmacy Services: Vancomycin Monitoring Note    Yfn Ray is a 52 y.o. male who is on day 8 (first negative cx-) of pharmacy to dose vancomycin for bacteremia,bone and joint infection.    Previous Vancomycin Dose:   1750 mg IV every  12  hours    Updated Cultures and Sensitivities:   : BLOOD CX-NGTD  : WOUND CX RIGHT SHOULDER- MRSA  : WOUND CX RIGHT SHOULDER- MRSA  : FLUID CX RIGHT SHOULDER FLUID- MRSA  : BLOOD CX / MRSA      Vitals/Labs  Ht: 180.3 cm (70.98\"); Wt: 86 kg (189 lb 9.5 oz)     Temp (24hrs), Av.2 °F (36.8 °C), Min:97.8 °F (36.6 °C), Max:98.7 °F (37.1 °C)    Estimated Creatinine Clearance: 123.7 mL/min (by C-G formula based on SCr of 0.85 mg/dL).       Results from last 7 days   Lab Units 23  1056 23  0546 23  0448 23  0605 23  0427 23  1715 23  0609 23  0518   VANCOMYCIN RM mcg/mL  --   --  23.67  --   --   --   --   --    VANCOMYCIN TR mcg/mL 14.23  --   --   --   --  17.60  --  14.79   CREATININE mg/dL  --   --  0.85 0.75*  --   --   --  0.59*   WBC 10*3/mm3  --  14.18* 18.81* 20.87*   < >  --    < > 22.98*    < > = values in this interval not displayed.     Assessment/Plan      Current Vancomycin Dose:  1250 mg IV every 12 hours; which provides the following predicted parameters:    Exposure target: AUC24 (range)400-600 mg/L.hr   AUC24,ss: 506 mg/L.hr  PAUC*: 96 %  Ctrough,ss: 14.9 mg/L  Pconc*: 0 %  Tox.: 10 %     Continue current regimen for now.    Thank you for involving pharmacy in this patient's care. Please contact pharmacy with any questions or concerns.    Clarita Castellanos  Clinical Pharmacist      "

## 2023-07-23 NOTE — PROGRESS NOTES
[facility]   Hospitalist Progress Note  Date: 2023  Patient Name: Yfn Ray  : 1970  MRN: [id number]  Date of admission: 7/10/2023      Subjective   Subjective     Chief Complaint: right arm infection     Summary:  52 y.o. male no past medical history presents to the emergency department for evaluation of right upper extremity pain.  Patient states he fell off a truck 5 days ago and was seen initially and discharged outpatient with outpatient follow-up.  States that yesterday he noted his arm swelling and turning red which got progressively worse prompting him to seek further medical attention.  He denies any fevers, chills, sweats, nausea, vomiting, chest pain, shortness of breath, palpitations, abdominal pain diarrhea constipation or dysuria, weakness.  In the emergency department felt to have cellulitis and started on vancomycin and Zosyn and will be admitted for ongoing monitoring management.   Patient evaluated by orthopedic surgery and cardiology.Patient is status post washout of his right shoulder on  with MRSA from the infected shoulder.  Cardiology signed off with recommendation for left heart cath in next couple of weeks once infection has cleared to evaluate for new systolic CHF.    Interval Followup:   Discharge home  canceled on  as patient noticed to have increased drainage from right shoulder.  Drainage has decreased with no more soaking of dressing pad.  Afebrile.  Blood pressure up  Rash much better per patient he gets it with pain medications and not due to antibiotics    Patient's white blood cell count and CRP is improving.  Patient had CT scanning of his arm on .  No definitive abscess.  At this time orthopedics is holding off on further surgery.  Denies any shortness of air.  Has been ambulating  Positive BM    Review of Systems   All systems were reviewed and negative except for: right shoulder pain and rash on back     Objective   Objective      Vitals:   Temp:  [97.9 °F (36.6 °C)-98.7 °F (37.1 °C)] 97.9 °F (36.6 °C)  Heart Rate:  [] 103  Resp:  [16-24] 16  BP: (109-136)/(61-92) 119/76  Physical Exam    Constitutional: Resting in bed. Wife at the bedside    Eyes: Pupils equal, sclerae anicteric, no conjunctival injection   HENT: NCAT, mucous membranes moist   Neck: Supple, no thyromegaly, no lymphadenopathy, trachea midline   Respiratory: Clear to auscultation bilaterally, nonlabored respirations    Cardiovascular: RRR, no murmurs, rubs, or gallops, palpable pedal pulses bilaterally   Gastrointestinal: Positive bowel sounds, soft, nontender, nondistended   Musculoskeletal: Full range of motion except for right shoulder which he is unable to move. Dressings in place. Drain removed.  Remain swollen right upper extremity with redness proximally.  Wound dressed   Psychiatric: Appropriate affect, cooperative   Neurologic: Oriented x 3, strength symmetric in all extremities, Cranial Nerves grossly intact to confrontation, speech clear   Skin: erythema all over the back  Result Review    Result Review:  I have personally reviewed the results from the time of this admission to 7/23/2023 15:58 EDT and agree with these findings:  [x]  Laboratory  BMP          7/18/2023    05:18 7/21/2023    06:05 7/22/2023    04:48   BMP   BUN 14  16  20    Creatinine 0.59  0.75  0.85    Sodium 132  131  133    Potassium 4.4  4.8  4.5    Chloride 101  99  101    CO2 21.8  24.6  22.6    Calcium 8.1  8.3  8.4      CBC          7/21/2023    06:05 7/22/2023    04:48 7/23/2023    05:46   CBC   WBC 20.87  18.81  14.18    RBC 3.20  3.29  3.20    Hemoglobin 10.2  10.4  10.0    Hematocrit 30.7  32.0  31.2    MCV 95.9  97.3  97.5    MCH 31.9  31.6  31.3    MCHC 33.2  32.5  32.1    RDW 14.0  14.1  14.0    Platelets 840  869  824    [x]  Microbiology  Blood Culture   Date Value Ref Range Status   07/10/2023 Staphylococcus aureus, MRSA (C)  Preliminary     Comment:       Infectious  disease consultation is highly recommended to rule out distant foci of infection.  Methicillin resistant Staphylococcus aureus, Patient may be an isolation risk.     BCID, PCR   Date Value Ref Range Status   07/10/2023 (C) Negative by BCID PCR. Culture to Follow. Final    Staph aureus. mecA/C and MREJ (methicillin resistance gene) detected. Identification by BCID2 PCR.     No results found for: CULTURES, HSVCX, URCX  No results found for: EYECULTURE, GCCX, HSVCULTURE, LABHSV  No results found for: LEGIONELLA, MRSACX, MUMPSCX, MYCOPLASCX  No results found for: NOCARDIACX, STOOLCX  No results found for: THROATCX, UNSTIMCULT, URINECX, CULTURE, VZVCULTUR  No results found for: VIRALCULTU, WOUNDCX    []  Radiology  []  EKG/Telemetry   []  Cardiology/Vascular   []  Pathology  []  Old records  []  Other:    Assessment & Plan   Assessment / Plan     Assessment/Plan:  MRSA bacteremia.  Subsequent blood culture negative since July 16  Right-sided rotator cuff tear  Septic right shoulder joint due to MRSA.  S/p right shoulder washout July 14  MRSA cellulitis of right shoulder  Traumatic injury of right shoulder, concern for rotator cuff tear  Systolic congestive heart failure with an EF of 20%, new diagnosis.  Stable    PLAN  -- Continue IV vancomycin.   -PICC line placed for 4 weeks of IV antibiotics until August 16  -- Previous hospitalist discussed plan with infectious disease.  Reviewed CT imaging of right arm  --Repeat cultures are showing no growth currently since July 16.  --Echo is showing significant systolic heart failure.  This is a new diagnosis for the patient.  Cardiology consulted.  Patient has been started on Coreg, Jardiance and Entresto.  Not able to add more medications due to soft blood pressure.  Patient will need outpatient heart catheterization once acute issues resolved.  -- MRI of right shoulder reviewed  --ortho consulted and patient is s/p washout of shoulder creatinine CPR improving  -White cell  count improving.  Trend CBC.  -Bowel regimen  -Pain control with IV and oral narcotics  Discussed plan with RN and .  Home health with IV antibiotics when  cleared by Ortho.    DVT prophylaxis:  Medical and mechanical DVT prophylaxis orders are present.    CODE STATUS:   Code Status (Patient has no pulse and is not breathing): CPR (Attempt to Resuscitate)  Medical Interventions (Patient has pulse or is breathing): Full Support        Electronically signed by Jae Andrews MD, 07/23/23, 4:00 PM EDT.

## 2023-07-24 ENCOUNTER — READMISSION MANAGEMENT (OUTPATIENT)
Dept: CALL CENTER | Facility: HOSPITAL | Age: 53
End: 2023-07-24
Payer: COMMERCIAL

## 2023-07-24 VITALS
WEIGHT: 189.6 LBS | SYSTOLIC BLOOD PRESSURE: 135 MMHG | HEIGHT: 71 IN | OXYGEN SATURATION: 96 % | BODY MASS INDEX: 26.54 KG/M2 | DIASTOLIC BLOOD PRESSURE: 63 MMHG | HEART RATE: 94 BPM | TEMPERATURE: 98.4 F | RESPIRATION RATE: 16 BRPM

## 2023-07-24 LAB
ANION GAP SERPL CALCULATED.3IONS-SCNC: 10 MMOL/L (ref 5–15)
BASOPHILS # BLD AUTO: 0.17 10*3/MM3 (ref 0–0.2)
BASOPHILS NFR BLD AUTO: 1.4 % (ref 0–1.5)
BUN SERPL-MCNC: 13 MG/DL (ref 6–20)
BUN/CREAT SERPL: 18.1 (ref 7–25)
CALCIUM SPEC-SCNC: 8.4 MG/DL (ref 8.6–10.5)
CHLORIDE SERPL-SCNC: 102 MMOL/L (ref 98–107)
CO2 SERPL-SCNC: 25 MMOL/L (ref 22–29)
CREAT SERPL-MCNC: 0.72 MG/DL (ref 0.76–1.27)
DEPRECATED RDW RBC AUTO: 49.7 FL (ref 37–54)
EGFRCR SERPLBLD CKD-EPI 2021: 109.9 ML/MIN/1.73
EOSINOPHIL # BLD AUTO: 0.29 10*3/MM3 (ref 0–0.4)
EOSINOPHIL NFR BLD AUTO: 2.4 % (ref 0.3–6.2)
ERYTHROCYTE [DISTWIDTH] IN BLOOD BY AUTOMATED COUNT: 14 % (ref 12.3–15.4)
GLUCOSE SERPL-MCNC: 136 MG/DL (ref 65–99)
HCT VFR BLD AUTO: 30.5 % (ref 37.5–51)
HGB BLD-MCNC: 9.8 G/DL (ref 13–17.7)
IMM GRANULOCYTES # BLD AUTO: 0.11 10*3/MM3 (ref 0–0.05)
IMM GRANULOCYTES NFR BLD AUTO: 0.9 % (ref 0–0.5)
LYMPHOCYTES # BLD AUTO: 2.4 10*3/MM3 (ref 0.7–3.1)
LYMPHOCYTES NFR BLD AUTO: 20.2 % (ref 19.6–45.3)
MCH RBC QN AUTO: 31.7 PG (ref 26.6–33)
MCHC RBC AUTO-ENTMCNC: 32.1 G/DL (ref 31.5–35.7)
MCV RBC AUTO: 98.7 FL (ref 79–97)
MONOCYTES # BLD AUTO: 1.19 10*3/MM3 (ref 0.1–0.9)
MONOCYTES NFR BLD AUTO: 10 % (ref 5–12)
NEUTROPHILS NFR BLD AUTO: 65.1 % (ref 42.7–76)
NEUTROPHILS NFR BLD AUTO: 7.73 10*3/MM3 (ref 1.7–7)
NRBC BLD AUTO-RTO: 0 /100 WBC (ref 0–0.2)
NT-PROBNP SERPL-MCNC: 786.8 PG/ML (ref 0–900)
PLATELET # BLD AUTO: 780 10*3/MM3 (ref 140–450)
PMV BLD AUTO: 8.4 FL (ref 6–12)
POTASSIUM SERPL-SCNC: 4.2 MMOL/L (ref 3.5–5.2)
RBC # BLD AUTO: 3.09 10*6/MM3 (ref 4.14–5.8)
SODIUM SERPL-SCNC: 137 MMOL/L (ref 136–145)
WBC NRBC COR # BLD: 11.89 10*3/MM3 (ref 3.4–10.8)

## 2023-07-24 PROCEDURE — 80048 BASIC METABOLIC PNL TOTAL CA: CPT | Performed by: INTERNAL MEDICINE

## 2023-07-24 PROCEDURE — 25010000002 HEPARIN (PORCINE) PER 1000 UNITS: Performed by: STUDENT IN AN ORGANIZED HEALTH CARE EDUCATION/TRAINING PROGRAM

## 2023-07-24 PROCEDURE — 97110 THERAPEUTIC EXERCISES: CPT

## 2023-07-24 PROCEDURE — 85025 COMPLETE CBC W/AUTO DIFF WBC: CPT | Performed by: INTERNAL MEDICINE

## 2023-07-24 PROCEDURE — 99232 SBSQ HOSP IP/OBS MODERATE 35: CPT | Performed by: INTERNAL MEDICINE

## 2023-07-24 PROCEDURE — 83880 ASSAY OF NATRIURETIC PEPTIDE: CPT | Performed by: INTERNAL MEDICINE

## 2023-07-24 PROCEDURE — 99024 POSTOP FOLLOW-UP VISIT: CPT | Performed by: STUDENT IN AN ORGANIZED HEALTH CARE EDUCATION/TRAINING PROGRAM

## 2023-07-24 PROCEDURE — 25010000002 VANCOMYCIN 5 G RECONSTITUTED SOLUTION: Performed by: INTERNAL MEDICINE

## 2023-07-24 RX ORDER — VANCOMYCIN/0.9 % SOD CHLORIDE 1.5G/250ML
1500 PLASTIC BAG, INJECTION (ML) INTRAVENOUS EVERY 12 HOURS
Qty: 11000 ML | Refills: 0 | Status: SHIPPED | OUTPATIENT
Start: 2023-07-25 | End: 2023-08-05

## 2023-07-24 RX ORDER — VANCOMYCIN/0.9 % SOD CHLORIDE 1.5G/250ML
1500 PLASTIC BAG, INJECTION (ML) INTRAVENOUS EVERY 12 HOURS
Status: DISCONTINUED | OUTPATIENT
Start: 2023-07-24 | End: 2023-07-24 | Stop reason: HOSPADM

## 2023-07-24 RX ORDER — SPIRONOLACTONE 25 MG/1
25 TABLET ORAL DAILY
Status: DISCONTINUED | OUTPATIENT
Start: 2023-07-24 | End: 2023-07-24 | Stop reason: HOSPADM

## 2023-07-24 RX ORDER — SPIRONOLACTONE 25 MG/1
25 TABLET ORAL DAILY
Qty: 30 TABLET | Refills: 0 | Status: SHIPPED | OUTPATIENT
Start: 2023-07-25 | End: 2023-08-24

## 2023-07-24 RX ADMIN — VANCOMYCIN HYDROCHLORIDE 1500 MG: 5 INJECTION, POWDER, LYOPHILIZED, FOR SOLUTION INTRAVENOUS at 11:03

## 2023-07-24 RX ADMIN — HEPARIN SODIUM 5000 UNITS: 5000 INJECTION INTRAVENOUS; SUBCUTANEOUS at 06:01

## 2023-07-24 RX ADMIN — Medication 10 ML: at 08:11

## 2023-07-24 RX ADMIN — SACUBITRIL AND VALSARTAN 1 TABLET: 24; 26 TABLET, FILM COATED ORAL at 08:12

## 2023-07-24 RX ADMIN — CARVEDILOL 3.12 MG: 3.12 TABLET, FILM COATED ORAL at 08:11

## 2023-07-24 RX ADMIN — EMPAGLIFLOZIN 10 MG: 10 TABLET, FILM COATED ORAL at 08:11

## 2023-07-24 RX ADMIN — SPIRONOLACTONE 25 MG: 25 TABLET ORAL at 11:02

## 2023-07-24 RX ADMIN — MULTIPLE VITAMINS W/ MINERALS TAB 1 TABLET: TAB at 08:11

## 2023-07-24 RX ADMIN — Medication 10 ML: at 08:12

## 2023-07-24 RX ADMIN — OXYCODONE AND ACETAMINOPHEN 1 TABLET: 5; 325 TABLET ORAL at 08:16

## 2023-07-24 NOTE — PROGRESS NOTES
[facility]     Progress Note    Patient Name: Yfn Ray  : 1970  MRN: [id number]  Primary Care Physician:  Marilyn Eugene APRN  Date of admission: 7/10/2023    Subjective   Subjective     HPI:  No events overnight.  Pain controlled.  Denies fevers or chills.  No further drainage noted from the anterior drain site.  Anxious for discharge home.    Review of Systems   See HPI    Objective   Objective     Vitals:   Temp:  [97.7 °F (36.5 °C)-98.8 °F (37.1 °C)] 98.5 °F (36.9 °C)  Heart Rate:  [] 98  Resp:  [16] 16  BP: (119-138)/(56-76) 126/56  Physical Exam    General: Alert, no acute distress   Cardiac: Intact peripheral pulse   Nonlabored respiration   Right upper extremity: No drainage.  No cellulitis.  No palpable abscess.  Induration down the anterior arm is stable.  No pain with passive elbow range of motion.  No pain with glenohumeral joint motion.  Mild swelling extends to the hand.  Neurovascular intact.  Compartments soft.    Result Review      WBC   Date Value Ref Range Status   2023 11.89 (H) 3.40 - 10.80 10*3/mm3 Final     RBC   Date Value Ref Range Status   2023 3.09 (L) 4.14 - 5.80 10*6/mm3 Final     Hemoglobin   Date Value Ref Range Status   2023 9.8 (L) 13.0 - 17.7 g/dL Final     Hematocrit   Date Value Ref Range Status   2023 30.5 (L) 37.5 - 51.0 % Final     MCV   Date Value Ref Range Status   2023 98.7 (H) 79.0 - 97.0 fL Final     MCH   Date Value Ref Range Status   2023 31.7 26.6 - 33.0 pg Final     MCHC   Date Value Ref Range Status   2023 32.1 31.5 - 35.7 g/dL Final     RDW   Date Value Ref Range Status   2023 14.0 12.3 - 15.4 % Final     RDW-SD   Date Value Ref Range Status   2023 49.7 37.0 - 54.0 fl Final     MPV   Date Value Ref Range Status   2023 8.4 6.0 - 12.0 fL Final     Platelets   Date Value Ref Range Status   2023 780 (H) 140 - 450 10*3/mm3 Final     Neutrophil %   Date Value Ref Range Status    07/24/2023 65.1 42.7 - 76.0 % Final     Lymphocyte %   Date Value Ref Range Status   07/24/2023 20.2 19.6 - 45.3 % Final     Monocyte %   Date Value Ref Range Status   07/24/2023 10.0 5.0 - 12.0 % Final     Eosinophil %   Date Value Ref Range Status   07/24/2023 2.4 0.3 - 6.2 % Final     Basophil %   Date Value Ref Range Status   07/24/2023 1.4 0.0 - 1.5 % Final     Immature Grans %   Date Value Ref Range Status   07/24/2023 0.9 (H) 0.0 - 0.5 % Final     Neutrophils, Absolute   Date Value Ref Range Status   07/24/2023 7.73 (H) 1.70 - 7.00 10*3/mm3 Final     Lymphocytes, Absolute   Date Value Ref Range Status   07/24/2023 2.40 0.70 - 3.10 10*3/mm3 Final     Monocytes, Absolute   Date Value Ref Range Status   07/24/2023 1.19 (H) 0.10 - 0.90 10*3/mm3 Final     Eosinophils, Absolute   Date Value Ref Range Status   07/24/2023 0.29 0.00 - 0.40 10*3/mm3 Final     Basophils, Absolute   Date Value Ref Range Status   07/24/2023 0.17 0.00 - 0.20 10*3/mm3 Final     Immature Grans, Absolute   Date Value Ref Range Status   07/24/2023 0.11 (H) 0.00 - 0.05 10*3/mm3 Final     nRBC   Date Value Ref Range Status   07/24/2023 0.0 0.0 - 0.2 /100 WBC Final        Result Review:  I have personally reviewed the results from the time of this admission to 7/24/2023 07:33 EDT and agree with these findings:  [x]  Laboratory  []  Microbiology  [x]  Radiology  []  EKG/Telemetry   []  Cardiology/Vascular   []  Pathology  []  Old records  []  Other:      Assessment & Plan   Assessment / Plan     Brief Patient Summary:  Yfn Ray is a 52 y.o. male with right shoulder septic arthritis and an anterior abscess in the deltopectoral interval extending down the bicep status post arthroscopic I&D on 7/14.  Multiple drains in place.  Blood cultures growing MRSA.  Right shoulder aspirate from 7/13 growing MRSA.  Drains removed 7/17.  Active Hospital Problems:  Active Hospital Problems    Diagnosis     Staphylococcal arthritis of right shoulder      Chronic HFrEF (heart failure with reduced ejection fraction)      Plan:   White blood cell count 11.89 and continues to trend down  Remains afebrile  Continue IV antibiotics  Light activity as tolerated with right upper extremity  Ice/elevate to help with swelling  Sling as needed  DVT prophylaxis: Subcutaneous heparin  Further care per primary team, appreciate assistance    Dispo: Stable for discharge home.  Scheduled follow-up on 7/28 for suture removal.  Weekly labs in the outpatient setting.    DVT prophylaxis:  Medical and mechanical DVT prophylaxis orders are present.    CODE STATUS:   Code Status (Patient has no pulse and is not breathing): CPR (Attempt to Resuscitate)  Medical Interventions (Patient has pulse or is breathing): Full Support    Electronically signed by Orville Gallegos MD, 07/24/23, 7:34 AM EDT.

## 2023-07-24 NOTE — PROGRESS NOTES
[facility]     Progress Note    Patient Name: Yfn Ray  : 1970  MRN: [id number]  Primary Care Physician:  Marilyn Eugene APRN  Date of admission: 7/10/2023    Subjective   Subjective     HPI:  Patient Reports he is doing better today.  He had an episode yesterday when there was an increase in drainage around his shoulder.  However after this has happened, there has been decreased drainage and his incision has been better appearing.  He has also had an improvement in laboratory inflammatory markers today.    Review of Systems   See HPI    Objective   Objective     Vitals:   Temp:  [97.7 °F (36.5 °C)-98.7 °F (37.1 °C)] 97.7 °F (36.5 °C)  Heart Rate:  [] 92  Resp:  [16-22] 16  BP: (109-138)/(61-92) 138/64  Physical Exam    General: Alert, no acute distress   Chest: Unlabored breathing, cardiovascular: Regular heart rate   Musculoskeletal: Dressing intact.  No drainage on dressing.  Incision intact with no drainage.  Neurovascular intact to extremity.    Result Review      WBC   Date Value Ref Range Status   2023 14.18 (H) 3.40 - 10.80 10*3/mm3 Final     RBC   Date Value Ref Range Status   2023 3.20 (L) 4.14 - 5.80 10*6/mm3 Final     Hemoglobin   Date Value Ref Range Status   2023 10.0 (L) 13.0 - 17.7 g/dL Final     Hematocrit   Date Value Ref Range Status   2023 31.2 (L) 37.5 - 51.0 % Final     MCV   Date Value Ref Range Status   2023 97.5 (H) 79.0 - 97.0 fL Final     MCH   Date Value Ref Range Status   2023 31.3 26.6 - 33.0 pg Final     MCHC   Date Value Ref Range Status   2023 32.1 31.5 - 35.7 g/dL Final     RDW   Date Value Ref Range Status   2023 14.0 12.3 - 15.4 % Final     RDW-SD   Date Value Ref Range Status   2023 50.2 37.0 - 54.0 fl Final     MPV   Date Value Ref Range Status   2023 8.7 6.0 - 12.0 fL Final     Platelets   Date Value Ref Range Status   2023 824 (H) 140 - 450 10*3/mm3 Final     Neutrophil %   Date Value  Ref Range Status   07/23/2023 67.1 42.7 - 76.0 % Final     Lymphocyte %   Date Value Ref Range Status   07/23/2023 17.9 (L) 19.6 - 45.3 % Final     Monocyte %   Date Value Ref Range Status   07/23/2023 10.5 5.0 - 12.0 % Final     Eosinophil %   Date Value Ref Range Status   07/23/2023 1.9 0.3 - 6.2 % Final     Basophil %   Date Value Ref Range Status   07/23/2023 0.8 0.0 - 1.5 % Final     Immature Grans %   Date Value Ref Range Status   07/23/2023 1.8 (H) 0.0 - 0.5 % Final     Neutrophils, Absolute   Date Value Ref Range Status   07/23/2023 9.51 (H) 1.70 - 7.00 10*3/mm3 Final     Lymphocytes, Absolute   Date Value Ref Range Status   07/23/2023 2.54 0.70 - 3.10 10*3/mm3 Final     Monocytes, Absolute   Date Value Ref Range Status   07/23/2023 1.49 (H) 0.10 - 0.90 10*3/mm3 Final     Eosinophils, Absolute   Date Value Ref Range Status   07/23/2023 0.27 0.00 - 0.40 10*3/mm3 Final     Basophils, Absolute   Date Value Ref Range Status   07/23/2023 0.12 0.00 - 0.20 10*3/mm3 Final     Immature Grans, Absolute   Date Value Ref Range Status   07/23/2023 0.25 (H) 0.00 - 0.05 10*3/mm3 Final     nRBC   Date Value Ref Range Status   07/23/2023 0.0 0.0 - 0.2 /100 WBC Final        Result Review:  I have personally reviewed the results from the time of this admission to 7/23/2023 21:18 EDT and agree with these findings:  [x]  Laboratory  []  Microbiology  []  Radiology  []  EKG/Telemetry   []  Cardiology/Vascular   []  Pathology  []  Old records  []  Other:      Assessment & Plan   Assessment / Plan     Brief Patient Summary:  Yfn Ray is a 52 y.o. male who is status post right shoulder I&D of septic arthritis    Active Hospital Problems:  Active Hospital Problems    Diagnosis     Staphylococcal arthritis of right shoulder     Chronic HFrEF (heart failure with reduced ejection fraction)      Plan: The patient is improved today.  There has been an improvement in his laboratory values with a decrease in CRP.  His incision is better  appearing with no drainage today.  Recommend continued IV antibiotics through PICC line and continued observation.  He is anxious to discharge home with IV antibiotics when medically able.       DVT prophylaxis:  Medical and mechanical DVT prophylaxis orders are present.    CODE STATUS:   Code Status (Patient has no pulse and is not breathing): CPR (Attempt to Resuscitate)  Medical Interventions (Patient has pulse or is breathing): Full Support    Disposition:  I expect patient to be discharged when medically able.    Electronically signed by Cristiano Longoria MD, 07/23/23, 9:18 PM EDT.

## 2023-07-24 NOTE — PROGRESS NOTES
"[facility] Clinical Pharmacy Services: Vancomycin Monitoring Note    Yfn Ray is a 52 y.o. male who is on day 8 (first negative cx-) of pharmacy to dose vancomycin for bacteremia,bone and joint infection.    Previous Vancomycin Dose:   1750 mg IV every  12  hours    Updated Cultures and Sensitivities:   : BLOOD CX-NGTD  : WOUND CX RIGHT SHOULDER- MRSA  : WOUND CX RIGHT SHOULDER- MRSA  : FLUID CX RIGHT SHOULDER FLUID- MRSA  : BLOOD CX / MRSA      Vitals/Labs  Ht: 180.3 cm (70.98\"); Wt: 86 kg (189 lb 9.5 oz)     Temp (24hrs), Av.3 °F (36.8 °C), Min:97.7 °F (36.5 °C), Max:98.8 °F (37.1 °C)    Estimated Creatinine Clearance: 146 mL/min (A) (by C-G formula based on SCr of 0.72 mg/dL (L)).       Results from last 7 days   Lab Units 23  0555 23  1056 23  0546 23  0448 23  0605 23  0427 23  1715 23  0609 23  0518   VANCOMYCIN RM mcg/mL  --   --   --  23.67  --   --   --   --   --    VANCOMYCIN TR mcg/mL  --  14.23  --   --   --   --  17.60  --  14.79   CREATININE mg/dL 0.72*  --   --  0.85 0.75*  --   --   --  0.59*   WBC 10*3/mm3 11.89*  --  14.18* 18.81* 20.87*   < >  --    < > 22.98*    < > = values in this interval not displayed.     Assessment/Plan      Current Vancomycin Dose:  1500 mg IV every 12 hours; which provides the following predicted parameters:    Exposure target: AUC24 (range)400-600 mg/L.hr   AUC24,ss: 532 mg/L.hr  PAUC*: 98 %  Ctrough,ss: 15 mg/L  Pconc*: 1 %  Tox.: 10 %     Changing to 1500 mg every 12 hours based on insight recommendations. Will draw a level in 2 days.     Thank you for involving pharmacy in this patient's care. Please contact pharmacy with any questions or concerns.    Oj Paniagua [facility]  Clinical Pharmacist        "

## 2023-07-24 NOTE — PROGRESS NOTES
[facility]   Hospitalist Progress Note  Date: 2023  Patient Name: Yfn Ray  : 1970  MRN: [id number]  Date of admission: 7/10/2023      Subjective   Subjective     Chief Complaint: right arm infection     Summary:  52 y.o. male no past medical history presents to the emergency department for evaluation of right upper extremity pain.  Patient states he fell off a truck 5 days ago and was seen initially and discharged outpatient with outpatient follow-up.  States that yesterday he noted his arm swelling and turning red which got progressively worse prompting him to seek further medical attention.  He denies any fevers, chills, sweats, nausea, vomiting, chest pain, shortness of breath, palpitations, abdominal pain diarrhea constipation or dysuria, weakness.  In the emergency department felt to have cellulitis and started on vancomycin and Zosyn and will be admitted for ongoing monitoring management.   Patient evaluated by orthopedic surgery and cardiology.Patient is status post washout of his right shoulder on  with MRSA from the infected shoulder.  Cardiology signed off with recommendation for left heart cath in next couple of weeks once infection has cleared to evaluate for new systolic CHF.    Interval Followup:     Discharge home  canceled on  as patient noticed to have increased drainage from right shoulder.  Drainage has from right shoulder has stopped.  Afebrile.  Blood pressure much improved  Rash much better per patient he gets it with pain medications and not due to antibiotics    Patient's white blood cell count and CRP is improving.  Patient had CT scanning of his arm on .  No definitive abscess.  At this time orthopedics is holding off on further surgery.  Denies any shortness of air.  Has been ambulating  Positive BM  Patient cleared by orthopedic surgery to be released home today.  Patient has been wanting to go home.  IV Vanco dose adjusted as per  pharmacy.  Review of Systems   All systems were reviewed and negative except for: right shoulder pain and rash on back     Objective   Objective     Vitals:   Temp:  [97.7 °F (36.5 °C)-98.8 °F (37.1 °C)] 98.4 °F (36.9 °C)  Heart Rate:  [] 94  Resp:  [16] 16  BP: (122-168)/(56-78) 135/63  Physical Exam    Constitutional: Resting in bed. Wife at the bedside    Eyes: Pupils equal, sclerae anicteric, no conjunctival injection   HENT: NCAT, mucous membranes moist   Neck: Supple, no thyromegaly, no lymphadenopathy, trachea midline   Respiratory: Clear to auscultation bilaterally, nonlabored respirations    Cardiovascular: RRR, no murmurs, rubs, or gallops, palpable pedal pulses bilaterally   Gastrointestinal: Positive bowel sounds, soft, nontender, nondistended   Musculoskeletal: Full range of motion except for right shoulder which he is unable to move. Dressings in place. Drain removed.  Remain swollen right upper extremity with redness proximally.  Wound dressed   Psychiatric: Appropriate affect, cooperative   Neurologic: Oriented x 3, strength symmetric in all extremities, Cranial Nerves grossly intact to confrontation, speech clear   Skin: erythema all over the back  Result Review    Result Review:  I have personally reviewed the results from the time of this admission to 7/24/2023 14:47 EDT and agree with these findings:  [x]  Laboratory  BMP          7/21/2023    06:05 7/22/2023    04:48 7/24/2023    05:55   BMP   BUN 16  20  13    Creatinine 0.75  0.85  0.72    Sodium 131  133  137    Potassium 4.8  4.5  4.2    Chloride 99  101  102    CO2 24.6  22.6  25.0    Calcium 8.3  8.4  8.4      CBC          7/22/2023    04:48 7/23/2023    05:46 7/24/2023    05:55   CBC   WBC 18.81  14.18  11.89    RBC 3.29  3.20  3.09    Hemoglobin 10.4  10.0  9.8    Hematocrit 32.0  31.2  30.5    MCV 97.3  97.5  98.7    MCH 31.6  31.3  31.7    MCHC 32.5  32.1  32.1    RDW 14.1  14.0  14.0    Platelets 869  824  780    [x]   Microbiology  Blood Culture   Date Value Ref Range Status   07/10/2023 Staphylococcus aureus, MRSA (C)  Preliminary     Comment:       Infectious disease consultation is highly recommended to rule out distant foci of infection.  Methicillin resistant Staphylococcus aureus, Patient may be an isolation risk.     BCID, PCR   Date Value Ref Range Status   07/10/2023 (C) Negative by BCID PCR. Culture to Follow. Final    Staph aureus. mecA/C and MREJ (methicillin resistance gene) detected. Identification by BCID2 PCR.     No results found for: CULTURES, HSVCX, URCX  No results found for: EYECULTURE, GCCX, HSVCULTURE, LABHSV  No results found for: LEGIONELLA, MRSACX, MUMPSCX, MYCOPLASCX  No results found for: NOCARDIACX, STOOLCX  No results found for: THROATCX, UNSTIMCULT, URINECX, CULTURE, VZVCULTUR  No results found for: VIRALCULTU, WOUNDCX    []  Radiology  []  EKG/Telemetry   []  Cardiology/Vascular   []  Pathology  []  Old records  []  Other:    Assessment & Plan   Assessment / Plan     Assessment/Plan:  MRSA bacteremia.  Subsequent blood culture negative since July 16  Right-sided rotator cuff tear  Septic right shoulder joint due to MRSA.  S/p right shoulder washout July 14  MRSA cellulitis of right shoulder  Traumatic injury of right shoulder, concern for rotator cuff tear  Systolic congestive heart failure with an EF of 20%, new diagnosis.  Stable    PLAN  -- Continue IV vancomycin.  New dose adjusted as per pharmacy  -PICC line placed for 4 weeks of IV antibiotics until August 16  -- Previous hospitalist discussed plan with infectious disease.  Reviewed CT imaging of right arm  --Repeat cultures are showing no growth currently since July 16.  --Echo is showing significant systolic heart failure.  This is a new diagnosis for the patient.  Cardiology consulted.  Patient has been started on Coreg, Jardiance and Entresto.  Aldactone added as blood pressure is up.  Patient will need outpatient heart catheterization  once acute issues resolved.  Needs outpatient BMP and CBC checked in next 3 days  -- MRI of right shoulder reviewed  --ortho consulted and patient is s/p washout of shoulder creatinine CPR improving  -White cell count improving.  Trend CBC.  -Bowel regimen  -Pain control with IV and oral narcotics  Discussed plan with RN and .  Discharge home with home health with IV antibiotics today.    DVT prophylaxis:  Medical and mechanical DVT prophylaxis orders are present.    CODE STATUS:   Code Status (Patient has no pulse and is not breathing): CPR (Attempt to Resuscitate)  Medical Interventions (Patient has pulse or is breathing): Full Support          Electronically signed by Jae Andrews MD, 07/24/23, 2:50 PM EDT.  .

## 2023-07-24 NOTE — PLAN OF CARE
Goal Outcome Evaluation:         Pt discharging. Lorna Jernigan, RN  Problem: Adjustment to Illness (Sepsis/Septic Shock)  Goal: Optimal Coping  Intervention: Optimize Psychosocial Adjustment to Illness  Description: Acknowledge, normalize, validate intensity and complexity of patient and support system response to situation.  Provide opportunity for expression of thoughts, feelings and concerns; respond with compassion and reassurance.  Decrease stress and anxiety by providing information about patient’s status and treatment.  Facilitate support system presence and participation in care; consider providing a diary in intensive care situation.  Support coping by recognizing current coping strategies; provide aid in developing new strategies.  Acknowledge and normalize difficulty in managing life-long lifestyle changes and expectations.  Assess and monitor for signs and symptoms of psychologic distress, anxiety and depression.  Consider palliative care consult for goals of care conversation, if the condition is worsening despite treatment.  Recent Flowsheet Documentation  Taken 7/24/2023 0701 by Lorna Jernigan, RN  Family/Support System Care:   self-care encouraged   support provided

## 2023-07-24 NOTE — THERAPY TREATMENT NOTE
"Patient Name: Yfn Ray  : 1970    MRN: [id number]                              Today's Date: 2023       Admit Date: 7/10/2023    Visit Dx:     ICD-10-CM ICD-9-CM   1. Acute HFrEF (heart failure with reduced ejection fraction)  I50.21 428.21   2. Cellulitis of left upper extremity  L03.114 682.3   3. Shoulder injury, right, initial encounter  S49.91XA 959.2   4. Injury of tendon of biceps  S46.209A 959.2   5. Sepsis, due to unspecified organism, unspecified whether acute organ dysfunction present  A41.9 038.9     995.91   6. Difficulty walking  R26.2 719.7   7. Decreased activities of daily living (ADL)  Z78.9 V49.89   8. Sepsis due to methicillin resistant Staphylococcus aureus (MRSA), unspecified whether acute organ dysfunction present  A41.02 038.12     995.91     Patient Active Problem List   Diagnosis    Chronic HFrEF (heart failure with reduced ejection fraction)    Staphylococcal arthritis of right shoulder     Past Medical History:   Diagnosis Date    Alpha 1-antitrypsin PiMS phenotype     \"alpha 1\" per pt and wife. unsure of what type.    Arthritis      Past Surgical History:   Procedure Laterality Date    LIVER BIOPSY      SHOULDER ARTHROSCOPY Right 2023    Procedure: SHOULDER ARTHROSCOPY WITH WASHOUT AND DEBRIDMENT;  Surgeon: Orville Gallegos MD;  [place] [facility] [place] [facility] [place]: [place]opedics;  Laterality: Right;    TOOTH EXTRACTION        General Information       Row Name 23 1314          OT Time and Intention    Document Type therapy note (daily note)  -LF     Mode of Treatment individual therapy;occupational therapy  -LF               User Key  (r) = Recorded By, (t) = Taken By, (c) = Cosigned By      Initials Name Provider Type    LF Anju Salgado OT Occupational Therapist                     Mobility/ADL's       Row Name 23 1314          Bed Mobility    Bed Mobility supine-sit;sit-supine  -LF     Supine-Sit [place] (Bed Mobility) independent  -LF     " Sit-Supine [place] (Bed Mobility) independent  -LF               User Key  (r) = Recorded By, (t) = Taken By, (c) = Cosigned By      Initials Name Provider Type     Anju Salgado OT Occupational Therapist                   Obj/Interventions       Row Name 07/24/23 1316          Shoulder (Therapeutic Exercise)    Shoulder (Therapeutic Exercise) PROM (passive range of motion)  -LF     Shoulder PROM (Therapeutic Exercise) right;flexion;extension;external rotation;internal rotation  Continues to only be able to tolerate ~10° of each exercise but able to increase number of reps, 2 sets x 10 reps  -       Row Name 07/24/23 1316          Elbow/Forearm (Therapeutic Exercise)    Elbow/Forearm (Therapeutic Exercise) AAROM (active assistive range of motion)  -     Elbow/Forearm AAROM (Therapeutic Exercise) right;flexion;extension;supination;pronation  2 sets x 12 reps  -       Row Name 07/24/23 1316          Wrist (Therapeutic Exercise)    Wrist (Therapeutic Exercise) AROM (active range of motion)  -LF     Wrist AROM (Therapeutic Exercise) right;flexion;extension;ulnar deviation;radial deviation;other (see comments)  2 sets x 12 reps  -       Row Name 07/24/23 1316          Hand (Therapeutic Exercise)    Hand (Therapeutic Exercise) AROM (active range of motion)  -LF     Hand AROM/AAROM (Therapeutic Exercise) right;finger flexion;finger extension;other (see comments)  2 sets x 12 reps  -       Row Name 07/24/23 1316          Motor Skills    Motor Skills functional endurance  -LF     Functional Endurance Good  -LF     Therapeutic Exercise shoulder;elbow/forearm;wrist;hand  -       Row Name 07/24/23 1316          Balance    Balance Assessment sitting dynamic balance  -LF     Dynamic Sitting Balance independent  -LF     Position, Sitting Balance unsupported  -LF     Balance Interventions sitting;dynamic;UE activity with balance activity  -LF               User Key  (r) = Recorded By, (t) = Taken By, (c) =  Cosigned By      Initials Name Provider Type     Anju Salgado, OT Occupational Therapist                   Goals/Plan    No documentation.                  Clinical Impression       Row Name 07/24/23 1318          Pain Assessment    Additional Documentation Pain Scale: FACES Pre/Post-Treatment (Group)  -       Row Name 07/24/23 1318          Pain Scale: FACES Pre/Post-Treatment    Pain: FACES Scale, Pretreatment 2-->hurts little bit  -LF     Posttreatment Pain Rating 2-->hurts little bit  -LF     Pain Location - Side/Orientation Right  -     Pain Location - shoulder  -LF       Row Name 07/24/23 1318          Plan of Care Review    Plan of Care Reviewed With patient;spouse  -     Progress improving  -     Outcome Evaluation Patient continues to be able to tolerate ~10° of each R shoulder PROM exercise, but was able to increase number of reps by 5 and then by 2 for AAROM/AROM distally. No abduction completed this session d/t pain secondary to an impingement type maneuver/position with PROM. Re-education provided on safe self-ranging, positioning, and use of sling with functional transfers/mobility to RUE. He would benefit from continued OT services in outpatient setting to maintain RUE ROM prior to R r.cuff repair.  -       Row Name 07/24/23 1318          Vital Signs    O2 Delivery Pre Treatment room air  -LF     O2 Delivery Intra Treatment room air  -LF     O2 Delivery Post Treatment room air  -LF               User Key  (r) = Recorded By, (t) = Taken By, (c) = Cosigned By      Initials Name Provider Type     Anju Salgado, OT Occupational Therapist                   Outcome Measures       Row Name 07/24/23 0293          How much help from another is currently needed...    Putting on and taking off regular lower body clothing? 2  -LF     Bathing (including washing, rinsing, and drying) 2  -LF     Toileting (which includes using toilet bed pan or urinal) 3  -LF     Putting on and taking off  regular upper body clothing 2  -LF     Taking care of personal grooming (such as brushing teeth) 3  -LF     Eating meals 4  -LF     AM-PAC 6 Clicks Score (OT) 16  -LF       Row Name 07/24/23 0701          How much help from another person do you currently need...    Turning from your back to your side while in flat bed without using bedrails? 4  -JR     Moving from lying on back to sitting on the side of a flat bed without bedrails? 4  -JR     Moving to and from a bed to a chair (including a wheelchair)? 4  -JR     Standing up from a chair using your arms (e.g., wheelchair, bedside chair)? 4  -JR     Climbing 3-5 steps with a railing? 3  -JR     To walk in hospital room? 4  -JR     AM-PAC 6 Clicks Score (PT) 23  -JR     Highest level of mobility 7 --> Walked 25 feet or more  -JR       Row Name 07/24/23 1327          Functional Assessment    Outcome Measure Options AM-PAC 6 Clicks Daily Activity (OT);Optimal Instrument  -       Row Name 07/24/23 1327          Optimal Instrument    Optimal Instrument Optimal - 3  -LF     Bending/Stooping 2  -LF     Standing 2  -LF     Reaching 3  -LF               User Key  (r) = Recorded By, (t) = Taken By, (c) = Cosigned By      Initials Name Provider Type     Anju Salgado OT Occupational Therapist    Lorna Mujica, RN Registered Nurse                    Occupational Therapy Education       Title: PT OT SLP Therapies (Resolved)       Topic: Occupational Therapy (Resolved)       Point: ADL training (Resolved)       Description:   Instruct learner(s) on proper safety adaptation and remediation techniques during self care or transfers.   Instruct in proper use of assistive devices.                  Learning Progress Summary             Patient Acceptance, E,TB, VU by  at 7/17/2023 1517   Significant Other Acceptance, E,TB, VU by  at 7/17/2023 1517                         Point: Precautions (Resolved)       Description:   Instruct learner(s) on prescribed precautions  during self-care and functional transfers.                  Learning Progress Summary             Patient Acceptance, E,TB, VU by  at 7/17/2023 1517   Significant Other Acceptance, E,TB, VU by  at 7/17/2023 1517                         Point: Body mechanics (Resolved)       Description:   Instruct learner(s) on proper positioning and spine alignment during self-care, functional mobility activities and/or exercises.                  Learning Progress Summary             Patient Acceptance, E,TB, VU by  at 7/17/2023 1517   Significant Other Acceptance, E,TB, VU by  at 7/17/2023 1517                                         User Key       Initials Effective Dates Name Provider Type Discipline     06/16/21 -  Anju Salgado OT Occupational Therapist OT                  OT Recommendation and Plan  Planned Therapy Interventions (OT): activity tolerance training, patient/caregiver education/training, BADL retraining, functional balance retraining, occupation/activity based interventions, ROM/therapeutic exercise, strengthening exercise, transfer/mobility retraining  Therapy Frequency (OT): 5 times/wk  Plan of Care Review  Plan of Care Reviewed With: patient, spouse  Progress: improving  Outcome Evaluation: Patient continues to be able to tolerate ~10° of each R shoulder PROM exercise, but was able to increase number of reps by 5 and then by 2 for AAROM/AROM distally. No abduction completed this session d/t pain secondary to an impingement type maneuver/position with PROM. Re-education provided on safe self-ranging, positioning, and use of sling with functional transfers/mobility to RUE. He would benefit from continued OT services in outpatient setting to maintain RUE ROM prior to R r.cuff repair.     Time Calculation:   Evaluation Complexity (OT)  Review Occupational Profile/Medical/Therapy History Complexity: brief/low complexity  Assessment, Occupational Performance/Identification of Deficit Complexity: 1-3  performance deficits  Clinical Decision Making Complexity (OT): problem focused assessment/low complexity  Overall Complexity of Evaluation (OT): low complexity     Time Calculation- OT       Row Name 07/24/23 1327             Time Calculation- OT    OT Received On 07/24/23  -LF      OT Goal Re-Cert Due Date 07/26/23  -LF         Timed Charges    60874 - OT Therapeutic Exercise Minutes 20  -LF      17054 - OT Therapeutic Activity Minutes 5  -LF         Total Minutes    Timed Charges Total Minutes 25  -LF       Total Minutes 25  -LF                User Key  (r) = Recorded By, (t) = Taken By, (c) = Cosigned By      Initials Name Provider Type    LF Anju Salgado OT Occupational Therapist                  Therapy Charges for Today       Code Description Service Date Service Provider Modifiers Qty    [id number] HC OT THER PROC EA 15 MIN 7/24/2023 Anju Salgado OT GO 2                 Anju Salgado OT  7/24/2023

## 2023-07-25 ENCOUNTER — TELEPHONE (OUTPATIENT)
Dept: ORTHOPEDIC SURGERY | Facility: CLINIC | Age: 53
End: 2023-07-25
Payer: COMMERCIAL

## 2023-07-25 ENCOUNTER — TELEPHONE (OUTPATIENT)
Dept: ORTHOPEDIC SURGERY | Facility: CLINIC | Age: 53
End: 2023-07-25

## 2023-07-25 NOTE — TELEPHONE ENCOUNTER
PLEASE, CALL A[facility] TO DISCUSS LABS THAT ARE NEEDING ORDERS AS WELL AS DISCUSS PATIENT'S VANCOMYCIN LEVEL MANAGEMENT    AIDA: [phone]

## 2023-07-25 NOTE — OUTREACH NOTE
Prep Survey      Flowsheet Row Responses   [facility] facility patient discharged from? [facility]   Is LACE score < 7 ? No   Eligibility Readm Mgmt   Discharge diagnosis Chronic HFrEF (heart failure with reduced ejection fraction)   Does the patient have one of the following disease processes/diagnoses(primary or secondary)? CHF   Does the patient have Home health ordered? Yes   What is the Home health agency?  [facility]   Is there a DME ordered? No   Prep survey completed? Yes            Lorna HOLLIDAY - Registered Nurse

## 2023-07-25 NOTE — TELEPHONE ENCOUNTER
Caller: AIDA [facility]    Relationship to patient:     Best call back number: [phone] EXT [phone] UNTIL 4PM    Patient is needing: PHARMACIST RETURNING CALL TO [facility] FOR PT ORDERS

## 2023-07-26 NOTE — TELEPHONE ENCOUNTER
L/M ON  REQUESTING A RETURN CALL REGARDING ORDERS. PATIENT IS TO HAVE WEEKLY VANCOMYCIN  TROUGH WITH CBC, CMP AND CRP FOR THE NEXT 6 WEEKS.

## 2023-07-27 ENCOUNTER — READMISSION MANAGEMENT (OUTPATIENT)
Dept: CALL CENTER | Facility: HOSPITAL | Age: 53
End: 2023-07-27
Payer: COMMERCIAL

## 2023-07-27 NOTE — OUTREACH NOTE
CHF Week 1 Survey      Flowsheet Row Responses   [facility] facility patient discharged from? [facility]   Does the patient have one of the following disease processes/diagnoses(primary or secondary)? CHF   CHF Week 1 attempt successful? No   Unsuccessful attempts Attempt 1            Marya MARTINEZ - Licensed Nurse

## 2023-07-28 ENCOUNTER — OFFICE VISIT (OUTPATIENT)
Dept: ORTHOPEDIC SURGERY | Facility: CLINIC | Age: 53
End: 2023-07-28
Payer: COMMERCIAL

## 2023-07-28 VITALS
BODY MASS INDEX: 25.62 KG/M2 | SYSTOLIC BLOOD PRESSURE: 135 MMHG | DIASTOLIC BLOOD PRESSURE: 90 MMHG | WEIGHT: 179 LBS | HEIGHT: 70 IN | OXYGEN SATURATION: 96 % | HEART RATE: 94 BPM

## 2023-07-28 DIAGNOSIS — M00.011 STAPHYLOCOCCAL ARTHRITIS OF RIGHT SHOULDER: Primary | ICD-10-CM

## 2023-07-28 NOTE — PROGRESS NOTES
"Chief Complaint  Pain and Follow-up of the Right Shoulder    Subjective          Yfn Ray presents to [facility] for   History of Present Illness    Yfn returns for follow-up of his right shoulder.  He is status post right shoulder arthroscopic irrigation and debridement on 7/14.  He had multiple drains placed and did have an anterior incision made to address a deltopectoral interval abscess.  He has transition to home.  He is receiving IV vancomycin.  He has home health in place.  No fevers reported.  Labs were drawn and reviewed today.    No Known Allergies     Social History     Socioeconomic History    Marital status:    Tobacco Use    Smoking status: Never    Smokeless tobacco: Never   Vaping Use    Vaping Use: Never used   Substance and Sexual Activity    Alcohol use: Never    Drug use: Never    Sexual activity: Defer        I reviewed the patient's chief complaint, history of present illness, review of systems, past medical history, surgical history, family history, social history, medications, and allergy list.     REVIEW OF SYSTEMS    Constitutional: Denies fevers, chills, weight loss  Cardiovascular: Denies chest pain, shortness of breath  Skin: Denies rashes, acute skin changes  Neurologic: Denies headache, loss of consciousness  MSK: Right shoulder pain      Objective   Vital Signs:   /90   Pulse 94   Ht 177.8 cm (70\")   Wt 81.2 kg (179 lb)   SpO2 96%   BMI 25.68 kg/m²     Body mass index is 25.68 kg/m².    Physical Exam    General: Alert. No acute distress.   Right upper extremity: Sutures were removed.  Incisions are all clean and dry.  Mild serous type drainage from the anterior drain site.  No cellulitis.  Swelling continues to resolve.  No palpable abscess.  Induration on the anterior arm continues to improve.  Axillary nerve sensation intact.  Intact active elbow flexion and extension.  Stiffness with terminal flexion of the hand.  Palpable radial " pulse.    Procedures    Imaging Results (Most Recent)       None                     Assessment and Plan        Adult Transthoracic Echo Complete W/ Cont if Necessary Per Protocol    Result Date: 7/13/2023  Narrative:   Left ventricular systolic function is severely decreased. Left ventricular ejection fraction appears to be less than 20%.   The left ventricular cavity is moderately dilated.   Left ventricular diastolic function is consistent with (grade I) impaired relaxation.   The left atrial cavity is mildly dilated.   Abnormal mitral valve structure consistent with dilated annulus.   Mild mitral regurgitation   No overt evidence of cardioembolic source by TTE   No prior echo for comparison     XR Shoulder 2+ View Right    Result Date: 7/6/2023  Narrative: PROCEDURE: XR SHOULDER 2+ VW RIGHT  COMPARISON: 7/6/2023, right humerus x-rays.  INDICATIONS: RIGHT SHOULDER PAIN AND LIMITED ROM/ FALL.  FINDINGS:  2 views were obtained.  External artifacts obscure detail.  No acute fracture or acute malalignment is identified.  There is a possible right glenohumeral joint effusion.  Osteonecrosis (or avascular necrosis) involving the right humeral head cannot be excluded.  There are mild degenerative changes of the right shoulder.  Incidental chronic calcified granulomatous disease of the chest is suggested.  No right pneumothorax is appreciated.  If symptoms or clinical concerns persist, consider imaging follow-up.      Impression:   No acute fracture or acute malalignment is identified.   Please note that portions of this note were completed with a voice recognition program.  KODI HERNANDEZ JR, MD       Electronically Signed and Approved By: KODI HERNANDEZ JR, MD on 7/06/2023 at 22:19              XR Humerus Right    Result Date: 7/6/2023  Narrative: PROCEDURE: XR HUMERUS RIGHT  COMPARISON: 7/6/2023 (right shoulder x-rays).  INDICATIONS: RIGHT HUMERUS PAIN AND LIMITED ROM/FALL.  FINDINGS: 5 views were obtained.  No acute  fracture or acute malalignment is identified.  Degenerative/enthesopathic changes involve the right elbow.  There may be loose bodies involving the right elbow joint space.  There is a possible right glenohumeral joint effusion.  Osteonecrosis (or avascular necrosis) involving the right humeral head cannot be excluded.  There are mild degenerative changes of the right shoulder.  If symptoms or clinical concerns persist, consider imaging follow-up.      Impression:  No acute fracture or acute malalignment is identified.     Please note that portions of this note were completed with a voice recognition program.  KODI HERNANDEZ JR, MD       Electronically Signed and Approved By: KODI HERNANDEZ JR, MD on 7/06/2023 at 22:17              XR Elbow 3+ View Right    Result Date: 7/11/2023  Narrative: PROCEDURE: XR ELBOW 3+ VW RIGHT  COMPARISON: 7/6/2023.  INDICATIONS:  RIGHT ELBOW PAIN/SWELLING/REDNESS FELL OUT OF BUCKET TRUCK 1 WEEK AGO.  FINDINGS:  Four (4) views were obtained.  No acute fracture or acute malalignment is identified.  No subacute fracture is appreciated.  Diffuse soft tissue swelling is seen and is nonspecific.  It may be related to an acute-to-subacute contusion.  Cellulitis cannot be excluded.  There are probably moderate-to-severe degenerative changes of the right elbow.  Enthesopathic changes are also present.  There is a possible 1.8 cm loose body (anteriorly) in the joint space, seen previously.  There may be a 1.3 cm loose body in the posterior joint space (versus enthesopathy).  Similar findings were seen previously.  No subcutaneous emphysema.  No retained radiopaque foreign body.  A right elbow joint effusion is possible.  If symptoms or clinical concerns persist, consider imaging follow-up.      Impression:   No acute fracture or acute malalignment is identified.  No subacute fracture is suggested.  There is nonspecific diffuse soft tissue swelling.  Please see above comments for further detail.      Please note that portions of this note were completed with a voice recognition program.  KODI HERNANDEZ JR, MD       Electronically Signed and Approved By: KODI HERNANDEZ JR, MD on 7/11/2023 at 2:12              CT Head Without Contrast    Result Date: 7/11/2023  Narrative: PROCEDURE: CT HEAD WO CONTRAST  COMPARISON: None.  INDICATIONS: AMS (altered mental status).  PROTOCOL:   Standard imaging protocol performed.    RADIATION:   Total DLP: 1,017.2mGy*cm.   MA and/or KV were/was adjusted to minimize radiation dose.    TECHNIQUE: After obtaining the patient's consent, 130 CT images were obtained without non-ionic intravenous contrast material.  DISCUSSION:   A routine nonenhanced head CT was performed.  No acute brain abnormality is identified.  No acute intracranial hemorrhage.  No acute infarct is seen.  No acute skull fracture.  No midline shift or acute intracranial mass effect is seen.  The ventricular size and configuration are within normal limits.  No significant acute findings are seen with regard to the imaged orbits, paranasal sinuses, or middle ear clefts.  There is mild chronic rightward nasal septal deviation with an associated nasal spur.  Please note that the entire level of the foramen magnum is not included in the field of view.      Impression:  No acute brain abnormality is seen. Specifically, no acute intracranial hemorrhage, no acute infarct, no intracranial mass lesion, and no acute intracranial mass effect are appreciated.   Please note that portions of this note were completed with a voice recognition program.  KODI HERNANDEZ JR, MD       Electronically Signed and Approved By: KODI HERNANDEZ JR, MD on 7/11/2023 at 0:46              CT Chest With Contrast Diagnostic    Result Date: 7/11/2023  Narrative: PROCEDURE: CT CHEST W CONTRAST DIAGNOSTIC  COMPARISON: None.  INDICATIONS: H/O FALL (4 DAYS AGO) PERSISTENT RIGHT ARM & RIGHT CHEST PAIN.  TECHNIQUE: After obtaining the patient's consent,  664 CT images were obtained with non-ionic intravenous contrast material.   PROTOCOL:   Standard CT imaging protocol performed.    RADIATION:   Total DLP: 466.5mGy*cm.   Automated exposure control was utilized to minimize radiation dose. CONTRAST: 100 mL Isovue 370 I.V.  FINDINGS: Age-indeterminate bibasilar atelectasis and/or infiltrate(s) and/or pulmonary contusion(s) is (are) seen with subsegmental atelectasis favored.  These findings are predominantly present within the bilateral lower lobes and in the lingula.  No pneumothorax is seen.  No pneumomediastinum.  Minimal if any pleural effusion is seen.  No pericardial effusion.  There may be borderline cardiac enlargement.  There is a small hiatal hernia.  Coronary artery calcifications are present.  No thoracic aortic aneurysm or dissection.  The contrast bolus in the pulmonary arteries is limited, precluding assessment for pulmonary embolism.  The patient's upper extremities in the scan field of view obscure detail.  There is also motion artifact on the exam.  The best possible images were obtained.  There is a suspected chronic vertebral compression fracture at approximately L1.  No definite acute fractures are seen.  There are degenerative changes throughout the imaged spine, especially within the lower thoracic and upper lumbar spine.  There is mild kyphosis centered at the thoracolumbar junction.  No thyroid enlargement.  The central tracheobronchial tree is well aerated without filling defect.  No definite suspicious pulmonary nodules are seen.  The motion artifact and the bibasilar airspace disease may obscure such findings.  No definite chest wall contusion is seen.  No ectopic chest wall gas collections are suggested.  There is soft tissue swelling centered about the right shoulder, which suggests acute-to-subacute contusion(s).  There may be a right glenohumeral joint effusion.  No definite acute (or subacute) fracture is seen in this region.  There is  diffuse hepatic steatosis.  No definite acute findings are seen in the partially imaged upper abdomen.      Impression:   1. The study is motion-limited.  2. There is suspected acute-to-subacute soft tissue contusion centered about the right shoulder.  There may be a positive right shoulder joint effusion.  No definite acute or subacute fracture is seen in this region or elsewhere.  No dislocation is suggested.  Please note that this exam was not tailored in order to evaluate the right shoulder girdle.  3. Subsegmental atelectasis is suggested in the lungs bilaterally.  Pneumonia and/or pulmonary contusion(s) is thought to be less likely.  4. No pneumothorax is seen.  No pneumomediastinum.  5. Again, no definite acute (or subacute) displaced rib fracture is seen.  6. Chronic calcified granulomatous disease involves the chest and abdomen.  7. There are coronary artery calcifications.  8. Again, the study is limited for several reasons.  For instance, the patient's upper extremities in the scan field of view obscure detail.  Also, as mentioned above, there is motion artifact on the exam obscuring detail.  9. Please see above comments for further detail.     Please note that portions of this note were completed with a voice recognition program.  KODI HERNANDEZ JR, MD       Electronically Signed and Approved By: KODI HERNANDEZ JR, MD on 7/11/2023 at 1:01              XR Chest 1 View    Result Date: 7/16/2023  Narrative: PROCEDURE: XR CHEST 1 VW  COMPARISON: 7/10/2023.  INDICATIONS: Possible sepsis.  FINDINGS:  Two views (AP semi-upright portable projections) of the chest reveal borderline cardiac enlargement and no acute infiltrate.  No pleural effusion or pneumothorax is seen.  Chronic calcified granulomatous disease involves the chest.  External artifacts obscure detail.      Impression:   No acute infiltrate is appreciated.      Please note that portions of this note were completed with a voice recognition program.   KODI HERNANDEZ JR, MD       Electronically Signed and Approved By: KODI HERNANDEZ JR, MD on 7/16/2023 at 1:45              XR Chest 1 View    Result Date: 7/10/2023  Narrative: PROCEDURE: XR CHEST 1 VW  COMPARISON: 7/10/2023.  INDICATIONS: Weak/Dizzy/AMS.  FINDINGS: A single frontal (AP or PA upright portable) chest radiograph reveals borderline cardiac enlargement. No pneumothorax is seen.  There is suspected chronic asymmetric elevation right diaphragm. Chronic calcified granulomatous disease involves the chest.  There may be minimal subsegmental atelectasis and/or fibrosis in the lung bases.  Acute infiltrate is thought to be less likely.      Impression:  No acute infiltrate is appreciated.     Please note that portions of this note were completed with a voice recognition program.  KODI HERNANDEZ JR, MD       Electronically Signed and Approved By: KODI HERNANDEZ JR, MD on 7/10/2023 at 23:43              MRI Shoulder Right With & Without Contrast    Result Date: 7/13/2023  Narrative: PROCEDURE: MRI SHOULDER RIGHT W WO CONTRAST  COMPARISON:  None INDICATIONS: Shoulder pain, rotator cuff disorder suspected, xray done  CONTRAST: 15 ml  Multihance I.V.  TECHNIQUE: A variety of imaging planes and parameters were utilized for visualization of suspected pathology.  Images were performed without and with intravenous gadolinium.  FINDINGS:  There is complete disruption of the supraspinatus component of rotator cuff with proximal retraction of the torn fibers towards the acromial humeral space.  There is also complete disruption of the superior and middle fibers of the subscapularis component of the cuff.  The inferior fibers appear to remain at least partially intact.  A large subacromial/subdeltoid bursal collection is seen.  There is also a large joint effusion.  On the postcontrast images, there is thickened nodular synovial enhancement associated with the joint space as well as the bursa.  There is also an abnormal  fluid collection which extends from the inferior and anterior margin of the joint space and bursa into the overlying deltoid muscular soft tissues.  These findings extend into the anterior deltoid musculature.  There is significant edema and enhancement within the surrounding soft tissues.  These findings indicate changes of septic arthropathy and septic bursitis with associated developing abscess which extends into the overlying anterior deltoid musculature.  The fluid collection within the overlying soft tissues measures approximately 10.4 x 2.6 x 4.6 cm.  The inferior margin fluid collection extends beyond the field of view for this study.  There is also heterogeneous abnormal bone marrow edema and enhancement involving the proximal humerus.  These findings indicate changes of developing osteomyelitis.  No large associated cortical erosion or destruction is seen.  There is marked abnormal signal and morphology involving the proximal biceps tendon.  The findings suggest changes of tendinopathy and superimposed high-grade partial thickness tear.  There does not appear to be complete disruption or distal retraction.  The biceps anchor appears to remain at least partially intact.  There is also medial subluxation of the biceps tendon from the intertubercular sulcus.  There is abnormal signal involving the superior labrum extending to the posterior superior labrum.  Given the abnormality of the biceps tendon, the findings suggest changes of a slap type 4 tear.  There are underlying changes of mild osteoarthritis involving glenohumeral joint.  There is irregularity and abnormal signal involving the inferior glenohumeral ligament suggesting changes of partial injury.  There does not appear to be complete avulsion.  The changes are most pronounced associated with the posterior component of the inferior glenohumeral ligament.  There is also likely partial injury of the inferior joint capsule.  The acromioclavicular joint  is intact.  Mild hypertrophic age related changes are noted.  There is moderate to severe atrophy throughout the rotator cuff musculature.  There is also edema and enhancement which extends into the rotator cuff musculature suggesting spread of infection to involve the rotator cuff muscular soft tissues.      Impression:   1. Findings indicating changes of septic arthropathy of the glenohumeral joint with associated septic subacromial/subdeltoid bursitis.  There is marked edema and enhancement within the surrounding muscular soft tissues as well as overlying subcutaneous soft tissues suggesting surrounding infectious myositis and soft tissue cellulitis.  A large abscess extends into the anterior deltoid musculature and soft tissues at the anterior margin of the shoulder.  The abscess measures up to approximately 10.4 cm.  The abscess extends inferior to the field of view for this study.  There is also involvement of infection involving the rotator cuff muscular soft tissues. 2. Evidence for associated developing osteomyelitis involving the proximal humerus. 3. Large full-thickness tear involving the entirety of the supraspinatus component of the rotator cuff with partial retraction of the torn tendon.  There is also full-thickness tear involving the anterior and middle fibers of the subscapularis component.  The inferior fibers remain at least partially intact. 4. Evidence for tendinopathy and superimposed high-grade partial-thickness tear involving the biceps tendon.  There does not appear to be complete disruption or distal retraction.  There is also partial medial subluxation of the biceps tendon from the intertubercular sulcus. 5. Evidence for a slap type 4 tear of the labrum. 6. Evidence of partial tear of the inferior glenohumeral ligament with partial injury of the inferior joint capsule. 7. Mild osteoarthritis of the glenohumeral joint.  Mild age related changes of the acromioclavicular joint are noted.       NEREYDA STRICKLAND MD       Electronically Signed and Approved By: NEREYDA STRICKLAND MD on 7/13/2023 at 15:08             Duplex Venous Upper Extremity - Right CAR    Result Date: 7/11/2023  Narrative:   Normal right upper extremity venous duplex scan.     CT Upper Extremity Right With Contrast    Result Date: 7/17/2023  Narrative: PROCEDURE: CT UPPER EXTREMITY RIGHT W CONTRAST  COMPARISONS: 7/12/2023 (right shoulder MRI, w/o & w/); 7/6/2023 (right shoulder & right humerus x-rays).  INDICATIONS: Septic arthritis of the right shoulder.  PROTOCOL:   CT imaging protocol performed.    RADIATION:   Total DLP: 653.1 mGy*cm   MA and/or KV were/was adjusted to minimize radiation dose.    CONTRAST: 90 mL Isovue 370 I.V.  TECHNIQUE:  After obtaining the patient's consent, multi-planar CT images (880 CT images) of the right upper extremity, chest, abdomen, and pelvis were created with non-ionic intravenous contrast.  Please note that the study was performed as if it were a chest CT exam, off-centered, to include the right upper extremity.  Please note that portions of the left chest, left abdomen, and left pelvis are excluded from the field of view.  FINDINGS:  There is diffuse soft tissue prominence involving the right shoulder girdle and the right upper extremity with irregular areas of enhancement.  Enhancement is seen, especially anteromedial to the right glenohumeral joint and about the right scapula.  There may be fluid collections as with abscesses or phlegmons in these areas including those within intramuscular locations.  One of the larger intramuscular fluid collections or phlegmons involves the right-sided supraspinatus muscle, such as seen on image 19 of series 201, image 103 of series 202, and image 110 of series 203.  It measures 4.5 x 2.6 x 3.3 cm.  It has an estimated total volume a 31.3 mL.  It has a CT number of 10 Hounsfield units or less as measured on image 105 of series 202.  Similar findings were seen on the  recent right shoulder MRI study.  There is a percutaneous drainage catheter in place about the proximal right humerus.  This catheter does not drain the above-mentioned right supraspinatus low-density.  Fluid collection/phlegmon involving the lateral aspect of the right pectoralis major muscle and the right deltoid muscle is possible.  Ectopic gas collections are seen in these areas on approximately axial images 55 through 70 of series 201.  They may be related to the percutaneous drainage catheter being in place.  Gas associated with an infectious process cannot be excluded.  Nonspecific diffuse subcutaneous edema extends into the right upper extremity to the included distal right forearm.  This finding is nonspecific.  It may represent dependent edema.  Edema associated with infectious/inflammatory cellulitis is possible.  Edema associated chronic venous insufficiency or lymphatic obstruction cannot be excluded.  Please correlate clinically.  Please note that the entire right upper extremity is not included in the field of view.  Fluid collection or phlegmon related to the anterior aspect of the biceps brachii muscle on the right is possible measuring about 3.5 x 2.2 x 10.3 cm in transverse, anteroposterior, and craniocaudal extent, respectively, as seen on image 137 of series 201 and image 46 of series 203.  It has an estimated total volume of approximately 65.2 mL.  It has a CT number of approximately 18 Hounsfield units or slightly less, as seen on image 136 of series 201.  The aforementioned percutaneous drainage catheter is thought to the located within portions of this fluid collection.  Again focal fluid collections or phlegmons may involve the more distal right upper extremity.  There is a possible right elbow joint effusion.  Septic arthritis involving the right elbow joint cannot be excluded.  There are suspected loose bodies in the right elbow joint space.  Joint space narrowing is seen.  Enthesopathic  changes involve the right elbow, as well.  Again there are degenerative changes involving the right glenohumeral joint and the right acromioclavicular joint.  No definite acute fracture is seen.  No dislocation.  The right glenohumeral joint effusion is thought to be better appreciated on the recent MRI study.  No definite unintended retained foreign bodies are appreciated in these areas.  Additionally, mild subsegmental atelectasis involves the lungs.  No pleural effusion.  No pneumothorax.  There is chronic calcified granulomatous disease of the chest.  The imaged central tracheobronchial tree is well aerated without filling defect.  Diffuse hepatic steatosis is noted.  There may be hepatomegaly.  The maximum craniocaudal dimension of the liver is 19.7 cm.  Probably no splenomegaly.  The spleen is partially imaged.  There is a small to moderate hiatal hernia.  Nodular structures are related to the lower esophagus within the posterior mediastinum and may represent prominent lymph nodes.  Formed stool is seen throughout the colon.  There may be fecal stasis (or fecal retention) as with constipation.  Fecal impaction cannot be excluded.  There are colonic diverticula.  No acute diverticulitis.  No definite stercoral ulceration.  No pneumoperitoneum or pneumatosis.  No pericolonic or Perirectal fluid collections are seen to suggest abscess.  The urinary bladder is partially imaged and is underdistended.  The appendix is not clearly identified.  No acute pyelonephritis.  No renal stones or hydronephrosis.  No definite ureterolithiasis.  Probably no adrenal mass.  There is minimal nodularity of the adrenal glands, which maintain adreniform configuration.  No acute pancreatitis or cholecystitis.       Impression:   An extensive age-indeterminate, but probably subacute-to-chronic, infectious/inflammatory process is suspected involving the soft tissues of the right shoulder girdle and the right upper extremity, as detailed  above.  Focal areas of low attenuation are seen, which may represent abscesses or phlegmons, including those that are located intramuscularly.  There are enlarged reactive lymph nodes, especially involving the right axillary region.  Reactive right-sided epitrochlear lymph nodes are possible.  No definite suppurative or necrotic adenopathy is seen.  Septic arthritis, involving the right elbow, cannot be excluded.  A percutaneous drain is in place about the proximal right humerus, extending inferiorly, laterally, and anteriorly into the upper-to-mid right arm, as discussed.  Small foci of gas are seen along the anterolateral aspect of the right shoulder and proximal right.  Please see above comments for further detail.   Please note that portions of this note were completed with a voice recognition program.  KODI HERNANDEZ JR, MD       Electronically Signed and Approved By: KODI HERNANDEZ JR, MD on 7/17/2023 at 6:08             IR Inject/asp large joint or bursa    Result Date: 7/13/2023  Narrative: PROCEDURE: IR INJECT/ASP LARGE JOINT OR BURSA  COMPARISON: None  INDICATIONS: Right SHOULDER Joint effusion. 1 FLUORO IMAGE. 0.2mGy.  FLUORO TIME 0.1 MINUTES.  DIAGNOSTIC.  CONSENT:   Prior to the procedure, the risks, benefits and alternatives were thoroughly explained.  This included the risk of bleeding, infection, and injury to associated structures.  Also the risk of allergic reaction was explained and the possibility of doing the procedure without intrarticular contrast.  The patient expressed understanding and wished to continue.  Informed written consent was obtained.   PROCEDURE:   The skin overlying the anterior aspect of the right shoulder joint was prepped and draped in normal sterile fashion.  Lidocaine was injected along the anticipated tract of the needle.  A 20 gauge spinal needle was advanced into the fluid collection adjacent to the right shoulder.  Five cc of blood tinged cloudy purulent-appearing fluid  was aspirated.  The sample sent to the lab for analysis.  The needle was withdrawn. The patient demonstrated no complication.   Less than 0.1 minutes fluoroscopy time was utilized.       Impression:  Successful fluoroscopically guided aspiration of fluid collection adjacent to right shoulder.  5 cubic centimeters of cloudy purulent-appearing fluid was aspirated and sent to the lab for analysis.    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  White blood cell count 12.7, ESR 82, CRP normalized at 0.9.  We will continue to monitor with weekly labs.  We will continue his IV vancomycin.  We discussed shoulder pendulum exercises, elbow range of motion exercises, hand and wrist range of motion exercises.  We discussed concerning and signs and symptoms related to his incisions.  He may shower.  He should not soak or scrub the incisions.  Cover as needed for any drainage.  Monitor for increasing redness, swelling, drainage, fevers, chills.  1 week follow-up for reevaluation.          Call or return if worsening symptoms.    Scribed for Orville Gallegos MD by Joyce Husain  07/28/2023   10:47 EDT         Follow Up       1 week    Patient was given instructions and counseling regarding his condition or for health maintenance advice. Please see specific information pulled into the AVS if appropriate.       I have personally performed the services described in this document as scribed by the above individual and it is both accurate and complete.     Orville Gallegos MD  07/28/23  12:40 EDT

## 2023-07-31 ENCOUNTER — READMISSION MANAGEMENT (OUTPATIENT)
Dept: CALL CENTER | Facility: HOSPITAL | Age: 53
End: 2023-07-31
Payer: COMMERCIAL

## 2023-08-01 ENCOUNTER — TELEPHONE (OUTPATIENT)
Dept: ORTHOPEDIC SURGERY | Facility: CLINIC | Age: 53
End: 2023-08-01

## 2023-08-01 ENCOUNTER — HOSPITAL ENCOUNTER (OUTPATIENT)
Dept: INFUSION THERAPY | Facility: HOSPITAL | Age: 53
Discharge: HOME | End: 2023-08-01
Admitting: STUDENT IN AN ORGANIZED HEALTH CARE EDUCATION/TRAINING PROGRAM
Payer: COMMERCIAL

## 2023-08-01 ENCOUNTER — TELEPHONE (OUTPATIENT)
Dept: CARDIOLOGY | Facility: CLINIC | Age: 53
End: 2023-08-01
Payer: COMMERCIAL

## 2023-08-01 VITALS
HEART RATE: 82 BPM | SYSTOLIC BLOOD PRESSURE: 130 MMHG | OXYGEN SATURATION: 97 % | RESPIRATION RATE: 16 BRPM | DIASTOLIC BLOOD PRESSURE: 71 MMHG | TEMPERATURE: 98.4 F

## 2023-08-01 DIAGNOSIS — Z45.2 PICC (PERIPHERALLY INSERTED CENTRAL CATHETER) IN PLACE: Primary | ICD-10-CM

## 2023-08-01 DIAGNOSIS — M00.011 STAPHYLOCOCCAL ARTHRITIS OF RIGHT SHOULDER: Primary | ICD-10-CM

## 2023-08-01 PROCEDURE — G0463 HOSPITAL OUTPT CLINIC VISIT: HCPCS

## 2023-08-01 NOTE — TELEPHONE ENCOUNTER
Caller: GALLITO    Relationship:   MED ASSIST NURSE  Best call back number: [phone]    What orders are you requesting (i.e. lab or imaging): ORDER FOR [facility] TO CHECK PTS PIC LINE    In what timeframe would the patient need to come in: ASAP    Where will you receive your lab/imaging services: [facility]    Additional notes: NURSE HAS TRIED HER STERILIZING TECHNIQUES TO GET THE BLOOD TO RETURN BUT IT EITHER ISN'T OR IT IS REALLY SLUGGISH AND SHE CANNOT GET ANY FROM HIM. PT STILL HAS ANTIBIOTICS THAT NEED TO BE TAKEN. CAN BE SEEN AT [facility]

## 2023-08-01 NOTE — TELEPHONE ENCOUNTER
APPOINTMENT FOR PICC FLUSH WAS GIVEN FOR 2PM TODAY.  ORDER FOR CATHFLO ADDED PER DR ROYAL ADDED TO ORDERS.  PATIENT WAS CONTACTED TO GO TO [facility] BY GALLITO.

## 2023-08-01 NOTE — TELEPHONE ENCOUNTER
I HAVE CALLED AND SPOKEN WITH GALLITO.  I HAVE PLACED AN ORDER FOR [facility] IV TEAM TO ASSESS THE PICC.  I AM WAITING FOR THE HUB TO CALL ME BACK WITH APPOINTMENT.

## 2023-08-02 ENCOUNTER — OFFICE VISIT (OUTPATIENT)
Dept: ORTHOPEDIC SURGERY | Facility: CLINIC | Age: 53
End: 2023-08-02
Payer: COMMERCIAL

## 2023-08-02 ENCOUNTER — OFFICE VISIT (OUTPATIENT)
Dept: CARDIOLOGY | Facility: CLINIC | Age: 53
End: 2023-08-02
Payer: COMMERCIAL

## 2023-08-02 VITALS
BODY MASS INDEX: 25.62 KG/M2 | DIASTOLIC BLOOD PRESSURE: 87 MMHG | HEART RATE: 92 BPM | WEIGHT: 179 LBS | OXYGEN SATURATION: 95 % | SYSTOLIC BLOOD PRESSURE: 119 MMHG | HEIGHT: 70 IN

## 2023-08-02 VITALS
BODY MASS INDEX: 26.23 KG/M2 | SYSTOLIC BLOOD PRESSURE: 123 MMHG | HEART RATE: 89 BPM | HEIGHT: 70 IN | DIASTOLIC BLOOD PRESSURE: 91 MMHG | WEIGHT: 183.2 LBS

## 2023-08-02 DIAGNOSIS — M00.011 STAPHYLOCOCCAL ARTHRITIS OF RIGHT SHOULDER: Primary | ICD-10-CM

## 2023-08-02 DIAGNOSIS — M00.011 STAPHYLOCOCCAL ARTHRITIS OF RIGHT SHOULDER: ICD-10-CM

## 2023-08-02 DIAGNOSIS — I50.22 CHRONIC HFREF (HEART FAILURE WITH REDUCED EJECTION FRACTION): Primary | ICD-10-CM

## 2023-08-02 RX ORDER — CARVEDILOL 6.25 MG/1
6.25 TABLET ORAL EVERY 12 HOURS SCHEDULED
Qty: 180 TABLET | Refills: 3 | Status: SHIPPED | OUTPATIENT
Start: 2023-08-02

## 2023-08-02 RX ORDER — SACUBITRIL AND VALSARTAN 49; 51 MG/1; MG/1
1 TABLET, FILM COATED ORAL 2 TIMES DAILY
Qty: 180 TABLET | Refills: 3 | Status: SHIPPED | OUTPATIENT
Start: 2023-08-02

## 2023-08-03 ENCOUNTER — TELEPHONE (OUTPATIENT)
Dept: ORTHOPEDIC SURGERY | Facility: CLINIC | Age: 53
End: 2023-08-03
Payer: COMMERCIAL

## 2023-08-03 DIAGNOSIS — M00.011 STAPHYLOCOCCAL ARTHRITIS OF RIGHT SHOULDER: Primary | ICD-10-CM

## 2023-08-10 ENCOUNTER — TELEPHONE (OUTPATIENT)
Dept: ORTHOPEDIC SURGERY | Facility: CLINIC | Age: 53
End: 2023-08-10
Payer: COMMERCIAL

## 2023-08-10 DIAGNOSIS — M00.011 STAPHYLOCOCCAL ARTHRITIS OF RIGHT SHOULDER: Primary | ICD-10-CM

## 2023-08-10 RX ORDER — HYDROCODONE BITARTRATE AND ACETAMINOPHEN 5; 325 MG/1; MG/1
1 TABLET ORAL EVERY 6 HOURS PRN
Qty: 30 TABLET | Refills: 0 | Status: SHIPPED | OUTPATIENT
Start: 2023-08-10

## 2023-08-15 ENCOUNTER — TELEPHONE (OUTPATIENT)
Dept: CARDIOLOGY | Facility: CLINIC | Age: 53
End: 2023-08-15
Payer: COMMERCIAL

## 2023-08-15 NOTE — TELEPHONE ENCOUNTER
Left a voicemail for patient to return call to office if needing to reschedule/ cancel appointment. Reminded patient to bring all medications with them to their appointment as well as to get labs drawn before appointment.

## 2023-08-16 ENCOUNTER — OFFICE VISIT (OUTPATIENT)
Dept: CARDIOLOGY | Facility: CLINIC | Age: 53
End: 2023-08-16
Payer: COMMERCIAL

## 2023-08-16 ENCOUNTER — TELEPHONE (OUTPATIENT)
Dept: CARDIOLOGY | Facility: CLINIC | Age: 53
End: 2023-08-16

## 2023-08-16 VITALS
BODY MASS INDEX: 26.69 KG/M2 | HEART RATE: 93 BPM | HEIGHT: 70 IN | SYSTOLIC BLOOD PRESSURE: 125 MMHG | WEIGHT: 186.4 LBS | DIASTOLIC BLOOD PRESSURE: 93 MMHG

## 2023-08-16 DIAGNOSIS — M00.011 STAPHYLOCOCCAL ARTHRITIS OF RIGHT SHOULDER: ICD-10-CM

## 2023-08-16 DIAGNOSIS — M00.011 STAPHYLOCOCCAL ARTHRITIS OF RIGHT SHOULDER: Primary | ICD-10-CM

## 2023-08-16 DIAGNOSIS — I50.22 CHRONIC HFREF (HEART FAILURE WITH REDUCED EJECTION FRACTION): Primary | ICD-10-CM

## 2023-08-16 RX ORDER — SACUBITRIL AND VALSARTAN 97; 103 MG/1; MG/1
1 TABLET, FILM COATED ORAL 2 TIMES DAILY
Qty: 180 TABLET | Refills: 3 | Status: SHIPPED | OUTPATIENT
Start: 2023-08-16

## 2023-08-16 RX ORDER — CARVEDILOL 12.5 MG/1
12.5 TABLET ORAL EVERY 12 HOURS SCHEDULED
Qty: 180 TABLET | Refills: 3 | Status: SHIPPED | OUTPATIENT
Start: 2023-08-16

## 2023-08-16 RX ORDER — MULTIPLE VITAMINS W/ MINERALS TAB 9MG-400MCG
1 TAB ORAL DAILY
COMMUNITY

## 2023-08-16 RX ORDER — TRAMADOL HYDROCHLORIDE 50 MG/1
50 TABLET ORAL EVERY 4 HOURS PRN
Qty: 30 TABLET | Refills: 0 | Status: SHIPPED | OUTPATIENT
Start: 2023-08-16

## 2023-08-16 NOTE — TELEPHONE ENCOUNTER
Called DR. Pederson office, they told me Patient now sees Marilyn ORDOÑEZ.  I Spoke With Marilyn Eugene, she recommended we try Tramadol.

## 2023-08-16 NOTE — ASSESSMENT & PLAN NOTE
Patient has chronic heart failure with severely reduced ejection fraction.  He denies any symptoms other than fatigue.  He does not appear to have any pedal edema.  His heart rate is better although it remains elevated in the 90s and low 100s, with a systolic blood pressure averaging around 115.  I will make the following changes to optimize his heart failure medications:    1.  Increase carvedilol 6.25, take 1 tablet in the morning and 2 at night for 1 week followed by 2 tablets twice a day until current supply is complete.  Then the new prescription will be for 12.5 mg twice daily.  2.  Increase Entresto 49/51 to 1 tablet in the morning and 2 at night for 1 week then followed by 2 and 2.  New prescription will be for 97/103, take 1 tablet twice daily.    3.  Continue all other medication as prescribed.  4.  Continue with heart rate blood pressure and daily weight log.  5.  Do blood work 1 to 2 days prior to next visit.

## 2023-08-16 NOTE — TELEPHONE ENCOUNTER
[facility] received a fax that requires your attention. The document has been indexed to the patient’s chart for your review.      Reason for sending: EXTERNAL MEDICAL RECORD NOTIFICATION     Documents Description: [facility] PAPERWORK-8.16.23    Name of Sender: NANCI PATEL (SPOUSE)     Date Indexed: 8.16.23    NOTES: PT WIFE WANTING A COPY AND CALL ONCE COMPLETED

## 2023-08-16 NOTE — TELEPHONE ENCOUNTER
Spoke with patients wife to let her know Marilyn Eugene recommended Tramadol. Script was called into patients pharmacy.

## 2023-08-16 NOTE — ASSESSMENT & PLAN NOTE
Patient still has significant discomfort of his right shoulder with some edema of his right hand.  Patient states that the edema has gone down quite a bit.  Fingers are warm to the touch radial pulses strong.  He continues to be treated with vancomycin.  I have advised him to follow-up with Ortho in regards to the edema of the right hand.  Once the infection is treated we may consider a cardiac cath due to prior chest pain.

## 2023-08-16 NOTE — PATIENT INSTRUCTIONS
1.  Increase carvedilol 6.25, take 1 tablet in the morning and 2 at night for 1 week followed by 2 tablets twice a day until current supply is complete.  Then the new prescription will be for 12.5 mg twice daily.  2.  Increase Entresto 49/51 to 1 tablet in the morning and 2 at night for 1 week then followed by 2 and 2.  New prescription will be for 97/103, take 1 tablet twice daily.    3.  Continue all other medication as prescribed.  4.  Continue with heart rate blood pressure and daily weight log.  5.  Do blood work 1 to 2 days prior to next visit.

## 2023-08-16 NOTE — TELEPHONE ENCOUNTER
WIFE CAME INTO OFFICE TO REQUEST A NEW PAIN MED RX (ORIGINAL ORDER FOR HYDROCODONE 08/10/23)    PER HIS CARDIOLOGIST, KAY. NEEDS TO BE ON MEDS THAT DO NOT CONTAIN TYLENOL/ACETAMINOPHEN    NEW RX CAN BE SENT TO PHARMACY ON FILE, [facility]

## 2023-08-16 NOTE — PROGRESS NOTES
"Office Visit    Chief Complaint  Chronic HFrEF (heart failure with reduced ejection fraction)    Subjective            Yfn Ray presents to [facility]  History of Present Illness  Mr. Ceja is a 52-year-old male that presented to the office today for follow-up due to chronic heart failure with reduced ejection fraction.  He continues to be treated with IV vancomycin for MRSA for septic arthritis of the right shoulder.  Patient is accompanied by his wife at today's visit.  Patient reports that he continues to feel better although he is still experiencing fatigue.  Patient denies any short of air but wife reports that he appears to breathe harder with activity than he has before his injury.  He denies any chest pain, edema, dizziness, palpitations or short of air.    Past Medical History:   Diagnosis Date    Alpha 1-antitrypsin PiMS phenotype     \"alpha 1\" per pt and wife. unsure of what type.    Arthritis        No Known Allergies     Past Surgical History:   Procedure Laterality Date    LIVER BIOPSY      SHOULDER ARTHROSCOPY Right 7/14/2023    Procedure: SHOULDER ARTHROSCOPY WITH WASHOUT AND DEBRIDMENT;  Surgeon: Orville Gallegos MD;  Location: [facility] OR [facility];  Service: Orthopedics;  Laterality: Right;    TOOTH EXTRACTION          Social History     Tobacco Use    Smoking status: Never    Smokeless tobacco: Never   Vaping Use    Vaping Use: Never used   Substance Use Topics    Alcohol use: Never    Drug use: Never       Family History   Problem Relation Age of Onset    Malig Hyperthermia Neg Hx         Prior to Admission medications    Medication Sig Start Date End Date Taking? Authorizing Provider   aspirin 325 MG EC tablet Take 1 tablet by mouth Daily for 30 days. 7/21/23 8/20/23  Orville Gallegos MD   carvedilol (COREG) 6.25 MG tablet Take 1 tablet by mouth Every 12 (Twelve) Hours. 8/2/23   Pema Hector MD   empagliflozin (JARDIANCE) 10 MG tablet tablet Take 1 tablet by mouth " "Daily for 30 days. 7/22/23 8/21/23  Jae Andrews MD   HYDROcodone-acetaminophen (NORCO) 5-325 MG per tablet Take 1 tablet by mouth Every 6 (Six) Hours As Needed for Moderate Pain. 8/10/23   Orville Gallegos MD   meclizine (ANTIVERT) 25 MG tablet Take 1 tablet by mouth 3 (Three) Times a Day As Needed for Dizziness.    Provider, MD Rubia   naloxone (NARCAN) 4 MG/0.1ML nasal spray Call 911. Don't prime. [place] in 1 nostril for overdose. Repeat in 2-3 minutes in other nostril if no or minimal breathing/responsiveness. 7/21/23   Orville Gallegos MD   oxyCODONE-acetaminophen (PERCOCET) 5-325 MG per tablet Take 1 tablet by mouth Every 4 (Four) Hours As Needed for Moderate Pain or Severe Pain. 7/21/23   Orville Gallegos MD   sacubitril-valsartan (Entresto) 49-51 MG tablet Take 1 tablet by mouth 2 (Two) Times a Day. 8/2/23   Pema Hector MD   sennosides-docusate (PERICOLACE) 8.6-50 MG per tablet Take 2 tablets by mouth 2 (Two) Times a Day. 7/21/23   Orville Gallegos MD   spironolactone (ALDACTONE) 25 MG tablet Take 1 tablet by mouth Daily for 30 days. 7/25/23 8/24/23  Jae Andrews MD   Vancomycin HCl-Dextrose (PHARMACY TO DOSE VANCOMYCIN) Continuous As Needed (Right shoulder MRSA septic arthritis and bacteremia) for up to 26 days. Indications: Infection with Dysregulated Inflammatory Response 7/21/23 8/16/23  Jae Andrews MD        Review of Systems   Constitutional:  Negative for fatigue.   Respiratory:  Negative for cough and shortness of breath.    Cardiovascular:  Negative for chest pain, palpitations and leg swelling.   Neurological:  Negative for dizziness.      Symptom Course: Been Stable    Weight Trend: Stable     Objective     /93   Pulse 93   Ht 177.8 cm (70\")   Wt 84.6 kg (186 lb 6.4 oz)   BMI 26.75 kg/mý       Physical Exam  Constitutional:       General: He is awake.      Appearance: Normal appearance.   Neck:      Thyroid: No thyromegaly.      Vascular: No carotid bruit or JVD. "   Cardiovascular:      Rate and Rhythm: Normal rate and regular rhythm.      Chest Wall: PMI is not displaced.      Pulses: Normal pulses.      Heart sounds: Normal heart sounds, S1 normal and S2 normal. No murmur heard.    No friction rub. No gallop. No S3 or S4 sounds.   Pulmonary:      Effort: Pulmonary effort is normal.      Breath sounds: Normal breath sounds and air entry. No wheezing, rhonchi or rales.   Abdominal:      General: Bowel sounds are normal.      Palpations: Abdomen is soft. There is no mass.      Tenderness: There is no abdominal tenderness.   Musculoskeletal:      Cervical back: Neck supple.      Right lower leg: No edema.      Left lower leg: No edema.   Neurological:      Mental Status: He is alert and oriented to person, place, and time.   Psychiatric:         Mood and Affect: Mood normal.         Behavior: Behavior is cooperative.         Result Review :                    ECG 12 Lead    Date/Time: 8/16/2023 12:04 PM  Performed by: Mira Triplett APRN  Authorized by: Mira Triplett APRN   Comments: Sinus rhythm, borderline left axis deviation, borderline T wave abnormality.  EKG is similar to prior EKG with a slower rate.       Lab Results   Component Value Date    PROBNP 786.8 07/24/2023    PROBNP 2,809.0 (H) 07/14/2023     CMP          7/21/2023    06:05 7/22/2023    04:48 7/24/2023    05:55   CMP   Glucose 102  140  136    BUN 16  20  13    Creatinine 0.75  0.85  0.72    EGFR 108.6  104.6  109.9    Sodium 131  133  137    Potassium 4.8  4.5  4.2    Chloride 99  101  102    Calcium 8.3  8.4  8.4    Total Protein  7.1     Albumin  2.6     Globulin  4.5     Total Bilirubin  0.2     Alkaline Phosphatase  304     AST (SGOT)  68     ALT (SGPT)  68     Albumin/Globulin Ratio  0.6     BUN/Creatinine Ratio 21.3  23.5  18.1    Anion Gap 7.4  9.4  10.0      CBC w/diff          7/22/2023    04:48 7/23/2023    05:46 7/24/2023    05:55   CBC w/Diff   WBC 18.81  14.18  11.89    RBC 3.29  3.20   3.09    Hemoglobin 10.4  10.0  9.8    Hematocrit 32.0  31.2  30.5    MCV 97.3  97.5  98.7    MCH 31.6  31.3  31.7    MCHC 32.5  32.1  32.1    RDW 14.1  14.0  14.0    Platelets 869  824  780    Neutrophil Rel % 77.5  67.1  65.1    Immature Granulocyte Rel % 1.6  1.8  0.9    Lymphocyte Rel % 11.2  17.9  20.2    Monocyte Rel % 7.4  10.5  10.0    Eosinophil Rel % 1.4  1.9  2.4    Basophil Rel % 0.9  0.8  1.4          No results found for: TSH   No results found for: FREET4   No results found for: DDIMERQUANT  Magnesium   Date Value Ref Range Status   07/18/2023 2.1 1.6 - 2.6 mg/dL Final      No results found for: DIGOXIN          Results for orders placed during the hospital encounter of 07/10/23    Adult Transthoracic Echo Complete W/ Cont if Necessary Per Protocol    Interpretation Summary    Left ventricular systolic function is severely decreased. Left ventricular ejection fraction appears to be less than 20%.    The left ventricular cavity is moderately dilated.    Left ventricular diastolic function is consistent with (grade I) impaired relaxation.    The left atrial cavity is mildly dilated.    Abnormal mitral valve structure consistent with dilated annulus.    Mild mitral regurgitation    No overt evidence of cardioembolic source by TTE    No prior echo for comparison         Assessment and Plan        Diagnoses and all orders for this visit:    1. Chronic HFrEF (heart failure with reduced ejection fraction) (Primary)  Assessment & Plan:  Patient has chronic heart failure with severely reduced ejection fraction.  He denies any symptoms other than fatigue.  He does not appear to have any pedal edema.  His heart rate is better although it remains elevated in the 90s and low 100s, with a systolic blood pressure averaging around 115.  I will make the following changes to optimize his heart failure medications:    1.  Increase carvedilol 6.25, take 1 tablet in the morning and 2 at night for 1 week followed by 2  tablets twice a day until current supply is complete.  Then the new prescription will be for 12.5 mg twice daily.  2.  Increase Entresto 49/51 to 1 tablet in the morning and 2 at night for 1 week then followed by 2 and 2.  New prescription will be for 97/103, take 1 tablet twice daily.    3.  Continue all other medication as prescribed.  4.  Continue with heart rate blood pressure and daily weight log.  5.  Do blood work 1 to 2 days prior to next visit.    Orders:  -     Lipid Panel; Future  -     CBC & Differential; Future  -     Comprehensive Metabolic Panel; Future  -     proBNP; Future  -     Magnesium; Future    2. Staphylococcal arthritis of right shoulder  Assessment & Plan:  Patient still has significant discomfort of his right shoulder with some edema of his right hand.  Patient states that the edema has gone down quite a bit.  Fingers are warm to the touch radial pulses strong.  He continues to be treated with vancomycin.  I have advised him to follow-up with Ortho in regards to the edema of the right hand.  Once the infection is treated we may consider a cardiac cath due to prior chest pain.      Other orders  -     carvedilol (COREG) 12.5 MG tablet; Take 1 tablet by mouth Every 12 (Twelve) Hours.  Dispense: 180 tablet; Refill: 3  -     empagliflozin (JARDIANCE) 10 MG tablet tablet; Take 1 tablet by mouth Daily for 30 days.  Dispense: 30 tablet; Refill: 0  -     sacubitril-valsartan (Entresto)  MG tablet; Take 1 tablet by mouth 2 (Two) Times a Day.  Dispense: 180 tablet; Refill: 3  -     ECG 12 Lead            Follow Up     No follow-ups on file.    Patient was given instructions and counseling regarding his condition or for health maintenance advice. Please see specific information pulled into the AVS if appropriate.     Electronically signed by SMITA Pina, 08/16/23, 12:05 PM EDT.

## 2023-08-17 ENCOUNTER — TELEPHONE (OUTPATIENT)
Dept: CARDIOLOGY | Facility: CLINIC | Age: 53
End: 2023-08-17
Payer: COMMERCIAL

## 2023-08-17 RX ORDER — SPIRONOLACTONE 25 MG/1
25 TABLET ORAL DAILY
Qty: 30 TABLET | Refills: 3 | Status: SHIPPED | OUTPATIENT
Start: 2023-08-17 | End: 2023-09-16

## 2023-08-17 NOTE — TELEPHONE ENCOUNTER
Wife called stating that spironolactone needs a refill.  Chart review done, medication rx sent to pharmacy.

## 2023-08-23 ENCOUNTER — OFFICE VISIT (OUTPATIENT)
Dept: ORTHOPEDIC SURGERY | Facility: CLINIC | Age: 53
End: 2023-08-23
Payer: COMMERCIAL

## 2023-08-23 ENCOUNTER — HOSPITAL ENCOUNTER (OUTPATIENT)
Dept: INFUSION THERAPY | Facility: HOSPITAL | Age: 53
Discharge: HOME | End: 2023-08-23
Admitting: STUDENT IN AN ORGANIZED HEALTH CARE EDUCATION/TRAINING PROGRAM
Payer: COMMERCIAL

## 2023-08-23 ENCOUNTER — TELEPHONE (OUTPATIENT)
Dept: ORTHOPEDIC SURGERY | Facility: CLINIC | Age: 53
End: 2023-08-23

## 2023-08-23 VITALS
BODY MASS INDEX: 26.63 KG/M2 | WEIGHT: 186 LBS | OXYGEN SATURATION: 95 % | DIASTOLIC BLOOD PRESSURE: 81 MMHG | HEART RATE: 87 BPM | SYSTOLIC BLOOD PRESSURE: 121 MMHG | HEIGHT: 70 IN

## 2023-08-23 VITALS
TEMPERATURE: 97.6 F | SYSTOLIC BLOOD PRESSURE: 106 MMHG | DIASTOLIC BLOOD PRESSURE: 67 MMHG | RESPIRATION RATE: 18 BRPM | OXYGEN SATURATION: 100 % | HEART RATE: 85 BPM

## 2023-08-23 DIAGNOSIS — M00.011 STAPHYLOCOCCAL ARTHRITIS OF RIGHT SHOULDER: Primary | ICD-10-CM

## 2023-08-23 DIAGNOSIS — M25.511 RIGHT SHOULDER PAIN, UNSPECIFIED CHRONICITY: ICD-10-CM

## 2023-08-23 PROCEDURE — 25010000002 ALTEPLASE 2 MG RECONSTITUTED SOLUTION: Performed by: STUDENT IN AN ORGANIZED HEALTH CARE EDUCATION/TRAINING PROGRAM

## 2023-08-23 PROCEDURE — 36593 DECLOT VASCULAR DEVICE: CPT

## 2023-08-23 PROCEDURE — 99024 POSTOP FOLLOW-UP VISIT: CPT | Performed by: PHYSICIAN ASSISTANT

## 2023-08-23 RX ORDER — VANCOMYCIN 1.5 G/300ML
1500 INJECTION, SOLUTION INTRAVENOUS EVERY 12 HOURS SCHEDULED
COMMUNITY

## 2023-08-23 RX ADMIN — ALTEPLASE: 2.2 INJECTION, POWDER, LYOPHILIZED, FOR SOLUTION INTRAVENOUS at 12:28

## 2023-08-23 NOTE — TELEPHONE ENCOUNTER
PER STACEY PINEDA ORDERS WERE PUT INTO COMPUTER FOR PATIENT TO GO TO [facility] TODAY AND HAVE PICC LINE FLUSH.  THA IV NURSE HELPED WITH SCHEDULING.  ADDITIONALLY, EDUARDO AT [facility] WAS CALLED AND VERBAL ORDER FOR CONTINUED IV ANTIBIOTICS WERE GIVEN AND HEPARIN AND SALINE FLUSHES FOR 2 MORE WEEKS.  SHE STATES SHE WILL INFORM [facility].  I CALLED AND SPOKE WITH BENY FOR CORIE CONTI TO CONTINUE HOME VISITS FOR 2 MORE WEEKS.  PATIENT AND DARCI'S WIFE VOICED UNDERSTANDING WITH ORDERS AND CARE.  VOICED UNDERSTANDING TO CALL THE OFFICE WITH ADDITIONAL ISSUES OR CONCERNS.

## 2023-08-23 NOTE — SIGNIFICANT NOTE
Pt here for PICC eval.  PICC is sluggish with slight blood return.  Pt was just here 2-3 weeks ago with the same problem.  Cathflo initiated to help return brisk blood flow and flush.    Dressing changed per pts request.  Current dressing was intact but pt states Home Health dressing does not stay in place well and PICC line has again been directed towards AC.  PICC tail repositioned to outer arm.

## 2023-08-23 NOTE — PROGRESS NOTES
"Chief Complaint  Follow-up of the Right Shoulder    Subjective          Yfn Ray presents to [facility]   History of Present Illness    Yfn Ray presents today for a follow-up of his right shoulder.  Patient is 6 weeks postop right shoulder arthroscopy with washout and debridement performed on 7/14/2023.  Patient had multiple drains placed and did have an anterior incision made to address a deltopectoral interval abscess. Today, patient reports pain to his shoulder.  He states that his incision continues to heal well with no active drainage.  He has been attending physical therapy but still experiences weakness and stiffness to his shoulder.  He is currently on IV vancomycin.  Patient's spouse explains that his pharmacist called and told him to hold off on the vancomycin until 8/24/2023. Patient had high Vancomycin Trough levels on his recent labs.       No Known Allergies     Social History     Socioeconomic History    Marital status:    Tobacco Use    Smoking status: Never    Smokeless tobacco: Never   Vaping Use    Vaping Use: Never used   Substance and Sexual Activity    Alcohol use: Never    Drug use: Never    Sexual activity: Defer        I reviewed the patient's chief complaint, history of present illness, review of systems, past medical history, surgical history, family history, social history, medications, and allergy list.     REVIEW OF SYSTEMS    Constitutional: Denies fevers, chills, weight loss  Cardiovascular: Denies chest pain, shortness of breath  Skin: Denies rashes, acute skin changes  Neurologic: Denies headache, loss of consciousness  MSK: Right shoulder pain      Objective   Vital Signs:   /81   Pulse 87   Ht 177.8 cm (70\")   Wt 84.4 kg (186 lb)   SpO2 95%   BMI 26.69 kg/mý     Body mass index is 26.69 kg/mý.    Physical Exam    General: Alert. No acute distress.   Right upper extremity: Incision is well healed. No active drainage or " redness. No concerning signs for infection to the incision. Limited glenohumeral joint range of motion. Unable to perform active forward elevation. Elbow range of motion intact with associated stiffness. Upper arm soft. Forearm soft. Active finger and wrist range of motion. Thumb opposition intact. Palmar abduction of the thumb intact. Sensation intact to the axillary, median, radial, and ulnar nerve distributions. Palpable radial pulse.     Procedures    Imaging Results (Most Recent)       Procedure Component Value Units Date/Time    XR Shoulder 2+ View Right [[id number]] Resulted: 08/24/23 1341     Updated: 08/24/23 1342    Narrative:      Indications: Right shoulder pain    Views: AP, Grashey, scapular Y right shoulder    Findings: No fractures or dislocations.  Glenohumeral joint is reduced.    Comparative Data: Comparative data found and reviewed today.                   Assessment and Plan    Diagnoses and all orders for this visit:    1. Staphylococcal arthritis of right shoulder (Primary)  -     Vancomycin, Trough; Future  -     IR PICC Line Flush; Future  -     alteplase (CATHFLO/ACTIVASE) injection 2 mg  -     Ambulatory Referral to [facility] For Infusion Treatment  -     Vancomycin, Trough; Standing    2. Right shoulder pain, unspecified chronicity  -     XR Shoulder 2+ View Right    Other orders  -     SCANNED - LABS        Yfn Flor presents today 6 weeks postop right shoulder arthroscopy with irrigation and debridement performed on 7/14/2023.  X-rays reviewed today.  Incision is well healed with no concerning signs for infection. PICC line in place on the left upper extremity.  An order was placed for PICC line flush.  Patient resume IV antibiotics on 8/24/2023.  Patient will continue with 2 infusions/day but decrease dose in half.  We will obtain new vancomycin trough levels beginning on 8/25/2023 and continuing these daily for minimum of 6 days.  Lab tests were ordered today. Continue with formal  physical therapy and home exercises. We will continue to monitor and make changes to the treatment plan accordingly. Patient will continue with weekly infectious labs.       Patient will follow up in 2 weeks for reevaluation.  We will obtain new x-ays of the right shoulder at next visit.       Call or return if symptoms worsen or patient has any concerns.       Follow Up   Return in about 2 weeks (around 9/6/2023).  Patient was given instructions and counseling regarding his condition or for health maintenance advice. Please see specific information pulled into the AVS if appropriate.     Tamar Abreu PA-C  08/24/23  14:27 EDT

## 2023-08-28 ENCOUNTER — TELEPHONE (OUTPATIENT)
Dept: ORTHOPEDIC SURGERY | Facility: CLINIC | Age: 53
End: 2023-08-28
Payer: COMMERCIAL

## 2023-08-28 NOTE — TELEPHONE ENCOUNTER
[facility] CALLED TO VERIFY DOSAGE OF VANCOMYCIN. FRIDAY PATIENT WAS CHANGED TO 75OMG FROM 1500MG. PER DR. ROYAL HE WOULD LIKE TO CONTINUE PATIENT ON VANCOMYCIN 750MG AND MONITOR TROUGH LEVELS UNTIL PATIENT STABILIZES.

## 2023-08-29 ENCOUNTER — TELEPHONE (OUTPATIENT)
Dept: CARDIOLOGY | Facility: CLINIC | Age: 53
End: 2023-08-29
Payer: COMMERCIAL

## 2023-08-29 NOTE — TELEPHONE ENCOUNTER
Spoke to patient to remind them of appointment also, Reminded patient to bring all medications with them to there appointment.

## 2023-08-29 NOTE — TELEPHONE ENCOUNTER
Spoke to patient to remind them of appointment also, Reminded patient to bring all medications with them to there appointment.  Patient home health is going to draw labs. Results will be with [facility].

## 2023-08-30 ENCOUNTER — OFFICE VISIT (OUTPATIENT)
Dept: CARDIOLOGY | Facility: CLINIC | Age: 53
End: 2023-08-30
Payer: COMMERCIAL

## 2023-08-30 VITALS
HEIGHT: 70 IN | SYSTOLIC BLOOD PRESSURE: 118 MMHG | BODY MASS INDEX: 26.63 KG/M2 | DIASTOLIC BLOOD PRESSURE: 60 MMHG | WEIGHT: 186 LBS | HEART RATE: 86 BPM

## 2023-08-30 DIAGNOSIS — I50.22 CHRONIC HFREF (HEART FAILURE WITH REDUCED EJECTION FRACTION): Primary | ICD-10-CM

## 2023-08-30 DIAGNOSIS — M00.011 STAPHYLOCOCCAL ARTHRITIS OF RIGHT SHOULDER: ICD-10-CM

## 2023-08-30 RX ORDER — CARVEDILOL 25 MG/1
25 TABLET ORAL EVERY 12 HOURS SCHEDULED
Qty: 180 TABLET | Refills: 3 | Status: SHIPPED | OUTPATIENT
Start: 2023-08-30

## 2023-08-30 RX ORDER — ASPIRIN 325 MG
325 TABLET, DELAYED RELEASE (ENTERIC COATED) ORAL EVERY 6 HOURS PRN
COMMUNITY

## 2023-08-30 NOTE — PROGRESS NOTES
"Office Visit    Chief Complaint  Chronic HFrEF    Subjective            Yfn Ray presents to [facility] CARDIOLOGY  History of Present Illness  Yfn is a 52 years old gentleman with chronic heart failure with reduced ejection fraction who is doing a lot better.  His shortness of breath is almost resolved.  His pedal edema is resolved.  He denies chest pain palpitation dizziness syncope.  He is tolerating the medications     Past Medical History:   Diagnosis Date    Alpha 1-antitrypsin PiMS phenotype     \"alpha 1\" per pt and wife. unsure of what type.    Arthritis        No Known Allergies     Past Surgical History:   Procedure Laterality Date    LIVER BIOPSY      SHOULDER ARTHROSCOPY Right 7/14/2023    Procedure: SHOULDER ARTHROSCOPY WITH WASHOUT AND DEBRIDMENT;  Surgeon: Orville Gallegos MD;  [place] [facility] [place] [facility] [place]  Service: [place]opedics;  Laterality: Right;    TOOTH EXTRACTION          Social History     Tobacco Use    Smoking status: Never    Smokeless tobacco: Never   Vaping Use    Vaping Use: Never used   Substance Use Topics    Alcohol use: Never    Drug use: Never       Family History   Problem Relation Age of Onset    Malig Hyperthermia Neg Hx         Prior to Admission medications    Medication Sig Start Date End Date Taking? Authorizing Provider   carvedilol (COREG) 12.5 MG tablet Take 1 tablet by mouth Every 12 (Twelve) Hours. 8/16/23   Mira Triplett APRN   empagliflozin (JARDIANCE) 10 MG tablet tablet Take 1 tablet by mouth Daily for 30 days. 8/16/23 9/15/23  Mira Triplett APRN   HYDROcodone-acetaminophen (NORCO) 5-325 MG per tablet Take 1 tablet by mouth Every 6 (Six) Hours As Needed for Moderate Pain.  Patient not taking: Reported on 8/23/2023 8/10/23   Orville Gallegos MD   meclizine (ANTIVERT) 25 MG tablet Take 1 tablet by mouth 3 (Three) Times a Day As Needed for Dizziness.  Patient not taking: Reported on 8/16/2023    Provider, MD Rubia   multivitamin with minerals " "tablet tablet Take 1 tablet by mouth Daily.    ProviderRubia MD   naloxone (NARCAN) 4 MG/0.1ML nasal spray Call 911. Don't prime. [place] in 1 nostril for overdose. Repeat in 2-3 minutes in other nostril if no or minimal breathing/responsiveness.  Patient not taking: Reported on 8/23/2023 7/21/23   Orville Gallegos MD   sacubitril-valsartan (Entresto)  MG tablet Take 1 tablet by mouth 2 (Two) Times a Day. 8/16/23   Mira Triplett APRN   sennosides-docusate (PERICOLACE) 8.6-50 MG per tablet Take 2 tablets by mouth 2 (Two) Times a Day.  Patient not taking: Reported on 8/23/2023 7/21/23   Orville Gallegos MD   spironolactone (ALDACTONE) 25 MG tablet Take 1 tablet by mouth Daily for 30 days. 8/17/23 9/16/23  Mira Triplett APRN   traMADol (ULTRAM) 50 MG tablet Take 1 tablet by mouth Every 4 (Four) Hours As Needed for Moderate Pain.  Patient not taking: Reported on 8/23/2023 8/16/23   Orville Gallegos MD   vancomycin 1500 mg/300 mL 0.9% NS IVPB Infuse 300 mL into a venous catheter Every 12 (Twelve) Hours.  Patient not taking: Reported on 8/23/2023    Provider, MD Rubia        Review of Systems   Constitutional:  Negative for fatigue.   Respiratory:  Negative for cough and shortness of breath.    Cardiovascular:  Negative for chest pain, palpitations and leg swelling.   Neurological:  Negative for dizziness.      Symptom Course: Improved    Weight Trend: Stable     Objective     /60   Pulse 86   Ht 177.8 cm (70\")   Wt 84.4 kg (186 lb)   BMI 26.69 kg/mý       Physical Exam  Constitutional:       General: He is awake.      Appearance: Normal appearance.   Neck:      Thyroid: No thyromegaly.      Vascular: No carotid bruit or JVD.   Cardiovascular:      Rate and Rhythm: Normal rate and regular rhythm.      Chest Wall: PMI is not displaced.      Pulses: Normal pulses.      Heart sounds: Normal heart sounds, S1 normal and S2 normal. No murmur heard.    No friction rub. No gallop. No S3 or S4 sounds. "   Pulmonary:      Effort: Pulmonary effort is normal.      Breath sounds: Normal breath sounds and air entry. No wheezing, rhonchi or rales.   Abdominal:      General: Bowel sounds are normal.      Palpations: Abdomen is soft. There is no mass.      Tenderness: There is no abdominal tenderness.   Musculoskeletal:      Cervical back: Neck supple.      Right lower leg: No edema.      Left lower leg: No edema.   Neurological:      Mental Status: He is alert and oriented to person, place, and time.   Psychiatric:         Mood and Affect: Mood normal.         Behavior: Behavior is cooperative.         Result Review :                      Lab Results   Component Value Date    PROBNP 786.8 07/24/2023    PROBNP 2,809.0 (H) 07/14/2023     CMP          7/21/2023    06:05 7/22/2023    04:48 7/24/2023    05:55   CMP   Glucose 102  140  136    BUN 16  20  13    Creatinine 0.75  0.85  0.72    EGFR 108.6  104.6  109.9    Sodium 131  133  137    Potassium 4.8  4.5  4.2    Chloride 99  101  102    Calcium 8.3  8.4  8.4    Total Protein  7.1     Albumin  2.6     Globulin  4.5     Total Bilirubin  0.2     Alkaline Phosphatase  304     AST (SGOT)  68     ALT (SGPT)  68     Albumin/Globulin Ratio  0.6     BUN/Creatinine Ratio 21.3  23.5  18.1    Anion Gap 7.4  9.4  10.0      CBC w/diff          7/22/2023    04:48 7/23/2023    05:46 7/24/2023    05:55   CBC w/Diff   WBC 18.81  14.18  11.89    RBC 3.29  3.20  3.09    Hemoglobin 10.4  10.0  9.8    Hematocrit 32.0  31.2  30.5    MCV 97.3  97.5  98.7    MCH 31.6  31.3  31.7    MCHC 32.5  32.1  32.1    RDW 14.1  14.0  14.0    Platelets 869  824  780    Neutrophil Rel % 77.5  67.1  65.1    Immature Granulocyte Rel % 1.6  1.8  0.9    Lymphocyte Rel % 11.2  17.9  20.2    Monocyte Rel % 7.4  10.5  10.0    Eosinophil Rel % 1.4  1.9  2.4    Basophil Rel % 0.9  0.8  1.4          No results found for: TSH   No results found for: FREET4   No results found for: DDIMERQUANT  Magnesium   Date Value Ref  Range Status   07/18/2023 2.1 1.6 - 2.6 mg/dL Final      No results found for: DIGOXIN      Results for orders placed during the hospital encounter of 07/10/23    Adult Transthoracic Echo Complete W/ Cont if Necessary Per Protocol    Interpretation Summary    Left ventricular systolic function is severely decreased. Left ventricular ejection fraction appears to be less than 20%.    The left ventricular cavity is moderately dilated.    Left ventricular diastolic function is consistent with (grade I) impaired relaxation.    The left atrial cavity is mildly dilated.    Abnormal mitral valve structure consistent with dilated annulus.    Mild mitral regurgitation    No overt evidence of cardioembolic source by TTE    No prior echo for comparison             Assessment and Plan        Diagnoses and all orders for this visit:    1. Chronic HFrEF (heart failure with reduced ejection fraction) (Primary)  Assessment & Plan:  Yfn has class II heart failure with reduced ejection fraction.  He is tolerating his medication well.  His heart rate runs in the 80s and 90s.  I am going to optimize his heart failure medications by increasing the dose of carvedilol to 18.75 mg twice a day for 1 week followed by 25 mg twice a day I have encouraged him to start walking outside as tolerated we will continue the full dose Entresto spironolactone and Jardiance in the current dosage    Orders:  -     CBC & Differential; Future  -     Comprehensive Metabolic Panel; Future  -     proBNP; Future    2. Staphylococcal arthritis of right shoulder  Assessment & Plan:  He had septic arthritis and is currently receiving IV antibiotics through a PICC line.    Orders:  -     CBC & Differential; Future  -     Comprehensive Metabolic Panel; Future  -     proBNP; Future    Other orders  -     carvedilol (COREG) 25 MG tablet; Take 1 tablet by mouth Every 12 (Twelve) Hours.  Dispense: 180 tablet; Refill: 3            Follow Up     Return for fu 5-6 weeks.  ,  EKG with next office visit.    Patient was given instructions and counseling regarding his condition or for health maintenance advice. Please see specific information pulled into the AVS if appropriate.     Electronically signed by Pema Hector MD, 08/30/23, 1:39 PM EDT.

## 2023-08-30 NOTE — PATIENT INSTRUCTIONS
1.  Increase carvedilol 18.75 mg twice a day for 1 week followed by 25 mg twice a day.  2.  Continue rest of the medication in the current dosage.  3.  Continue to maintain heart rate blood pressure and weight log.  4.  Do blood work 1 or 2 days before next appointment.

## 2023-08-30 NOTE — ASSESSMENT & PLAN NOTE
Yfn has class II heart failure with reduced ejection fraction.  He is tolerating his medication well.  His heart rate runs in the 80s and 90s.  I am going to optimize his heart failure medications by increasing the dose of carvedilol to 18.75 mg twice a day for 1 week followed by 25 mg twice a day I have encouraged him to start walking outside as tolerated we will continue the full dose Entresto spironolactone and Jardiance in the current dosage

## 2023-09-06 ENCOUNTER — OFFICE VISIT (OUTPATIENT)
Dept: ORTHOPEDIC SURGERY | Facility: CLINIC | Age: 53
End: 2023-09-06
Payer: COMMERCIAL

## 2023-09-06 ENCOUNTER — TELEPHONE (OUTPATIENT)
Dept: ORTHOPEDIC SURGERY | Facility: CLINIC | Age: 53
End: 2023-09-06

## 2023-09-06 ENCOUNTER — OFFICE VISIT (OUTPATIENT)
Dept: CARDIOLOGY | Facility: CLINIC | Age: 53
End: 2023-09-06
Payer: COMMERCIAL

## 2023-09-06 VITALS
SYSTOLIC BLOOD PRESSURE: 103 MMHG | WEIGHT: 188 LBS | OXYGEN SATURATION: 96 % | HEART RATE: 76 BPM | HEIGHT: 70 IN | BODY MASS INDEX: 26.92 KG/M2 | DIASTOLIC BLOOD PRESSURE: 70 MMHG

## 2023-09-06 VITALS
HEIGHT: 70 IN | SYSTOLIC BLOOD PRESSURE: 96 MMHG | DIASTOLIC BLOOD PRESSURE: 62 MMHG | WEIGHT: 187 LBS | BODY MASS INDEX: 26.77 KG/M2 | HEART RATE: 79 BPM

## 2023-09-06 DIAGNOSIS — M00.011 STAPHYLOCOCCAL ARTHRITIS OF RIGHT SHOULDER: Primary | ICD-10-CM

## 2023-09-06 DIAGNOSIS — I50.22 CHRONIC HFREF (HEART FAILURE WITH REDUCED EJECTION FRACTION): Primary | ICD-10-CM

## 2023-09-06 NOTE — PROGRESS NOTES
"Chief Complaint  Congestive Heart Failure and Hospital Follow Up Visit    Subjective            History of Present Illness  Yfn Ray is a 52-year-old white/ male patient who presents to the office today for hospital follow-up.  He was admitted to [facility] from 7/10/2023 to 7/24/2023 MRSA bacteremia.  While hospitalized he developed new systolic CHF.  His echocardiogram on 7/13/2023 revealed ejection fraction less than 20% and mildly dilated left atria.  No source of cardiac emboli found on echocardiogram.  Due to his infection at the time of hospitalization the cardiac catheterization was postponed to be done at a time after his infection cleared.  Upon discharge he was given new prescriptions for aspirin, carvedilol, Jardiance, Entresto, and some antibiotics.  He was instructed to follow-up with the heart failure clinic outpatient and has been evaluated by Dr. Hector and SMITA Emmanuel.  Today he reports improved cardiac symptoms and is being compliant with his medications. He denies any new or worsening chest pain, shortness of breath, lightheadedness/dizziness, palpitations, or edema.    PMH  Past Medical History:   Diagnosis Date    Alpha 1-antitrypsin PiMS phenotype     \"alpha 1\" per pt and wife. unsure of what type.    Arthritis          ALLERGY  No Known Allergies       SURGICALHX  Past Surgical History:   Procedure Laterality Date    LIVER BIOPSY      SHOULDER ARTHROSCOPY Right 7/14/2023    Procedure: SHOULDER ARTHROSCOPY WITH WASHOUT AND DEBRIDMENT;  Surgeon: Orville Gallegos MD;  Location: [facility] OR [place];  Service: Orthopedics;  Laterality: Right;    TOOTH EXTRACTION            SOC  Social History     Socioeconomic History    Marital status:    Tobacco Use    Smoking status: Never    Smokeless tobacco: Never   Vaping Use    Vaping Use: Never used   Substance and Sexual Activity    Alcohol use: Never    Drug use: Never    Sexual activity: Defer         FAMHX  Family History " "  Problem Relation Age of Onset    Malig Hyperthermia Neg Hx           MEDSIGONLY  Current Outpatient Medications on File Prior to Visit   Medication Sig    aspirin 325 MG EC tablet Take 1 tablet by mouth Every 6 (Six) Hours As Needed for Mild Pain.    carvedilol (COREG) 25 MG tablet Take 1 tablet by mouth Every 12 (Twelve) Hours.    empagliflozin (JARDIANCE) 10 MG tablet tablet Take 1 tablet by mouth Daily for 30 days.    HYDROcodone-acetaminophen (NORCO) 5-325 MG per tablet Take 1 tablet by mouth Every 6 (Six) Hours As Needed for Moderate Pain.    multivitamin with minerals tablet tablet Take 1 tablet by mouth Daily.    naloxone (NARCAN) 4 MG/0.1ML nasal spray Call 911. Don't prime. [place] in 1 nostril for overdose. Repeat in 2-3 minutes in other nostril if no or minimal breathing/responsiveness.    sacubitril-valsartan (Entresto)  MG tablet Take 1 tablet by mouth 2 (Two) Times a Day.    sennosides-docusate (PERICOLACE) 8.6-50 MG per tablet Take 2 tablets by mouth 2 (Two) Times a Day.    spironolactone (ALDACTONE) 25 MG tablet Take 1 tablet by mouth Daily for 30 days.    vancomycin 1500 mg/300 mL 0.9% NS IVPB Infuse 150 mL into a venous catheter Every 12 (Twelve) Hours.     Current Facility-Administered Medications on File Prior to Visit   Medication    alteplase (CATHFLO/ACTIVASE) injection 2 mg         Objective   BP 96/62   Pulse 79   Ht 177.8 cm (70\")   Wt 84.8 kg (187 lb)   BMI 26.83 kg/m²       Physical Exam  HENT:      Head: Normocephalic.   Neck:      Vascular: No carotid bruit.   Cardiovascular:      Rate and Rhythm: Normal rate and regular rhythm.      Pulses: Normal pulses.      Heart sounds: Normal heart sounds. No murmur heard.  Pulmonary:      Effort: Pulmonary effort is normal.      Breath sounds: Normal breath sounds.   Musculoskeletal:      Cervical back: Neck supple.      Right lower leg: No edema.      Left lower leg: No edema.   Skin:     General: Skin is dry.   Neurological:      " Mental Status: He is alert and oriented to person, place, and time.   Psychiatric:         Behavior: Behavior normal.     Result Review :   The following data was reviewed by: SMITA Peterson on 09/06/2023:  proBNP   Date Value Ref Range Status   07/24/2023 786.8 0.0 - 900.0 pg/mL Final     CMP          7/24/2023    05:55   CMP   Glucose 136    BUN 13    Creatinine 0.72    EGFR 109.9    Sodium 137    Potassium 4.2    Chloride 102    Calcium 8.4    Total Protein    Albumin    Globulin    Total Bilirubin    Alkaline Phosphatase    AST (SGOT)    ALT (SGPT)    Albumin/Globulin Ratio    BUN/Creatinine Ratio 18.1    Anion Gap 10.0      CBC w/diff          7/24/2023    05:55   CBC w/Diff   WBC 11.89    RBC 3.09    Hemoglobin 9.8    Hematocrit 30.5    MCV 98.7    MCH 31.7    MCHC 32.1    RDW 14.0    Platelets 780    Neutrophil Rel % 65.1    Immature Granulocyte Rel % 0.9    Lymphocyte Rel % 20.2    Monocyte Rel % 10.0    Eosinophil Rel % 2.4    Basophil Rel % 1.4       No results found for: TSH   No results found for: FREET4   No results found for: DDIMERQUANT  Magnesium   Date Value Ref Range Status   07/18/2023 2.1 1.6 - 2.6 mg/dL Final      No results found for: DIGOXIN   Lab Results   Component Value Date    TROPONINT 9 07/10/2023             Results for orders placed during the hospital encounter of 07/10/23    Adult Transthoracic Echo Complete W/ Cont if Necessary Per Protocol    Interpretation Summary    Left ventricular systolic function is severely decreased. Left ventricular ejection fraction appears to be less than 20%.    The left ventricular cavity is moderately dilated.    Left ventricular diastolic function is consistent with (grade I) impaired relaxation.    The left atrial cavity is mildly dilated.    Abnormal mitral valve structure consistent with dilated annulus.    Mild mitral regurgitation    No overt evidence of cardioembolic source by TTE    No prior echo for comparison         Assessment and  Plan    Diagnoses and all orders for this visit:    1. Chronic HFrEF (heart failure with reduced ejection fraction) (Primary)  Symptomatically improved, discussed case with Dr. Holliday and the patient.  Once the patient is on oral antibiotic treatment then cardiac catheterization can be scheduled.  The patient is projected to start oral antibiotic therapy in about 2 weeks.  They will contact the office at that time so that he can be put on the cath schedule for further ischemic evaluation.  Continue current therapy.    Other orders  -     empagliflozin (JARDIANCE) 10 MG tablet tablet; Take 1 tablet by mouth Daily.  Dispense: 90 tablet; Refill: 1            Follow Up   Return in about 6 months (around 3/6/2024) for Follow up with Dr Holliday.    Patient was given instructions and counseling regarding his condition or for health maintenance advice. Please see specific information pulled into the AVS if appropriate.     Yfn Flor  reports that he has never smoked. He has never used smokeless tobacco.           Magaly Mcelroy, SMITA  09/06/23  13:22 EDT    Dictated Utilizing Dragon Dictation

## 2023-09-06 NOTE — TELEPHONE ENCOUNTER
PER DR ROYAL, HUDSON NIELSEN AT [facility] WAS CALLED AND VERBAL ORDERS WERE GIVEN FOR VANCOMYCIN TROUGH LEVELS TO BE DONE WEEKLY FOR 2 WEEKS.  ALSO CBC, ESR, AND CRP TO BE DONE BEFORE PATIENTS RETURN VISIT ON 9-20-23.  HUDSON NIELSEN VERBALIZED UNDERSTANDING OF ORDERS.

## 2023-09-06 NOTE — PROGRESS NOTES
"Chief Complaint  Follow-up and Pain of the Right Shoulder    Subjective          Yfn Ray presents to [facility] ORTH[facility] for   History of Present Illness    The patient presents here today for follow up evaluation of the right shoulder. Patient is 8 weeks postop right shoulder arthroscopy with washout and debridement performed on 7/14/2023. He reports he recently got into turkey mites. He denies any drainage. He has no new complaints. He has been having home health therapy.      No Known Allergies     Social History     Socioeconomic History    Marital status:    Tobacco Use    Smoking status: Never    Smokeless tobacco: Never   Vaping Use    Vaping Use: Never used   Substance and Sexual Activity    Alcohol use: Never    Drug use: Never    Sexual activity: Defer        I reviewed the patient's chief complaint, history of present illness, review of systems, past medical history, surgical history, family history, social history, medications, and allergy list.     REVIEW OF SYSTEMS    Constitutional: Denies fevers, chills, weight loss  Cardiovascular: Denies chest pain, shortness of breath  Skin: Denies rashes, acute skin changes  Neurologic: Denies headache, loss of consciousness  MSK: Right shoulder pain      Objective   Vital Signs:   /70   Pulse 76   Ht 177.8 cm (70\")   Wt 85.3 kg (188 lb)   SpO2 96%   BMI 26.98 kg/m²     Body mass index is 26.98 kg/m².    Physical Exam    General: Alert. No acute distress.   Right shoulder- no fluid collection or cellulitis. Incisions well healing. Neurovascularly intact. Forward elevation active 30, passive 160. External Rotation 30. Internal rotation to back pocket. Axillary sensation intact. Swelling to the hand.     Procedures    Imaging Results (Most Recent)       None                     Assessment and Plan        XR Shoulder 2+ View Right    Result Date: 8/25/2023  Narrative: Indications: Right shoulder pain Views: AP, Grashey, " scapular Y right shoulder Findings: No fractures or dislocations.  Glenohumeral joint is reduced. Comparative Data: Comparative data found and reviewed today.      Diagnoses and all orders for this visit:    1. Staphylococcal arthritis of right shoulder (Primary)    Other orders  -     SCANNED - LABS  -     SCANNED - LABS  -     SCANNED - LABS        Discussed the treatment plan with the patient.  I reviewed the lab work with the patient. Plan to continue IV antibiotics for another 2 weeks with repeat lab work. May consider transitioning to oral antibiotics at follow up.       Will obtain X-Rays of Right shoulder at next visit.     Call or return if worsening symptoms.    Scribed for Orville Gallegos MD by Chyna Krishna  09/06/2023   10:56 EDT         Follow Up       2 weeks    Patient was given instructions and counseling regarding his condition or for health maintenance advice. Please see specific information pulled into the AVS if appropriate.       I have personally performed the services described in this document as scribed by the above individual and it is both accurate and complete.     Orville Gallegos MD  09/06/23  11:12 EDT

## 2023-09-08 ENCOUNTER — TELEPHONE (OUTPATIENT)
Dept: ORTHOPEDIC SURGERY | Facility: CLINIC | Age: 53
End: 2023-09-08
Payer: COMMERCIAL

## 2023-09-08 NOTE — TELEPHONE ENCOUNTER
SPOKE WITH FRANKO AT [facility] AND PER DR ROYAL VERBAL ORDERS GIVEN FOR PATIENT TO CONTINUE IV THERAPY FOR 2 MORE WEEKS ENDING ON 9/22/23.  PATIENT'S LABS FROM 9-5-23 WERE FAXED TO FRANKO.  PATIENT TO RECEIVE 750MG OF VANCOMYCIN/1500NS IV EVERY 12 HOURS.

## 2023-09-08 NOTE — TELEPHONE ENCOUNTER
PT WIFE CALLED AND STATED THAT THE PHARMACY WILL NOT SHIP THE MEDS UNLESS AN ORDER IS SENT OVER TO THEM - VERBAL ORDERS ARE NOT ACCEPTED - IT IS THROUGH [facility]. PLEASE ADVISE [phone]

## 2023-09-08 NOTE — TELEPHONE ENCOUNTER
CALLED AND LEFT A MESSAGE FOR FRANKO TO CALL ME IN THE OFFICE.  HUB PLEASE PUT HIM THROUGH TO ME IN THE OFFICE IF YOU RECEIVE THE CALL

## 2023-09-08 NOTE — TELEPHONE ENCOUNTER
Caller: FRANKO MARIA/ [facility]    Relationship: PHARMACIST    Best call back number: [phone]    What form or medical record are you requesting: ORDER TO EXTEND VANCOMYCIN    Who is requesting this form or medical record from you: PHARMACY    Timeframe paperwork needed: ASAP    Additional notes: FRANKO STATED THAT PATIENT WIFE TOLD HIM THAT THE ORDER FOR VANCOMYCIN WAS EXTENDED. HE IS ALSO ASKING IF DR. ROYAL IS GOING TO FOLLOW PATIENT'S DOSING WITH THE VANCOMYCIN, OR DO THEY NEED TO DO THAT. PLEASE CALL HIM TO DISCUSS.

## 2023-09-18 DIAGNOSIS — M00.011 STAPHYLOCOCCAL ARTHRITIS OF RIGHT SHOULDER: Primary | ICD-10-CM

## 2023-09-19 ENCOUNTER — LAB (OUTPATIENT)
Dept: LAB | Facility: HOSPITAL | Age: 53
End: 2023-09-19
Payer: COMMERCIAL

## 2023-09-19 ENCOUNTER — HOSPITAL ENCOUNTER (OUTPATIENT)
Dept: INFUSION THERAPY | Facility: HOSPITAL | Age: 53
Discharge: HOME | End: 2023-09-19
Payer: COMMERCIAL

## 2023-09-19 VITALS
RESPIRATION RATE: 20 BRPM | BODY MASS INDEX: 26.94 KG/M2 | DIASTOLIC BLOOD PRESSURE: 72 MMHG | OXYGEN SATURATION: 97 % | WEIGHT: 192.46 LBS | SYSTOLIC BLOOD PRESSURE: 106 MMHG | TEMPERATURE: 97.7 F | HEART RATE: 70 BPM | HEIGHT: 71 IN

## 2023-09-19 DIAGNOSIS — M00.011 STAPHYLOCOCCAL ARTHRITIS OF RIGHT SHOULDER: ICD-10-CM

## 2023-09-19 DIAGNOSIS — M00.011 STAPHYLOCOCCAL ARTHRITIS OF RIGHT SHOULDER: Primary | ICD-10-CM

## 2023-09-19 LAB
ALBUMIN SERPL-MCNC: 4 G/DL (ref 3.5–5.2)
ALBUMIN/GLOB SERPL: 1.2 G/DL
ALP SERPL-CCNC: 241 U/L (ref 39–117)
ALT SERPL W P-5'-P-CCNC: 50 U/L (ref 1–41)
ANION GAP SERPL CALCULATED.3IONS-SCNC: 10 MMOL/L (ref 5–15)
AST SERPL-CCNC: 30 U/L (ref 1–40)
BASOPHILS # BLD AUTO: 0.08 10*3/MM3 (ref 0–0.2)
BASOPHILS NFR BLD AUTO: 0.9 % (ref 0–1.5)
BILIRUB SERPL-MCNC: 0.2 MG/DL (ref 0–1.2)
BUN SERPL-MCNC: 21 MG/DL (ref 6–20)
BUN/CREAT SERPL: 19.4 (ref 7–25)
CALCIUM SPEC-SCNC: 9.3 MG/DL (ref 8.6–10.5)
CHLORIDE SERPL-SCNC: 104 MMOL/L (ref 98–107)
CO2 SERPL-SCNC: 23 MMOL/L (ref 22–29)
CREAT SERPL-MCNC: 1.08 MG/DL (ref 0.76–1.27)
CRP SERPL-MCNC: <0.3 MG/DL (ref 0–0.5)
DEPRECATED RDW RBC AUTO: 45.4 FL (ref 37–54)
EGFRCR SERPLBLD CKD-EPI 2021: 82.1 ML/MIN/1.73
EOSINOPHIL # BLD AUTO: 0.91 10*3/MM3 (ref 0–0.4)
EOSINOPHIL NFR BLD AUTO: 10.1 % (ref 0.3–6.2)
ERYTHROCYTE [DISTWIDTH] IN BLOOD BY AUTOMATED COUNT: 13.2 % (ref 12.3–15.4)
ERYTHROCYTE [SEDIMENTATION RATE] IN BLOOD: 34 MM/HR (ref 0–20)
GLOBULIN UR ELPH-MCNC: 3.4 GM/DL
GLUCOSE SERPL-MCNC: 89 MG/DL (ref 65–99)
HCT VFR BLD AUTO: 37.9 % (ref 37.5–51)
HGB BLD-MCNC: 12.7 G/DL (ref 13–17.7)
IMM GRANULOCYTES # BLD AUTO: 0.03 10*3/MM3 (ref 0–0.05)
IMM GRANULOCYTES NFR BLD AUTO: 0.3 % (ref 0–0.5)
LYMPHOCYTES # BLD AUTO: 1.8 10*3/MM3 (ref 0.7–3.1)
LYMPHOCYTES NFR BLD AUTO: 19.9 % (ref 19.6–45.3)
MCH RBC QN AUTO: 31.4 PG (ref 26.6–33)
MCHC RBC AUTO-ENTMCNC: 33.5 G/DL (ref 31.5–35.7)
MCV RBC AUTO: 93.8 FL (ref 79–97)
MONOCYTES # BLD AUTO: 0.66 10*3/MM3 (ref 0.1–0.9)
MONOCYTES NFR BLD AUTO: 7.3 % (ref 5–12)
NEUTROPHILS NFR BLD AUTO: 5.56 10*3/MM3 (ref 1.7–7)
NEUTROPHILS NFR BLD AUTO: 61.5 % (ref 42.7–76)
NRBC BLD AUTO-RTO: 0 /100 WBC (ref 0–0.2)
PLATELET # BLD AUTO: 326 10*3/MM3 (ref 140–450)
PMV BLD AUTO: 9.2 FL (ref 6–12)
POTASSIUM SERPL-SCNC: 4.2 MMOL/L (ref 3.5–5.2)
PROT SERPL-MCNC: 7.4 G/DL (ref 6–8.5)
RBC # BLD AUTO: 4.04 10*6/MM3 (ref 4.14–5.8)
SODIUM SERPL-SCNC: 137 MMOL/L (ref 136–145)
VANCOMYCIN TROUGH SERPL-MCNC: 5.3 MCG/ML (ref 5–20)
WBC NRBC COR # BLD: 9.04 10*3/MM3 (ref 3.4–10.8)

## 2023-09-19 PROCEDURE — 80053 COMPREHEN METABOLIC PANEL: CPT | Performed by: STUDENT IN AN ORGANIZED HEALTH CARE EDUCATION/TRAINING PROGRAM

## 2023-09-19 PROCEDURE — 85652 RBC SED RATE AUTOMATED: CPT | Performed by: STUDENT IN AN ORGANIZED HEALTH CARE EDUCATION/TRAINING PROGRAM

## 2023-09-19 PROCEDURE — 86140 C-REACTIVE PROTEIN: CPT | Performed by: STUDENT IN AN ORGANIZED HEALTH CARE EDUCATION/TRAINING PROGRAM

## 2023-09-19 PROCEDURE — 36592 COLLECT BLOOD FROM PICC: CPT

## 2023-09-19 PROCEDURE — 80202 ASSAY OF VANCOMYCIN: CPT | Performed by: STUDENT IN AN ORGANIZED HEALTH CARE EDUCATION/TRAINING PROGRAM

## 2023-09-19 PROCEDURE — 85025 COMPLETE CBC W/AUTO DIFF WBC: CPT

## 2023-09-19 RX ORDER — HEPARIN SODIUM (PORCINE) LOCK FLUSH IV SOLN 100 UNIT/ML 100 UNIT/ML
500 SOLUTION INTRAVENOUS AS NEEDED
Status: DISCONTINUED | OUTPATIENT
Start: 2023-09-19 | End: 2023-09-21 | Stop reason: HOSPADM

## 2023-09-19 RX ORDER — HEPARIN SODIUM (PORCINE) LOCK FLUSH IV SOLN 100 UNIT/ML 100 UNIT/ML
500 SOLUTION INTRAVENOUS AS NEEDED
OUTPATIENT
Start: 2023-09-19

## 2023-09-19 NOTE — CONSULTS
Patient arrives with complaint of PICC not flushing or giving blood.    PICC appears to be withdrawn 4 CM from original 0 position. New PICC dressing well adhered. Observable skin surrounding PICC insertion site has no redness, tenderness, or swelling.    Line power flushed. Gave brisk blood return.    Conclusion: Cathflow not indicated. Properly functioning PICC.    Damion Bach RN

## 2023-09-20 ENCOUNTER — OFFICE VISIT (OUTPATIENT)
Dept: ORTHOPEDIC SURGERY | Facility: CLINIC | Age: 53
End: 2023-09-20
Payer: COMMERCIAL

## 2023-09-20 ENCOUNTER — TELEPHONE (OUTPATIENT)
Dept: ORTHOPEDIC SURGERY | Facility: CLINIC | Age: 53
End: 2023-09-20

## 2023-09-20 VITALS
HEIGHT: 71 IN | WEIGHT: 192 LBS | HEART RATE: 70 BPM | OXYGEN SATURATION: 94 % | SYSTOLIC BLOOD PRESSURE: 113 MMHG | DIASTOLIC BLOOD PRESSURE: 75 MMHG | BODY MASS INDEX: 26.88 KG/M2

## 2023-09-20 DIAGNOSIS — M25.511 RIGHT SHOULDER PAIN, UNSPECIFIED CHRONICITY: Primary | ICD-10-CM

## 2023-09-20 DIAGNOSIS — M00.011 STAPHYLOCOCCAL ARTHRITIS OF RIGHT SHOULDER: ICD-10-CM

## 2023-09-20 DIAGNOSIS — M00.011 STAPHYLOCOCCAL ARTHRITIS OF RIGHT SHOULDER: Primary | ICD-10-CM

## 2023-09-20 RX ORDER — TRAMADOL HYDROCHLORIDE 50 MG/1
50 TABLET ORAL EVERY 12 HOURS PRN
Qty: 20 TABLET | Refills: 0 | Status: SHIPPED | OUTPATIENT
Start: 2023-09-20

## 2023-09-20 NOTE — PROGRESS NOTES
"Chief Complaint  Follow-up and Pain of the Right Shoulder    Subjective          Yfn Ray presents to [facility] for   History of Present Illness    The patient presents here today for follow up evaluation of the right shoulder. The patient S/P right shoulder arthroscopy with washout and debridement performed on 7/14/2023. He is still doing IV antibiotics. He reports he has been having increased pain. He has been attending therapy. He denies any drainage. His lab work has been improving.       No Known Allergies     Social History     Socioeconomic History    Marital status:    Tobacco Use    Smoking status: Never    Smokeless tobacco: Never   Vaping Use    Vaping Use: Never used   Substance and Sexual Activity    Alcohol use: Never    Drug use: Never    Sexual activity: Defer        I reviewed the patient's chief complaint, history of present illness, review of systems, past medical history, surgical history, family history, social history, medications, and allergy list.     REVIEW OF SYSTEMS    Constitutional: Denies fevers, chills, weight loss  Cardiovascular: Denies chest pain, shortness of breath  Skin: Denies rashes, acute skin changes  Neurologic: Denies headache, loss of consciousness  MSK: Right shoulder pain      Objective   Vital Signs:   /75   Pulse 70   Ht 180.3 cm (71\")   Wt 87.1 kg (192 lb)   SpO2 94%   BMI 26.78 kg/m²     Body mass index is 26.78 kg/m².    Physical Exam    General: Alert. No acute distress.   Right shoulder- no fluid collection or cellulitis. Incisions well healing. Neurovascularly intact. Forward elevation active 30, passive 90 with crepitus. External Rotation 20. Internal rotation to waistline. Axillary sensation intact. Swelling to the hand.  deltoid fires with Abduction.     Procedures    Imaging Results (Most Recent)       Procedure Component Value Units Date/Time    XR Shoulder 2+ View Right [[id number]] Resulted: 09/20/23 0822 "     Updated: 09/20/23 0824    Narrative:      Indications: Follow-up right shoulder infection    Views: AP, Grashey, Scap Y right shoulder    Findings: High riding humeral head and decreased articular height in the   glenohumeral joint consistent with rotator cuff arthropathy.  Disuse   osteopenia type changes, no obvious lytic or aggressive features.    Glenohumeral joint appears reduced.    Comparative Data: Comparative data found and reviewed today                     Assessment and Plan        XR Shoulder 2+ View Right    Result Date: 9/20/2023  Narrative: Indications: Follow-up right shoulder infection Views: AP, Grashey, Scap Y right shoulder Findings: High riding humeral head and decreased articular height in the glenohumeral joint consistent with rotator cuff arthropathy.  Disuse osteopenia type changes, no obvious lytic or aggressive features.  Glenohumeral joint appears reduced. Comparative Data: Comparative data found and reviewed today    XR Shoulder 2+ View Right    Result Date: 8/25/2023  Narrative: Indications: Right shoulder pain Views: AP, Grashey, scapular Y right shoulder Findings: No fractures or dislocations.  Glenohumeral joint is reduced. Comparative Data: Comparative data found and reviewed today.      Diagnoses and all orders for this visit:    1. Right shoulder pain, unspecified chronicity (Primary)  -     XR Shoulder 2+ View Right    2. Staphylococcal arthritis of right shoulder  -     traMADol (ULTRAM) 50 MG tablet; Take 1 tablet by mouth Every 12 (Twelve) Hours As Needed for Moderate Pain.  Dispense: 20 tablet; Refill: 0        Discussed the treatment plan with the patient.  I reviewed the x-rays that were obtained today with the patient. Plan to finish IV antibiotic. Then remain off of it for 2 weeks then have lab work rechecked. Prescription for tramadol given today.       Call or return if worsening symptoms.    Scribed for Orville Gallegos MD by Chyna Krishna  09/20/2023   08:06  EDT         Follow Up       3 weeks    Patient was given instructions and counseling regarding his condition or for health maintenance advice. Please see specific information pulled into the AVS if appropriate.       I have personally performed the services described in this document as scribed by the above individual and it is both accurate and complete.     Orville Gallegos MD  09/20/23  08:29 EDT

## 2023-09-27 ENCOUNTER — TELEPHONE (OUTPATIENT)
Dept: ORTHOPEDIC SURGERY | Facility: CLINIC | Age: 53
End: 2023-09-27
Payer: COMMERCIAL

## 2023-09-27 DIAGNOSIS — M00.011 STAPHYLOCOCCAL ARTHRITIS OF RIGHT SHOULDER: Primary | ICD-10-CM

## 2023-09-27 RX ORDER — DOXYCYCLINE HYCLATE 100 MG/1
100 CAPSULE ORAL 2 TIMES DAILY
Qty: 84 CAPSULE | Refills: 0 | Status: SHIPPED | OUTPATIENT
Start: 2023-09-27

## 2023-09-27 NOTE — TELEPHONE ENCOUNTER
PATIENT HAD LAST DOSE OF IV ANTIBIOTICS TODAY.  ORDER FOR PICC LINE REMOVAL FOR 9/28/23 CONFIRMED WITH WIFE.  PER DR ROYAL ORAL ANTIBIOTICS SENT INTO PHARMACY.  LAB ORDERS ARE IN THE COMPUTER FOR LAB WORK NEXT WEEK.  WIFE VOICES UNDERSTANDING.

## 2023-10-10 ENCOUNTER — TELEPHONE (OUTPATIENT)
Dept: CARDIOLOGY | Facility: CLINIC | Age: 53
End: 2023-10-10
Payer: COMMERCIAL

## 2023-10-10 ENCOUNTER — TELEPHONE (OUTPATIENT)
Dept: ORTHOPEDIC SURGERY | Facility: CLINIC | Age: 53
End: 2023-10-10

## 2023-10-10 NOTE — TELEPHONE ENCOUNTER
Provider: DR ROYAL    Caller: NANCI PATEL    Relationship to Patient: [facility] ([facility])    Phone Number: [phone]    Reason for Call: PT NEEDS BLOOD WORK ORDER SO HE CAN HAVE IT DONE BEFORE HIS APPT ON 10/11. PLEASE SEND TO [facility] IN [place]. PLEASE CALL HER BACK AT THE NUMBER ABOVE.

## 2023-10-10 NOTE — TELEPHONE ENCOUNTER
Spoke with wife and confirmed appointment for tomorrow 10/11 @ 10:00, to get lab work done and bring medications and bp log.

## 2023-10-11 ENCOUNTER — OFFICE VISIT (OUTPATIENT)
Dept: ORTHOPEDIC SURGERY | Facility: CLINIC | Age: 53
End: 2023-10-11
Payer: COMMERCIAL

## 2023-10-11 ENCOUNTER — OFFICE VISIT (OUTPATIENT)
Dept: CARDIOLOGY | Facility: CLINIC | Age: 53
End: 2023-10-11
Payer: COMMERCIAL

## 2023-10-11 ENCOUNTER — PREP FOR SURGERY (OUTPATIENT)
Dept: OTHER | Facility: HOSPITAL | Age: 53
End: 2023-10-11
Payer: COMMERCIAL

## 2023-10-11 ENCOUNTER — TELEPHONE (OUTPATIENT)
Dept: CARDIOLOGY | Facility: CLINIC | Age: 53
End: 2023-10-11
Payer: COMMERCIAL

## 2023-10-11 VITALS
OXYGEN SATURATION: 95 % | HEIGHT: 71 IN | HEART RATE: 73 BPM | SYSTOLIC BLOOD PRESSURE: 99 MMHG | DIASTOLIC BLOOD PRESSURE: 67 MMHG | BODY MASS INDEX: 26.18 KG/M2 | WEIGHT: 187 LBS

## 2023-10-11 VITALS
WEIGHT: 184 LBS | HEIGHT: 71 IN | HEART RATE: 68 BPM | BODY MASS INDEX: 25.76 KG/M2 | DIASTOLIC BLOOD PRESSURE: 64 MMHG | SYSTOLIC BLOOD PRESSURE: 88 MMHG

## 2023-10-11 DIAGNOSIS — M00.011 STAPHYLOCOCCAL ARTHRITIS OF RIGHT SHOULDER: ICD-10-CM

## 2023-10-11 DIAGNOSIS — I50.22 CHRONIC HFREF (HEART FAILURE WITH REDUCED EJECTION FRACTION): Primary | ICD-10-CM

## 2023-10-11 DIAGNOSIS — I50.21 ACUTE HFREF (HEART FAILURE WITH REDUCED EJECTION FRACTION): Primary | ICD-10-CM

## 2023-10-11 DIAGNOSIS — M00.011 STAPHYLOCOCCAL ARTHRITIS OF RIGHT SHOULDER: Primary | ICD-10-CM

## 2023-10-11 DIAGNOSIS — R79.89 ABNORMAL LFTS: ICD-10-CM

## 2023-10-11 RX ORDER — SODIUM CHLORIDE 0.9 % (FLUSH) 0.9 %
10 SYRINGE (ML) INJECTION AS NEEDED
OUTPATIENT
Start: 2023-10-11

## 2023-10-11 RX ORDER — SODIUM CHLORIDE 0.9 % (FLUSH) 0.9 %
10 SYRINGE (ML) INJECTION EVERY 12 HOURS SCHEDULED
OUTPATIENT
Start: 2023-10-11

## 2023-10-11 RX ORDER — ASPIRIN 81 MG/1
81 TABLET ORAL DAILY
Qty: 90 TABLET | Refills: 3 | Status: SHIPPED | OUTPATIENT
Start: 2023-10-11

## 2023-10-11 RX ORDER — CARVEDILOL 25 MG/1
25 TABLET ORAL EVERY 12 HOURS SCHEDULED
Qty: 180 TABLET | Refills: 3 | Status: SHIPPED | OUTPATIENT
Start: 2023-10-11

## 2023-10-11 RX ORDER — SPIRONOLACTONE 25 MG/1
25 TABLET ORAL DAILY
Qty: 90 TABLET | Refills: 3 | Status: SHIPPED | OUTPATIENT
Start: 2023-10-11

## 2023-10-11 RX ORDER — SODIUM CHLORIDE 9 MG/ML
40 INJECTION, SOLUTION INTRAVENOUS AS NEEDED
OUTPATIENT
Start: 2023-10-11

## 2023-10-11 RX ORDER — TRAMADOL HYDROCHLORIDE 50 MG/1
50 TABLET ORAL EVERY 8 HOURS PRN
Qty: 20 TABLET | Refills: 0 | Status: SHIPPED | OUTPATIENT
Start: 2023-10-11

## 2023-10-11 NOTE — H&P (VIEW-ONLY)
"Office Visit    Chief Complaint  Chronic HFrEF (heart failure with reduced ejection fraction)    Subjective            Yfn Ray presents to [facility]  History of Present Illness  Mr. Ray is a 53-year-old male that presented to the office today for follow-up due to chronic heart failure with reduced ejection fraction who is doing well, although he continues to experience ongoing fatigue during the day.  He has completed his home IV antibiotics due to septic arthritis of his shoulder and PICC line was removed 2 weeks ago.  He will continue a minimum of 6 weeks of oral antibiotics.  He denies any short of air, chest pain, dizziness or syncopal episodes.      Past Medical History:   Diagnosis Date    Alpha 1-antitrypsin PiMS phenotype     \"alpha 1\" per pt and wife. unsure of what type.    Arthritis        No Known Allergies     Past Surgical History:   Procedure Laterality Date    LIVER BIOPSY      SHOULDER ARTHROSCOPY Right 7/14/2023    Procedure: SHOULDER ARTHROSCOPY WITH WASHOUT AND DEBRIDMENT;  Surgeon: Orville Gallegos MD;  Location: [facility] OR [facility];  Service: Orthopedics;  Laterality: Right;    TOOTH EXTRACTION          Social History     Tobacco Use    Smoking status: Never    Smokeless tobacco: Never   Vaping Use    Vaping Use: Never used   Substance Use Topics    Alcohol use: Never    Drug use: Never       Family History   Problem Relation Age of Onset    Malig Hyperthermia Neg Hx         Prior to Admission medications    Medication Sig Start Date End Date Taking? Authorizing Provider   aspirin 325 MG EC tablet Take 1 tablet by mouth Every 6 (Six) Hours As Needed for Mild Pain.   Yes ProviderRubia MD   carvedilol (COREG) 25 MG tablet Take 1 tablet by mouth Every 12 (Twelve) Hours. 8/30/23  Yes Pema Hector MD   doxycycline (VIBRAMYCIN) 100 MG capsule Take 1 capsule by mouth 2 (Two) Times a Day. 9/27/23  Yes Orville Gallegos MD   empagliflozin (JARDIANCE) 10 MG tablet " "tablet Take 1 tablet by mouth Daily. 9/6/23  Yes Magaly Mcelroy APRN   multivitamin with minerals tablet tablet Take 1 tablet by mouth Daily.   Yes Provider, MD Rubia   naloxone (NARCAN) 4 MG/0.1ML nasal spray Call 911. Don't prime. Senatobia in 1 nostril for overdose. Repeat in 2-3 minutes in other nostril if no or minimal breathing/responsiveness. 7/21/23  Yes Orville Gallegos MD   sacubitril-valsartan (Entresto)  MG tablet Take 1 tablet by mouth 2 (Two) Times a Day. 8/16/23  Yes Mira Triplett APRN   sennosides-docusate (PERICOLACE) 8.6-50 MG per tablet Take 2 tablets by mouth 2 (Two) Times a Day.  Patient taking differently: Take 2 tablets by mouth 2 (Two) Times a Day. PRN 7/21/23  Yes Orville Gallegos MD   spironolactone (ALDACTONE) 25 MG tablet Take 1 tablet by mouth Daily for 30 days. 8/17/23 10/11/23 Yes Mira Triplett APRN   traMADol (ULTRAM) 50 MG tablet Take 1 tablet by mouth Every 8 (Eight) Hours As Needed for Moderate Pain. 10/11/23  Yes Orville Gallegos MD   HYDROcodone-acetaminophen (NORCO) 5-325 MG per tablet Take 1 tablet by mouth Every 6 (Six) Hours As Needed for Moderate Pain. 8/10/23   Orville Gallegos MD   vancomycin 1500 mg/300 mL 0.9% NS IVPB Infuse 150 mL into a venous catheter Every 12 (Twelve) Hours.    Provider, MD Rubia   traMADol (ULTRAM) 50 MG tablet Take 1 tablet by mouth Every 12 (Twelve) Hours As Needed for Moderate Pain. 9/20/23 10/11/23  Orville Gallegos MD        Review of Systems   Constitutional:  Positive for fatigue.   Endocrine: Positive for cold intolerance.   Psychiatric/Behavioral:  Positive for depressed mood.         Symptom Course: Been Unchanged    Weight Trend: Stable     Objective     BP (!) 88/64   Pulse 68   Ht 180.3 cm (70.98\")   Wt 83.5 kg (184 lb)   BMI 25.67 kg/m²       Physical Exam  Constitutional:       General: He is awake.      Appearance: Normal appearance.   Neck:      Thyroid: No thyromegaly.      Vascular: No carotid bruit or JVD. "   Cardiovascular:      Rate and Rhythm: Normal rate and regular rhythm.      Chest Wall: PMI is not displaced.      Pulses: Normal pulses.      Heart sounds: Normal heart sounds, S1 normal and S2 normal. No murmur heard.     No friction rub. No gallop. No S3 or S4 sounds.   Pulmonary:      Effort: Pulmonary effort is normal.      Breath sounds: Normal breath sounds and air entry. No wheezing, rhonchi or rales.   Abdominal:      General: Bowel sounds are normal.      Palpations: Abdomen is soft. There is no mass.      Tenderness: There is no abdominal tenderness.   Musculoskeletal:      Cervical back: Neck supple.      Right lower leg: No edema.      Left lower leg: No edema.   Neurological:      Mental Status: He is alert and oriented to person, place, and time.   Psychiatric:         Mood and Affect: Mood normal.         Behavior: Behavior is cooperative.           Result Review :                    ECG 12 Lead    Date/Time: 10/11/2023 12:10 PM  Performed by: Mira Triplett APRN    Authorized by: Mira Triplett APRN  Comments: Normal sinus rhythm with a heart rate of 64.  Compared to previous EKG heart rate is much slower.         Lab Results   Component Value Date    PROBNP 786.8 07/24/2023    PROBNP 2,809.0 (H) 07/14/2023     CMP          7/22/2023    04:48 7/24/2023    05:55 9/19/2023    11:15   CMP   Glucose 140  136  89    BUN 20  13  21    Creatinine 0.85  0.72  1.08    EGFR 104.6  109.9  82.1    Sodium 133  137  137    Potassium 4.5  4.2  4.2    Chloride 101  102  104    Calcium 8.4  8.4  9.3    Total Protein 7.1   7.4    Albumin 2.6   4.0    Globulin 4.5   3.4    Total Bilirubin 0.2   0.2    Alkaline Phosphatase 304   241    AST (SGOT) 68   30    ALT (SGPT) 68   50    Albumin/Globulin Ratio 0.6   1.2    BUN/Creatinine Ratio 23.5  18.1  19.4    Anion Gap 9.4  10.0  10.0      CBC w/diff          7/23/2023    05:46 7/24/2023    05:55 9/19/2023    11:15   CBC w/Diff   WBC 14.18  11.89  9.04    RBC 3.20   "3.09  4.04    Hemoglobin 10.0  9.8  12.7    Hematocrit 31.2  30.5  37.9    MCV 97.5  98.7  93.8    MCH 31.3  31.7  31.4    MCHC 32.1  32.1  33.5    RDW 14.0  14.0  13.2    Platelets 824  780  326    Neutrophil Rel % 67.1  65.1  61.5    Immature Granulocyte Rel % 1.8  0.9  0.3    Lymphocyte Rel % 17.9  20.2  19.9    Monocyte Rel % 10.5  10.0  7.3    Eosinophil Rel % 1.9  2.4  10.1    Basophil Rel % 0.8  1.4  0.9          No results found for: \"TSH\"   No results found for: \"FREET4\"   No results found for: \"DDIMERQUANT\"  Magnesium   Date Value Ref Range Status   07/18/2023 2.1 1.6 - 2.6 mg/dL Final      No results found for: \"DIGOXIN\"          Results for orders placed during the hospital encounter of 07/10/23    Adult Transthoracic Echo Complete W/ Cont if Necessary Per Protocol    Interpretation Summary    Left ventricular systolic function is severely decreased. Left ventricular ejection fraction appears to be less than 20%.    The left ventricular cavity is moderately dilated.    Left ventricular diastolic function is consistent with (grade I) impaired relaxation.    The left atrial cavity is mildly dilated.    Abnormal mitral valve structure consistent with dilated annulus.    Mild mitral regurgitation    No overt evidence of cardioembolic source by TTE    No prior echo for comparison         Assessment and Plan        Diagnoses and all orders for this visit:    1. Chronic HFrEF (heart failure with reduced ejection fraction) (Primary)  Assessment & Plan:  Yfn has heart failure with reduced ejection fraction who is doing fairly well.  He continues to experience fatigue.  Blood pressures running a little on the low side.  He does not appear to have any pedal edema or short of air.  I will make the following changes to his heart failure medications:    1.  Decrease aspirin to 81 mg every day with food.  2.  Reduce the carvedilol to 25 mg half a tablet in the morning and a whole tablet in the evening.  3.  Keep a log " of his heart rate and blood pressure and bring it to us in 2 weeks starting this Sunday.  4.  Proceed with cardiac catheterization and echocardiography for follow-up of heart failure.    Orders:  -     Adult Transthoracic Echo Complete W/ Cont if Necessary Per Protocol; Future  -     Lipid Panel; Future  -     CBC & Differential; Future  -     Comprehensive Metabolic Panel; Future  -     proBNP; Future  -     Magnesium; Future  -     US Abdomen Complete; Future  -     ECG 12 Lead    2. Staphylococcal arthritis of right shoulder  Assessment & Plan:  He has recently completed his IV antibiotics and PICC line was removed approximately 2 weeks ago.  He will continue oral antibiotics for a minimum of 6 weeks.    Orders:  -     Adult Transthoracic Echo Complete W/ Cont if Necessary Per Protocol; Future  -     Lipid Panel; Future  -     CBC & Differential; Future  -     Comprehensive Metabolic Panel; Future  -     proBNP; Future  -     Magnesium; Future    3. Abnormal LFTs  Assessment & Plan:  Yfn's liver enzymes continue to slowly increase.  We will do a ultrasound of the abdomen and continue to monitor labs.    Orders:  -     US Abdomen Complete; Future    Other orders  -     aspirin 81 MG EC tablet; Take 1 tablet by mouth Daily.  Dispense: 90 tablet; Refill: 3  -     carvedilol (COREG) 25 MG tablet; Take 1 tablet by mouth Every 12 (Twelve) Hours. Haif tab in am and whole tab at night  Dispense: 180 tablet; Refill: 3  -     spironolactone (ALDACTONE) 25 MG tablet; Take 1 tablet by mouth Daily.  Dispense: 90 tablet; Refill: 3            Follow Up     No follow-ups on file.    Patient was given instructions and counseling regarding his condition or for health maintenance advice. Please see specific information pulled into the AVS if appropriate.     Electronically signed by Pema Hector MD, 10/11/23, 12:26 PM EDT.

## 2023-10-11 NOTE — PATIENT INSTRUCTIONS
1.  Decrease aspirin to 81 mg every day with food.  2.  Reduce the carvedilol to 25 mg half a tablet in the morning and a whole tablet in the evening.  3.  Keep a log of his heart rate and blood pressure and bring it to us in 2 weeks starting this Sunday.  4.  Proceed with cardiac catheterization and echocardiography for follow-up of heart failure.

## 2023-10-11 NOTE — ASSESSMENT & PLAN NOTE
He has recently completed his IV antibiotics and PICC line was removed approximately 2 weeks ago.  He will continue oral antibiotics for a minimum of 6 weeks.

## 2023-10-11 NOTE — PROGRESS NOTES
"Chief Complaint  Follow-up and Pain of the Right Shoulder    Subjective          Yfn Ray presents to [facility] for   History of Present Illness    The patient presents here today for follow up evaluation of the right shoulder. The patient S/P right shoulder arthroscopy with washout and debridement performed on 7/14/2023. He reports he was in the sun recently and got burned due to the oral antibiotics. He denies drainage to his incision. He is having pain in his shoulder.     No Known Allergies     Social History     Socioeconomic History    Marital status:    Tobacco Use    Smoking status: Never    Smokeless tobacco: Never   Vaping Use    Vaping Use: Never used   Substance and Sexual Activity    Alcohol use: Never    Drug use: Never    Sexual activity: Defer        I reviewed the patient's chief complaint, history of present illness, review of systems, past medical history, surgical history, family history, social history, medications, and allergy list.     REVIEW OF SYSTEMS    Constitutional: Denies fevers, chills, weight loss  Cardiovascular: Denies chest pain, shortness of breath  Skin: Denies rashes, acute skin changes  Neurologic: Denies headache, loss of consciousness  MSK: Right shoulder pain      Objective   Vital Signs:   BP 99/67   Pulse 73   Ht 180.3 cm (71\")   Wt 84.8 kg (187 lb)   SpO2 95%   BMI 26.08 kg/mý     Body mass index is 26.08 kg/mý.    Physical Exam    General: Alert. No acute distress.   Right shoulder- Incisions clean and dry, no effusion. Positive crepitus. Forward elevation passive 100. External Rotation 30. Internal rotation to waistline. Neurovascularly intact. Deltoid fires with shoulder Abduction. 3/5 rotator cuff testing with pain. Positive pulses. Intact finger flexion and extension.     Procedures    Imaging Results (Most Recent)       None                     Assessment and Plan        XR Shoulder 2+ View Right    Result Date: " 9/20/2023  Narrative: Indications: Follow-up right shoulder infection Views: AP, Grashey, Scap Y right shoulder Findings: High riding humeral head and decreased articular height in the glenohumeral joint consistent with rotator cuff arthropathy.  Disuse osteopenia type changes, no obvious lytic or aggressive features.  Glenohumeral joint appears reduced. Comparative Data: Comparative data found and reviewed today      Diagnoses and all orders for this visit:    1. Staphylococcal arthritis of right shoulder (Primary)    Other orders  -     SCANNED - LABS        Discussed the treatment plan with the patient.  I reviewed the x-rays that were obtained today with the patient. Plan to finish antibiotics. Plan for repeat labs prior to follow up. I advised the patient to watch for swelling, fever, chills, drainage and signs of infection. He can not take tylenol. Plan to continue aleve. Refill of tramadol given today.   His WBC and CRP is normal and he is cleared from my standpoint to proceed with his heart cath.       Will obtain X-Rays of right shoulder at next visit.     Call or return if worsening symptoms.    Scribed for Orville Gallegos MD by Chyna Krishna  10/11/2023   08:51 EDT         Follow Up     4 weeks      Patient was given instructions and counseling regarding his condition or for health maintenance advice. Please see specific information pulled into the AVS if appropriate.       I have personally performed the services described in this document as scribed by the above individual and it is both accurate and complete.     Orville Gallegos MD  10/11/23  08:56 EDT

## 2023-10-11 NOTE — ASSESSMENT & PLAN NOTE
Yfn's liver enzymes continue to slowly increase.  We will do a ultrasound of the abdomen and continue to monitor labs.

## 2023-10-11 NOTE — TELEPHONE ENCOUNTER
Caller: NANCI PATEL    Relationship: Emergency Contact    Best call back number: [phone]     What is the best time to reach you: ANYTIME    Who are you requesting to speak with (clinical staff, provider,  specific staff member): ANYONE    What was the call regarding: NANCI CALLED IN STATING WHEN THE PATIENT LAST SEEN WAYNE SHE WANTED HIM TO HAVE A HEART CATH DONE BUT COULDN'T DO IT DUE TO PATIENT HAVING A PICC LINE IN. PATIENT'S PICC LINE HAS BEEN REMOVED FOR 2 WEEKS NOW AND NANCI STATED WAYNE SAID TO CALL IN ONCE PICC LINE WAS REMOVED SO THE HEART CATH COULD GET SCHEDULED.     Is it okay if the provider responds through [employer]: NO,PLEASE CALL    HUB WAS TRANSFERRED TO [facility]'S PHONE BUT RECEIVED VOICEMAIL.

## 2023-10-11 NOTE — ASSESSMENT & PLAN NOTE
Yfn has heart failure with reduced ejection fraction who is doing fairly well.  He continues to experience fatigue.  Blood pressures running a little on the low side.  He does not appear to have any pedal edema or short of air.  I will make the following changes to his heart failure medications:    1.  Decrease aspirin to 81 mg every day with food.  2.  Reduce the carvedilol to 25 mg half a tablet in the morning and a whole tablet in the evening.  3.  Keep a log of his heart rate and blood pressure and bring it to us in 2 weeks starting this Sunday.  4.  Proceed with cardiac catheterization and echocardiography for follow-up of heart failure.

## 2023-10-11 NOTE — PROGRESS NOTES
"Office Visit    Chief Complaint  Chronic HFrEF (heart failure with reduced ejection fraction)    Subjective            Yfn Ray presents to [facility] CARDIOLOGY  History of Present Illness  Mr. Ray is a 53-year-old male that presented to the office today for follow-up due to chronic heart failure with reduced ejection fraction who is doing well, although he continues to experience ongoing fatigue during the day.  He has completed his home IV antibiotics due to septic arthritis of his shoulder and PICC line was removed 2 weeks ago.  He will continue a minimum of 6 weeks of oral antibiotics.  He denies any short of air, chest pain, dizziness or syncopal episodes.      Past Medical History:   Diagnosis Date    Alpha 1-antitrypsin PiMS phenotype     \"alpha 1\" per pt and wife. unsure of what type.    Arthritis        No Known Allergies     Past Surgical History:   Procedure Laterality Date    LIVER BIOPSY      SHOULDER ARTHROSCOPY Right 7/14/2023    Procedure: SHOULDER ARTHROSCOPY WITH WASHOUT AND DEBRIDMENT;  Surgeon: Orville Gallegos MD;  Location: [facility] OR [facility];  Service: Orthopedics;  Laterality: Right;    TOOTH EXTRACTION          Social History     Tobacco Use    Smoking status: Never    Smokeless tobacco: Never   Vaping Use    Vaping Use: Never used   Substance Use Topics    Alcohol use: Never    Drug use: Never       Family History   Problem Relation Age of Onset    Malig Hyperthermia Neg Hx         Prior to Admission medications    Medication Sig Start Date End Date Taking? Authorizing Provider   aspirin 325 MG EC tablet Take 1 tablet by mouth Every 6 (Six) Hours As Needed for Mild Pain.   Yes ProviderRubia MD   carvedilol (COREG) 25 MG tablet Take 1 tablet by mouth Every 12 (Twelve) Hours. 8/30/23  Yes Pema Hector MD   doxycycline (VIBRAMYCIN) 100 MG capsule Take 1 capsule by mouth 2 (Two) Times a Day. 9/27/23  Yes Orville Gallegos MD   empagliflozin (JARDIANCE) 10 MG tablet " "tablet Take 1 tablet by mouth Daily. 9/6/23  Yes Magaly Mcelroy APRN   multivitamin with minerals tablet tablet Take 1 tablet by mouth Daily.   Yes Provider, MD Rubia   naloxone (NARCAN) 4 MG/0.1ML nasal spray Call 911. Don't prime. [place] in 1 nostril for overdose. Repeat in 2-3 minutes in other nostril if no or minimal breathing/responsiveness. 7/21/23  Yes Orville Gallegos MD   sacubitril-valsartan (Entresto)  MG tablet Take 1 tablet by mouth 2 (Two) Times a Day. 8/16/23  Yes Mira Triplett APRN   sennosides-docusate (PERICOLACE) 8.6-50 MG per tablet Take 2 tablets by mouth 2 (Two) Times a Day.  Patient taking differently: Take 2 tablets by mouth 2 (Two) Times a Day. PRN 7/21/23  Yes Orville Gallegos MD   spironolactone (ALDACTONE) 25 MG tablet Take 1 tablet by mouth Daily for 30 days. 8/17/23 10/11/23 Yes Mira Triplett APRN   traMADol (ULTRAM) 50 MG tablet Take 1 tablet by mouth Every 8 (Eight) Hours As Needed for Moderate Pain. 10/11/23  Yes Orville Gallegos MD   HYDROcodone-acetaminophen (NORCO) 5-325 MG per tablet Take 1 tablet by mouth Every 6 (Six) Hours As Needed for Moderate Pain. 8/10/23   Orville Gallegos MD   vancomycin 1500 mg/300 mL 0.9% NS IVPB Infuse 150 mL into a venous catheter Every 12 (Twelve) Hours.    Provider, MD Rubia   traMADol (ULTRAM) 50 MG tablet Take 1 tablet by mouth Every 12 (Twelve) Hours As Needed for Moderate Pain. 9/20/23 10/11/23  Orville Gallegos MD        Review of Systems   Constitutional:  Positive for fatigue.   Endocrine: Positive for cold intolerance.   Psychiatric/Behavioral:  Positive for depressed mood.         Symptom Course: Been Unchanged    Weight Trend: Stable     Objective     BP (!) 88/64   Pulse 68   Ht 180.3 cm (70.98\")   Wt 83.5 kg (184 lb)   BMI 25.67 kg/mý       Physical Exam  Constitutional:       General: He is awake.      Appearance: Normal appearance.   Neck:      Thyroid: No thyromegaly.      Vascular: No carotid bruit or JVD. "   Cardiovascular:      Rate and Rhythm: Normal rate and regular rhythm.      Chest Wall: PMI is not displaced.      Pulses: Normal pulses.      Heart sounds: Normal heart sounds, S1 normal and S2 normal. No murmur heard.     No friction rub. No gallop. No S3 or S4 sounds.   Pulmonary:      Effort: Pulmonary effort is normal.      Breath sounds: Normal breath sounds and air entry. No wheezing, rhonchi or rales.   Abdominal:      General: Bowel sounds are normal.      Palpations: Abdomen is soft. There is no mass.      Tenderness: There is no abdominal tenderness.   Musculoskeletal:      Cervical back: Neck supple.      Right lower leg: No edema.      Left lower leg: No edema.   Neurological:      Mental Status: He is alert and oriented to person, place, and time.   Psychiatric:         Mood and Affect: Mood normal.         Behavior: Behavior is cooperative.           Result Review :                    ECG 12 Lead    Date/Time: 10/11/2023 12:10 PM  Performed by: Mira Triplett APRN    Authorized by: Mira Triplett APRN  Comments: Normal sinus rhythm with a heart rate of 64.  Compared to previous EKG heart rate is much slower.         Lab Results   Component Value Date    PROBNP 786.8 07/24/2023    PROBNP 2,809.0 (H) 07/14/2023     CMP          7/22/2023    04:48 7/24/2023    05:55 9/19/2023    11:15   CMP   Glucose 140  136  89    BUN 20  13  21    Creatinine 0.85  0.72  1.08    EGFR 104.6  109.9  82.1    Sodium 133  137  137    Potassium 4.5  4.2  4.2    Chloride 101  102  104    Calcium 8.4  8.4  9.3    Total Protein 7.1   7.4    Albumin 2.6   4.0    Globulin 4.5   3.4    Total Bilirubin 0.2   0.2    Alkaline Phosphatase 304   241    AST (SGOT) 68   30    ALT (SGPT) 68   50    Albumin/Globulin Ratio 0.6   1.2    BUN/Creatinine Ratio 23.5  18.1  19.4    Anion Gap 9.4  10.0  10.0      CBC w/diff          7/23/2023    05:46 7/24/2023    05:55 9/19/2023    11:15   CBC w/Diff   WBC 14.18  11.89  9.04    RBC 3.20   "3.09  4.04    Hemoglobin 10.0  9.8  12.7    Hematocrit 31.2  30.5  37.9    MCV 97.5  98.7  93.8    MCH 31.3  31.7  31.4    MCHC 32.1  32.1  33.5    RDW 14.0  14.0  13.2    Platelets 824  780  326    Neutrophil Rel % 67.1  65.1  61.5    Immature Granulocyte Rel % 1.8  0.9  0.3    Lymphocyte Rel % 17.9  20.2  19.9    Monocyte Rel % 10.5  10.0  7.3    Eosinophil Rel % 1.9  2.4  10.1    Basophil Rel % 0.8  1.4  0.9          No results found for: \"TSH\"   No results found for: \"FREET4\"   No results found for: \"DDIMERQUANT\"  Magnesium   Date Value Ref Range Status   07/18/2023 2.1 1.6 - 2.6 mg/dL Final      No results found for: \"DIGOXIN\"          Results for orders placed during the hospital encounter of 07/10/23    Adult Transthoracic Echo Complete W/ Cont if Necessary Per Protocol    Interpretation Summary    Left ventricular systolic function is severely decreased. Left ventricular ejection fraction appears to be less than 20%.    The left ventricular cavity is moderately dilated.    Left ventricular diastolic function is consistent with (grade I) impaired relaxation.    The left atrial cavity is mildly dilated.    Abnormal mitral valve structure consistent with dilated annulus.    Mild mitral regurgitation    No overt evidence of cardioembolic source by TTE    No prior echo for comparison         Assessment and Plan        Diagnoses and all orders for this visit:    1. Chronic HFrEF (heart failure with reduced ejection fraction) (Primary)  Assessment & Plan:  Yfn has heart failure with reduced ejection fraction who is doing fairly well.  He continues to experience fatigue.  Blood pressures running a little on the low side.  He does not appear to have any pedal edema or short of air.  I will make the following changes to his heart failure medications:    1.  Decrease aspirin to 81 mg every day with food.  2.  Reduce the carvedilol to 25 mg half a tablet in the morning and a whole tablet in the evening.  3.  Keep a log " of his heart rate and blood pressure and bring it to us in 2 weeks starting this Sunday.  4.  Proceed with cardiac catheterization and echocardiography for follow-up of heart failure.    Orders:  -     Adult Transthoracic Echo Complete W/ Cont if Necessary Per Protocol; Future  -     Lipid Panel; Future  -     CBC & Differential; Future  -     Comprehensive Metabolic Panel; Future  -     proBNP; Future  -     Magnesium; Future  -     US Abdomen Complete; Future  -     ECG 12 Lead    2. Staphylococcal arthritis of right shoulder  Assessment & Plan:  He has recently completed his IV antibiotics and PICC line was removed approximately 2 weeks ago.  He will continue oral antibiotics for a minimum of 6 weeks.    Orders:  -     Adult Transthoracic Echo Complete W/ Cont if Necessary Per Protocol; Future  -     Lipid Panel; Future  -     CBC & Differential; Future  -     Comprehensive Metabolic Panel; Future  -     proBNP; Future  -     Magnesium; Future    3. Abnormal LFTs  Assessment & Plan:  Yfn's liver enzymes continue to slowly increase.  We will do a ultrasound of the abdomen and continue to monitor labs.    Orders:  -     US Abdomen Complete; Future    Other orders  -     aspirin 81 MG EC tablet; Take 1 tablet by mouth Daily.  Dispense: 90 tablet; Refill: 3  -     carvedilol (COREG) 25 MG tablet; Take 1 tablet by mouth Every 12 (Twelve) Hours. Haif tab in am and whole tab at night  Dispense: 180 tablet; Refill: 3  -     spironolactone (ALDACTONE) 25 MG tablet; Take 1 tablet by mouth Daily.  Dispense: 90 tablet; Refill: 3            Follow Up     No follow-ups on file.    Patient was given instructions and counseling regarding his condition or for health maintenance advice. Please see specific information pulled into the AVS if appropriate.     Electronically signed by Pema Hector MD, 10/11/23, 12:26 PM EDT.

## 2023-10-12 PROBLEM — I50.21 ACUTE HFREF (HEART FAILURE WITH REDUCED EJECTION FRACTION): Status: ACTIVE | Noted: 2023-10-11

## 2023-10-18 ENCOUNTER — HOSPITAL ENCOUNTER (OUTPATIENT)
Dept: ULTRASOUND IMAGING | Facility: HOSPITAL | Age: 53
Discharge: HOME | End: 2023-10-18
Admitting: INTERNAL MEDICINE
Payer: COMMERCIAL

## 2023-10-18 DIAGNOSIS — I50.22 CHRONIC HFREF (HEART FAILURE WITH REDUCED EJECTION FRACTION): ICD-10-CM

## 2023-10-18 DIAGNOSIS — R79.89 ABNORMAL LFTS: ICD-10-CM

## 2023-10-18 PROCEDURE — 76705 ECHO EXAM OF ABDOMEN: CPT

## 2023-10-30 ENCOUNTER — TELEPHONE (OUTPATIENT)
Dept: CARDIOLOGY | Facility: CLINIC | Age: 53
End: 2023-10-30
Payer: COMMERCIAL

## 2023-10-30 NOTE — TELEPHONE ENCOUNTER
I spoke to patient and gave an arrival time of 8:00 on 10/31/23 for [facility]. Patient was instructed to have a  for the day of the procedure and to arrive at the main entrance/registration area. Patient was educated about stent placement and informed that in the event of stent placement, the patient will be required to stay overnight for observation. Patient was instructed to continue all medications as usual. Patient was instructed to be NPO after midnight with sips of water as needed. Patient is agreeable with no other questions or concerns.

## 2023-10-30 NOTE — PRE-PROCEDURE INSTRUCTIONS
PATIENT INSTRUCTED TO BE:    - NPO AFTER MIDNIGHT EXCEPT CAN HAVE SIPS OF WATER WITH HIS MEDICATION PRIOR TO PROCEDURE    -  INSTRUCTED NO LOTIONS, JEWELRY, PIERCINGS, OR DEODORANT DAY OF THE PROCEDURE    - INSTRUCTED TO TAKE THE FOLLOWING MEDICATIONS THE DAY OF SURGERY:     Aspirin, Tramadol, Jardiance, Carvedilol, Entresto, Sprionolactone, Multivitamin, Doxycycline      - INSTRUCTED PT TO FOLLOW ANY INSTRUCTIONS GIVEN BY HIS CARDIOLOGIST.    - INFORMED PT THEY ATTEMPT RADIAL APPROACH FIRST IF UNABLE TO PERFORM CATH WITH THAT APPROACH THEY WILL DO A FEMORAL APPROACH.  ALSO, INFORMED PT THEY WILL BE ON BEDREST POSTOP.  IF THE SURGEON FEELS  AN INTERVENTION OR STENTS NEEDS TO BE PLACED, HE/SHE WILL STAY OVER NIGHT FOR OBSERVATION AND MONITORING.     - INFORMED THE PATIENT HE CAN HAVE TWO VISITORS WITH HIM THE DAY OF THE PROCEDURE    - INSTRUCTED PT TO PARK IN THE PARKING GARAGE, ENTER THE HOSPITAL THRU ENTRANCE A AND  CHECK IN AT THE MAIN REGISTRATION DESK, AFTER REGISTRATION PT WILL BE TAKEN THE THE PREOP AREA FOR HIS PROCEDURE.    -ARRIVAL TIME GIVEN BY CARDIOLOGIST OFFICE, INFORMED PT IF ARRIVAL TIME CHANGES OR ADJUSTMENTS NEED TO BE MADE IN THEIR ARRIVAL TIME, HE/SHE WOULD RECEIVE A CALL.    -INSTRUCTED PT TO COME TO [facility] TO GET THEIR LABS/ EKG DONE PRIOR TO PROCEDURE      - PATIENT VERBALIZED UNDERSTANDING

## 2023-10-31 ENCOUNTER — HOSPITAL ENCOUNTER (OUTPATIENT)
Facility: HOSPITAL | Age: 53
Setting detail: HOSPITAL OUTPATIENT SURGERY
Discharge: HOME | End: 2023-10-31
Attending: INTERNAL MEDICINE | Admitting: NURSE PRACTITIONER
Payer: COMMERCIAL

## 2023-10-31 VITALS
HEIGHT: 71 IN | HEART RATE: 50 BPM | BODY MASS INDEX: 25.76 KG/M2 | TEMPERATURE: 97.9 F | WEIGHT: 184 LBS | SYSTOLIC BLOOD PRESSURE: 109 MMHG | DIASTOLIC BLOOD PRESSURE: 72 MMHG | OXYGEN SATURATION: 100 % | RESPIRATION RATE: 18 BRPM

## 2023-10-31 DIAGNOSIS — I50.21 ACUTE HFREF (HEART FAILURE WITH REDUCED EJECTION FRACTION): ICD-10-CM

## 2023-10-31 DIAGNOSIS — I50.22 CHRONIC HFREF (HEART FAILURE WITH REDUCED EJECTION FRACTION): Primary | ICD-10-CM

## 2023-10-31 LAB
ANION GAP SERPL CALCULATED.3IONS-SCNC: 10.8 MMOL/L (ref 5–15)
BUN SERPL-MCNC: 24 MG/DL (ref 6–20)
BUN/CREAT SERPL: 17.8 (ref 7–25)
CALCIUM SPEC-SCNC: 9.5 MG/DL (ref 8.6–10.5)
CHLORIDE SERPL-SCNC: 105 MMOL/L (ref 98–107)
CO2 SERPL-SCNC: 22.2 MMOL/L (ref 22–29)
CREAT SERPL-MCNC: 1.35 MG/DL (ref 0.76–1.27)
DEPRECATED RDW RBC AUTO: 44.2 FL (ref 37–54)
EGFRCR SERPLBLD CKD-EPI 2021: 62.8 ML/MIN/1.73
ERYTHROCYTE [DISTWIDTH] IN BLOOD BY AUTOMATED COUNT: 12.8 % (ref 12.3–15.4)
GLUCOSE SERPL-MCNC: 101 MG/DL (ref 65–99)
HCT VFR BLD AUTO: 41.4 % (ref 37.5–51)
HGB BLD-MCNC: 13.1 G/DL (ref 13–17.7)
INR PPP: 1.05 (ref 0.86–1.15)
MCH RBC QN AUTO: 30 PG (ref 26.6–33)
MCHC RBC AUTO-ENTMCNC: 31.6 G/DL (ref 31.5–35.7)
MCV RBC AUTO: 94.7 FL (ref 79–97)
PLATELET # BLD AUTO: 298 10*3/MM3 (ref 140–450)
PMV BLD AUTO: 9.6 FL (ref 6–12)
POTASSIUM SERPL-SCNC: 4.6 MMOL/L (ref 3.5–5.2)
PROTHROMBIN TIME: 13.8 SECONDS (ref 11.8–14.9)
RBC # BLD AUTO: 4.37 10*6/MM3 (ref 4.14–5.8)
SODIUM SERPL-SCNC: 138 MMOL/L (ref 136–145)
WBC NRBC COR # BLD: 9.9 10*3/MM3 (ref 3.4–10.8)

## 2023-10-31 PROCEDURE — 85610 PROTHROMBIN TIME: CPT | Performed by: NURSE PRACTITIONER

## 2023-10-31 PROCEDURE — 85027 COMPLETE CBC AUTOMATED: CPT | Performed by: NURSE PRACTITIONER

## 2023-10-31 PROCEDURE — C1769 GUIDE WIRE: HCPCS | Performed by: INTERNAL MEDICINE

## 2023-10-31 PROCEDURE — 99153 MOD SED SAME PHYS/QHP EA: CPT | Performed by: INTERNAL MEDICINE

## 2023-10-31 PROCEDURE — 25010000002 NITROGLYCERIN 100-5 MCG/ML-% SOLUTION: Performed by: INTERNAL MEDICINE

## 2023-10-31 PROCEDURE — C1894 INTRO/SHEATH, NON-LASER: HCPCS | Performed by: INTERNAL MEDICINE

## 2023-10-31 PROCEDURE — 80048 BASIC METABOLIC PNL TOTAL CA: CPT | Performed by: NURSE PRACTITIONER

## 2023-10-31 PROCEDURE — 25010000002 HEPARIN (PORCINE) PER 1000 UNITS: Performed by: INTERNAL MEDICINE

## 2023-10-31 PROCEDURE — 25010000002 MIDAZOLAM PER 1MG: Performed by: INTERNAL MEDICINE

## 2023-10-31 PROCEDURE — 93458 L HRT ARTERY/VENTRICLE ANGIO: CPT | Performed by: INTERNAL MEDICINE

## 2023-10-31 PROCEDURE — 25010000002 FENTANYL CITRATE (PF) 100 MCG/2ML SOLUTION: Performed by: INTERNAL MEDICINE

## 2023-10-31 PROCEDURE — 99152 MOD SED SAME PHYS/QHP 5/>YRS: CPT | Performed by: INTERNAL MEDICINE

## 2023-10-31 RX ORDER — NALOXONE HCL 0.4 MG/ML
0.4 VIAL (ML) INJECTION
Status: DISCONTINUED | OUTPATIENT
Start: 2023-10-31 | End: 2023-10-31 | Stop reason: HOSPADM

## 2023-10-31 RX ORDER — MORPHINE SULFATE 2 MG/ML
1 INJECTION, SOLUTION INTRAMUSCULAR; INTRAVENOUS EVERY 4 HOURS PRN
Status: DISCONTINUED | OUTPATIENT
Start: 2023-10-31 | End: 2023-10-31 | Stop reason: HOSPADM

## 2023-10-31 RX ORDER — ONDANSETRON 2 MG/ML
4 INJECTION INTRAMUSCULAR; INTRAVENOUS EVERY 6 HOURS PRN
Status: DISCONTINUED | OUTPATIENT
Start: 2023-10-31 | End: 2023-10-31 | Stop reason: HOSPADM

## 2023-10-31 RX ORDER — MIDAZOLAM HYDROCHLORIDE 2 MG/2ML
INJECTION, SOLUTION INTRAMUSCULAR; INTRAVENOUS
Status: DISCONTINUED | OUTPATIENT
Start: 2023-10-31 | End: 2023-10-31 | Stop reason: HOSPADM

## 2023-10-31 RX ORDER — HEPARIN SODIUM 1000 [USP'U]/ML
INJECTION, SOLUTION INTRAVENOUS; SUBCUTANEOUS
Status: DISCONTINUED | OUTPATIENT
Start: 2023-10-31 | End: 2023-10-31 | Stop reason: HOSPADM

## 2023-10-31 RX ORDER — SODIUM CHLORIDE 9 MG/ML
75 INJECTION, SOLUTION INTRAVENOUS CONTINUOUS
Status: DISCONTINUED | OUTPATIENT
Start: 2023-10-31 | End: 2023-10-31 | Stop reason: HOSPADM

## 2023-10-31 RX ORDER — NITROGLYCERIN 0.4 MG/1
0.4 TABLET SUBLINGUAL
Status: DISCONTINUED | OUTPATIENT
Start: 2023-10-31 | End: 2023-10-31 | Stop reason: HOSPADM

## 2023-10-31 RX ORDER — LIDOCAINE HYDROCHLORIDE 20 MG/ML
INJECTION, SOLUTION INFILTRATION; PERINEURAL
Status: DISCONTINUED | OUTPATIENT
Start: 2023-10-31 | End: 2023-10-31 | Stop reason: HOSPADM

## 2023-10-31 RX ORDER — ACETAMINOPHEN 325 MG/1
650 TABLET ORAL EVERY 4 HOURS PRN
Status: DISCONTINUED | OUTPATIENT
Start: 2023-10-31 | End: 2023-10-31 | Stop reason: HOSPADM

## 2023-10-31 RX ORDER — FENTANYL CITRATE 50 UG/ML
INJECTION, SOLUTION INTRAMUSCULAR; INTRAVENOUS
Status: DISCONTINUED | OUTPATIENT
Start: 2023-10-31 | End: 2023-10-31 | Stop reason: HOSPADM

## 2023-10-31 RX ORDER — SODIUM CHLORIDE 9 MG/ML
100 INJECTION, SOLUTION INTRAVENOUS CONTINUOUS
Status: ACTIVE | OUTPATIENT
Start: 2023-10-31 | End: 2023-10-31

## 2023-10-31 RX ORDER — ONDANSETRON 4 MG/1
4 TABLET, FILM COATED ORAL EVERY 6 HOURS PRN
Status: DISCONTINUED | OUTPATIENT
Start: 2023-10-31 | End: 2023-10-31 | Stop reason: HOSPADM

## 2023-10-31 RX ORDER — SODIUM CHLORIDE 9 MG/ML
40 INJECTION, SOLUTION INTRAVENOUS AS NEEDED
Status: DISCONTINUED | OUTPATIENT
Start: 2023-10-31 | End: 2023-10-31 | Stop reason: HOSPADM

## 2023-10-31 RX ORDER — SODIUM CHLORIDE 0.9 % (FLUSH) 0.9 %
10 SYRINGE (ML) INJECTION AS NEEDED
Status: DISCONTINUED | OUTPATIENT
Start: 2023-10-31 | End: 2023-10-31 | Stop reason: HOSPADM

## 2023-10-31 RX ORDER — SODIUM CHLORIDE 0.9 % (FLUSH) 0.9 %
10 SYRINGE (ML) INJECTION EVERY 12 HOURS SCHEDULED
Status: DISCONTINUED | OUTPATIENT
Start: 2023-10-31 | End: 2023-10-31 | Stop reason: HOSPADM

## 2023-10-31 NOTE — NURSING NOTE
Patient arrived to the unit for a heart cath. Consent was signed and sent to cath lab with patient. VSS. Cath site clean, dry, and intact. IV removed. Discharge teaching was completed with patient and spouse.

## 2023-11-13 ENCOUNTER — OFFICE VISIT (OUTPATIENT)
Dept: ORTHOPEDIC SURGERY | Facility: CLINIC | Age: 53
End: 2023-11-13
Payer: COMMERCIAL

## 2023-11-13 VITALS
HEIGHT: 71 IN | BODY MASS INDEX: 25.9 KG/M2 | DIASTOLIC BLOOD PRESSURE: 64 MMHG | WEIGHT: 185 LBS | HEART RATE: 73 BPM | SYSTOLIC BLOOD PRESSURE: 94 MMHG | OXYGEN SATURATION: 95 %

## 2023-11-13 DIAGNOSIS — M25.511 RIGHT SHOULDER PAIN, UNSPECIFIED CHRONICITY: Primary | ICD-10-CM

## 2023-11-13 DIAGNOSIS — M87.00 AVN (AVASCULAR NECROSIS OF BONE): ICD-10-CM

## 2023-11-13 DIAGNOSIS — M00.011 STAPHYLOCOCCAL ARTHRITIS OF RIGHT SHOULDER: ICD-10-CM

## 2023-11-13 PROCEDURE — 99213 OFFICE O/P EST LOW 20 MIN: CPT | Performed by: STUDENT IN AN ORGANIZED HEALTH CARE EDUCATION/TRAINING PROGRAM

## 2023-11-13 NOTE — PROGRESS NOTES
"Chief Complaint  Follow-up and Pain of the Right Shoulder    Subjective          Yfn Ray presents to [facility] ORTHOPEDICS for   History of Present Illness    The patient presents here today for follow up evaluation of the right shoulder. The patient S/P right shoulder arthroscopy with washout and debridement performed on 7/14/2023. He reports he is having some pain to the shoulder. He denies drainage to his incision. He denies fever or chills.     No Known Allergies     Social History     Socioeconomic History    Marital status:    Tobacco Use    Smoking status: Never    Smokeless tobacco: Never   Vaping Use    Vaping Use: Never used   Substance and Sexual Activity    Alcohol use: Never    Drug use: Never    Sexual activity: Defer        I reviewed the patient's chief complaint, history of present illness, review of systems, past medical history, surgical history, family history, social history, medications, and allergy list.     REVIEW OF SYSTEMS    Constitutional: Denies fevers, chills, weight loss  Cardiovascular: Denies chest pain, shortness of breath  Skin: Denies rashes, acute skin changes  Neurologic: Denies headache, loss of consciousness  MSK: Right shoulder pain      Objective   Vital Signs:   BP 94/64   Pulse 73   Ht 180.3 cm (71\")   Wt 83.9 kg (185 lb)   SpO2 95%   BMI 25.80 kg/m²     Body mass index is 25.8 kg/m².    Physical Exam    General: Alert. No acute distress.   Right shoulder-  Incisions clean and dry, no effusion. Positive crepitus. Forward elevation passive 100. External Rotation 30. Internal rotation to waistline. Neurovascularly intact. Deltoid fires with shoulder Abduction. 3/5 rotator cuff testing with pain. Positive pulses. Intact finger flexion and extension.      Procedures    Imaging Results (Most Recent)       Procedure Component Value Units Date/Time    XR Shoulder 2+ View Right [[id number]] Resulted: 11/13/23 0945     Updated: 11/13/23 0947    " Narrative:      Indications: Follow-up right shoulder trauma, infection    Views: AP, Grashey, Scap Y, axillary right shoulder    Findings: Degenerative changes are seen in the glenohumeral joint.  There   is new flattening of the humeral head consistent with avascular necrosis.    There does appear to be areas of decreased mineralization throughout the   humeral head.  This could be disuse versus possible infectious etiology.    Glenohumeral joint appears reduced.  No fractures are noted.    Comparative Data: Comparative data found and reviewed                     Assessment and Plan        XR Shoulder 2+ View Right    Result Date: 11/13/2023  Narrative: Indications: Follow-up right shoulder trauma, infection Views: AP, Grashey, Scap Y, axillary right shoulder Findings: Degenerative changes are seen in the glenohumeral joint.  There is new flattening of the humeral head consistent with avascular necrosis.  There does appear to be areas of decreased mineralization throughout the humeral head.  This could be disuse versus possible infectious etiology.  Glenohumeral joint appears reduced.  No fractures are noted. Comparative Data: Comparative data found and reviewed    Adult Transthoracic Echo Complete W/ Cont if Necessary Per Protocol    Result Date: 11/3/2023  Narrative:   Left ventricular systolic function is mildly decreased. Left ventricular ejection fraction appears to be 46 - 50%.   The left ventricular cavity is mildly dilated.   Left ventricular diastolic dysfunction is noted.   The left atrial cavity is mildly dilated.     US Abdomen Limited    Result Date: 10/18/2023  Narrative: PROCEDURE: US ABDOMEN LIMITED  COMPARISON: None  INDICATIONS: liver function tests  TECHNIQUE: Ultrasound examination of the right upper quadrant of the abdomen was performed.   FINDINGS:  Liver:  Liver is normal in contour and echogenicity.  No intrahepatic biliary ductal dilation noted.  No focal lesion noted.  Hepatic vein and  portal vein are patent.  There is normal directional flow  Biliary:  Gallbladder is unremarkable in appearance.  There is no wall thickening, stones, sludge or pericholecystic fluid.  Patient reports no sonographic Bansal sign.  Common bile duct is normal in caliber measuring 2.4 mm  Pancreas:  Pancreas is obscured by bowel gas.  Right kidney:  Right kidney is normal in appearance.  Right kidney measures 10.9 cm  Other:  No right upper quadrant ascites       Impression:   1. Liver is grossly unremarkable in appearance. 2. Gallbladder appears within normal limits.     DAYRON DELACRUZ MD       ELECTRONICALLY SIGNED AND APPROVED BY: DAYRON DELACRUZ MD ON 10/18/2023 AT 8:46               Diagnoses and all orders for this visit:    1. Right shoulder pain, unspecified chronicity (Primary)  -     XR Shoulder 2+ View Right    2. Staphylococcal arthritis of right shoulder  -     MRI Shoulder Right Without Contrast; Future    3. AVN (avascular necrosis of bone)  -     MRI Shoulder Right Without Contrast; Future    Other orders  -     SCANNED - LABS  -     SCANNED - LABS        Discussed the treatment plan with the patient.  I reviewed the lab work at this time. His x-rays show some evidence of AVN. Plan for MRI of the right shoulder to further evaluate his rotator cuff and AVN. The patient expressed understanding and wished to proceed.         Call or return if worsening symptoms.    Scribed for Orville Gallegos MD by Chyna Krishna  11/13/2023   08:32 EST         Follow Up       MRI results    Patient was given instructions and counseling regarding his condition or for health maintenance advice. Please see specific information pulled into the AVS if appropriate.       I have personally performed the services described in this document as scribed by the above individual and it is both accurate and complete.     Orville Gallegos MD  11/13/23  09:49 EST

## 2023-11-27 ENCOUNTER — TELEPHONE (OUTPATIENT)
Dept: CARDIOLOGY | Facility: CLINIC | Age: 53
End: 2023-11-27

## 2023-11-27 NOTE — TELEPHONE ENCOUNTER
[facility] received a fax that requires your attention. The document has been indexed to the patient’s chart for your review.      Reason for sending: EXTERNAL MEDICAL RECORD NOTIFICATION     Documents Description: LAB-[facility]-11.24.23    Name of Sender: [facility]     Date Indexed: 11.24.23

## 2023-11-28 ENCOUNTER — OFFICE VISIT (OUTPATIENT)
Dept: CARDIOLOGY | Facility: CLINIC | Age: 53
End: 2023-11-28
Payer: COMMERCIAL

## 2023-11-28 VITALS
SYSTOLIC BLOOD PRESSURE: 105 MMHG | HEIGHT: 71 IN | HEART RATE: 67 BPM | WEIGHT: 184 LBS | DIASTOLIC BLOOD PRESSURE: 68 MMHG | BODY MASS INDEX: 25.76 KG/M2

## 2023-11-28 DIAGNOSIS — I50.22 CHRONIC HFREF (HEART FAILURE WITH REDUCED EJECTION FRACTION): Primary | ICD-10-CM

## 2023-11-28 DIAGNOSIS — R79.89 ABNORMAL LFTS: ICD-10-CM

## 2023-11-28 DIAGNOSIS — E78.5 HYPERLIPIDEMIA LDL GOAL <100: ICD-10-CM

## 2023-11-28 PROBLEM — I50.21 ACUTE HFREF (HEART FAILURE WITH REDUCED EJECTION FRACTION): Status: RESOLVED | Noted: 2023-10-11 | Resolved: 2023-11-28

## 2023-11-28 PROCEDURE — 99214 OFFICE O/P EST MOD 30 MIN: CPT | Performed by: INTERNAL MEDICINE

## 2023-11-28 RX ORDER — ATORVASTATIN CALCIUM 20 MG/1
20 TABLET, FILM COATED ORAL DAILY
Qty: 30 TABLET | Refills: 11 | Status: SHIPPED | OUTPATIENT
Start: 2023-11-28

## 2023-11-28 RX ORDER — SPIRONOLACTONE 25 MG/1
25 TABLET ORAL EVERY OTHER DAY
Start: 2023-11-28

## 2023-11-28 NOTE — PATIENT INSTRUCTIONS
1.  Reduce the spironolactone to every other day.  2.  Start a blood pressure log for 2 weeks from now and bring it in.  3.  Do blood test in 3 weeks

## 2023-11-28 NOTE — PROGRESS NOTES
"Office Visit    Chief Complaint  Chronic HFrEF    Subjective            Yfn Ray presents to [facility] CARDIOLOGY  History of Present Illness  Yfn is a 53 years old young male with heart failure with reduced ejection fraction who is doing very well.  He denies any symptoms of chest pain palpitation shortness of breath fatigue dizziness or syncope.  His blood pressures are very well-controlled on his current regimen.  He did undergo coronary angiography which was normal.      Past Medical History:   Diagnosis Date    Alpha 1-antitrypsin PiMS phenotype     \"alpha 1\" per pt and wife. unsure of what type.    Arthritis     Chest pain     2 months ago    H/o Back injury     HFrEF (heart failure with reduced ejection fraction)     Hypertension     Rash     due to antibiotic currently on       No Known Allergies     Past Surgical History:   Procedure Laterality Date    CARDIAC CATHETERIZATION N/A 10/31/2023    Procedure: Left Heart Cath;  Surgeon: Cristiano Holliday MD;  Location: [facility] CATH INVASIVE LOCATION;  Service: Cardiovascular;  Laterality: N/A;    LIVER BIOPSY      SHOULDER ARTHROSCOPY Right 7/14/2023    Procedure: SHOULDER ARTHROSCOPY WITH WASHOUT AND DEBRIDMENT;  Surgeon: Orville Gallegos MD;  [place] [facility] [place] [facility] [place]  Service: Orthopedics;  Laterality: Right;    TOOTH EXTRACTION          Social History     Tobacco Use    Smoking status: Never    Smokeless tobacco: Never   Vaping Use    Vaping Use: Never used   Substance Use Topics    Alcohol use: Never    Drug use: Never       Family History   Problem Relation Age of Onset    Malig Hyperthermia Neg Hx         Prior to Admission medications    Medication Sig Start Date End Date Taking? Authorizing Provider   aspirin 81 MG EC tablet Take 1 tablet by mouth Daily. 10/11/23  Yes Pema Hector MD   carvedilol (COREG) 25 MG tablet Take 1 tablet by mouth Every 12 (Twelve) Hours. Haif tab in am and whole tab at night  Patient taking differently: Take 1 " "tablet by mouth Every 12 (Twelve) Hours. 10/11/23  Yes Pema Hector MD   empagliflozin (JARDIANCE) 10 MG tablet tablet Take 1 tablet by mouth Daily. 9/6/23  Yes Magaly Mcelroy APRN   multivitamin with minerals tablet tablet Take 1 tablet by mouth Daily.   Yes Provider, MD Rubia   naloxone (NARCAN) 4 MG/0.1ML nasal spray Call 911. Don't prime. [place] in 1 nostril for overdose. Repeat in 2-3 minutes in other nostril if no or minimal breathing/responsiveness. 7/21/23  Yes Orville Gallegos MD   sacubitril-valsartan (Entresto)  MG tablet Take 1 tablet by mouth 2 (Two) Times a Day. 8/16/23  Yes Mira Triplett APRN   spironolactone (ALDACTONE) 25 MG tablet Take 1 tablet by mouth Daily. 10/11/23  Yes Pema Hector MD   traMADol (ULTRAM) 50 MG tablet Take 1 tablet by mouth Every 8 (Eight) Hours As Needed for Moderate Pain. 10/11/23  Yes Orville Gallegos MD   doxycycline (VIBRAMYCIN) 100 MG capsule Take 1 capsule by mouth 2 (Two) Times a Day. 9/27/23   Orville Gallegos MD        Review of Systems   Constitutional:  Negative for fatigue.   Respiratory:  Negative for cough and shortness of breath.    Cardiovascular:  Negative for chest pain, palpitations and leg swelling.   Neurological:  Negative for dizziness.        Symptom Course: Improved    Weight Trend: Stable     Objective     /68   Pulse 67   Ht 180.3 cm (71\")   Wt 83.5 kg (184 lb)   BMI 25.66 kg/m²       Physical Exam  Constitutional:       General: He is awake.      Appearance: Normal appearance.   Neck:      Thyroid: No thyromegaly.      Vascular: No carotid bruit or JVD.   Cardiovascular:      Rate and Rhythm: Normal rate and regular rhythm.      Chest Wall: PMI is not displaced.      Pulses: Normal pulses.      Heart sounds: Normal heart sounds, S1 normal and S2 normal. No murmur heard.     No friction rub. No gallop. No S3 or S4 sounds.   Pulmonary:      Effort: Pulmonary effort is normal.      Breath sounds: Normal breath sounds and " "air entry. No wheezing, rhonchi or rales.   Abdominal:      General: Bowel sounds are normal.      Palpations: Abdomen is soft. There is no mass.      Tenderness: There is no abdominal tenderness.   Musculoskeletal:      Cervical back: Neck supple.      Right lower leg: No edema.      Left lower leg: No edema.   Neurological:      Mental Status: He is alert and oriented to person, place, and time.   Psychiatric:         Mood and Affect: Mood normal.         Behavior: Behavior is cooperative.           Result Review :                      Lab Results   Component Value Date    PROBNP 786.8 07/24/2023    PROBNP 2,809.0 (H) 07/14/2023     CMP          7/24/2023    05:55 9/19/2023    11:15 10/31/2023    08:34   CMP   Glucose 136  89  101    BUN 13  21  24    Creatinine 0.72  1.08  1.35    EGFR 109.9  82.1  62.8    Sodium 137  137  138    Potassium 4.2  4.2  4.6    Chloride 102  104  105    Calcium 8.4  9.3  9.5    Total Protein  7.4     Albumin  4.0     Globulin  3.4     Total Bilirubin  0.2     Alkaline Phosphatase  241     AST (SGOT)  30     ALT (SGPT)  50     Albumin/Globulin Ratio  1.2     BUN/Creatinine Ratio 18.1  19.4  17.8    Anion Gap 10.0  10.0  10.8      CBC w/diff          7/24/2023    05:55 9/19/2023    11:15 10/31/2023    08:34   CBC w/Diff   WBC 11.89  9.04  9.90    RBC 3.09  4.04  4.37    Hemoglobin 9.8  12.7  13.1    Hematocrit 30.5  37.9  41.4    MCV 98.7  93.8  94.7    MCH 31.7  31.4  30.0    MCHC 32.1  33.5  31.6    RDW 14.0  13.2  12.8    Platelets 780  326  298    Neutrophil Rel % 65.1  61.5     Immature Granulocyte Rel % 0.9  0.3     Lymphocyte Rel % 20.2  19.9     Monocyte Rel % 10.0  7.3     Eosinophil Rel % 2.4  10.1     Basophil Rel % 1.4  0.9           No results found for: \"TSH\"   No results found for: \"FREET4\"   No results found for: \"DDIMERQUANT\"  Magnesium   Date Value Ref Range Status   07/18/2023 2.1 1.6 - 2.6 mg/dL Final      No results found for: \"DIGOXIN\"      Outside labs were " reviewed.  Creatinine is slightly up at 1.5 and his liver function stays persistently abnormal.  He had a normal abdominal ultrasound in the right upper quadrant.    Results for orders placed in visit on 11/03/23    Adult Transthoracic Echo Complete W/ Cont if Necessary Per Protocol    Interpretation Summary    Left ventricular systolic function is mildly decreased. Left ventricular ejection fraction appears to be 46 - 50%.    The left ventricular cavity is mildly dilated.    Left ventricular diastolic dysfunction is noted.    The left atrial cavity is mildly dilated.          Assessment and Plan        Diagnoses and all orders for this visit:    1. Chronic HFrEF (heart failure with reduced ejection fraction) (Primary)  Assessment & Plan:  Mr. Yfn Dejesus is a 53 years old gentleman with heart failure with reduced ejection fraction who is doing very well.  His ejection fraction has improved from 20% to 45 to 50%.  He is well compensated card class II heart failure.  We will reduce his spironolactone to every other day since he appears to be mildly volume depleted.    Orders:  -     Comprehensive Metabolic Panel; Future    2. Abnormal LFTs  Assessment & Plan:  He has persistently elevated SGOT, SGPT and mildly elevated alkaline phosphatase.  His his ultrasound of the abdomen was negative for any liver or gallbladder disease.  His LDL is elevated at 127 with elevated triglycerides so I am going to put him on 20 mg of Lipitor and recheck LFT in 3 weeks.  He has been prescribed a heart healthy diet      3. Hyperlipidemia LDL goal <100  Assessment & Plan:  He has persistently elevated LFTs since his septic arthritis admission.  He is an ultrasound of his abdomen was negative.  We will therefore start him on Lipitor 20 mg once at bedtime and follow follow his liver function closely      Other orders  -     spironolactone (ALDACTONE) 25 MG tablet; Take 1 tablet by mouth Every Other Day.  -     atorvastatin (LIPITOR) 20  MG tablet; Take 1 tablet by mouth Daily.  Dispense: 30 tablet; Refill: 11  -     [facility] - CARDIOLOGY            Follow Up     Return if symptoms worsen or fail to improve, for  follow-up with Dr. Holliday on a regular basis.    Patient was given instructions and counseling regarding his condition or for health maintenance advice. Please see specific information pulled into the AVS if appropriate.     Electronically signed by Pema Hector MD, 11/28/23, 8:53 AM EST.

## 2023-12-14 ENCOUNTER — HOSPITAL ENCOUNTER (OUTPATIENT)
Dept: MRI IMAGING | Facility: HOSPITAL | Age: 53
Discharge: HOME | End: 2023-12-14
Admitting: STUDENT IN AN ORGANIZED HEALTH CARE EDUCATION/TRAINING PROGRAM
Payer: COMMERCIAL

## 2023-12-14 DIAGNOSIS — M00.011 STAPHYLOCOCCAL ARTHRITIS OF RIGHT SHOULDER: ICD-10-CM

## 2023-12-14 DIAGNOSIS — M87.00 AVN (AVASCULAR NECROSIS OF BONE): ICD-10-CM

## 2023-12-14 LAB
CREAT BLDA-MCNC: 1.7 MG/DL (ref 0.6–1.3)
EGFRCR SERPLBLD CKD-EPI 2021: 47.6 ML/MIN/1.73

## 2023-12-14 PROCEDURE — A9577 INJ MULTIHANCE: HCPCS | Performed by: STUDENT IN AN ORGANIZED HEALTH CARE EDUCATION/TRAINING PROGRAM

## 2023-12-14 PROCEDURE — 82565 ASSAY OF CREATININE: CPT

## 2023-12-14 PROCEDURE — 73223 MRI JOINT UPR EXTR W/O&W/DYE: CPT

## 2023-12-14 PROCEDURE — 0 GADOBENATE DIMEGLUMINE 529 MG/ML SOLUTION: Performed by: STUDENT IN AN ORGANIZED HEALTH CARE EDUCATION/TRAINING PROGRAM

## 2023-12-14 RX ADMIN — GADOBENATE DIMEGLUMINE 15 ML: 529 INJECTION, SOLUTION INTRAVENOUS at 11:24

## 2023-12-15 DIAGNOSIS — M86.211: Primary | ICD-10-CM

## 2023-12-19 ENCOUNTER — TELEPHONE (OUTPATIENT)
Dept: CARDIOLOGY | Facility: CLINIC | Age: 53
End: 2023-12-19

## 2023-12-19 ENCOUNTER — TELEPHONE (OUTPATIENT)
Dept: ORTHOPEDIC SURGERY | Facility: CLINIC | Age: 53
End: 2023-12-19
Payer: COMMERCIAL

## 2023-12-19 NOTE — TELEPHONE ENCOUNTER
L/M ON VM REQUESTING A RETURN CALL REGARDING MRI RESULTS AND REFERRAL TO DR RHODES SHOULDER SPECIALIST.

## 2023-12-20 ENCOUNTER — OFFICE VISIT (OUTPATIENT)
Dept: ORTHOPEDIC SURGERY | Facility: CLINIC | Age: 53
End: 2023-12-20
Payer: COMMERCIAL

## 2023-12-20 ENCOUNTER — OFFICE VISIT (OUTPATIENT)
Dept: CARDIOLOGY | Facility: CLINIC | Age: 53
End: 2023-12-20
Payer: COMMERCIAL

## 2023-12-20 VITALS
DIASTOLIC BLOOD PRESSURE: 86 MMHG | HEIGHT: 71 IN | OXYGEN SATURATION: 95 % | HEART RATE: 77 BPM | SYSTOLIC BLOOD PRESSURE: 134 MMHG | BODY MASS INDEX: 25.76 KG/M2 | WEIGHT: 184 LBS

## 2023-12-20 VITALS
WEIGHT: 192 LBS | BODY MASS INDEX: 26.88 KG/M2 | HEIGHT: 71 IN | DIASTOLIC BLOOD PRESSURE: 86 MMHG | HEART RATE: 79 BPM | SYSTOLIC BLOOD PRESSURE: 132 MMHG

## 2023-12-20 DIAGNOSIS — N18.2 CKD (CHRONIC KIDNEY DISEASE) STAGE 2, GFR 60-89 ML/MIN: ICD-10-CM

## 2023-12-20 DIAGNOSIS — I50.22 CHRONIC HFREF (HEART FAILURE WITH REDUCED EJECTION FRACTION): Primary | ICD-10-CM

## 2023-12-20 DIAGNOSIS — E78.5 HYPERLIPIDEMIA LDL GOAL <100: ICD-10-CM

## 2023-12-20 DIAGNOSIS — M87.00 AVN (AVASCULAR NECROSIS OF BONE): ICD-10-CM

## 2023-12-20 DIAGNOSIS — M00.011 STAPHYLOCOCCAL ARTHRITIS OF RIGHT SHOULDER: Primary | ICD-10-CM

## 2023-12-20 RX ORDER — CARVEDILOL 25 MG/1
25 TABLET ORAL EVERY 12 HOURS SCHEDULED
Qty: 180 TABLET | Refills: 3 | Status: SHIPPED | OUTPATIENT
Start: 2023-12-20

## 2023-12-20 NOTE — ASSESSMENT & PLAN NOTE
His lipids have been elevated.  At last visit he was started on atorvastatin 20 mg.  Will continue the same.

## 2023-12-20 NOTE — PATIENT INSTRUCTIONS
1.  Increase carvedilol 25 mg take a full tablet twice daily.  2.  Continue with all other medications as prescribed.  3.  Do a heart rate blood pressure and daily weight log and bring it to your next appointment.  4.  Do blood work 1 to 2 days prior to next visit.

## 2023-12-20 NOTE — ASSESSMENT & PLAN NOTE
Mr. Ray has heart failure with reduced ejection fraction, that has improved over the past few months.  He is doing very well from a cardiac standpoint.  His systolic blood pressure tends to run greater than 130 with a heart rate in the 70s and 80s.  He does take carvedilol 25 mg half tablet in the morning and full tablet at night.  I will trial him on a full tablet twice daily to optimize his heart failure medications and address his heart rate and blood pressure.

## 2023-12-20 NOTE — PROGRESS NOTES
"Chief Complaint  Follow-up and Pain of the Right Shoulder    Subjective          Yfn Ray presents to [facility] ORTHOPEDICS for   History of Present Illness    The patient presents here today for follow up evaluation of the right shoulder. The patient S/P right shoulder arthroscopy with washout and debridement performed on 7/14/2023. The patient recently had an MRI and is here today for those results.       No Known Allergies     Social History     Socioeconomic History    Marital status:    Tobacco Use    Smoking status: Never    Smokeless tobacco: Never   Vaping Use    Vaping Use: Never used   Substance and Sexual Activity    Alcohol use: Never    Drug use: Never    Sexual activity: Defer        I reviewed the patient's chief complaint, history of present illness, review of systems, past medical history, surgical history, family history, social history, medications, and allergy list.     REVIEW OF SYSTEMS    Constitutional: Denies fevers, chills, weight loss  Cardiovascular: Denies chest pain, shortness of breath  Skin: Denies rashes, acute skin changes  Neurologic: Denies headache, loss of consciousness  MSK: Right shoulder pain      Objective   Vital Signs:   /86   Pulse 77   Ht 180.3 cm (71\")   Wt 83.5 kg (184 lb)   SpO2 95%   BMI 25.66 kg/m²     Body mass index is 25.66 kg/m².    Physical Exam    General: Alert. No acute distress.   Right shoulder-  Incisions clean and dry, no effusion. Positive crepitus. Forward elevation passive 100. External Rotation 30. Internal rotation to waistline. Neurovascularly intact. Deltoid fires with shoulder Abduction. 3/5 rotator cuff testing with pain. Positive pulses. Intact finger flexion and extension.      Procedures    Imaging Results (Most Recent)       None                     Assessment and Plan        MRI Shoulder Right With & Without Contrast    Result Date: 12/15/2023  Narrative: PROCEDURE: MRI SHOULDER RIGHT W WO CONTRAST  " COMPARISON:  [facility] , CR, XR SHOULDER 2+ VW RIGHT, 11/13/2023, 8:40.  [facility], MR, MRI SHOULDER RIGHT W WO CONTRAST, 7/12/2023, 21:29. INDICATIONS: Aseptic necrosis, Right shoulder  CONTRAST: 15ml  Multihance I.V.  TECHNIQUE: A variety of imaging planes and parameters were utilized for visualization of suspected pathology.  Images were performed without and with intravenous gadolinium.  FINDINGS:  Rotator cuff:  There is complete, full-thickness full with massive tearing supraspinatus and infraspinatus tendon from the insertion site.  Severe muscle atrophy.  There is full-thickness tearing subscapularis tendon from the insertion site with medial retraction musculotendinous junction.  This is a large tear.  Few inferior higher fibers are present.  There is severe muscle atrophy. The teres minor tendon and muscle are intact.  Mild muscle atrophy.  Glenohumeral joint and osseous structures:  There is diffuse abnormal low T1 high T2 signal intensity in the visualized portions of the humerus and glenoid.  There are erosions of the humeral head and superior glenoid.  There is a large joint effusion.  There is synovitis.  There is anterior superior subluxation humeral head.  There is diffuse labral tearing.  These findings have significantly progressed since previous exam.  Long head biceps tendon:  The biceps tendon is not well seen proximally.  Likely chronically torn.  Acromioclavicular joint:  Normal for age.  No os acromiale.  No significant joint effusion.  No lateral downsloping.  No significant osseous marrow edema.  Miscellaneous:  Mild thinning of the lateral deltoid muscle.  No pathologically enlarged axillary lymph nodes.  Trace amount subacromial subdeltoid bursal fluid.  This is significantly decreased since previous exam.       Impression:   1. Diffuse abnormal low T1 high T2 signal intensity of the visualized humerus and acetabulum.  There is progressive erosive changes and  destruction centered at the glenohumeral joint.  There is a moderate-sized joint effusion with synovitis.  These findings are suspicious for chronic osteomyelitis.  This has progressed from previous imaging. 2. Trace amount subacromial subdeltoid bursal fluid.  This is significantly improved from previous exam. 3. Chronic, full thickness full width tearing supraspinatus infraspinatus tendon superior subscapularis tendon.  Severe muscle atrophy. 4. Mild degenerative change of the acromioclavicular joint.      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 AVN (avascular necrosis of bone)        Discussed the treatment plan with the patient.  I reviewed the MRI with the patient. Plan for a referral to a shoulder specialist. Our office will contact Dr. Cage's office in [place] for consultation. We will also consult infectious disease to assist with management moving forward. The patient expressed understanding and wished to proceed.         Call or return if worsening symptoms.    Scribed for Orville Gallegos MD by Chyna Krishna  12/20/2023   08:24 EST         Follow Up       PRN    Patient was given instructions and counseling regarding his condition or for health maintenance advice. Please see specific information pulled into the AVS if appropriate.       I have personally performed the services described in this document as scribed by the above individual and it is both accurate and complete.     Orville Gallegos MD  12/20/23  08:34 EST

## 2023-12-20 NOTE — PROGRESS NOTES
"Office Visit    Chief Complaint  Chronic HFrEF (heart failure with reduced ejection fraction)    Subjective            Yfn Ray presents to [facility] CARDIOLOGY  History of Present Illness  Mr. Ray is a 53-year-old male that presented to the office today for follow-up due to heart failure with reduced ejection fraction.  Overall he appears to be doing well.  He does feel fatigue over the past couple weeks.  His heart rate and blood pressure are well-controlled.  He denies dizziness, short of air, chest pain or syncopal episodes.  Patient reports that a couple weeks ago he discontinued drinking soft drinks.  He had been consuming greater than 12 regular soft drinks daily.  His fatigue started around the time he discontinued Coke/RCA sodas and replaced it with flavored fuller.  He is no longer consuming all the sugar and caffeine.  Yfn had an orthopedic appointment this morning of which they had informed him if he continues to experience infection of his shoulder.  He will be sent for further evaluation.      Past Medical History:   Diagnosis Date    Alpha 1-antitrypsin PiMS phenotype     \"alpha 1\" per pt and wife. unsure of what type.    Arthritis     Chest pain     2 months ago    H/o Back injury     HFrEF (heart failure with reduced ejection fraction)     Hypertension     Rash     due to antibiotic currently on       No Known Allergies     Past Surgical History:   Procedure Laterality Date    CARDIAC CATHETERIZATION N/A 10/31/2023    Procedure: Left Heart Cath;  Surgeon: Cristiano Holliday MD;  Location: [facility] CATH INVASIVE LOCATION;  Service: Cardiovascular;  Laterality: N/A;    LIVER BIOPSY      SHOULDER ARTHROSCOPY Right 7/14/2023    Procedure: SHOULDER ARTHROSCOPY WITH WASHOUT AND DEBRIDMENT;  Surgeon: Orville Gallegos MD;  Location: [facility] OR [facility] [place];  Service: Orthopedics;  Laterality: Right;    TOOTH EXTRACTION          Social History     Tobacco Use    Smoking status: Never    Smokeless " "tobacco: Never   Vaping Use    Vaping Use: Never used   Substance Use Topics    Alcohol use: Never    Drug use: Never       Family History   Problem Relation Age of Onset    Malig Hyperthermia Neg Hx         Prior to Admission medications    Medication Sig Start Date End Date Taking? Authorizing Provider   aspirin 81 MG EC tablet Take 1 tablet by mouth Daily. 10/11/23  Yes Pema Hector MD   atorvastatin (LIPITOR) 20 MG tablet Take 1 tablet by mouth Daily. 11/28/23  Yes Pema Hector MD   carvedilol (COREG) 25 MG tablet Take 1 tablet by mouth Every 12 (Twelve) Hours. Haif tab in am and whole tab at night  Patient taking differently: Take 1 tablet by mouth Every 12 (Twelve) Hours. 1/2 tab in am, full tab in pm 10/11/23  Yes Pema Hector MD   empagliflozin (JARDIANCE) 10 MG tablet tablet Take 1 tablet by mouth Daily. 9/6/23  Yes Magaly Mcelroy APRN   multivitamin with minerals tablet tablet Take 1 tablet by mouth Daily.   Yes Provider, MD Rubia   sacubitril-valsartan (Entresto)  MG tablet Take 1 tablet by mouth 2 (Two) Times a Day. 8/16/23  Yes Mira Triplett APRN   spironolactone (ALDACTONE) 25 MG tablet Take 1 tablet by mouth Every Other Day. 11/28/23  Yes Pema Hector MD   naloxone (NARCAN) 4 MG/0.1ML nasal spray Call 911. Don't prime. [place] in 1 nostril for overdose. Repeat in 2-3 minutes in other nostril if no or minimal breathing/responsiveness. 7/21/23   Orville Gallegos MD   traMADol (ULTRAM) 50 MG tablet Take 1 tablet by mouth Every 8 (Eight) Hours As Needed for Moderate Pain. 10/11/23   Orville Gallegos MD        Review of Systems   Constitutional:  Positive for fatigue.   Respiratory:  Negative for cough and shortness of breath.    Cardiovascular:  Negative for chest pain, palpitations and leg swelling.   Neurological:  Positive for dizziness.        Symptom Course: Worsened    Weight Trend: Stable     Objective     /86   Pulse 79   Ht 180.3 cm (71\")   Wt 87.1 kg (192 " lb)   BMI 26.78 kg/m²       Physical Exam  Constitutional:       General: He is awake.      Appearance: Normal appearance.   Neck:      Thyroid: No thyromegaly.      Vascular: No carotid bruit or JVD.   Cardiovascular:      Rate and Rhythm: Normal rate and regular rhythm.      Chest Wall: PMI is not displaced.      Pulses: Normal pulses.      Heart sounds: Normal heart sounds, S1 normal and S2 normal. No murmur heard.     No friction rub. No gallop. No S3 or S4 sounds.   Pulmonary:      Effort: Pulmonary effort is normal.      Breath sounds: Normal breath sounds and air entry. No wheezing, rhonchi or rales.   Abdominal:      General: Bowel sounds are normal.      Palpations: Abdomen is soft. There is no mass.      Tenderness: There is no abdominal tenderness.   Musculoskeletal:      Cervical back: Neck supple.      Right lower leg: No edema.      Left lower leg: No edema.   Neurological:      Mental Status: He is alert and oriented to person, place, and time.   Psychiatric:         Mood and Affect: Mood normal.         Behavior: Behavior is cooperative.           Result Review :                      Lab Results   Component Value Date    PROBNP 786.8 07/24/2023    PROBNP 2,809.0 (H) 07/14/2023     CMP          9/19/2023    11:15 10/31/2023    08:34 12/14/2023    11:23   CMP   Glucose 89  101     BUN 21  24     Creatinine 1.08  1.35  1.70    EGFR 82.1  62.8  47.6    Sodium 137  138     Potassium 4.2  4.6     Chloride 104  105     Calcium 9.3  9.5     Total Protein 7.4      Albumin 4.0      Globulin 3.4      Total Bilirubin 0.2      Alkaline Phosphatase 241      AST (SGOT) 30      ALT (SGPT) 50      Albumin/Globulin Ratio 1.2      BUN/Creatinine Ratio 19.4  17.8     Anion Gap 10.0  10.8       CBC w/diff          7/24/2023    05:55 9/19/2023    11:15 10/31/2023    08:34   CBC w/Diff   WBC 11.89  9.04  9.90    RBC 3.09  4.04  4.37    Hemoglobin 9.8  12.7  13.1    Hematocrit 30.5  37.9  41.4    MCV 98.7  93.8  94.7   "  MCH 31.7  31.4  30.0    MCHC 32.1  33.5  31.6    RDW 14.0  13.2  12.8    Platelets 780  326  298    Neutrophil Rel % 65.1  61.5     Immature Granulocyte Rel % 0.9  0.3     Lymphocyte Rel % 20.2  19.9     Monocyte Rel % 10.0  7.3     Eosinophil Rel % 2.4  10.1     Basophil Rel % 1.4  0.9           No results found for: \"TSH\"   No results found for: \"FREET4\"   No results found for: \"DDIMERQUANT\"  Magnesium   Date Value Ref Range Status   07/18/2023 2.1 1.6 - 2.6 mg/dL Final      No results found for: \"DIGOXIN\"          Results for orders placed in visit on 11/03/23    Adult Transthoracic Echo Complete W/ Cont if Necessary Per Protocol    Interpretation Summary    Left ventricular systolic function is mildly decreased. Left ventricular ejection fraction appears to be 46 - 50%.    The left ventricular cavity is mildly dilated.    Left ventricular diastolic dysfunction is noted.    The left atrial cavity is mildly dilated.          Assessment and Plan        Diagnoses and all orders for this visit:    1. Chronic HFrEF (heart failure with reduced ejection fraction) (Primary)  Assessment & Plan:  Mr. Ray has heart failure with reduced ejection fraction, that has improved over the past few months.  He is doing very well from a cardiac standpoint.  His systolic blood pressure tends to run greater than 130 with a heart rate in the 70s and 80s.  He does take carvedilol 25 mg half tablet in the morning and full tablet at night.  I will trial him on a full tablet twice daily to optimize his heart failure medications and address his heart rate and blood pressure.      Orders:  -     CBC & Differential; Future  -     Comprehensive Metabolic Panel; Future  -     Magnesium; Future  -     proBNP; Future    2. Hyperlipidemia LDL goal <100  Assessment & Plan:  His lipids have been elevated.  At last visit he was started on atorvastatin 20 mg.  Will continue the same.      3. CKD (chronic kidney disease) stage 2, GFR 60-89 " ml/min  Assessment & Plan:  His kidney function is slightly low but stable.  I will repeat his labs in 1 month.    Orders:  -     CBC & Differential; Future  -     Comprehensive Metabolic Panel; Future  -     Magnesium; Future  -     proBNP; Future    Other orders  -     carvedilol (COREG) 25 MG tablet; Take 1 tablet by mouth Every 12 (Twelve) Hours. Haif tab in am and whole tab at night  Dispense: 180 tablet; Refill: 3            Follow Up     Return in about 4 weeks (around 1/17/2024).    Patient was given instructions and counseling regarding his condition or for health maintenance advice. Please see specific information pulled into the AVS if appropriate.     Electronically signed by SMITA Pina, 12/20/23, 11:52 AM EST.

## 2024-01-10 ENCOUNTER — OFFICE VISIT (OUTPATIENT)
Dept: INFECTIOUS DISEASES | Facility: CLINIC | Age: 54
End: 2024-01-10
Payer: COMMERCIAL

## 2024-01-10 VITALS
WEIGHT: 193 LBS | RESPIRATION RATE: 20 BRPM | BODY MASS INDEX: 26.92 KG/M2 | TEMPERATURE: 97.1 F | DIASTOLIC BLOOD PRESSURE: 66 MMHG | HEART RATE: 76 BPM | SYSTOLIC BLOOD PRESSURE: 109 MMHG

## 2024-01-10 DIAGNOSIS — M00.012 STAPHYLOCOCCAL ARTHRITIS OF LEFT SHOULDER: Primary | ICD-10-CM

## 2024-01-10 PROCEDURE — 99204 OFFICE O/P NEW MOD 45 MIN: CPT | Performed by: INTERNAL MEDICINE

## 2024-01-10 NOTE — PROGRESS NOTES
"Referring Provider: Dr. Gallegos   Reason for clinic visits: Initial infectious disease clinic visit with concerns for recurrent/persistent right shoulder septic arthritis    HPI: Yfn Ray is a 53 y.o. male who presents to clinic today with above complaint. The patient was admitted to the hospital in July with MRSA bacteremia and right shoulder septic arthritis.  He underwent right shoulder washout on July 14 with operative cultures growing MRSA.  He was discharged on IV vancomycin and due to persistently high white blood cell count was treated with a longer than 6-week course of IV antibiotics.  Per the patient and his wife he finished his antibiotics at the end of October.  During this time he continued to have pain in the shoulder.  His incision healed well and there was no erythema and swelling.  After discontinuing antibiotics he continued to have issues with right shoulder pain.  He denied any fevers chills or night sweats.  He had an MRI of the shoulder done in December which showed progressive destructive changes.  He was referred to a different orthopedic surgeon and the plan is for him to undergo operative intervention next week at [facility].    Past Medical History:   Diagnosis Date    Alpha 1-antitrypsin PiMS phenotype     \"alpha 1\" per pt and wife. unsure of what type.    Arthritis     Chest pain     2 months ago    H/o Back injury     HFrEF (heart failure with reduced ejection fraction)     Hypertension     Rash     due to antibiotic currently on       Past Surgical History:   Procedure Laterality Date    CARDIAC CATHETERIZATION N/A 10/31/2023    Procedure: Left Heart Cath;  Surgeon: Cristiano Holliday MD;  Location: [facility] CATH INVASIVE LOCATION;  Service: Cardiovascular;  Laterality: N/A;    LIVER BIOPSY      SHOULDER ARTHROSCOPY Right 7/14/2023    Procedure: SHOULDER ARTHROSCOPY WITH WASHOUT AND DEBRIDMENT;  Surgeon: Orville Gallegos MD;  [place] [facility] [place] [facility]  Service: Orthopedics;  " Laterality: Right;    TOOTH EXTRACTION         Social History   reports that he has never smoked. He has never used smokeless tobacco. He reports that he does not drink alcohol and does not use drugs.    Family History  family history is not on file.    No Known Allergies    The medication list has been reviewed and updated.     Review of Systems  Pertinent items are noted in HPI, all other systems reviewed and negative    Vital Signs   Vitals:    01/10/24 1009   BP: 109/66   Pulse: 76   Resp: 20   Temp: 97.1 °F (36.2 °C)        Physical Exam:   General: In no acute distress   Respiratory: Breathing comfortably on room air  Musculoskeletal: Right shoulder surgical incision healed well without erythema swelling or drainage  Neurological: Alert and oriented, moving all 4 extremities  Psychiatric: Normal mood and affect     Lab Results   Component Value Date    WBC 9.90 10/31/2023    HGB 13.1 10/31/2023    HCT 41.4 10/31/2023    MCV 94.7 10/31/2023     10/31/2023       Lab Results   Component Value Date    GLUCOSE 101 (H) 10/31/2023    BUN 24 (H) 10/31/2023    CREATININE 1.70 (H) 12/14/2023    BCR 17.8 10/31/2023    CO2 22.2 10/31/2023    CALCIUM 9.5 10/31/2023    ALBUMIN 4.0 09/19/2023    AST 30 09/19/2023    ALT 50 (H) 09/19/2023       Lab Results   Component Value Date    SEDRATE 34 (H) 09/19/2023       Lab Results   Component Value Date    CRP <0.30 09/19/2023 7/16 BCx P x2  7/14 OR cx right shoulder MRSA  7/13 right shoulder arthrocentesis MRSA  7/13 BCx MRSA 1/2  7/10 BCx MRSA x 2    12/14 MRI of the right shoulder with shows progressive erosive changes and destruction centered around the glenohumeral joint.  Moderate sized joint effusion with synovitis.  Findings concerning for chronic osteomyelitis.  Chronic full-thickness full width tearing of the supraspinatus infraspinatus tendon    Assessment:  This is a 53 y.o. male who presents to clinic today for evaluation MRSA right shoulder septic  arthritis.  Agree that the patient needs surgical intervention at this time.  The patient does not have any signs or symptoms of systemic infection therefore I recommend holding all antibiotics to increase yield of cultures.  Although MRSA is most likely the culprit I would still recommend obtaining cultures at the time of surgery to ensure no other pathogen is involved.  Once that is done the patient should be started on empiric vancomycin while awaiting culture results.  I recommend a minimum of a 6-week course of IV antibiotics.  Since the patient's surgery is going to be at [place] I told the patient he has the option of following up with [facility] [facility] especially if they will see him in the hospital.  He will contact our clinic if he would like to continue following with [facility] infectious disease.    Return to Infectious Disease clinic the patient will decide whether he will follow-up with me     Time: More than 50% of time spent in counseling and coordination of care:  Total face-to-face/floor time 45 min.  Time spent in counseling 45 min. Counseling included the following topics: See assessment above

## 2024-01-12 ENCOUNTER — PATIENT ROUNDING (BHMG ONLY) (OUTPATIENT)
Dept: INFECTIOUS DISEASES | Facility: CLINIC | Age: 54
End: 2024-01-12
Payer: COMMERCIAL

## 2024-01-22 ENCOUNTER — TRANSCRIBE ORDERS (OUTPATIENT)
Dept: ADMINISTRATIVE | Facility: HOSPITAL | Age: 54
End: 2024-01-22
Payer: COMMERCIAL

## 2024-01-22 DIAGNOSIS — M00.9 PYOGENIC ARTHRITIS, UPPER ARM: Primary | ICD-10-CM

## 2024-01-26 ENCOUNTER — HOSPITAL ENCOUNTER (OUTPATIENT)
Dept: INFUSION THERAPY | Facility: HOSPITAL | Age: 54
Discharge: HOME | End: 2024-01-26
Payer: COMMERCIAL

## 2024-01-26 VITALS
DIASTOLIC BLOOD PRESSURE: 68 MMHG | WEIGHT: 194 LBS | OXYGEN SATURATION: 100 % | RESPIRATION RATE: 20 BRPM | HEIGHT: 71 IN | HEART RATE: 81 BPM | BODY MASS INDEX: 27.16 KG/M2 | SYSTOLIC BLOOD PRESSURE: 101 MMHG | TEMPERATURE: 98 F

## 2024-01-26 DIAGNOSIS — I50.22 CHRONIC HFREF (HEART FAILURE WITH REDUCED EJECTION FRACTION): ICD-10-CM

## 2024-01-26 DIAGNOSIS — N18.2 CKD (CHRONIC KIDNEY DISEASE) STAGE 2, GFR 60-89 ML/MIN: ICD-10-CM

## 2024-01-26 DIAGNOSIS — M00.011 STAPHYLOCOCCAL ARTHRITIS OF RIGHT SHOULDER: Primary | ICD-10-CM

## 2024-01-26 LAB
ALBUMIN SERPL-MCNC: 3.6 G/DL (ref 3.5–5.2)
ALBUMIN/GLOB SERPL: 1 G/DL
ALP SERPL-CCNC: 227 U/L (ref 39–117)
ALT SERPL W P-5'-P-CCNC: 34 U/L (ref 1–41)
ANION GAP SERPL CALCULATED.3IONS-SCNC: 13 MMOL/L (ref 5–15)
AST SERPL-CCNC: 21 U/L (ref 1–40)
BASOPHILS # BLD AUTO: 0.09 10*3/MM3 (ref 0–0.2)
BASOPHILS NFR BLD AUTO: 0.8 % (ref 0–1.5)
BILIRUB SERPL-MCNC: 0.5 MG/DL (ref 0–1.2)
BUN SERPL-MCNC: 25 MG/DL (ref 6–20)
BUN/CREAT SERPL: 19.4 (ref 7–25)
CALCIUM SPEC-SCNC: 9.3 MG/DL (ref 8.6–10.5)
CHLORIDE SERPL-SCNC: 98 MMOL/L (ref 98–107)
CO2 SERPL-SCNC: 22 MMOL/L (ref 22–29)
CREAT SERPL-MCNC: 1.29 MG/DL (ref 0.76–1.27)
DEPRECATED RDW RBC AUTO: 43 FL (ref 37–54)
EGFRCR SERPLBLD CKD-EPI 2021: 66.3 ML/MIN/1.73
EOSINOPHIL # BLD AUTO: 1.2 10*3/MM3 (ref 0–0.4)
EOSINOPHIL NFR BLD AUTO: 10.7 % (ref 0.3–6.2)
ERYTHROCYTE [DISTWIDTH] IN BLOOD BY AUTOMATED COUNT: 12.2 % (ref 12.3–15.4)
GLOBULIN UR ELPH-MCNC: 3.5 GM/DL
GLUCOSE SERPL-MCNC: 98 MG/DL (ref 65–99)
HCT VFR BLD AUTO: 31.2 % (ref 37.5–51)
HGB BLD-MCNC: 10.1 G/DL (ref 13–17.7)
IMM GRANULOCYTES # BLD AUTO: 0.05 10*3/MM3 (ref 0–0.05)
IMM GRANULOCYTES NFR BLD AUTO: 0.4 % (ref 0–0.5)
LYMPHOCYTES # BLD AUTO: 2.1 10*3/MM3 (ref 0.7–3.1)
LYMPHOCYTES NFR BLD AUTO: 18.8 % (ref 19.6–45.3)
MAGNESIUM SERPL-MCNC: 2.3 MG/DL (ref 1.6–2.6)
MCH RBC QN AUTO: 30.9 PG (ref 26.6–33)
MCHC RBC AUTO-ENTMCNC: 32.4 G/DL (ref 31.5–35.7)
MCV RBC AUTO: 95.4 FL (ref 79–97)
MONOCYTES # BLD AUTO: 1.43 10*3/MM3 (ref 0.1–0.9)
MONOCYTES NFR BLD AUTO: 12.8 % (ref 5–12)
NEUTROPHILS NFR BLD AUTO: 56.5 % (ref 42.7–76)
NEUTROPHILS NFR BLD AUTO: 6.31 10*3/MM3 (ref 1.7–7)
NRBC BLD AUTO-RTO: 0 /100 WBC (ref 0–0.2)
NT-PROBNP SERPL-MCNC: 219.6 PG/ML (ref 0–900)
PLATELET # BLD AUTO: 330 10*3/MM3 (ref 140–450)
PMV BLD AUTO: 9.8 FL (ref 6–12)
POTASSIUM SERPL-SCNC: 4.4 MMOL/L (ref 3.5–5.2)
PROT SERPL-MCNC: 7.1 G/DL (ref 6–8.5)
RBC # BLD AUTO: 3.27 10*6/MM3 (ref 4.14–5.8)
SODIUM SERPL-SCNC: 133 MMOL/L (ref 136–145)
WBC NRBC COR # BLD AUTO: 11.18 10*3/MM3 (ref 3.4–10.8)

## 2024-01-26 PROCEDURE — 85025 COMPLETE CBC W/AUTO DIFF WBC: CPT

## 2024-01-26 PROCEDURE — 83880 ASSAY OF NATRIURETIC PEPTIDE: CPT

## 2024-01-26 PROCEDURE — 83735 ASSAY OF MAGNESIUM: CPT

## 2024-01-26 PROCEDURE — 80053 COMPREHEN METABOLIC PANEL: CPT

## 2024-01-26 PROCEDURE — G0463 HOSPITAL OUTPT CLINIC VISIT: HCPCS

## 2024-01-26 PROCEDURE — 36592 COLLECT BLOOD FROM PICC: CPT

## 2024-01-26 RX ORDER — HEPARIN SODIUM (PORCINE) LOCK FLUSH IV SOLN 100 UNIT/ML 100 UNIT/ML
500 SOLUTION INTRAVENOUS AS NEEDED
OUTPATIENT
Start: 2024-01-26

## 2024-01-29 NOTE — PROGRESS NOTES
Please let Mr. Magno Gruber know that we have received his labs and his red blood count continues to slowly decline.  He will need to follow up with his primary care to watch his red blood count.  His sodium is also a little on the low side.  Please have him abide by a 1200 fluid restriction.

## 2024-02-01 ENCOUNTER — TRANSCRIBE ORDERS (OUTPATIENT)
Dept: ADMINISTRATIVE | Facility: HOSPITAL | Age: 54
End: 2024-02-01
Payer: COMMERCIAL

## 2024-02-01 DIAGNOSIS — M19.011 ARTHRITIS OF RIGHT SHOULDER REGION: ICD-10-CM

## 2024-02-01 DIAGNOSIS — I50.21 ACUTE SYSTOLIC HEART FAILURE: Primary | ICD-10-CM

## 2024-02-02 ENCOUNTER — HOSPITAL ENCOUNTER (OUTPATIENT)
Dept: INFUSION THERAPY | Facility: HOSPITAL | Age: 54
Discharge: HOME | End: 2024-02-02
Payer: COMMERCIAL

## 2024-02-02 VITALS
RESPIRATION RATE: 20 BRPM | SYSTOLIC BLOOD PRESSURE: 92 MMHG | HEART RATE: 79 BPM | TEMPERATURE: 98.1 F | OXYGEN SATURATION: 96 % | DIASTOLIC BLOOD PRESSURE: 56 MMHG

## 2024-02-02 DIAGNOSIS — M00.011 STAPHYLOCOCCAL ARTHRITIS OF RIGHT SHOULDER: Primary | ICD-10-CM

## 2024-02-02 LAB
ALBUMIN SERPL-MCNC: 3.7 G/DL (ref 3.5–5.2)
ALBUMIN/GLOB SERPL: 1.1 G/DL
ALP SERPL-CCNC: 234 U/L (ref 39–117)
ALT SERPL W P-5'-P-CCNC: 49 U/L (ref 1–41)
ANION GAP SERPL CALCULATED.3IONS-SCNC: 11.8 MMOL/L (ref 5–15)
AST SERPL-CCNC: 28 U/L (ref 1–40)
BASOPHILS # BLD AUTO: 0.11 10*3/MM3 (ref 0–0.2)
BASOPHILS NFR BLD AUTO: 1.2 % (ref 0–1.5)
BILIRUB SERPL-MCNC: 0.2 MG/DL (ref 0–1.2)
BUN SERPL-MCNC: 17 MG/DL (ref 6–20)
BUN/CREAT SERPL: 15.2 (ref 7–25)
CALCIUM SPEC-SCNC: 8.6 MG/DL (ref 8.6–10.5)
CHLORIDE SERPL-SCNC: 104 MMOL/L (ref 98–107)
CO2 SERPL-SCNC: 22.2 MMOL/L (ref 22–29)
CREAT SERPL-MCNC: 1.12 MG/DL (ref 0.76–1.27)
CRP SERPL-MCNC: 0.4 MG/DL (ref 0–0.5)
DEPRECATED RDW RBC AUTO: 43.8 FL (ref 37–54)
EGFRCR SERPLBLD CKD-EPI 2021: 78.6 ML/MIN/1.73
EOSINOPHIL # BLD AUTO: 1.11 10*3/MM3 (ref 0–0.4)
EOSINOPHIL NFR BLD AUTO: 11.7 % (ref 0.3–6.2)
ERYTHROCYTE [DISTWIDTH] IN BLOOD BY AUTOMATED COUNT: 12.4 % (ref 12.3–15.4)
GLOBULIN UR ELPH-MCNC: 3.3 GM/DL
GLUCOSE SERPL-MCNC: 120 MG/DL (ref 65–99)
HCT VFR BLD AUTO: 35.1 % (ref 37.5–51)
HGB BLD-MCNC: 11.1 G/DL (ref 13–17.7)
IMM GRANULOCYTES # BLD AUTO: 0.02 10*3/MM3 (ref 0–0.05)
IMM GRANULOCYTES NFR BLD AUTO: 0.2 % (ref 0–0.5)
LYMPHOCYTES # BLD AUTO: 2.33 10*3/MM3 (ref 0.7–3.1)
LYMPHOCYTES NFR BLD AUTO: 24.5 % (ref 19.6–45.3)
MCH RBC QN AUTO: 30.5 PG (ref 26.6–33)
MCHC RBC AUTO-ENTMCNC: 31.6 G/DL (ref 31.5–35.7)
MCV RBC AUTO: 96.4 FL (ref 79–97)
MONOCYTES # BLD AUTO: 0.7 10*3/MM3 (ref 0.1–0.9)
MONOCYTES NFR BLD AUTO: 7.4 % (ref 5–12)
NEUTROPHILS NFR BLD AUTO: 5.24 10*3/MM3 (ref 1.7–7)
NEUTROPHILS NFR BLD AUTO: 55 % (ref 42.7–76)
NRBC BLD AUTO-RTO: 0 /100 WBC (ref 0–0.2)
PLATELET # BLD AUTO: 425 10*3/MM3 (ref 140–450)
PMV BLD AUTO: 8.9 FL (ref 6–12)
POTASSIUM SERPL-SCNC: 4.3 MMOL/L (ref 3.5–5.2)
PROT SERPL-MCNC: 7 G/DL (ref 6–8.5)
RBC # BLD AUTO: 3.64 10*6/MM3 (ref 4.14–5.8)
SODIUM SERPL-SCNC: 138 MMOL/L (ref 136–145)
VANCOMYCIN TROUGH SERPL-MCNC: 13.84 MCG/ML (ref 5–20)
WBC NRBC COR # BLD AUTO: 9.51 10*3/MM3 (ref 3.4–10.8)

## 2024-02-02 PROCEDURE — 80202 ASSAY OF VANCOMYCIN: CPT | Performed by: INTERNAL MEDICINE

## 2024-02-02 PROCEDURE — 80053 COMPREHEN METABOLIC PANEL: CPT | Performed by: INTERNAL MEDICINE

## 2024-02-02 PROCEDURE — 96523 IRRIG DRUG DELIVERY DEVICE: CPT

## 2024-02-02 PROCEDURE — 85025 COMPLETE CBC W/AUTO DIFF WBC: CPT | Performed by: INTERNAL MEDICINE

## 2024-02-02 PROCEDURE — 36592 COLLECT BLOOD FROM PICC: CPT | Performed by: INTERNAL MEDICINE

## 2024-02-02 PROCEDURE — 86140 C-REACTIVE PROTEIN: CPT | Performed by: INTERNAL MEDICINE

## 2024-02-02 PROCEDURE — 36415 COLL VENOUS BLD VENIPUNCTURE: CPT | Performed by: INTERNAL MEDICINE

## 2024-02-05 NOTE — TELEPHONE ENCOUNTER
CALLED PATIENT, SPOKE WITH WIFE, PATIENT WAS SCHEDULED WED. AT 1 PM   Please review refill request.   No protocol for requested medication.    Medication: Omega-3  Last office visit date: 08/14/2023  Pharmacy: [facility] [id number] [place] [id number]    Order pended, routed to clinician for review.     Last OV Plan of care: \"Follow up in a year or sooner, if needed.  \"  Last Refill:  08/14/2023  Number of Refills Sent:  5  Quantity of Medication Given:  30 day supply     Controlled Substance: No

## 2024-02-09 ENCOUNTER — HOSPITAL ENCOUNTER (OUTPATIENT)
Dept: INFUSION THERAPY | Facility: HOSPITAL | Age: 54
Discharge: HOME | End: 2024-02-09
Payer: COMMERCIAL

## 2024-02-09 VITALS
DIASTOLIC BLOOD PRESSURE: 60 MMHG | SYSTOLIC BLOOD PRESSURE: 112 MMHG | TEMPERATURE: 97.8 F | OXYGEN SATURATION: 97 % | HEART RATE: 77 BPM | RESPIRATION RATE: 20 BRPM

## 2024-02-09 DIAGNOSIS — M00.011 STAPHYLOCOCCAL ARTHRITIS OF RIGHT SHOULDER: Primary | ICD-10-CM

## 2024-02-09 LAB
ALBUMIN SERPL-MCNC: 3.6 G/DL (ref 3.5–5.2)
ALBUMIN/GLOB SERPL: 1.2 G/DL
ALP SERPL-CCNC: 234 U/L (ref 39–117)
ALT SERPL W P-5'-P-CCNC: 66 U/L (ref 1–41)
ANION GAP SERPL CALCULATED.3IONS-SCNC: 10.8 MMOL/L (ref 5–15)
AST SERPL-CCNC: 37 U/L (ref 1–40)
BASOPHILS # BLD AUTO: 0.06 10*3/MM3 (ref 0–0.2)
BASOPHILS NFR BLD AUTO: 0.8 % (ref 0–1.5)
BILIRUB SERPL-MCNC: 0.2 MG/DL (ref 0–1.2)
BUN SERPL-MCNC: 23 MG/DL (ref 6–20)
BUN/CREAT SERPL: 17.3 (ref 7–25)
CALCIUM SPEC-SCNC: 9.1 MG/DL (ref 8.6–10.5)
CHLORIDE SERPL-SCNC: 103 MMOL/L (ref 98–107)
CO2 SERPL-SCNC: 22.2 MMOL/L (ref 22–29)
CREAT SERPL-MCNC: 1.33 MG/DL (ref 0.76–1.27)
CRP SERPL-MCNC: 0.75 MG/DL (ref 0–0.5)
DEPRECATED RDW RBC AUTO: 44.6 FL (ref 37–54)
EGFRCR SERPLBLD CKD-EPI 2021: 63.9 ML/MIN/1.73
EOSINOPHIL # BLD AUTO: 0.67 10*3/MM3 (ref 0–0.4)
EOSINOPHIL NFR BLD AUTO: 9.1 % (ref 0.3–6.2)
ERYTHROCYTE [DISTWIDTH] IN BLOOD BY AUTOMATED COUNT: 12.6 % (ref 12.3–15.4)
GLOBULIN UR ELPH-MCNC: 3.1 GM/DL
GLUCOSE SERPL-MCNC: 122 MG/DL (ref 65–99)
HCT VFR BLD AUTO: 34.7 % (ref 37.5–51)
HGB BLD-MCNC: 11.3 G/DL (ref 13–17.7)
IMM GRANULOCYTES # BLD AUTO: 0.02 10*3/MM3 (ref 0–0.05)
IMM GRANULOCYTES NFR BLD AUTO: 0.3 % (ref 0–0.5)
LYMPHOCYTES # BLD AUTO: 2.07 10*3/MM3 (ref 0.7–3.1)
LYMPHOCYTES NFR BLD AUTO: 28.2 % (ref 19.6–45.3)
MCH RBC QN AUTO: 31.3 PG (ref 26.6–33)
MCHC RBC AUTO-ENTMCNC: 32.6 G/DL (ref 31.5–35.7)
MCV RBC AUTO: 96.1 FL (ref 79–97)
MONOCYTES # BLD AUTO: 0.74 10*3/MM3 (ref 0.1–0.9)
MONOCYTES NFR BLD AUTO: 10.1 % (ref 5–12)
NEUTROPHILS NFR BLD AUTO: 3.77 10*3/MM3 (ref 1.7–7)
NEUTROPHILS NFR BLD AUTO: 51.5 % (ref 42.7–76)
NRBC BLD AUTO-RTO: 0 /100 WBC (ref 0–0.2)
PLATELET # BLD AUTO: 308 10*3/MM3 (ref 140–450)
PMV BLD AUTO: 9.4 FL (ref 6–12)
POTASSIUM SERPL-SCNC: 4.3 MMOL/L (ref 3.5–5.2)
PROT SERPL-MCNC: 6.7 G/DL (ref 6–8.5)
RBC # BLD AUTO: 3.61 10*6/MM3 (ref 4.14–5.8)
SODIUM SERPL-SCNC: 136 MMOL/L (ref 136–145)
WBC NRBC COR # BLD AUTO: 7.33 10*3/MM3 (ref 3.4–10.8)

## 2024-02-09 PROCEDURE — 86140 C-REACTIVE PROTEIN: CPT | Performed by: INTERNAL MEDICINE

## 2024-02-09 PROCEDURE — 36592 COLLECT BLOOD FROM PICC: CPT

## 2024-02-09 PROCEDURE — 85025 COMPLETE CBC W/AUTO DIFF WBC: CPT | Performed by: INTERNAL MEDICINE

## 2024-02-09 PROCEDURE — G0463 HOSPITAL OUTPT CLINIC VISIT: HCPCS

## 2024-02-09 PROCEDURE — 80202 ASSAY OF VANCOMYCIN: CPT | Performed by: PHYSICIAN ASSISTANT

## 2024-02-09 PROCEDURE — 96523 IRRIG DRUG DELIVERY DEVICE: CPT

## 2024-02-09 PROCEDURE — 80053 COMPREHEN METABOLIC PANEL: CPT | Performed by: INTERNAL MEDICINE

## 2024-02-13 LAB — VANCOMYCIN TROUGH SERPL-MCNC: 15.9 MCG/ML (ref 5–20)

## 2024-02-14 DIAGNOSIS — M00.011 STAPHYLOCOCCAL ARTHRITIS OF RIGHT SHOULDER: Primary | ICD-10-CM

## 2024-02-16 ENCOUNTER — HOSPITAL ENCOUNTER (OUTPATIENT)
Dept: INFUSION THERAPY | Facility: HOSPITAL | Age: 54
Discharge: HOME | End: 2024-02-16
Payer: COMMERCIAL

## 2024-02-16 VITALS
TEMPERATURE: 97.6 F | OXYGEN SATURATION: 96 % | HEART RATE: 74 BPM | DIASTOLIC BLOOD PRESSURE: 59 MMHG | RESPIRATION RATE: 20 BRPM | SYSTOLIC BLOOD PRESSURE: 103 MMHG

## 2024-02-16 DIAGNOSIS — M00.011 STAPHYLOCOCCAL ARTHRITIS OF RIGHT SHOULDER: Primary | ICD-10-CM

## 2024-02-16 LAB
ALBUMIN SERPL-MCNC: 3.8 G/DL (ref 3.5–5.2)
ALBUMIN/GLOB SERPL: 1.3 G/DL
ALP SERPL-CCNC: 220 U/L (ref 39–117)
ALT SERPL W P-5'-P-CCNC: 53 U/L (ref 1–41)
ANION GAP SERPL CALCULATED.3IONS-SCNC: 9.9 MMOL/L (ref 5–15)
AST SERPL-CCNC: 28 U/L (ref 1–40)
BASOPHILS # BLD AUTO: 0.08 10*3/MM3 (ref 0–0.2)
BASOPHILS NFR BLD AUTO: 1 % (ref 0–1.5)
BILIRUB SERPL-MCNC: 0.2 MG/DL (ref 0–1.2)
BUN SERPL-MCNC: 20 MG/DL (ref 6–20)
BUN/CREAT SERPL: 16.5 (ref 7–25)
CALCIUM SPEC-SCNC: 9.1 MG/DL (ref 8.6–10.5)
CHLORIDE SERPL-SCNC: 103 MMOL/L (ref 98–107)
CO2 SERPL-SCNC: 23.1 MMOL/L (ref 22–29)
CREAT SERPL-MCNC: 1.21 MG/DL (ref 0.76–1.27)
CRP SERPL-MCNC: <0.3 MG/DL (ref 0–0.5)
DEPRECATED RDW RBC AUTO: 45.1 FL (ref 37–54)
EGFRCR SERPLBLD CKD-EPI 2021: 71.6 ML/MIN/1.73
EOSINOPHIL # BLD AUTO: 1.11 10*3/MM3 (ref 0–0.4)
EOSINOPHIL NFR BLD AUTO: 14.1 % (ref 0.3–6.2)
ERYTHROCYTE [DISTWIDTH] IN BLOOD BY AUTOMATED COUNT: 12.7 % (ref 12.3–15.4)
GLOBULIN UR ELPH-MCNC: 3 GM/DL
GLUCOSE SERPL-MCNC: 132 MG/DL (ref 65–99)
HCT VFR BLD AUTO: 35.6 % (ref 37.5–51)
HGB BLD-MCNC: 11.6 G/DL (ref 13–17.7)
IMM GRANULOCYTES # BLD AUTO: 0.02 10*3/MM3 (ref 0–0.05)
IMM GRANULOCYTES NFR BLD AUTO: 0.3 % (ref 0–0.5)
LYMPHOCYTES # BLD AUTO: 2.19 10*3/MM3 (ref 0.7–3.1)
LYMPHOCYTES NFR BLD AUTO: 27.9 % (ref 19.6–45.3)
MCH RBC QN AUTO: 31.5 PG (ref 26.6–33)
MCHC RBC AUTO-ENTMCNC: 32.6 G/DL (ref 31.5–35.7)
MCV RBC AUTO: 96.7 FL (ref 79–97)
MONOCYTES # BLD AUTO: 0.64 10*3/MM3 (ref 0.1–0.9)
MONOCYTES NFR BLD AUTO: 8.2 % (ref 5–12)
NEUTROPHILS NFR BLD AUTO: 3.81 10*3/MM3 (ref 1.7–7)
NEUTROPHILS NFR BLD AUTO: 48.5 % (ref 42.7–76)
NRBC BLD AUTO-RTO: 0 /100 WBC (ref 0–0.2)
PLATELET # BLD AUTO: 271 10*3/MM3 (ref 140–450)
PMV BLD AUTO: 9.6 FL (ref 6–12)
POTASSIUM SERPL-SCNC: 4 MMOL/L (ref 3.5–5.2)
PROT SERPL-MCNC: 6.8 G/DL (ref 6–8.5)
RBC # BLD AUTO: 3.68 10*6/MM3 (ref 4.14–5.8)
SODIUM SERPL-SCNC: 136 MMOL/L (ref 136–145)
VANCOMYCIN TROUGH SERPL-MCNC: 13.88 MCG/ML (ref 5–20)
WBC NRBC COR # BLD AUTO: 7.85 10*3/MM3 (ref 3.4–10.8)

## 2024-02-16 PROCEDURE — 36591 DRAW BLOOD OFF VENOUS DEVICE: CPT

## 2024-02-16 PROCEDURE — 80202 ASSAY OF VANCOMYCIN: CPT | Performed by: INTERNAL MEDICINE

## 2024-02-16 PROCEDURE — 36592 COLLECT BLOOD FROM PICC: CPT

## 2024-02-16 PROCEDURE — G0463 HOSPITAL OUTPT CLINIC VISIT: HCPCS

## 2024-02-16 PROCEDURE — 80053 COMPREHEN METABOLIC PANEL: CPT | Performed by: INTERNAL MEDICINE

## 2024-02-16 PROCEDURE — 86140 C-REACTIVE PROTEIN: CPT | Performed by: INTERNAL MEDICINE

## 2024-02-16 PROCEDURE — 85025 COMPLETE CBC W/AUTO DIFF WBC: CPT | Performed by: INTERNAL MEDICINE

## 2024-02-22 ENCOUNTER — TRANSCRIBE ORDERS (OUTPATIENT)
Dept: ADMINISTRATIVE | Facility: HOSPITAL | Age: 54
End: 2024-02-22
Payer: COMMERCIAL

## 2024-02-22 ENCOUNTER — HOSPITAL ENCOUNTER (OUTPATIENT)
Dept: GENERAL RADIOLOGY | Facility: HOSPITAL | Age: 54
Discharge: HOME | End: 2024-02-22
Admitting: NURSE PRACTITIONER
Payer: COMMERCIAL

## 2024-02-22 DIAGNOSIS — T82.898A OCCLUSION OF PERIPHERALLY INSERTED CENTRAL CATHETER (PICC) LINE, INITIAL ENCOUNTER: Primary | ICD-10-CM

## 2024-02-22 DIAGNOSIS — T82.898A OCCLUSION OF PERIPHERALLY INSERTED CENTRAL CATHETER (PICC) LINE, INITIAL ENCOUNTER: ICD-10-CM

## 2024-02-22 PROCEDURE — 71045 X-RAY EXAM CHEST 1 VIEW: CPT

## 2024-02-23 ENCOUNTER — HOSPITAL ENCOUNTER (OUTPATIENT)
Dept: INFUSION THERAPY | Facility: HOSPITAL | Age: 54
Discharge: HOME | End: 2024-02-23
Payer: COMMERCIAL

## 2024-02-23 VITALS
SYSTOLIC BLOOD PRESSURE: 109 MMHG | TEMPERATURE: 98.2 F | DIASTOLIC BLOOD PRESSURE: 74 MMHG | RESPIRATION RATE: 16 BRPM | HEART RATE: 82 BPM | OXYGEN SATURATION: 100 %

## 2024-02-23 DIAGNOSIS — M00.011 STAPHYLOCOCCAL ARTHRITIS OF RIGHT SHOULDER: Primary | ICD-10-CM

## 2024-02-23 LAB
ALBUMIN SERPL-MCNC: 3.9 G/DL (ref 3.5–5.2)
ALBUMIN/GLOB SERPL: 1.2 G/DL
ALP SERPL-CCNC: 208 U/L (ref 39–117)
ALT SERPL W P-5'-P-CCNC: 62 U/L (ref 1–41)
ANION GAP SERPL CALCULATED.3IONS-SCNC: 11.6 MMOL/L (ref 5–15)
AST SERPL-CCNC: 29 U/L (ref 1–40)
BASOPHILS # BLD AUTO: 0.09 10*3/MM3 (ref 0–0.2)
BASOPHILS NFR BLD AUTO: 1 % (ref 0–1.5)
BILIRUB SERPL-MCNC: 0.3 MG/DL (ref 0–1.2)
BUN SERPL-MCNC: 24 MG/DL (ref 6–20)
BUN/CREAT SERPL: 23.5 (ref 7–25)
CALCIUM SPEC-SCNC: 9 MG/DL (ref 8.6–10.5)
CHLORIDE SERPL-SCNC: 105 MMOL/L (ref 98–107)
CO2 SERPL-SCNC: 23.4 MMOL/L (ref 22–29)
CREAT SERPL-MCNC: 1.02 MG/DL (ref 0.76–1.27)
CRP SERPL-MCNC: <0.3 MG/DL (ref 0–0.5)
DEPRECATED RDW RBC AUTO: 45.7 FL (ref 37–54)
EGFRCR SERPLBLD CKD-EPI 2021: 87.9 ML/MIN/1.73
EOSINOPHIL # BLD AUTO: 1.03 10*3/MM3 (ref 0–0.4)
EOSINOPHIL NFR BLD AUTO: 11.3 % (ref 0.3–6.2)
ERYTHROCYTE [DISTWIDTH] IN BLOOD BY AUTOMATED COUNT: 12.8 % (ref 12.3–15.4)
ERYTHROCYTE [SEDIMENTATION RATE] IN BLOOD: <1 MM/HR (ref 0–20)
GLOBULIN UR ELPH-MCNC: 3.3 GM/DL
GLUCOSE SERPL-MCNC: 90 MG/DL (ref 65–99)
HCT VFR BLD AUTO: 40.3 % (ref 37.5–51)
HGB BLD-MCNC: 13 G/DL (ref 13–17.7)
IMM GRANULOCYTES # BLD AUTO: 0.03 10*3/MM3 (ref 0–0.05)
IMM GRANULOCYTES NFR BLD AUTO: 0.3 % (ref 0–0.5)
LYMPHOCYTES # BLD AUTO: 1.95 10*3/MM3 (ref 0.7–3.1)
LYMPHOCYTES NFR BLD AUTO: 21.3 % (ref 19.6–45.3)
MCH RBC QN AUTO: 31.5 PG (ref 26.6–33)
MCHC RBC AUTO-ENTMCNC: 32.3 G/DL (ref 31.5–35.7)
MCV RBC AUTO: 97.6 FL (ref 79–97)
MONOCYTES # BLD AUTO: 0.81 10*3/MM3 (ref 0.1–0.9)
MONOCYTES NFR BLD AUTO: 8.9 % (ref 5–12)
NEUTROPHILS NFR BLD AUTO: 5.23 10*3/MM3 (ref 1.7–7)
NEUTROPHILS NFR BLD AUTO: 57.2 % (ref 42.7–76)
NRBC BLD AUTO-RTO: 0 /100 WBC (ref 0–0.2)
PLATELET # BLD AUTO: 238 10*3/MM3 (ref 140–450)
PMV BLD AUTO: 10.2 FL (ref 6–12)
POTASSIUM SERPL-SCNC: 4.1 MMOL/L (ref 3.5–5.2)
PROT SERPL-MCNC: 7.2 G/DL (ref 6–8.5)
RBC # BLD AUTO: 4.13 10*6/MM3 (ref 4.14–5.8)
SODIUM SERPL-SCNC: 140 MMOL/L (ref 136–145)
VANCOMYCIN TROUGH SERPL-MCNC: 11.1 MCG/ML (ref 5–20)
WBC NRBC COR # BLD AUTO: 9.14 10*3/MM3 (ref 3.4–10.8)

## 2024-02-23 PROCEDURE — 80053 COMPREHEN METABOLIC PANEL: CPT | Performed by: INTERNAL MEDICINE

## 2024-02-23 PROCEDURE — 86140 C-REACTIVE PROTEIN: CPT | Performed by: INTERNAL MEDICINE

## 2024-02-23 PROCEDURE — 85652 RBC SED RATE AUTOMATED: CPT | Performed by: INTERNAL MEDICINE

## 2024-02-23 PROCEDURE — G0463 HOSPITAL OUTPT CLINIC VISIT: HCPCS

## 2024-02-23 PROCEDURE — 36592 COLLECT BLOOD FROM PICC: CPT

## 2024-02-23 PROCEDURE — 85025 COMPLETE CBC W/AUTO DIFF WBC: CPT | Performed by: INTERNAL MEDICINE

## 2024-02-23 PROCEDURE — 80202 ASSAY OF VANCOMYCIN: CPT | Performed by: INTERNAL MEDICINE

## 2024-02-23 NOTE — CONSULTS
Patient arrives for PICC troubleshoot. Patient and wife report that PICC in the left upper arm is sluggish and doesn't have blood return.    PICC has a well-adhered dressing. No redness, swelling, drainage, tracking, or pain at or near insertion site.    Flushing is somewhat sluggish. No blood return until I applied traction to the insertion site. With traction, I get brisk blood return. This is indicative of a kink of the catheter at the insertion site and not a thrombus or fibrin formation causing catheter occlusion.    I redressed the PICC and swept the PICC posteriorly to more closely match the likely insertion angle during placement. The Catheter is withdrawn to the 2 CM ira from zero and I maintained this external length. Following the catheter repositioning and redressing, I was able to draw all the blood necessary for the ordered lab work and the line flushed with the expected resistance.     Conclusion: Properly functioning PICC. Cathflo not indicated.    Damion Bach RN

## 2024-02-28 RX ORDER — EMPAGLIFLOZIN 10 MG/1
10 TABLET, FILM COATED ORAL DAILY
Qty: 90 TABLET | Refills: 0 | Status: SHIPPED | OUTPATIENT
Start: 2024-02-28

## 2024-03-01 ENCOUNTER — HOSPITAL ENCOUNTER (OUTPATIENT)
Dept: INFUSION THERAPY | Facility: HOSPITAL | Age: 54
Discharge: HOME | End: 2024-03-01
Admitting: INTERNAL MEDICINE
Payer: COMMERCIAL

## 2024-03-01 VITALS
DIASTOLIC BLOOD PRESSURE: 73 MMHG | RESPIRATION RATE: 20 BRPM | HEART RATE: 66 BPM | HEIGHT: 71 IN | OXYGEN SATURATION: 98 % | SYSTOLIC BLOOD PRESSURE: 112 MMHG | WEIGHT: 198.41 LBS | TEMPERATURE: 97.9 F | BODY MASS INDEX: 27.78 KG/M2

## 2024-03-01 DIAGNOSIS — M00.011 STAPHYLOCOCCAL ARTHRITIS OF RIGHT SHOULDER: Primary | ICD-10-CM

## 2024-03-01 LAB
ALBUMIN SERPL-MCNC: 4 G/DL (ref 3.5–5.2)
ALBUMIN/GLOB SERPL: 1.2 G/DL
ALP SERPL-CCNC: 207 U/L (ref 39–117)
ALT SERPL W P-5'-P-CCNC: 63 U/L (ref 1–41)
ANION GAP SERPL CALCULATED.3IONS-SCNC: 11.6 MMOL/L (ref 5–15)
AST SERPL-CCNC: 31 U/L (ref 1–40)
BASOPHILS # BLD AUTO: 0.11 10*3/MM3 (ref 0–0.2)
BASOPHILS NFR BLD AUTO: 1.2 % (ref 0–1.5)
BILIRUB SERPL-MCNC: 0.2 MG/DL (ref 0–1.2)
BUN SERPL-MCNC: 25 MG/DL (ref 6–20)
BUN/CREAT SERPL: 21.7 (ref 7–25)
CALCIUM SPEC-SCNC: 8.9 MG/DL (ref 8.6–10.5)
CHLORIDE SERPL-SCNC: 105 MMOL/L (ref 98–107)
CO2 SERPL-SCNC: 19.4 MMOL/L (ref 22–29)
CREAT SERPL-MCNC: 1.15 MG/DL (ref 0.76–1.27)
CRP SERPL-MCNC: <0.3 MG/DL (ref 0–0.5)
DEPRECATED RDW RBC AUTO: 45.1 FL (ref 37–54)
EGFRCR SERPLBLD CKD-EPI 2021: 76.1 ML/MIN/1.73
EOSINOPHIL # BLD AUTO: 1.01 10*3/MM3 (ref 0–0.4)
EOSINOPHIL NFR BLD AUTO: 10.9 % (ref 0.3–6.2)
ERYTHROCYTE [DISTWIDTH] IN BLOOD BY AUTOMATED COUNT: 12.6 % (ref 12.3–15.4)
ERYTHROCYTE [SEDIMENTATION RATE] IN BLOOD: 27 MM/HR (ref 0–20)
GLOBULIN UR ELPH-MCNC: 3.4 GM/DL
GLUCOSE SERPL-MCNC: 111 MG/DL (ref 65–99)
HCT VFR BLD AUTO: 41.8 % (ref 37.5–51)
HGB BLD-MCNC: 13.4 G/DL (ref 13–17.7)
IMM GRANULOCYTES # BLD AUTO: 0.03 10*3/MM3 (ref 0–0.05)
IMM GRANULOCYTES NFR BLD AUTO: 0.3 % (ref 0–0.5)
LYMPHOCYTES # BLD AUTO: 2.39 10*3/MM3 (ref 0.7–3.1)
LYMPHOCYTES NFR BLD AUTO: 25.9 % (ref 19.6–45.3)
MCH RBC QN AUTO: 30.9 PG (ref 26.6–33)
MCHC RBC AUTO-ENTMCNC: 32.1 G/DL (ref 31.5–35.7)
MCV RBC AUTO: 96.5 FL (ref 79–97)
MONOCYTES # BLD AUTO: 0.7 10*3/MM3 (ref 0.1–0.9)
MONOCYTES NFR BLD AUTO: 7.6 % (ref 5–12)
NEUTROPHILS NFR BLD AUTO: 4.99 10*3/MM3 (ref 1.7–7)
NEUTROPHILS NFR BLD AUTO: 54.1 % (ref 42.7–76)
NRBC BLD AUTO-RTO: 0 /100 WBC (ref 0–0.2)
PLATELET # BLD AUTO: 230 10*3/MM3 (ref 140–450)
PMV BLD AUTO: 9.8 FL (ref 6–12)
POTASSIUM SERPL-SCNC: 4.6 MMOL/L (ref 3.5–5.2)
PROT SERPL-MCNC: 7.4 G/DL (ref 6–8.5)
RBC # BLD AUTO: 4.33 10*6/MM3 (ref 4.14–5.8)
SODIUM SERPL-SCNC: 136 MMOL/L (ref 136–145)
VANCOMYCIN TROUGH SERPL-MCNC: 11.08 MCG/ML (ref 5–20)
WBC NRBC COR # BLD AUTO: 9.23 10*3/MM3 (ref 3.4–10.8)

## 2024-03-01 PROCEDURE — 86140 C-REACTIVE PROTEIN: CPT | Performed by: INTERNAL MEDICINE

## 2024-03-01 PROCEDURE — 85652 RBC SED RATE AUTOMATED: CPT | Performed by: INTERNAL MEDICINE

## 2024-03-01 PROCEDURE — 80053 COMPREHEN METABOLIC PANEL: CPT | Performed by: INTERNAL MEDICINE

## 2024-03-01 PROCEDURE — 80202 ASSAY OF VANCOMYCIN: CPT | Performed by: INTERNAL MEDICINE

## 2024-03-01 PROCEDURE — 36415 COLL VENOUS BLD VENIPUNCTURE: CPT

## 2024-03-01 PROCEDURE — 85025 COMPLETE CBC W/AUTO DIFF WBC: CPT | Performed by: INTERNAL MEDICINE

## 2024-03-01 NOTE — CODE DOCUMENTATION
Per patient request PICC line was removed. Labs were obtained via venipuncture, as PICC line would not give blood return.   Pressure dressing applied and discharge instructions were provided. Patient verbalized understanding.

## 2024-03-05 LAB
BACTERIA SPEC AEROBE CULT: ABNORMAL
GRAM STN SPEC: ABNORMAL
GRAM STN SPEC: ABNORMAL
ISOLATED FROM: ABNORMAL

## 2024-03-18 ENCOUNTER — OFFICE VISIT (OUTPATIENT)
Dept: CARDIOLOGY | Facility: CLINIC | Age: 54
End: 2024-03-18
Payer: COMMERCIAL

## 2024-03-18 VITALS
DIASTOLIC BLOOD PRESSURE: 72 MMHG | BODY MASS INDEX: 27.3 KG/M2 | HEART RATE: 78 BPM | SYSTOLIC BLOOD PRESSURE: 106 MMHG | HEIGHT: 71 IN | WEIGHT: 195 LBS

## 2024-03-18 DIAGNOSIS — I50.22 CHRONIC HFREF (HEART FAILURE WITH REDUCED EJECTION FRACTION): Primary | ICD-10-CM

## 2024-03-18 PROCEDURE — 99214 OFFICE O/P EST MOD 30 MIN: CPT | Performed by: INTERNAL MEDICINE

## 2024-03-18 NOTE — ASSESSMENT & PLAN NOTE
Ejection fraction improved but still persistently mildly decreased continue with Coreg 25 twice daily dosing, Jardiance 10 mg daily, Entresto 97/103 twice daily, and Aldactone 25 daily once a day dosing no evidence of fluid retention at this patient can proceed with repeat shoulder surgery if felt to be indicated.

## 2024-03-18 NOTE — PROGRESS NOTES
"Chief Complaint  Congestive Heart Failure, Hyperlipidemia, and Follow-up    Subjective    Patient with stable breathing ability reports generalized fatigue and lack of energy issues.  No increased edema or weight gain since last visit  Past Medical History:   Diagnosis Date    Alpha 1-antitrypsin PiMS phenotype     \"alpha 1\" per pt and wife. unsure of what type.    Arthritis     Chest pain     2 months ago    H/o Back injury     HFrEF (heart failure with reduced ejection fraction)     Hypertension     Rash     due to antibiotic currently on         Current Outpatient Medications:     aspirin 81 MG EC tablet, Take 1 tablet by mouth Daily., Disp: 90 tablet, Rfl: 3    atorvastatin (LIPITOR) 20 MG tablet, Take 1 tablet by mouth Daily., Disp: 30 tablet, Rfl: 11    carvedilol (COREG) 25 MG tablet, Take 1 tablet by mouth Every 12 (Twelve) Hours. Haif tab in am and whole tab at night, Disp: 180 tablet, Rfl: 3    Jardiance 10 MG tablet tablet, Take 1 tablet by mouth once daily, Disp: 90 tablet, Rfl: 0    multivitamin with minerals tablet tablet, Take 1 tablet by mouth Daily., Disp: , Rfl:     sacubitril-valsartan (Entresto)  MG tablet, Take 1 tablet by mouth 2 (Two) Times a Day., Disp: 180 tablet, Rfl: 3    spironolactone (ALDACTONE) 25 MG tablet, Take 1 tablet by mouth Every Other Day., Disp: , Rfl:     There are no discontinued medications.  No Known Allergies     Social History     Tobacco Use    Smoking status: Never    Smokeless tobacco: Never   Vaping Use    Vaping status: Never Used   Substance Use Topics    Alcohol use: Never    Drug use: Never       Family History   Problem Relation Age of Onset    Malig Hyperthermia Neg Hx         Objective     /72 (BP Location: Left arm)   Pulse 78   Ht 180.3 cm (71\")   Wt 88.5 kg (195 lb)   BMI 27.20 kg/m²       Physical Exam    General Appearance:   no acute distress  Alert and oriented x3  HENT:   lips not cyanotic  Atraumatic  Neck:  No jvd " "  supple  Respiratory:  no respiratory distress  normal breath sounds  no rales  Cardiovascular:  Regular rate and rhythm  no S3, no S4   no murmur  no rub  Extremities  No cyanosis  lower extremity edema: none    Skin:   warm, dry  No rashes      Result Review :     proBNP   Date Value Ref Range Status   01/26/2024 219.6 0.0 - 900.0 pg/mL Final     CMP          2/16/2024    06:54 2/23/2024    09:29 3/1/2024    07:12   CMP   Glucose 132  90  111    BUN 20  24  25    Creatinine 1.21  1.02  1.15    EGFR 71.6  87.9  76.1    Sodium 136  140  136    Potassium 4.0  4.1  4.6    Chloride 103  105  105    Calcium 9.1  9.0  8.9    Total Protein 6.8  7.2  7.4    Albumin 3.8  3.9  4.0    Globulin 3.0  3.3  3.4    Total Bilirubin 0.2  0.3  0.2    Alkaline Phosphatase 220  208  207    AST (SGOT) 28  29  31    ALT (SGPT) 53  62  63    Albumin/Globulin Ratio 1.3  1.2  1.2    BUN/Creatinine Ratio 16.5  23.5  21.7    Anion Gap 9.9  11.6  11.6      CBC w/diff          2/16/2024    06:54 2/23/2024    09:29 3/1/2024    07:20   CBC w/Diff   WBC 7.85  9.14  9.23    RBC 3.68  4.13  4.33    Hemoglobin 11.6  13.0  13.4    Hematocrit 35.6  40.3  41.8    MCV 96.7  97.6  96.5    MCH 31.5  31.5  30.9    MCHC 32.6  32.3  32.1    RDW 12.7  12.8  12.6    Platelets 271  238  230    Neutrophil Rel % 48.5  57.2  54.1    Immature Granulocyte Rel % 0.3  0.3  0.3    Lymphocyte Rel % 27.9  21.3  25.9    Monocyte Rel % 8.2  8.9  7.6    Eosinophil Rel % 14.1  11.3  10.9    Basophil Rel % 1.0  1.0  1.2       No results found for: \"TSH\"   No results found for: \"FREET4\"   No results found for: \"DDIMERQUANT\"  Magnesium   Date Value Ref Range Status   01/26/2024 2.3 1.6 - 2.6 mg/dL Final      No results found for: \"DIGOXIN\"   Lab Results   Component Value Date    TROPONINT 9 07/10/2023             No results found for: \"POCTROP\"    Results for orders placed in visit on 11/03/23    Adult Transthoracic Echo Complete W/ Cont if Necessary Per " Protocol    Interpretation Summary    Left ventricular systolic function is mildly decreased. Left ventricular ejection fraction appears to be 46 - 50%.    The left ventricular cavity is mildly dilated.    Left ventricular diastolic dysfunction is noted.    The left atrial cavity is mildly dilated.                 Diagnoses and all orders for this visit:    1. Chronic HFrEF (heart failure with reduced ejection fraction) (Primary)  Assessment & Plan:  Ejection fraction improved but still persistently mildly decreased continue with Coreg 25 twice daily dosing, Jardiance 10 mg daily, Entresto 97/103 twice daily, and Aldactone 25 daily once a day dosing no evidence of fluid retention at this patient can proceed with repeat shoulder surgery if felt to be indicated.              Follow Up     Return in about 6 months (around 9/18/2024) for Follow with Magaly Mcelroy, EKG with F/U.          Patient was given instructions and counseling regarding his condition or for health maintenance advice. Please see specific information pulled into the AVS if appropriate.

## 2024-05-28 RX ORDER — EMPAGLIFLOZIN 10 MG/1
10 TABLET, FILM COATED ORAL DAILY
Qty: 90 TABLET | Refills: 0 | Status: SHIPPED | OUTPATIENT
Start: 2024-05-28

## 2024-07-12 ENCOUNTER — TELEPHONE (OUTPATIENT)
Dept: CARDIOLOGY | Facility: CLINIC | Age: 54
End: 2024-07-12
Payer: COMMERCIAL

## 2024-07-12 NOTE — TELEPHONE ENCOUNTER
I'm out of office today but you can ask Dr Holliday if he wants him added to his scheduled  I'd recommend he be taken to the ER if can't be seen today

## 2024-07-12 NOTE — TELEPHONE ENCOUNTER
MILKA patient's wife. Patient is very sluggish, with a cough- this is a deep a dry cough. Patient is not running a fever or other signs of illness.     Patient's BP/HR today 119/84-80- patient wife states he isn't feeling right and would like to know what to do.     Patient is wanting to see SMITA Ibrahim- OR Dr Holliday instead of going to ER , as he is not having CP, but patient's wife states this is not normal.     Please advise

## 2024-07-12 NOTE — TELEPHONE ENCOUNTER
SW patient's wife. Went over recommendations. Patient's wife verbalized understanding and would like to be seen sooner if possible. No appointments available at this time. Will check on Monday to see if we have any cancellations.

## 2024-07-12 NOTE — TELEPHONE ENCOUNTER
Caller: NANCI PATEL    Relationship to patient: Emergency Contact    Best call back number: [phone]    Chief complaint: SLUGGISH AND BAD COUGH     Type of visit: 6 MONTH FOLLOW UP     Requested date: BEFORE HIS SHOULDER SURGERY 08.29.2024    If rescheduling, when is the original appointment: 09.19.2024     Additional notes:N/A

## 2024-08-09 ENCOUNTER — OFFICE VISIT (OUTPATIENT)
Dept: CARDIOLOGY | Facility: CLINIC | Age: 54
End: 2024-08-09
Payer: COMMERCIAL

## 2024-08-09 VITALS
WEIGHT: 204 LBS | BODY MASS INDEX: 28.56 KG/M2 | HEIGHT: 71 IN | SYSTOLIC BLOOD PRESSURE: 97 MMHG | HEART RATE: 71 BPM | DIASTOLIC BLOOD PRESSURE: 61 MMHG

## 2024-08-09 DIAGNOSIS — I50.22 CHRONIC HFREF (HEART FAILURE WITH REDUCED EJECTION FRACTION): Primary | ICD-10-CM

## 2024-08-09 DIAGNOSIS — E78.5 HYPERLIPIDEMIA LDL GOAL <100: ICD-10-CM

## 2024-08-09 PROBLEM — M19.011 ARTHRITIS OF RIGHT SHOULDER REGION: Status: ACTIVE | Noted: 2024-01-18

## 2024-08-09 PROBLEM — B95.62 METHICILLIN RESIS STAPH INFCT CAUSING DISEASES CLASSD ELSWHR: Status: RESOLVED | Noted: 2023-09-22 | Resolved: 2024-08-09

## 2024-08-09 PROBLEM — I42.8 NICM (NONISCHEMIC CARDIOMYOPATHY): Status: ACTIVE | Noted: 2024-01-19

## 2024-08-09 PROBLEM — I42.8 NICM (NONISCHEMIC CARDIOMYOPATHY): Status: RESOLVED | Noted: 2024-01-19 | Resolved: 2024-08-09

## 2024-08-09 PROCEDURE — 99214 OFFICE O/P EST MOD 30 MIN: CPT | Performed by: NURSE PRACTITIONER

## 2024-08-09 PROCEDURE — 93000 ELECTROCARDIOGRAM COMPLETE: CPT | Performed by: NURSE PRACTITIONER

## 2024-08-09 RX ORDER — ASPIRIN 81 MG/1
81 TABLET ORAL DAILY
Qty: 90 TABLET | Refills: 3 | Status: SHIPPED | OUTPATIENT
Start: 2024-08-09

## 2024-08-09 RX ORDER — ATORVASTATIN CALCIUM 20 MG/1
20 TABLET, FILM COATED ORAL DAILY
Qty: 90 TABLET | Refills: 3 | Status: SHIPPED | OUTPATIENT
Start: 2024-08-09

## 2024-08-09 RX ORDER — SPIRONOLACTONE 25 MG/1
25 TABLET ORAL EVERY OTHER DAY
Qty: 90 TABLET | Refills: 3 | Status: SHIPPED | OUTPATIENT
Start: 2024-08-09

## 2024-08-09 RX ORDER — FERROUS SULFATE 325(65) MG
325 TABLET ORAL
COMMUNITY

## 2024-08-09 NOTE — PROGRESS NOTES
"Chief Complaint  Follow-up; Chronic HFrEF; and sx clearance , lt shoulder sx    Subjective            History of Present Illness  Yfn Ray is a 53-year-old male patient who presents to the office today for follow-up.  He has chronic HFrEF with improved ejection fraction and hyperlipidemia.  He is compliant with medication.  He denies any new or worsening cardiac symptoms today.  He is seeking cardiac surgical clearance for shoulder surgery.    PMH  Past Medical History:   Diagnosis Date    Abnormal ECG 7-23    Alpha 1-antitrypsin PiMS phenotype     \"alpha 1\" per pt and wife. unsure of what type.    Arthritis     Chest pain     2 months ago    CHF (congestive heart failure) 7 10 2023    Coronary artery disease 7-23    H/o Back injury     HFrEF (heart failure with reduced ejection fraction)     Hypertension     Methicillin resis staph infct causing diseases classd elswhr 09/22/2023    Rash     due to antibiotic currently on         ALLERGY  No Known Allergies       SURGICALHX  Past Surgical History:   Procedure Laterality Date    CARDIAC CATHETERIZATION N/A 10/31/2023    Procedure: Left Heart Cath;  Surgeon: Cristiano Holliday MD;  Location: [facility] CATH INVASIVE LOCATION;  Service: Cardiovascular;  Laterality: N/A;    LIVER BIOPSY      SHOULDER ARTHROSCOPY Right 07/14/2023    Procedure: SHOULDER ARTHROSCOPY WITH WASHOUT AND DEBRIDMENT;  Surgeon: Orville Gallegos MD;  Location: [facility] OR [facility];  Service: Orthopedics;  Laterality: Right;    SHOULDER ARTHROSCOPY Right 01/18/2024    TOOTH EXTRACTION            SOC  Social History     Socioeconomic History    Marital status:    Tobacco Use    Smoking status: Never    Smokeless tobacco: Never   Vaping Use    Vaping status: Never Used   Substance and Sexual Activity    Alcohol use: Never    Drug use: Never    Sexual activity: Yes     Partners: Female     Birth control/protection: Same-sex partner     Comment:          FAMHX  Family History   Problem Relation Age of " "Onset    Malig Hyperthermia Neg Hx           MEDSIGONLY  Current Outpatient Medications on File Prior to Visit   Medication Sig    aspirin 81 MG EC tablet Take 1 tablet by mouth Daily.    atorvastatin (LIPITOR) 20 MG tablet Take 1 tablet by mouth Daily.    carvedilol (COREG) 25 MG tablet Take 1 tablet by mouth Every 12 (Twelve) Hours. Haif tab in am and whole tab at night    ferrous sulfate 325 (65 FE) MG tablet Take 1 tablet by mouth Daily With Breakfast.    Jardiance 10 MG tablet tablet Take 1 tablet by mouth once daily    multivitamin with minerals tablet tablet Take 1 tablet by mouth Daily.    sacubitril-valsartan (Entresto)  MG tablet Take 1 tablet by mouth 2 (Two) Times a Day.    spironolactone (ALDACTONE) 25 MG tablet Take 1 tablet by mouth Every Other Day.     No current facility-administered medications on file prior to visit.         Objective   BP 97/61   Pulse 71   Ht 180.3 cm (71\")   Wt 92.5 kg (204 lb)   BMI 28.45 kg/m²       Physical Exam  HENT:      Head: Normocephalic.   Neck:      Vascular: No carotid bruit.   Cardiovascular:      Rate and Rhythm: Normal rate and regular rhythm.      Pulses: Normal pulses.      Heart sounds: Normal heart sounds. No murmur heard.  Pulmonary:      Effort: Pulmonary effort is normal.      Breath sounds: Normal breath sounds.   Musculoskeletal:      Cervical back: Neck supple.      Right lower leg: No edema.      Left lower leg: No edema.   Skin:     General: Skin is dry.   Neurological:      Mental Status: He is alert and oriented to person, place, and time.   Psychiatric:         Behavior: Behavior normal.       ECG 12 Lead    Date/Time: 8/9/2024 10:34 AM  Performed by: Magaly Mcelroy APRN    Authorized by: Magaly Mcelroy APRN  Comparison: compared with previous ECG from 10/11/2023  Similar to previous ECG  Rhythm: sinus rhythm  BPM: 70  Conduction: conduction normal  ST Segments: ST segments normal  T Waves: T waves normal  Other findings: " "low voltage    Clinical impression: non-specific ECG      Result Review :   The following data was reviewed by: SMITA Peterson on 08/09/2024:  proBNP   Date Value Ref Range Status   01/26/2024 219.6 0.0 - 900.0 pg/mL Final     CMP          3/1/2024    07:12   CMP   Glucose 111    BUN 25    Creatinine 1.15    EGFR 76.1    Sodium 136    Potassium 4.6    Chloride 105    Calcium 8.9    Total Protein 7.4    Albumin 4.0    Globulin 3.4    Total Bilirubin 0.2    Alkaline Phosphatase 207    AST (SGOT) 31    ALT (SGPT) 63    Albumin/Globulin Ratio 1.2    BUN/Creatinine Ratio 21.7    Anion Gap 11.6      CBC w/diff          8/5/2024    11:14   CBC w/Diff   WBC 9.14       RBC 3.97       Hemoglobin 12.6       Hematocrit 38.6       MCV 97.2       MCH 31.7       MCHC 32.6       RDW 12.9       Platelets 224       Neutrophil Rel % 45.8       Immature Granulocyte Rel % 0.3       Lymphocyte Rel % 33.2       Monocyte Rel % 9.1       Eosinophil Rel % 10.6       Basophil Rel % 1.0         No results found for: \"TSH\"   No results found for: \"FREET4\"   No results found for: \"DDIMERQUANT\"  Magnesium   Date Value Ref Range Status   01/26/2024 2.3 1.6 - 2.6 mg/dL Final      No results found for: \"DIGOXIN\"   Lab Results   Component Value Date    TROPONINT 9 07/10/2023           Results for orders placed in visit on 11/03/23    Adult Transthoracic Echo Complete W/ Cont if Necessary Per Protocol    Interpretation Summary    Left ventricular systolic function is mildly decreased. Left ventricular ejection fraction appears to be 46 - 50%.    The left ventricular cavity is mildly dilated.    Left ventricular diastolic dysfunction is noted.    The left atrial cavity is mildly dilated.           Assessment and Plan    Diagnoses and all orders for this visit:    1. Chronic HFrEF (heart failure with reduced ejection fraction) (Primary)  Symptomatically stable at this time and euvolemic on exam today, continue carvedilol 25 mg twice daily, " No Jardiance 10 mg daily, Entresto  mg twice daily, and spironolactone 25 mg every other day.  -     aspirin 81 MG EC tablet; Take 1 tablet by mouth Daily.  Dispense: 90 tablet; Refill: 3  -     empagliflozin (Jardiance) 10 MG tablet tablet; Take 1 tablet by mouth Daily.  Dispense: 90 tablet; Refill: 3  -     spironolactone (ALDACTONE) 25 MG tablet; Take 1 tablet by mouth Every Other Day.  Dispense: 90 tablet; Refill: 3  -     ECG 12 Lead    2. Hyperlipidemia LDL goal <100  Last lipid panel was 11/24/2023 with  which is slightly above goal range.  For now continue atorvastatin 20 mg daily and repeat fasting lipid and hepatic function panel prior to next office visit.  -     atorvastatin (LIPITOR) 20 MG tablet; Take 1 tablet by mouth Daily.  Dispense: 90 tablet; Refill: 3            Follow Up   Return in about 6 months (around 2/9/2025) for Follow up with Dr Holliday.    Patient was given instructions and counseling regarding his condition or for health maintenance advice. Please see specific information pulled into the AVS if appropriate.     Yfn Flor  reports that he has never smoked. He has never used smokeless tobacco.            Magaly Mcelroy, SMITA  08/09/24  11:35 EDT    Dictated Utilizing Dragon Dictation

## 2024-08-21 ENCOUNTER — HOSPITAL ENCOUNTER (EMERGENCY)
Facility: HOSPITAL | Age: 54
Discharge: HOME | End: 2024-08-21
Attending: EMERGENCY MEDICINE
Payer: COMMERCIAL

## 2024-08-21 ENCOUNTER — APPOINTMENT (OUTPATIENT)
Dept: GENERAL RADIOLOGY | Facility: HOSPITAL | Age: 54
End: 2024-08-21
Payer: COMMERCIAL

## 2024-08-21 VITALS
DIASTOLIC BLOOD PRESSURE: 82 MMHG | OXYGEN SATURATION: 98 % | TEMPERATURE: 98.1 F | BODY MASS INDEX: 29.35 KG/M2 | HEART RATE: 74 BPM | SYSTOLIC BLOOD PRESSURE: 118 MMHG | WEIGHT: 209.66 LBS | RESPIRATION RATE: 18 BRPM | HEIGHT: 71 IN

## 2024-08-21 DIAGNOSIS — S20.211A CONTUSION OF RIB ON RIGHT SIDE, INITIAL ENCOUNTER: Primary | ICD-10-CM

## 2024-08-21 PROCEDURE — 71101 X-RAY EXAM UNILAT RIBS/CHEST: CPT

## 2024-08-21 PROCEDURE — 96372 THER/PROPH/DIAG INJ SC/IM: CPT

## 2024-08-21 PROCEDURE — 25010000002 KETOROLAC TROMETHAMINE PER 15 MG

## 2024-08-21 PROCEDURE — 99283 EMERGENCY DEPT VISIT LOW MDM: CPT

## 2024-08-21 RX ORDER — KETOROLAC TROMETHAMINE 30 MG/ML
60 INJECTION, SOLUTION INTRAMUSCULAR; INTRAVENOUS ONCE
Status: COMPLETED | OUTPATIENT
Start: 2024-08-21 | End: 2024-08-21

## 2024-08-21 RX ADMIN — KETOROLAC TROMETHAMINE 60 MG: 30 INJECTION, SOLUTION INTRAMUSCULAR at 19:32

## 2024-08-21 NOTE — DISCHARGE INSTRUCTIONS
Home.  Take home medications as needed.  You may alternate ice and heat to the area for pain relief.  Remember to take deep breaths often while awake to prevent a worsening of condition or development of pneumonia.    Call your primary care provider to schedule an appointment for follow-up in the next 2 to 3 days.    If symptoms worsen, change in presentation, or you develop new symptoms such as fever, shortness of breath, or productive cough please seek medical attention or return to the ED for further evaluation.

## 2024-08-21 NOTE — ED PROVIDER NOTES
"Time: 5:55 PM EDT  Date of encounter:  8/21/2024  Independent Historian/Clinical History and Information was obtained by:   Patient and Family    History is limited by: N/A    Chief Complaint   Patient presents with    Fall         History of Present Illness:  Patient is a 53 y.o. year old male who presents to the emergency department for evaluation of right rib pain.  Patient fell on Friday from standing.  He has been having worsening right rib pain.  Denies any shortness of breath.  Patient presents today because he was worried about septic arthritis.  Patient states that he was diagnosed with septic arthritis in the past and had to be treated with multiple IV abx. He was in the hospital for quite some time. He just wants to make sure that he doesn't develop this again. Denies any fever. (Triage Provider: Ralph Moran PA-C).          Patient Care Team  Primary Care Provider: Marilyn Eugene APRN    Past Medical History:     Allergies   Allergen Reactions    Acetaminophen Other (See Comments)     Patient has Alpha-1 and should avoid medications with acetaminophen (Tylenol).     Past Medical History:   Diagnosis Date    Abnormal ECG 7-23    Alpha 1-antitrypsin PiMS phenotype     \"alpha 1\" per pt and wife. unsure of what type.    Arthritis     Chest pain     2 months ago    CHF (congestive heart failure) 7 10 2023    Coronary artery disease 7-23    H/o Back injury     HFrEF (heart failure with reduced ejection fraction)     Hypertension     Methicillin resis staph infct causing diseases classd elswhr 09/22/2023    Rash     due to antibiotic currently on     Past Surgical History:   Procedure Laterality Date    CARDIAC CATHETERIZATION N/A 10/31/2023    Procedure: Left Heart Cath;  Surgeon: Cristiano Holliday MD;  Location: [facility] CATH INVASIVE LOCATION;  Service: Cardiovascular;  Laterality: N/A;    LIVER BIOPSY      SHOULDER ARTHROSCOPY Right 07/14/2023    Procedure: SHOULDER ARTHROSCOPY WITH WASHOUT AND DEBRIDMENT;  " Surgeon: Orville Gallegos MD;  Location: [facility] OR [facility];  Service: Orthopedics;  Laterality: Right;    SHOULDER ARTHROSCOPY Right 01/18/2024    TOOTH EXTRACTION       Family History   Problem Relation Age of Onset    Malig Hyperthermia Neg Hx        Home Medications:  Prior to Admission medications    Medication Sig Start Date End Date Taking? Authorizing Provider   aspirin 81 MG EC tablet Take 1 tablet by mouth Daily. 8/9/24   Magaly Mcelroy APRN   atorvastatin (LIPITOR) 20 MG tablet Take 1 tablet by mouth Daily. 8/9/24   Magaly Mcelroy APRN   carvedilol (COREG) 25 MG tablet Take 1 tablet by mouth Every 12 (Twelve) Hours. Haif tab in am and whole tab at night 12/20/23   Mira Triplett APRN   empagliflozin (Jardiance) 10 MG tablet tablet Take 1 tablet by mouth Daily. 8/9/24   Magaly Mcelroy APRN   ferrous sulfate 325 (65 FE) MG tablet Take 1 tablet by mouth Daily With Breakfast.    Provider, MD Rubia   multivitamin with minerals tablet tablet Take 1 tablet by mouth Daily.    Provider, MD Rubia   sacubitril-valsartan (Entresto)  MG tablet Take 1 tablet by mouth 2 (Two) Times a Day. 8/16/23   Mira Triplett APRN   spironolactone (ALDACTONE) 25 MG tablet Take 1 tablet by mouth Every Other Day. 8/9/24   Magaly Mcelroy APRN        Social History:   Social History     Tobacco Use    Smoking status: Never    Smokeless tobacco: Never   Vaping Use    Vaping status: Never Used   Substance Use Topics    Alcohol use: Never    Drug use: Never         Review of Systems:  Review of Systems   Constitutional: Negative.    HENT: Negative.     Eyes: Negative.    Respiratory: Negative.     Cardiovascular: Negative.    Gastrointestinal: Negative.    Endocrine: Negative.    Genitourinary: Negative.    Musculoskeletal:  Positive for arthralgias and myalgias.   Skin: Negative.    Allergic/Immunologic: Negative.    Neurological: Negative.    Hematological: Negative.    Psychiatric/Behavioral:  "Negative.          Physical Exam:  /82 (BP Location: Right arm, Patient Position: Sitting)   Pulse 74   Temp 98.1 °F (36.7 °C) (Oral)   Resp 18   Ht 180.3 cm (71\")   Wt 95.1 kg (209 lb 10.5 oz)   SpO2 98%   BMI 29.24 kg/m²         Physical Exam  Vitals and nursing note reviewed.   Constitutional:       General: He is not in acute distress.     Appearance: Normal appearance. He is not toxic-appearing.   HENT:      Head: Normocephalic and atraumatic.      Jaw: There is normal jaw occlusion.      Right Ear: Tympanic membrane, ear canal and external ear normal.      Left Ear: Tympanic membrane, ear canal and external ear normal.      Nose: Nose normal.      Mouth/Throat:      Mouth: Mucous membranes are moist.      Pharynx: Oropharynx is clear.   Eyes:      General: Lids are normal.      Extraocular Movements: Extraocular movements intact.      Conjunctiva/sclera: Conjunctivae normal.      Pupils: Pupils are equal, round, and reactive to light.   Cardiovascular:      Rate and Rhythm: Normal rate and regular rhythm.      Pulses: Normal pulses.      Heart sounds: Normal heart sounds.   Pulmonary:      Effort: Pulmonary effort is normal. No respiratory distress.      Breath sounds: Normal breath sounds. No stridor. No wheezing, rhonchi or rales.   Chest:      Chest wall: Tenderness (Right fourth and fifth rib) present.   Abdominal:      General: Abdomen is flat.      Palpations: Abdomen is soft.      Tenderness: There is no abdominal tenderness. There is no right CVA tenderness, left CVA tenderness, guarding or rebound.   Musculoskeletal:         General: Normal range of motion.      Cervical back: Normal range of motion and neck supple.      Right lower leg: No edema.      Left lower leg: No edema.   Skin:     General: Skin is warm and dry.   Neurological:      Mental Status: He is alert and oriented to person, place, and time. Mental status is at baseline.   Psychiatric:         Mood and Affect: Mood normal. "                  Procedures:  Procedures      Medical Decision Making:      Comorbidities that affect care:    Arthritis, chest pain, rash, hypertension, MRSA, CAD    External Notes reviewed:    Previous Clinic Note: Patient was last seen in urgent care on 8/14/2024 for cough.      The following orders were placed and all results were independently analyzed by me:  Orders Placed This Encounter   Procedures    XR Ribs Right With PA Chest       Medications Given in the Emergency Department:  Medications   ketorolac (TORADOL) injection 60 mg (60 mg Intramuscular Given 8/21/24 1932)        ED Course:    The patient was initially evaluated in the triage area where orders were placed. The patient was later dispositioned by SMITA Canales.      The patient was advised to stay for completion of workup which includes but is not limited to communication of labs and radiological results, reassessment and plan. The patient was advised that leaving prior to disposition by a provider could result in critical findings that are not communicated to the patient.     ED Course as of 08/21/24 2112   Wed Aug 21, 2024   1758 PROVIDER IN TRIAGE  Patient was evaluated by Ralph laughlin PA-C. Orders were placed and awaiting final results and disposition.   [MV]      ED Course User Index  [MV] Ralph Moran PA       Labs:    Lab Results (last 24 hours)       ** No results found for the last 24 hours. **             Imaging:    XR Ribs Right With PA Chest    Result Date: 8/21/2024  XR RIBS RIGHT W PA CHEST Date of Exam: 8/21/2024 6:18 PM EDT Indication: Fall Comparison: Two-view chest x-ray 8/14/2024 Findings: Lungs are adequately expanded and appear clear. Calcified granuloma in the left upper lung is clear. No pneumothorax or large pleural effusion is seen. Cardiomediastinal contours appear stable. Postoperative changes are redemonstrated in the right humerus. No displaced right rib fractures identified.     Impression: 1.No displaced  right rib fracture identified. 2.No acute cardiopulmonary abnormality. Electronically Signed: Vanessa Quezada  8/21/2024 6:50 PM EDT  Workstation ID: [id number]       Differential Diagnosis and Discussion:      Chest Pain:  Based on the patient's signs and symptoms, I considered aortic dissection, myocardial infaction, pulmonary embolism, cardiac tamponade, pericarditis, pneumothorax, musculoskeletal chest pain and other differential diagnosis as an etiology of the patient's chest pain.     All X-rays impressions were independently interpreted by me.    MDM  Number of Diagnoses or Management Options  Contusion of rib on right side, initial encounter  Diagnosis management comments: The patient´s symptoms are consistent with musculoskeletal pain. The patient is now resting comfortably, feels better, is alert, talkative, interactive and in no distress. The repeat examination is unremarkable and benign. The patient has circulatory, motor, and sensory intact. The patient is otherwise alert and well appearing. The history, physical exam, and diagnostics (if any) do not suggest the presence of  dislocation, fracture, or other process requiring further testing, treatment or consultation in the emergency department. The vital signs have been stable. The patient's condition is stable and appropriate for discharge. The patient will pursue further outpatient evaluation with the primary care physician or other designated for consulting position as indicated in the discharge instructions.  Patient is agreeable to plan for discharge and verbalizes understanding of strict return instructions regarding worsening condition.       Amount and/or Complexity of Data Reviewed  Tests in the radiology section of CPT®: reviewed         Patient Care Considerations:    NARCOTICS: I considered prescribing opiate pain medication as an outpatient, however patient presents with worsening rib pain.  However, I am concerned with a prescription for  narcotics the level of sedation could worsen his condition by not allowing him to take deep breaths leading to pneumonia.  Given that the patient already has a cough from an upper respiratory infection I do not want to provide narcotics which could worsen his condition.  He also is intolerant to acetaminophen or ibuprofen due to liver and kidney disease.  Patient can only have these medications in small doses.      Consultants/Shared Management Plan:    None    Social Determinants of Health:    Patient is independent, reliable, and has access to care.       Disposition and Care Coordination:    Discharged: The patient is suitable and stable for discharge with no need for consideration of admission.    I have explained the patient´s condition, diagnoses and treatment plan based on the information available to me at this time. I have answered questions and addressed any concerns. The patient has a good  understanding of the patient´s diagnosis, condition, and treatment plan as can be expected at this point. The vital signs have been stable. The patient´s condition is stable and appropriate for discharge from the emergency department.      The patient will pursue further outpatient evaluation with the primary care physician or other designated or consulting physician as outlined in the discharge instructions. They are agreeable to this plan of care and follow-up instructions have been explained in detail. The patient has received these instructions in written format and has expressed an understanding of the discharge instructions. The patient is aware that any significant change in condition or worsening of symptoms should prompt an immediate return to this or the closest emergency department or call to 911.    Final diagnoses:   Contusion of rib on right side, initial encounter        ED Disposition       ED Disposition   Discharge    Condition   Stable    Comment   --               This medical record created using  voice recognition software.             Cailin Saenz, APRN  08/21/24 [id number]

## 2024-08-25 NOTE — ASSESSMENT & PLAN NOTE
He has persistently elevated LFTs since his septic arthritis admission.  He is an ultrasound of his abdomen was negative.  We will therefore start him on Lipitor 20 mg once at bedtime and follow follow his liver function closely  
He has persistently elevated SGOT, SGPT and mildly elevated alkaline phosphatase.  His his ultrasound of the abdomen was negative for any liver or gallbladder disease.  His LDL is elevated at 127 with elevated triglycerides so I am going to put him on 20 mg of Lipitor and recheck LFT in 3 weeks.  He has been prescribed a heart healthy diet  
Mr. Yfn Dejesus is a 53 years old gentleman with heart failure with reduced ejection fraction who is doing very well.  His ejection fraction has improved from 20% to 45 to 50%.  He is well compensated card class II heart failure.  We will reduce his spironolactone to every other day since he appears to be mildly volume depleted.  
No

## 2024-09-06 ENCOUNTER — HOSPITAL ENCOUNTER (INPATIENT)
Facility: HOSPITAL | Age: 54
LOS: 3 days | Discharge: HOME | DRG: 642 | End: 2024-09-09
Attending: EMERGENCY MEDICINE | Admitting: INTERNAL MEDICINE
Payer: COMMERCIAL

## 2024-09-06 DIAGNOSIS — N18.9 ACUTE RENAL FAILURE SUPERIMPOSED ON CHRONIC KIDNEY DISEASE, UNSPECIFIED ACUTE RENAL FAILURE TYPE, UNSPECIFIED CKD STAGE: Primary | ICD-10-CM

## 2024-09-06 DIAGNOSIS — N17.9 ACUTE RENAL FAILURE SUPERIMPOSED ON CHRONIC KIDNEY DISEASE, UNSPECIFIED ACUTE RENAL FAILURE TYPE, UNSPECIFIED CKD STAGE: Primary | ICD-10-CM

## 2024-09-06 DIAGNOSIS — E87.5 ACUTE HYPERKALEMIA: ICD-10-CM

## 2024-09-06 LAB
ALBUMIN SERPL-MCNC: 3.9 G/DL (ref 3.5–5.2)
ALBUMIN/GLOB SERPL: 1 G/DL
ALP SERPL-CCNC: 273 U/L (ref 39–117)
ALT SERPL W P-5'-P-CCNC: 26 U/L (ref 1–41)
ANION GAP SERPL CALCULATED.3IONS-SCNC: 10.1 MMOL/L (ref 5–15)
ANION GAP SERPL CALCULATED.3IONS-SCNC: 7.9 MMOL/L (ref 5–15)
AST SERPL-CCNC: 29 U/L (ref 1–40)
BASOPHILS # BLD AUTO: 0.12 10*3/MM3 (ref 0–0.2)
BASOPHILS NFR BLD AUTO: 0.9 % (ref 0–1.5)
BILIRUB SERPL-MCNC: 0.2 MG/DL (ref 0–1.2)
BUN SERPL-MCNC: 35 MG/DL (ref 6–20)
BUN SERPL-MCNC: 40 MG/DL (ref 6–20)
BUN/CREAT SERPL: 22.6 (ref 7–25)
BUN/CREAT SERPL: 23.2 (ref 7–25)
CALCIUM SPEC-SCNC: 8 MG/DL (ref 8.6–10.5)
CALCIUM SPEC-SCNC: 9.5 MG/DL (ref 8.6–10.5)
CHLORIDE SERPL-SCNC: 101 MMOL/L (ref 98–107)
CHLORIDE SERPL-SCNC: 99 MMOL/L (ref 98–107)
CO2 SERPL-SCNC: 19.1 MMOL/L (ref 22–29)
CO2 SERPL-SCNC: 19.9 MMOL/L (ref 22–29)
CREAT SERPL-MCNC: 1.51 MG/DL (ref 0.76–1.27)
CREAT SERPL-MCNC: 1.77 MG/DL (ref 0.76–1.27)
DEPRECATED RDW RBC AUTO: 41.9 FL (ref 37–54)
EGFRCR SERPLBLD CKD-EPI 2021: 45.4 ML/MIN/1.73
EGFRCR SERPLBLD CKD-EPI 2021: 54.9 ML/MIN/1.73
EOSINOPHIL # BLD AUTO: 1.59 10*3/MM3 (ref 0–0.4)
EOSINOPHIL NFR BLD AUTO: 12 % (ref 0.3–6.2)
ERYTHROCYTE [DISTWIDTH] IN BLOOD BY AUTOMATED COUNT: 12.1 % (ref 12.3–15.4)
GLOBULIN UR ELPH-MCNC: 4 GM/DL
GLUCOSE BLDC GLUCOMTR-MCNC: 102 MG/DL (ref 70–99)
GLUCOSE BLDC GLUCOMTR-MCNC: 73 MG/DL (ref 70–99)
GLUCOSE SERPL-MCNC: 88 MG/DL (ref 65–99)
GLUCOSE SERPL-MCNC: 99 MG/DL (ref 65–99)
HCT VFR BLD AUTO: 38.9 % (ref 37.5–51)
HGB BLD-MCNC: 12.9 G/DL (ref 13–17.7)
HOLD SPECIMEN: NORMAL
HOLD SPECIMEN: NORMAL
IMM GRANULOCYTES # BLD AUTO: 0.07 10*3/MM3 (ref 0–0.05)
IMM GRANULOCYTES NFR BLD AUTO: 0.5 % (ref 0–0.5)
LYMPHOCYTES # BLD AUTO: 2.78 10*3/MM3 (ref 0.7–3.1)
LYMPHOCYTES NFR BLD AUTO: 21.1 % (ref 19.6–45.3)
MCH RBC QN AUTO: 31.5 PG (ref 26.6–33)
MCHC RBC AUTO-ENTMCNC: 33.2 G/DL (ref 31.5–35.7)
MCV RBC AUTO: 95.1 FL (ref 79–97)
MONOCYTES # BLD AUTO: 1.12 10*3/MM3 (ref 0.1–0.9)
MONOCYTES NFR BLD AUTO: 8.5 % (ref 5–12)
NEUTROPHILS NFR BLD AUTO: 57 % (ref 42.7–76)
NEUTROPHILS NFR BLD AUTO: 7.52 10*3/MM3 (ref 1.7–7)
NRBC BLD AUTO-RTO: 0 /100 WBC (ref 0–0.2)
PLATELET # BLD AUTO: 310 10*3/MM3 (ref 140–450)
PMV BLD AUTO: 9.2 FL (ref 6–12)
POTASSIUM SERPL-SCNC: 4.7 MMOL/L (ref 3.5–5.2)
POTASSIUM SERPL-SCNC: 5.6 MMOL/L (ref 3.5–5.2)
PROT SERPL-MCNC: 7.9 G/DL (ref 6–8.5)
RBC # BLD AUTO: 4.09 10*6/MM3 (ref 4.14–5.8)
SODIUM SERPL-SCNC: 128 MMOL/L (ref 136–145)
SODIUM SERPL-SCNC: 129 MMOL/L (ref 136–145)
WBC NRBC COR # BLD AUTO: 13.2 10*3/MM3 (ref 3.4–10.8)
WHOLE BLOOD HOLD COAG: NORMAL
WHOLE BLOOD HOLD SPECIMEN: NORMAL

## 2024-09-06 PROCEDURE — 25010000002 CALCIUM GLUCONATE-NACL 1-0.675 GM/50ML-% SOLUTION: Performed by: EMERGENCY MEDICINE

## 2024-09-06 PROCEDURE — 99291 CRITICAL CARE FIRST HOUR: CPT

## 2024-09-06 PROCEDURE — 93005 ELECTROCARDIOGRAM TRACING: CPT | Performed by: EMERGENCY MEDICINE

## 2024-09-06 PROCEDURE — 99223 1ST HOSP IP/OBS HIGH 75: CPT | Performed by: STUDENT IN AN ORGANIZED HEALTH CARE EDUCATION/TRAINING PROGRAM

## 2024-09-06 PROCEDURE — 80053 COMPREHEN METABOLIC PANEL: CPT | Performed by: EMERGENCY MEDICINE

## 2024-09-06 PROCEDURE — 93005 ELECTROCARDIOGRAM TRACING: CPT

## 2024-09-06 PROCEDURE — 36415 COLL VENOUS BLD VENIPUNCTURE: CPT

## 2024-09-06 PROCEDURE — 85025 COMPLETE CBC W/AUTO DIFF WBC: CPT

## 2024-09-06 PROCEDURE — 82948 REAGENT STRIP/BLOOD GLUCOSE: CPT | Performed by: EMERGENCY MEDICINE

## 2024-09-06 PROCEDURE — 63710000001 INSULIN REGULAR HUMAN PER 5 UNITS: Performed by: EMERGENCY MEDICINE

## 2024-09-06 PROCEDURE — 25010000002 HEPARIN (PORCINE) PER 1000 UNITS: Performed by: STUDENT IN AN ORGANIZED HEALTH CARE EDUCATION/TRAINING PROGRAM

## 2024-09-06 PROCEDURE — 25810000003 SODIUM CHLORIDE 0.9 % SOLUTION: Performed by: EMERGENCY MEDICINE

## 2024-09-06 RX ORDER — BISACODYL 10 MG
10 SUPPOSITORY, RECTAL RECTAL DAILY PRN
Status: DISCONTINUED | OUTPATIENT
Start: 2024-09-06 | End: 2024-09-09 | Stop reason: HOSPADM

## 2024-09-06 RX ORDER — POLYETHYLENE GLYCOL 3350 17 G/17G
17 POWDER, FOR SOLUTION ORAL DAILY PRN
Status: DISCONTINUED | OUTPATIENT
Start: 2024-09-06 | End: 2024-09-09 | Stop reason: HOSPADM

## 2024-09-06 RX ORDER — NITROGLYCERIN 0.4 MG/1
0.4 TABLET SUBLINGUAL
Status: DISCONTINUED | OUTPATIENT
Start: 2024-09-06 | End: 2024-09-06

## 2024-09-06 RX ORDER — SODIUM CHLORIDE 0.9 % (FLUSH) 0.9 %
10 SYRINGE (ML) INJECTION AS NEEDED
Status: DISCONTINUED | OUTPATIENT
Start: 2024-09-06 | End: 2024-09-09 | Stop reason: HOSPADM

## 2024-09-06 RX ORDER — AMOXICILLIN 250 MG
2 CAPSULE ORAL 2 TIMES DAILY PRN
Status: DISCONTINUED | OUTPATIENT
Start: 2024-09-06 | End: 2024-09-09 | Stop reason: HOSPADM

## 2024-09-06 RX ORDER — SODIUM CHLORIDE 9 MG/ML
40 INJECTION, SOLUTION INTRAVENOUS AS NEEDED
Status: DISCONTINUED | OUTPATIENT
Start: 2024-09-06 | End: 2024-09-09 | Stop reason: HOSPADM

## 2024-09-06 RX ORDER — DEXTROSE MONOHYDRATE 25 G/50ML
25 INJECTION, SOLUTION INTRAVENOUS ONCE
Status: COMPLETED | OUTPATIENT
Start: 2024-09-06 | End: 2024-09-06

## 2024-09-06 RX ORDER — HEPARIN SODIUM 5000 [USP'U]/ML
5000 INJECTION, SOLUTION INTRAVENOUS; SUBCUTANEOUS EVERY 8 HOURS SCHEDULED
Status: DISCONTINUED | OUTPATIENT
Start: 2024-09-06 | End: 2024-09-09 | Stop reason: HOSPADM

## 2024-09-06 RX ORDER — SODIUM CHLORIDE 0.9 % (FLUSH) 0.9 %
10 SYRINGE (ML) INJECTION EVERY 12 HOURS SCHEDULED
Status: DISCONTINUED | OUTPATIENT
Start: 2024-09-06 | End: 2024-09-09 | Stop reason: HOSPADM

## 2024-09-06 RX ORDER — OXYCODONE HYDROCHLORIDE 5 MG/1
5 TABLET ORAL EVERY 4 HOURS PRN
Status: DISCONTINUED | OUTPATIENT
Start: 2024-09-06 | End: 2024-09-09 | Stop reason: HOSPADM

## 2024-09-06 RX ORDER — NITROGLYCERIN 0.4 MG/1
0.4 TABLET SUBLINGUAL
Status: DISCONTINUED | OUTPATIENT
Start: 2024-09-06 | End: 2024-09-07

## 2024-09-06 RX ORDER — CALCIUM GLUCONATE 20 MG/ML
1000 INJECTION, SOLUTION INTRAVENOUS ONCE
Status: COMPLETED | OUTPATIENT
Start: 2024-09-06 | End: 2024-09-06

## 2024-09-06 RX ORDER — PROMETHAZINE HYDROCHLORIDE 25 MG/1
25 TABLET ORAL EVERY 4 HOURS
COMMUNITY
Start: 2024-09-04 | End: 2024-09-09 | Stop reason: HOSPADM

## 2024-09-06 RX ORDER — EMPAGLIFLOZIN 25 MG/1
1 TABLET, FILM COATED ORAL DAILY
COMMUNITY
Start: 2024-09-04 | End: 2024-09-06

## 2024-09-06 RX ORDER — CARVEDILOL 25 MG/1
25 TABLET ORAL 2 TIMES DAILY WITH MEALS
Status: ON HOLD | COMMUNITY
End: 2024-09-09

## 2024-09-06 RX ORDER — BISACODYL 5 MG/1
5 TABLET, DELAYED RELEASE ORAL DAILY PRN
Status: DISCONTINUED | OUTPATIENT
Start: 2024-09-06 | End: 2024-09-09 | Stop reason: HOSPADM

## 2024-09-06 RX ORDER — DOXYCYCLINE 100 MG/1
100 CAPSULE ORAL EVERY 12 HOURS SCHEDULED
Status: DISCONTINUED | OUTPATIENT
Start: 2024-09-06 | End: 2024-09-09 | Stop reason: HOSPADM

## 2024-09-06 RX ORDER — OXYCODONE HYDROCHLORIDE 10 MG/1
10-20 TABLET ORAL EVERY 6 HOURS PRN
COMMUNITY
End: 2024-09-09 | Stop reason: HOSPADM

## 2024-09-06 RX ADMIN — DEXTROSE MONOHYDRATE 25 G: 25 INJECTION, SOLUTION INTRAVENOUS at 18:18

## 2024-09-06 RX ADMIN — Medication 10 ML: at 22:23

## 2024-09-06 RX ADMIN — SODIUM BICARBONATE 50 MEQ: 84 INJECTION INTRAVENOUS at 18:18

## 2024-09-06 RX ADMIN — CALCIUM GLUCONATE 1000 MG: 20 INJECTION, SOLUTION INTRAVENOUS at 18:18

## 2024-09-06 RX ADMIN — OXYCODONE HYDROCHLORIDE 5 MG: 5 TABLET ORAL at 22:23

## 2024-09-06 RX ADMIN — SODIUM CHLORIDE 1000 ML: 9 INJECTION, SOLUTION INTRAVENOUS at 18:18

## 2024-09-06 RX ADMIN — INSULIN HUMAN 5 UNITS: 100 INJECTION, SOLUTION PARENTERAL at 18:18

## 2024-09-06 RX ADMIN — SODIUM ZIRCONIUM CYCLOSILICATE 10 G: 10 POWDER, FOR SUSPENSION ORAL at 18:18

## 2024-09-06 RX ADMIN — HEPARIN SODIUM 5000 UNITS: 5000 INJECTION INTRAVENOUS; SUBCUTANEOUS at 22:23

## 2024-09-06 RX ADMIN — DOXYCYCLINE 100 MG: 100 CAPSULE ORAL at 22:23

## 2024-09-06 NOTE — PROGRESS NOTES
Full nephrology consult to follow tomorrow.  D/W ER attending.  Patient has CARIDAD and hyperkalemia.  Cr 1.7 and K 5.6.  Temporizing measures given.  Recent bactrim use noted, along with high dose entresto, aldactone, coreg.  Defer to primary team if IVF warranted (note hx CHF, EF 45% by echo in Oct 2023).  Upon diet resumption please ensure low K restriction.  Will repeat K level 9PM.

## 2024-09-06 NOTE — H&P
[facility]  Date: [facility]24   Patient Name: Yfn Ray  : 1970  MRN: [id number]  Primary Care Physician:  Marilyn Eugene APRN  Date of admission: 2024    Subjective   Subjective     Chief Complaint: Hyperkalemia    HPI:    Yfn Ray is a 53 y.o. 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  Patient is on Entresto, spironolactone, empagliflozin, carvedilol for HFrEF but has not been taking any of his medications for the past 1 week since the surgery as he noted to be hypotensive with a systolic blood pressure in 90s to 100s.  Denies any trouble urinating.  Denies any hematuria.  Denies any fevers, chills, abdominal pain, diarrhea, nausea, vomiting.  Has not taken any NSAIDs.  Taking only oxycodone as needed for shoulder pain.    In the ED, vital signs temperature 98.7, pulse 84, respiratory 11, blood pressure 110/77 on room air saturating around 97%.  Sodium 129, potassium 5.6, chloride 99, BUN 40, creatinine 1.77, baseline around 1.1, rest of the CMP with no significant findings, WBC 13.2, hemoglobin 12.9, platelets 310, EKG sinus rhythm with no significant hyperkalemic changes.  Received Lokelma and shifting agents in the ED for hyperkalemia.  Case has been discussed with ED physician with nephrologist on-call, agreed with the current management, recommended to switch to doxycycline, repeat potassium levels at 9 PM. Patient has been admitted for further evaluation and management of CARIDAD, hyperkalemia    Personal History     Past Medical History:   Diagnosis Date   • Abnormal ECG    • Alpha 1-antitrypsin PiMS phenotype     \"alpha 1\" per pt and wife. unsure of what type.   • " Arthritis    • Chest pain     2 months ago   • CHF (congestive heart failure) 7 10 2023   • Coronary artery disease 7-23   • H/o Back injury    • HFrEF (heart failure with reduced ejection fraction)    • Hypertension    • Methicillin resis staph infct causing diseases classd elswhr 09/22/2023   • Rash     due to antibiotic currently on         Past Surgical History:   Procedure Laterality Date   • CARDIAC CATHETERIZATION N/A 10/31/2023    Procedure: Left Heart Cath;  Surgeon: Cristiano Holliday MD;  Location: [facility] CATH INVASIVE LOCATION;  Service: Cardiovascular;  Laterality: N/A;   • LIVER BIOPSY     • SHOULDER ARTHROSCOPY Right 07/14/2023    Procedure: SHOULDER ARTHROSCOPY WITH WASHOUT AND DEBRIDMENT;  Surgeon: Orville Gallegos MD;  [place] [facility] [place] [facility];  Service: Orthopedics;  Laterality: Right;   • SHOULDER ARTHROSCOPY Right 01/18/2024   • TOOTH EXTRACTION           Family History   Problem Relation Age of Onset   • Malig Hyperthermia Neg Hx          Social History     Socioeconomic History   • Marital status:    Tobacco Use   • Smoking status: Never   • Smokeless tobacco: Never   Vaping Use   • Vaping status: Never Used   Substance and Sexual Activity   • Alcohol use: Never   • Drug use: Never   • Sexual activity: Yes     Partners: Female     Birth control/protection: Partner of same sex     Comment:          Home Medications:  aspirin, atorvastatin, carvedilol, empagliflozin, ferrous sulfate, multivitamin with minerals, oxyCODONE, promethazine, sacubitril-valsartan, and spironolactone    Allergies:  Allergies   Allergen Reactions   • Acetaminophen Other (See Comments)     Patient has Alpha-1 and should avoid medications with acetaminophen (Tylenol).       Review of Systems   All other systems reviewed and negative except as mentioned above in the HPI    Objective   Objective     Vitals:   Temp:  [98.7 °F (37.1 °C)] 98.7 °F (37.1 °C)  Heart Rate:  [] 85  Resp:  [11] 11  BP: (100-121)/(65-77)  100/66    Physical Exam    Constitutional: Awake, alert, no acute distress   Eyes: Pupils equal, sclerae anicteric, no conjunctival injection   HENT: NCAT, mucous membranes moist   Respiratory: Clear to auscultation bilaterally, nonlabored respirations    Cardiovascular: RRR, no murmurs, rubs, or gallops, palpable pedal pulses bilaterally   Gastrointestinal: Positive bowel sounds, soft, nontender, nondistended   Musculoskeletal: Surgical site on the right shoulder covered with dressing.  No erythema or drainage noted.  No bilateral ankle edema, no clubbing or cyanosis to extremities   Neurologic: Oriented x 3, speech clear    Result Review    Result Review:  I have personally reviewed the results from the time of this admission to 9/6/2024 20:38 EDT and agree with these findings:  [x]  Laboratory  []  Microbiology  [x]  Radiology  [x]  EKG/Telemetry   []  Cardiology/Vascular   []  Pathology  []  Old records  []  Other:        Imaging Results (Last 24 Hours)       ** No results found for the last 24 hours. **             doxycycline, 100 mg, Oral, Q12H         Assessment & Plan   Assessment / Plan     Assessment/Plan:   CARIDAD Ddx: dehydration, relative hypotension  Hyperkalemia  Mild hyponatremia likely due to dehydration  S/p right reverse total shoulder arthroplasty on 08/29/24 at [facility]  Chronic osteomyelitis of the right shoulder joint  Nonischemic cardiomyopathy  Chronic HFrEF  Hypertension  Hyperlipidemia    Plan  Admit to inpatient, telemetry  Monitor potassium levels with repeat BMP tonight  Monitor sodium, creatinine with BMP tonight and with a.m. labs  Received 1 L IV fluids in the ED.  Further gentle IV fluids based on sodium levels on repeat BMP tonight   Discontinue Bactrim, start on doxycycline  Hold Entresto, spironolactone, empagliflozin in the setting of CARIDAD and hyperkalemia  Nephrology consulted, appreciate recommendations  Consider Cardiology consult in a.m. for management of the heart  failure medications, patient follows Dr. Holliday as outpatient  Renal diet  Monitor blood pressure  Monitor urine output  Resume other appropriate home medications once reconciled    VTE Prophylaxis:  Pharmacologic VTE prophylaxis orders are signed & held.      CODE STATUS:    Level Of Support Discussed With: Patient  Code Status (Patient has no pulse and is not breathing): CPR (Attempt to Resuscitate)  Medical Interventions (Patient has pulse or is breathing): Full Support      Admission Status:  I believe this patient meets inpatient status.    Part of this note may be an electronic transcription/translation of spoken language to printed text using the Dragon Dictation System    Lino Rodriguez MD              47 F   SP  HEART LUNG TRANSPLANT 7/27/22    W PREOP PERC [place]   H:    Eisenmenger syndrome,    CVA,  Hypothyroid  8/4  trach  POST OP MULTIPLE BRONcHS,  + CANDIDA  JENNIFER   W/ EP FOLLOWING  8/9   trach collar on room air  8/23  abd pain  neg findings,   ngt out    soft diet  8/25   bronch- candida improving,  trf to [facility]  ,

## 2024-09-06 NOTE — ED PROVIDER NOTES
"Time: 7:36 PM EDT  Date of encounter:  9/6/2024  Independent Historian/Clinical History and Information was obtained by:   Patient and Family    History is limited by: N/A    Chief Complaint: Elevated potassium of 6.1, low sodium, told by doctor to come to ER        History of Present Illness:  Patient is a 53 y.o. year old male with history of recurrent MRSA infection and right shoulder and status post recent revision of right shoulder surgery a week ago at [facility] in [place], who presents to the emergency department for evaluation of abnormal lab work noted by his doctor including low sodium and elevated potassium of 6.1.    He has been recently taking Bactrim DS 2 tabs in the morning and 2 tabs in the evening.    It was also noted that his blood pressure has been running lower than usual and he has not needed to take his blood pressure pills this week.  He denies any fevers or chills.      Patient Care Team  Primary Care Provider: Marilyn Eugene APRN    Past Medical History:     Allergies   Allergen Reactions    Acetaminophen Other (See Comments)     Patient has Alpha-1 and should avoid medications with acetaminophen (Tylenol).     Past Medical History:   Diagnosis Date    Abnormal ECG 7-23    Alpha 1-antitrypsin PiMS phenotype     \"alpha 1\" per pt and wife. unsure of what type.    Arthritis     Chest pain     2 months ago    CHF (congestive heart failure) 7 10 2023    Coronary artery disease 7-23    H/o Back injury     HFrEF (heart failure with reduced ejection fraction)     Hypertension     Methicillin resis staph infct causing diseases classd elswhr 09/22/2023    Rash     due to antibiotic currently on     Past Surgical History:   Procedure Laterality Date    CARDIAC CATHETERIZATION N/A 10/31/2023    Procedure: Left Heart Cath;  Surgeon: Cristiano Holliday MD;  Location: [facility] CATH INVASIVE LOCATION;  Service: Cardiovascular;  Laterality: N/A;    LIVER BIOPSY      SHOULDER ARTHROSCOPY Right " "07/14/2023    Procedure: SHOULDER ARTHROSCOPY WITH WASHOUT AND DEBRIDMENT;  Surgeon: Orville Gallegos MD;  Location: [facility];  Service: Orthopedics;  Laterality: Right;    SHOULDER ARTHROSCOPY Right 01/18/2024    TOOTH EXTRACTION       Family History   Problem Relation Age of Onset    Malig Hyperthermia Neg Hx        Home Medications:  Prior to Admission medications    Medication Sig Start Date End Date Taking? Authorizing Provider   aspirin 81 MG EC tablet Take 1 tablet by mouth Daily. 8/9/24   Magaly Mcelroy APRN   atorvastatin (LIPITOR) 20 MG tablet Take 1 tablet by mouth Daily. 8/9/24   Magaly Mcelroy APRN   carvedilol (COREG) 25 MG tablet Take 1 tablet by mouth Every 12 (Twelve) Hours. Haif tab in am and whole tab at night 12/20/23   Mira Triplett APRN   empagliflozin (Jardiance) 10 MG tablet tablet Take 1 tablet by mouth Daily. 8/9/24   Magaly Mcelroy APRN   ferrous sulfate 325 (65 FE) MG tablet Take 1 tablet by mouth Daily With Breakfast.    Provider, MD Rubia   multivitamin with minerals tablet tablet Take 1 tablet by mouth Daily.    Provider, MD Rubia   sacubitril-valsartan (Entresto)  MG tablet Take 1 tablet by mouth 2 (Two) Times a Day. 8/16/23   Mira Triplett APRN   spironolactone (ALDACTONE) 25 MG tablet Take 1 tablet by mouth Every Other Day. 8/9/24   Magaly Mcelroy APRN        Social History:   Social History     Tobacco Use    Smoking status: Never    Smokeless tobacco: Never   Vaping Use    Vaping status: Never Used   Substance Use Topics    Alcohol use: Never    Drug use: Never         Review of Systems:  Review of Systems   I performed a 10 point review of systems which was all negative, except for the positives found in the HPI above.  Physical Exam:  /75   Pulse 85   Temp 98.7 °F (37.1 °C) (Oral)   Resp 11   Ht 180.3 cm (71\")   Wt 92.8 kg (204 lb 9.4 oz)   SpO2 96%   BMI 28.53 kg/m²     Physical Exam   General: Awake alert " and in no obvious distress    HEENT: Head normocephalic atraumatic, eyes PERRLA EOMI, nose normal, oropharynx normal.    Neck: Supple full range of motion, no meningismus, no lymphadenopathy    Heart: Regular rate and rhythm, no murmurs or rubs, 2+ radial pulses bilaterally    Lungs: Clear to auscultation bilaterally without wheezes or crackles, no respiratory distress    Abdomen: Soft, nontender, nondistended, no rebound or guarding    Skin: Warm, dry, no rash    Musculoskeletal: Reduced range of motion of right shoulder due to pain and swelling and recent surgery, no erythema or wound dehiscence or drainage noted,, no lower extremity edema    Neurologic: Oriented x3, no motor deficits no sensory deficits    Psychiatric: Mood appears stable, no psychosis          Procedures:  Procedures      Medical Decision Making:      Comorbidities that affect care:    CKD, recurrent MRSA infection    External Notes reviewed:    Previous Labs: Reviewed his previous lab work and baseline renal function it looks like today's values look worse acutely.      The following orders were placed and all results were independently analyzed by me:  Orders Placed This Encounter   Procedures    Tully Draw    Comprehensive Metabolic Panel    CBC Auto Differential    Potassium    Renal Function Panel    Vital Signs Every Hour and Per Hospital Policy Based on Patient Condition    Maintain IV Access    Telemetry - Place Orders & Notify Provider of Results When Patient Experiences Acute Chest Pain, Dysrhythmia or Respiratory Distress    Continuous Pulse Oximetry    Please call me if K > 5.4 at 9PM, [phone]  Nursing Communication    Nephrology  (on-call MD unless specified)    Hospitalist (on-call MD unless specified)    POC Glucose Q1H    ECG 12 Lead Electrolyte Imbalance    ECG 12 Lead Electrolyte Imbalance    Insert Peripheral IV    Inpatient Admission    CBC & Differential    Green Top (Gel)    Lavender Top    Gold Top - [facility]    Light  Blue Top    CBC & Differential       Medications Given in the Emergency Department:  Medications   sodium chloride 0.9 % flush 10 mL (has no administration in time range)   sodium chloride 0.9 % bolus 1,000 mL (1,000 mL Intravenous New Bag 9/6/24 1818)   calcium gluconate 1000 Mg/50ml 0.675% NaCl IV SOLN (0 mg Intravenous Stopped 9/6/24 1834)   sodium zirconium cyclosilicate (LOKELMA) packet 10 g (10 g Oral Given 9/6/24 1818)   sodium bicarbonate injection 8.4% 50 mEq (50 mEq Intravenous Given 9/6/24 1818)   insulin regular (humuLIN R,novoLIN R) injection 5 Units (5 Units Intravenous Given 9/6/24 1818)   dextrose (D50W) (25 g/50 mL) IV injection 25 g (25 g Intravenous Given 9/6/24 1818)        ED Course:    ED Course as of 09/06/24 1941   Fri Sep 06, 2024   1758 EKG: I interpreted his twelve-lead EKG as normal sinus rhythm at 80 bpm, normal P waves, QRS showing inferior Q waves, anteroseptal Q waves, and overall low voltage, normal ST segments noted, borderline T wave abnormalities in inferior leads. [VS]      ED Course User Index  [VS] Edwin Thibodeaux MD       Labs:    Lab Results (last 24 hours)       Procedure Component Value Units Date/Time    CBC AND DIFFERENTIAL [[id number]]  (Abnormal) Collected: 09/06/24 1338     Updated: 09/06/24 1721     WBC 12.65 10*3/uL      RBC 4.16 10*6/uL      Hemoglobin 13.4 g/dL      Hematocrit 40.1 %      MCV 96.4 fL      MCH 32.2 pg      MCHC 33.4 g/dL      RDW 12.0 %      Platelets 299 10*3/uL      MPV 9.1 fL      Differential Type       Physician Office CBC w/AutoDiff     (arb'U)     Neutrophil Rel % 55.7 %      Lymphocyte Rel % 22.6 %      Monocyte Rel % 8.9 %      Eosinophil % 11.5 %      Basophil Rel % 0.8 %      Neutrophils Absolute 7.10 10*3/uL      Lymphocytes Absolute 2.86 10*3/uL      Monocytes Absolute 1.13 10*3/uL      Eosinophils Absolute 1.45 10*3/uL      Basophils Absolute 0.10 10*3/uL      Immature Grans % 0.5 %      Immature Grans, Absolute 0.06 10*3/uL      COMPREHENSIVE METABOLIC PANEL [[id number]]  (Abnormal) Collected: 09/06/24 1339     Updated: 09/06/24 1721    CBC & Differential [[id number]]  (Abnormal) Collected: 09/06/24 1738    Specimen: Blood Updated: 09/06/24 1745    Narrative:      The following orders were created for panel order CBC & Differential.  Procedure                               Abnormality         Status                     ---------                               -----------         ------                     CBC Auto Differential[[id number]]        Abnormal            Final result                 Please view results for these tests on the individual orders.    Comprehensive Metabolic Panel [[id number]]  (Abnormal) Collected: 09/06/24 1738    Specimen: Blood Updated: 09/06/24 1802     Glucose 88 mg/dL      BUN 40 mg/dL      Creatinine 1.77 mg/dL      Sodium 129 mmol/L      Potassium 5.6 mmol/L      Chloride 99 mmol/L      CO2 19.9 mmol/L      Calcium 9.5 mg/dL      Total Protein 7.9 g/dL      Albumin 3.9 g/dL      ALT (SGPT) 26 U/L      AST (SGOT) 29 U/L      Alkaline Phosphatase 273 U/L      Total Bilirubin 0.2 mg/dL      Globulin 4.0 gm/dL      A/G Ratio 1.0 g/dL      BUN/Creatinine Ratio 22.6     Anion Gap 10.1 mmol/L      eGFR 45.4 mL/min/1.73     Narrative:      GFR Normal >60  Chronic Kidney Disease <60  Kidney Failure <15      CBC Auto Differential [[id number]]  (Abnormal) Collected: 09/06/24 1738    Specimen: Blood Updated: 09/06/24 1745     WBC 13.20 10*3/mm3      RBC 4.09 10*6/mm3      Hemoglobin 12.9 g/dL      Hematocrit 38.9 %      MCV 95.1 fL      MCH 31.5 pg      MCHC 33.2 g/dL      RDW 12.1 %      RDW-SD 41.9 fl      MPV 9.2 fL      Platelets 310 10*3/mm3      Neutrophil % 57.0 %      Lymphocyte % 21.1 %      Monocyte % 8.5 %      Eosinophil % 12.0 %      Basophil % 0.9 %      Immature Grans % 0.5 %      Neutrophils, Absolute 7.52 10*3/mm3      Lymphocytes, Absolute 2.78 10*3/mm3      Monocytes, Absolute 1.12 10*3/mm3      Eosinophils,  Absolute 1.59 10*3/mm3      Basophils, Absolute 0.12 10*3/mm3      Immature Grans, Absolute 0.07 10*3/mm3      nRBC 0.0 /100 WBC     POC Glucose Q1H [[id number]]  (Normal) Collected: 09/06/24 1921    Specimen: Blood Updated: 09/06/24 1922     Glucose 73 mg/dL      Comment: Serial Number: [id number]:  [id number]                Imaging:    No Radiology Exams Resulted Within Past 24 Hours      Differential Diagnosis and Discussion:    Weakness: Based on the patient's history, signs, and symptoms, the diffential diagnosis includes but is not limited to meningitis, stroke, sepsis, subarachnoid hemorrhage, intracranial bleeding, encephalitis, acute uti, dehydration, MS, myasthenia gravis, Guillan P[place], migraine variant, neuromuscular disorders vertigo, electrolyte imbalance, and metabolic disorders.    All labs were reviewed and interpreted by me.  All X-rays impressions were independently interpreted by me.  EKG was interpreted by me.    MDM     Amount and/or Complexity of Data Reviewed  Clinical lab tests: reviewed  Tests in the medicine section of CPT®: reviewed  Decide to obtain previous medical records or to obtain history from someone other than the patient: yes       This patient was sent in by his doctor for abnormal labs including elevated potassium this morning of 6.1.    I gave him some IV fluids in the ED and started the hyperkalemia management protocol including IV insulin/dextrose, calcium gluconate, sodium bicarbonate, Lokelma.    I consulted with our on-call nephrologist who recommended admission for his CARIDAD and to have his medications adjusted.    We will also discontinue Bactrim DS and consider starting him on doxycycline, given history of recurrent MRSA.      Total Critical Care time of 35 minutes. Total critical care time documented does not include time spent on separately billed procedures for services of nurses or physician assistants. I personally saw and examined the patient. I have reviewed  all diagnostic interpretations and treatment plans as written. I was present for the key portions of any procedures performed and the inclusive time noted in any critical care statement. Critical care time includes patient management by me, time spent at the patients bedside,  time to review lab and imaging results, discussing patient care, documentation in the medical record, and time spent with family or caregiver.          Patient Care Considerations:          Consultants/Shared Management Plan:    Hospitalist: I have discussed the case with the admitting hospitalist who agrees to accept the patient for admission.  Consultant: I have discussed the case with the on-call nephrologist who agrees to consult on the patient.    Social Determinants of Health:    Patient is independent, reliable, and has access to care.       Disposition and Care Coordination:    Admit:   Through independent evaluation of the patient's history, physical, and imperical data, the patient meets criteria for inpatient admission to the hospital.        Final diagnoses:   Acute renal failure superimposed on chronic kidney disease, unspecified acute renal failure type, unspecified CKD stage   Acute hyperkalemia        ED Disposition       ED Disposition   Decision to Admit    Condition   --    Comment   Level of Care: Telemetry [5]   Diagnosis: Hyperkalemia [053356]   Certification: I Certify That Inpatient Hospital Services Are Medically Necessary For Greater Than 2 Midnights                 This medical record created using voice recognition software.             Edwin Thibodeaux MD  09/06/24 4868

## 2024-09-07 LAB
ALBUMIN SERPL-MCNC: 3.3 G/DL (ref 3.5–5.2)
ANION GAP SERPL CALCULATED.3IONS-SCNC: 6.5 MMOL/L (ref 5–15)
BASOPHILS # BLD AUTO: 0.13 10*3/MM3 (ref 0–0.2)
BASOPHILS NFR BLD AUTO: 1 % (ref 0–1.5)
BILIRUB UR QL STRIP: NEGATIVE
BUN SERPL-MCNC: 32 MG/DL (ref 6–20)
BUN/CREAT SERPL: 21.2 (ref 7–25)
CALCIUM SPEC-SCNC: 8.8 MG/DL (ref 8.6–10.5)
CHLORIDE SERPL-SCNC: 99 MMOL/L (ref 98–107)
CLARITY UR: CLEAR
CO2 SERPL-SCNC: 20.5 MMOL/L (ref 22–29)
COLOR UR: YELLOW
CREAT SERPL-MCNC: 1.51 MG/DL (ref 0.76–1.27)
DEPRECATED RDW RBC AUTO: 42.5 FL (ref 37–54)
EGFRCR SERPLBLD CKD-EPI 2021: 54.9 ML/MIN/1.73
EOSINOPHIL # BLD AUTO: 1.73 10*3/MM3 (ref 0–0.4)
EOSINOPHIL NFR BLD AUTO: 13.8 % (ref 0.3–6.2)
ERYTHROCYTE [DISTWIDTH] IN BLOOD BY AUTOMATED COUNT: 12.1 % (ref 12.3–15.4)
GLUCOSE BLDC GLUCOMTR-MCNC: 113 MG/DL (ref 70–99)
GLUCOSE SERPL-MCNC: 91 MG/DL (ref 65–99)
GLUCOSE UR STRIP-MCNC: ABNORMAL MG/DL
HCT VFR BLD AUTO: 35.9 % (ref 37.5–51)
HGB BLD-MCNC: 11.9 G/DL (ref 13–17.7)
HGB UR QL STRIP.AUTO: NEGATIVE
IMM GRANULOCYTES # BLD AUTO: 0.06 10*3/MM3 (ref 0–0.05)
IMM GRANULOCYTES NFR BLD AUTO: 0.5 % (ref 0–0.5)
KETONES UR QL STRIP: NEGATIVE
LEUKOCYTE ESTERASE UR QL STRIP.AUTO: NEGATIVE
LYMPHOCYTES # BLD AUTO: 3.59 10*3/MM3 (ref 0.7–3.1)
LYMPHOCYTES NFR BLD AUTO: 28.7 % (ref 19.6–45.3)
MAGNESIUM SERPL-MCNC: 2.3 MG/DL (ref 1.6–2.6)
MCH RBC QN AUTO: 31.6 PG (ref 26.6–33)
MCHC RBC AUTO-ENTMCNC: 33.1 G/DL (ref 31.5–35.7)
MCV RBC AUTO: 95.5 FL (ref 79–97)
MONOCYTES # BLD AUTO: 1.22 10*3/MM3 (ref 0.1–0.9)
MONOCYTES NFR BLD AUTO: 9.8 % (ref 5–12)
NEUTROPHILS NFR BLD AUTO: 46.2 % (ref 42.7–76)
NEUTROPHILS NFR BLD AUTO: 5.78 10*3/MM3 (ref 1.7–7)
NITRITE UR QL STRIP: NEGATIVE
NRBC BLD AUTO-RTO: 0 /100 WBC (ref 0–0.2)
PH UR STRIP.AUTO: 5.5 [PH] (ref 5–8)
PHOSPHATE SERPL-MCNC: 3.9 MG/DL (ref 2.5–4.5)
PLATELET # BLD AUTO: 293 10*3/MM3 (ref 140–450)
PMV BLD AUTO: 9.5 FL (ref 6–12)
POTASSIUM SERPL-SCNC: 5.3 MMOL/L (ref 3.5–5.2)
PROT UR QL STRIP: NEGATIVE
RBC # BLD AUTO: 3.76 10*6/MM3 (ref 4.14–5.8)
SODIUM SERPL-SCNC: 126 MMOL/L (ref 136–145)
SP GR UR STRIP: 1.02 (ref 1–1.03)
UROBILINOGEN UR QL STRIP: ABNORMAL
WBC NRBC COR # BLD AUTO: 12.51 10*3/MM3 (ref 3.4–10.8)

## 2024-09-07 PROCEDURE — 36415 COLL VENOUS BLD VENIPUNCTURE: CPT | Performed by: STUDENT IN AN ORGANIZED HEALTH CARE EDUCATION/TRAINING PROGRAM

## 2024-09-07 PROCEDURE — 81003 URINALYSIS AUTO W/O SCOPE: CPT | Performed by: INTERNAL MEDICINE

## 2024-09-07 PROCEDURE — 25010000002 HEPARIN (PORCINE) PER 1000 UNITS: Performed by: STUDENT IN AN ORGANIZED HEALTH CARE EDUCATION/TRAINING PROGRAM

## 2024-09-07 PROCEDURE — 99232 SBSQ HOSP IP/OBS MODERATE 35: CPT | Performed by: INTERNAL MEDICINE

## 2024-09-07 PROCEDURE — 83735 ASSAY OF MAGNESIUM: CPT | Performed by: STUDENT IN AN ORGANIZED HEALTH CARE EDUCATION/TRAINING PROGRAM

## 2024-09-07 PROCEDURE — 85025 COMPLETE CBC W/AUTO DIFF WBC: CPT | Performed by: STUDENT IN AN ORGANIZED HEALTH CARE EDUCATION/TRAINING PROGRAM

## 2024-09-07 PROCEDURE — 80069 RENAL FUNCTION PANEL: CPT | Performed by: STUDENT IN AN ORGANIZED HEALTH CARE EDUCATION/TRAINING PROGRAM

## 2024-09-07 PROCEDURE — 25810000003 SODIUM CHLORIDE 0.9 % SOLUTION: Performed by: INTERNAL MEDICINE

## 2024-09-07 PROCEDURE — 82948 REAGENT STRIP/BLOOD GLUCOSE: CPT

## 2024-09-07 RX ORDER — SODIUM CHLORIDE 9 MG/ML
75 INJECTION, SOLUTION INTRAVENOUS CONTINUOUS
Status: ACTIVE | OUTPATIENT
Start: 2024-09-07 | End: 2024-09-07

## 2024-09-07 RX ORDER — ATORVASTATIN CALCIUM 20 MG/1
20 TABLET, FILM COATED ORAL DAILY
Status: DISCONTINUED | OUTPATIENT
Start: 2024-09-07 | End: 2024-09-09 | Stop reason: HOSPADM

## 2024-09-07 RX ORDER — ASPIRIN 81 MG/1
81 TABLET ORAL DAILY
Status: DISCONTINUED | OUTPATIENT
Start: 2024-09-07 | End: 2024-09-09 | Stop reason: HOSPADM

## 2024-09-07 RX ADMIN — HEPARIN SODIUM 5000 UNITS: 5000 INJECTION INTRAVENOUS; SUBCUTANEOUS at 21:09

## 2024-09-07 RX ADMIN — OXYCODONE HYDROCHLORIDE 5 MG: 5 TABLET ORAL at 06:23

## 2024-09-07 RX ADMIN — Medication 10 ML: at 09:07

## 2024-09-07 RX ADMIN — DOXYCYCLINE 100 MG: 100 CAPSULE ORAL at 09:07

## 2024-09-07 RX ADMIN — OXYCODONE HYDROCHLORIDE 5 MG: 5 TABLET ORAL at 21:09

## 2024-09-07 RX ADMIN — ASPIRIN 81 MG: 81 TABLET, COATED ORAL at 11:19

## 2024-09-07 RX ADMIN — Medication 10 ML: at 21:10

## 2024-09-07 RX ADMIN — EMPAGLIFLOZIN 10 MG: 10 TABLET, FILM COATED ORAL at 11:19

## 2024-09-07 RX ADMIN — ATORVASTATIN CALCIUM 20 MG: 20 TABLET, FILM COATED ORAL at 09:07

## 2024-09-07 RX ADMIN — SODIUM ZIRCONIUM CYCLOSILICATE 10 G: 10 POWDER, FOR SUSPENSION ORAL at 06:23

## 2024-09-07 RX ADMIN — HEPARIN SODIUM 5000 UNITS: 5000 INJECTION INTRAVENOUS; SUBCUTANEOUS at 15:58

## 2024-09-07 RX ADMIN — OXYCODONE HYDROCHLORIDE 5 MG: 5 TABLET ORAL at 12:15

## 2024-09-07 RX ADMIN — HEPARIN SODIUM 5000 UNITS: 5000 INJECTION INTRAVENOUS; SUBCUTANEOUS at 06:23

## 2024-09-07 RX ADMIN — SODIUM CHLORIDE 75 ML/HR: 9 INJECTION, SOLUTION INTRAVENOUS at 11:19

## 2024-09-07 RX ADMIN — DOXYCYCLINE 100 MG: 100 CAPSULE ORAL at 21:09

## 2024-09-07 NOTE — PLAN OF CARE
Goal Outcome Evaluation:  Plan of Care Reviewed With: patient           Outcome Evaluation: Patient is alert and oriented. On room air. Complained of pain once and medicated per MAR. IV fluids initiated at 75 mL/hr. Urine sample collected, patient bladder scanned after urinating.

## 2024-09-07 NOTE — PLAN OF CARE
Goal Outcome Evaluation:      Alert and able to make needs known, independent with ADLS, wife at bedside, c/o pain , treated per MAR, will continue with plan of care

## 2024-09-07 NOTE — PROGRESS NOTES
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 On Entresto, spironolactone, empagliflozin, carvedilol for HFrEF but has not been taking any of his medications for the past 1 week. On presentation VSS, Cr 1.77 (baseline 1.1), BUN 40, K 5.6, Na 129. No EKG changes. Received Lokelma and shifting agents in the ED Nephro following    Interval Followup:     Objective   Objective     Vitals:   Temp:  [98 °F (36.7 °C)-98.7 °F (37.1 °C)] 98.1 °F (36.7 °C)  Heart Rate:  [] 86  Resp:  [11-18] 16  BP: (100-121)/(65-77) 102/70  Physical Exam    Constitutional: conversant, NAD   Respiratory:  nonlabored respirations    Cardiovascular:  RRR, no edema   Gastrointestinal: soft, nondistended   Neurologic: Alert, speech clear   Skin: Extremities warm    Result Review    Result Review:  I have personally reviewed the following over the last 24 hours (07:00 to 07:00) and agree with the following findings  [x]  Laboratory  CBC          2024    04:03 2024    13:38 2024    17:38 2024    04:44   CBC   WBC 16.41     12.65     13.20  12.51    RBC 4.04     4.16     4.09  3.76    Hemoglobin 12.8     13.4     12.9  11.9    Hematocrit 38.9     40.1     38.9  35.9    MCV 96.3     96.4     95.1  95.5    MCH 31.7     32.2     31.5  31.6    MCHC 32.9     33.4     33.2  33.1    RDW 12.7     12.0     12.1  12.1    Platelets 241     299     310  293       Details          This result is from an external source.             BMP          2024    11:23 2024     17:38 9/6/2024    21:00 9/7/2024    04:44   BMP   BUN  40  35  32    Creatinine  1.77  1.51  1.51    Sodium  129  128  126    Potassium 4.4     5.6  4.7  5.3    Chloride  99  101  99    CO2  19.9  19.1  20.5    Calcium  9.5  8.0  8.8       Details          This result is from an external source.               []  Microbiology  []  Radiology   [x]  EKG/Telemetry monitor personally reviewed and independently interpreted:  NSR  []  Cardiology/Vascular   []  Pathology  []  Old records  [x]  Other:    Intake/Output Summary (Last 24 hours) at 9/7/2024 1033  Last data filed at 9/7/2024 0800  Gross per 24 hour   Intake 960 ml   Output --   Net 960 ml         Assessment & Plan   Assessment / Plan     Assessment/Plan:  Hyperkalemia  Adverse reaction to Bactrim  CARIDAD on CKD stage II  Hyponatremia, clinically significant  Mild metabolic acidosis  Dehydration  Chronic HFrEF  Hypertension  Hyperlipidemia  Nonischemic cardiomyopathy   Chronic normocytic anemia  S/p right reverse total shoulder arthroplasty on 08/29/24 at [facility]  Chronic osteomyelitis of the right shoulder joint         Potassium back up to 5.3  Creatinine 1.55, unchanged from yesterday.  Still above baseline.  Urine output not recorded.  Obtain UA, PVR.  Received 1 L NS in ER.  Most recent echo shows improvement in EF 46 to 50%.  Still looks a little dry on exam, sodium down to 126, will give normal saline 75 cc/h for 1 additional liter.  BP 110s over 60s. Continue to hold spironolactone, Entresto, Coreg  Monitor strict I's and O's.  Avoid nephrotoxic agents.  Trend renal function and electrolyte  Lokelma 10 g x 1 repeated this morning  Restart Jardiance 10 mg daily  Bactrim discontinued.  Continue doxycycline 100 mg every 12 hours  Nephrology on board, appreciate assistance  Restart baby aspirin and statin  VTE ppx: subcu heparin 5000 units every 8 hours  CBC, BMP in AM    Discussed plan with RN.    VTE Prophylaxis:  Pharmacologic VTE prophylaxis  orders are present.        CODE STATUS:   Level Of Support Discussed With: Patient  Code Status (Patient has no pulse and is not breathing): CPR (Attempt to Resuscitate)  Medical Interventions (Patient has pulse or is breathing): Full Support    Electronically signed by Paul Gong DO, 09/07/24, 10:42 AM EDT.

## 2024-09-07 NOTE — CONSULTS
"  [facility] Consult Note      Patient Name: Yfn Ray  : 1970  MRN: [id number]  Primary Care Physician:  Marilyn Eugene APRN  Referring Physician: No ref. provider found  Date of admission: 2024    Subjective     Reason for Consult:  CARIDAD + hyperkalemia     HPI:   Yfn Ray is a 53 y.o. 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  Been on bactrim, entresto, aldactone, jardiance.  K 5.6 with Cr 1.7.  Also hyponatremic, Na 129.  Bicarb was 20 with normal AG.  Given temporizing measures and K down to 4.7 at 9PM last night but back up to 5.3 this AM.  Cr down slightly 1.5 with IVF but Na lower 126.  He is not on thiazide diuretic.  Urinalysis is pending.  SBP been low 100s.  Denies n/v or diarrhea or dysuria.  No dyspnea or swelling.  Echo on 11/3/23 showed EF 45 to 50%.    Review of Systems:   14 point review of systems is otherwise negative except for mentioned above on HPI    Personal History     Past Medical History:   Diagnosis Date   • Abnormal ECG    • Alpha 1-antitrypsin PiMS phenotype     \"alpha 1\" per pt and wife. unsure of what type.   • Arthritis    • Chest pain     2 months ago   • CHF (congestive heart failure) 7 10 2023   • Coronary artery disease    • H/o Back injury    • HFrEF (heart failure with reduced ejection fraction)    • Hypertension    • Methicillin resis staph infct causing diseases classd elswhr 2023   • Rash     due to antibiotic currently on       Past Surgical History:   Procedure Laterality Date   • CARDIAC CATHETERIZATION N/A 10/31/2023    Procedure: Left Heart Cath;  Surgeon: Cristiano Holliday MD;  Location: [facility] CATH INVASIVE LOCATION;  Service: Cardiovascular;  Laterality: N/A;   • LIVER BIOPSY     • SHOULDER ARTHROSCOPY Right 2023    Procedure: SHOULDER ARTHROSCOPY WITH " WASHOUT AND DEBRIDMENT;  Surgeon: Orville Gallegos MD;  [place] [facility] [place];  Service: Orthopedics;  Laterality: Right;   • SHOULDER ARTHROSCOPY Right 01/18/2024   • SHOULDER ARTHROSCOPY W/ ROTATOR CUFF REPAIR Right 2024 8/29/2024   • TOOTH EXTRACTION         Family History: family history is not on file.    Social History:  reports that he has never smoked. He has never used smokeless tobacco. He reports that he does not drink alcohol and does not use drugs.    Home Medications:  Prior to Admission medications    Medication Sig Start Date End Date Taking? Authorizing Provider   aspirin 81 MG EC tablet Take 1 tablet by mouth Daily. 8/9/24  Yes Magaly Mcelroy APRN   atorvastatin (LIPITOR) 20 MG tablet Take 1 tablet by mouth Daily. 8/9/24  Yes Magaly Mcelroy APRN   carvedilol (COREG) 25 MG tablet Take 1 tablet by mouth 2 (Two) Times a Day With Meals.   Yes Rubia Kahn MD   empagliflozin (JARDIANCE) 10 MG tablet tablet Take 1 tablet by mouth Daily.   Yes Rubia Kahn MD   ferrous sulfate 325 (65 FE) MG tablet Take 1 tablet by mouth Daily With Breakfast.   Yes Rubia Kahn MD   multivitamin with minerals tablet tablet Take 1 tablet by mouth Daily.   Yes Rubia Kahn MD   oxyCODONE (ROXICODONE) 10 MG tablet Take 1-2 tablets by mouth Every 6 (Six) Hours As Needed for Moderate Pain.   Yes Rubia Kahn MD   promethazine (PHENERGAN) 25 MG tablet Take 1 tablet by mouth Every 4 (Four) Hours. 9/4/24  Yes Rubia Kahn MD   sacubitril-valsartan (Entresto)  MG tablet Take 1 tablet by mouth 2 (Two) Times a Day. 8/16/23  Yes Mira Triplett APRN   spironolactone (ALDACTONE) 25 MG tablet Take 1 tablet by mouth Every Other Day. 8/9/24  Yes Magaly Mcelroy APRN       Allergies:  Allergies   Allergen Reactions   • Acetaminophen Other (See Comments)     Patient has Alpha-1 and should avoid medications with acetaminophen (Tylenol).       Objective      Vitals:   Temp:  [97.9 °F (36.6 °C)-98.7 °F (37.1 °C)] 97.9 °F (36.6 °C)  Heart Rate:  [] 69  Resp:  [11-18] 16  BP: (100-121)/(65-77) 102/66    Intake/Output Summary (Last 24 hours) at 9/7/2024 1319  Last data filed at 9/7/2024 1248  Gross per 24 hour   Intake 1200 ml   Output --   Net 1200 ml       Physical Exam:    General Appearance: pleasant WM comfortable/alert  Skin: warm and dry  HEENT: oral mucosa normal, nonicteric sclera  Neck: supple, no JVD  Lungs: CTA bilat no rales   Heart: RRR, normal S1 and S2  Abdomen: soft, nontender, nondistended  : no palpable bladder  Extremities: no edema, cyanosis or clubbing  Neuro: normal speech and mental status     Scheduled Meds:     aspirin, 81 mg, Oral, Daily  atorvastatin, 20 mg, Oral, Daily  doxycycline, 100 mg, Oral, Q12H  empagliflozin, 10 mg, Oral, Daily  heparin (porcine), 5,000 Units, Subcutaneous, Q8H  sodium chloride, 10 mL, Intravenous, Q12H      IV Meds:   sodium chloride, 75 mL/hr, Last Rate: 75 mL/hr (09/07/24 1119)        Results Reviewed:   I have personally reviewed the results from the time of this admission to 9/7/2024 13:19 EDT     Lab Results   Component Value Date    GLUCOSE 91 09/07/2024    CALCIUM 8.8 09/07/2024     (L) 09/07/2024    K 5.3 (H) 09/07/2024    CO2 20.5 (L) 09/07/2024    CL 99 09/07/2024    BUN 32 (H) 09/07/2024    CREATININE 1.51 (H) 09/07/2024    BCR 21.2 09/07/2024    ANIONGAP 6.5 09/07/2024      Lab Results   Component Value Date    MG 2.3 09/07/2024    PHOS 3.9 09/07/2024    ALBUMIN 3.3 (L) 09/07/2024           Assessment / Plan     ASSESSMENT:  Non olig CARIDAD - likely AIN from bactrim.  Has marked periph eosinophilia.  Cr improved slightly 1.7 to 1.5.  UA pending   CKD stage 2 - BL Cr 1.1 to 1.3 range in assoc with CHF/HTN  Hyperkalemia - due to CARIDAD + bactrim + CHF meds (entresto, aldactone, coreg).  K improved 5.6 to 4.7 with temporizing measures but rebounded some to 5.3 this AM  Hyponatremia, in relation to  hypovolemia and CARIDAD; Na down to 126; not on thiazide   HFrEF/NICM - compensated, still suspect bit vol depleted .  Prior echo noted, EF 45 to 50%  S/P R total shoulder arthroplasty on 8/29/24 for MRSA osteomyelitis - bactrim -> doxy now    PLAN:  Agree with current plan: repeat dose of lokelma this AM and another liter of normal saline  Holding aldactone, entresto, coreg for now   Jardiance has been resumed  Doxycyline in place of bactrim  Low K diet     Thank you for involving us in the care of Yfn Ray.  Please feel free to call with any questions.    Damion Thurman MD  09/07/24  13:19 EDT    [facility]  [phone]

## 2024-09-08 ENCOUNTER — APPOINTMENT (OUTPATIENT)
Dept: CARDIOLOGY | Facility: HOSPITAL | Age: 54
DRG: 642 | End: 2024-09-08
Payer: COMMERCIAL

## 2024-09-08 LAB
ANION GAP SERPL CALCULATED.3IONS-SCNC: 10.9 MMOL/L (ref 5–15)
BUN SERPL-MCNC: 31 MG/DL (ref 6–20)
BUN/CREAT SERPL: 25 (ref 7–25)
CALCIUM SPEC-SCNC: 8.9 MG/DL (ref 8.6–10.5)
CHLORIDE SERPL-SCNC: 103 MMOL/L (ref 98–107)
CO2 SERPL-SCNC: 20.1 MMOL/L (ref 22–29)
CREAT SERPL-MCNC: 1.24 MG/DL (ref 0.76–1.27)
DEPRECATED RDW RBC AUTO: 43 FL (ref 37–54)
EGFRCR SERPLBLD CKD-EPI 2021: 69.5 ML/MIN/1.73
ERYTHROCYTE [DISTWIDTH] IN BLOOD BY AUTOMATED COUNT: 12 % (ref 12.3–15.4)
GLUCOSE BLDC GLUCOMTR-MCNC: 108 MG/DL (ref 70–99)
GLUCOSE SERPL-MCNC: 89 MG/DL (ref 65–99)
HCT VFR BLD AUTO: 35.8 % (ref 37.5–51)
HGB BLD-MCNC: 11.6 G/DL (ref 13–17.7)
MCH RBC QN AUTO: 31.4 PG (ref 26.6–33)
MCHC RBC AUTO-ENTMCNC: 32.4 G/DL (ref 31.5–35.7)
MCV RBC AUTO: 96.8 FL (ref 79–97)
PLATELET # BLD AUTO: 276 10*3/MM3 (ref 140–450)
PMV BLD AUTO: 9.2 FL (ref 6–12)
POTASSIUM SERPL-SCNC: 5 MMOL/L (ref 3.5–5.2)
RBC # BLD AUTO: 3.7 10*6/MM3 (ref 4.14–5.8)
SODIUM SERPL-SCNC: 134 MMOL/L (ref 136–145)
WBC NRBC COR # BLD AUTO: 11.63 10*3/MM3 (ref 3.4–10.8)

## 2024-09-08 PROCEDURE — 80048 BASIC METABOLIC PNL TOTAL CA: CPT | Performed by: INTERNAL MEDICINE

## 2024-09-08 PROCEDURE — 25010000002 HEPARIN (PORCINE) PER 1000 UNITS: Performed by: STUDENT IN AN ORGANIZED HEALTH CARE EDUCATION/TRAINING PROGRAM

## 2024-09-08 PROCEDURE — 25010000002 SULFUR HEXAFLUORIDE MICROSPH 60.7-25 MG RECONSTITUTED SUSPENSION: Performed by: INTERNAL MEDICINE

## 2024-09-08 PROCEDURE — 93308 TTE F-UP OR LMTD: CPT | Performed by: INTERNAL MEDICINE

## 2024-09-08 PROCEDURE — 93308 TTE F-UP OR LMTD: CPT

## 2024-09-08 PROCEDURE — 82948 REAGENT STRIP/BLOOD GLUCOSE: CPT

## 2024-09-08 PROCEDURE — 85027 COMPLETE CBC AUTOMATED: CPT | Performed by: INTERNAL MEDICINE

## 2024-09-08 PROCEDURE — 99232 SBSQ HOSP IP/OBS MODERATE 35: CPT | Performed by: INTERNAL MEDICINE

## 2024-09-08 RX ORDER — CARVEDILOL 3.12 MG/1
3.12 TABLET ORAL 2 TIMES DAILY WITH MEALS
Status: DISCONTINUED | OUTPATIENT
Start: 2024-09-08 | End: 2024-09-09 | Stop reason: HOSPADM

## 2024-09-08 RX ADMIN — HEPARIN SODIUM 5000 UNITS: 5000 INJECTION INTRAVENOUS; SUBCUTANEOUS at 22:04

## 2024-09-08 RX ADMIN — HEPARIN SODIUM 5000 UNITS: 5000 INJECTION INTRAVENOUS; SUBCUTANEOUS at 14:45

## 2024-09-08 RX ADMIN — DOXYCYCLINE 100 MG: 100 CAPSULE ORAL at 20:52

## 2024-09-08 RX ADMIN — CARVEDILOL 3.12 MG: 3.12 TABLET, FILM COATED ORAL at 17:49

## 2024-09-08 RX ADMIN — Medication 10 ML: at 21:01

## 2024-09-08 RX ADMIN — EMPAGLIFLOZIN 10 MG: 10 TABLET, FILM COATED ORAL at 09:00

## 2024-09-08 RX ADMIN — OXYCODONE HYDROCHLORIDE 5 MG: 5 TABLET ORAL at 22:04

## 2024-09-08 RX ADMIN — SULFUR HEXAFLUORIDE 5 ML: KIT at 18:14

## 2024-09-08 RX ADMIN — Medication 10 ML: at 09:00

## 2024-09-08 RX ADMIN — ATORVASTATIN CALCIUM 20 MG: 20 TABLET, FILM COATED ORAL at 09:00

## 2024-09-08 RX ADMIN — HEPARIN SODIUM 5000 UNITS: 5000 INJECTION INTRAVENOUS; SUBCUTANEOUS at 05:50

## 2024-09-08 RX ADMIN — DOXYCYCLINE 100 MG: 100 CAPSULE ORAL at 08:59

## 2024-09-08 RX ADMIN — OXYCODONE HYDROCHLORIDE 5 MG: 5 TABLET ORAL at 17:51

## 2024-09-08 RX ADMIN — OXYCODONE HYDROCHLORIDE 5 MG: 5 TABLET ORAL at 09:27

## 2024-09-08 RX ADMIN — ASPIRIN 81 MG: 81 TABLET, COATED ORAL at 09:00

## 2024-09-08 NOTE — PLAN OF CARE
Goal Outcome Evaluation:  Plan of Care Reviewed With: patient        Progress: improving  Outcome Evaluation: Patient alert and oriented x 4, vitalss stable. Wife at bedside. Pain treated per MAR. Patient showered on shift. Wife brought surgical dressing from home to place on shoulder incision.

## 2024-09-08 NOTE — PROGRESS NOTES
[facility]   Hospitalist Progress Note  Date: 2024  Patient Name: Yfn Ray  : 1970  MRN: [id number]  Date of admission: 2024      Subjective   Subjective     Chief Complaint: Follow up for hyperkalemia    Summary: 54 y/o F with HTN, HLD, HFrEF, nonischemic cardiomyopathy, history of MRSA osteomyelitis of the right shoulder joint s/p right total shoulder arthroplasty on 2024, currently on Bactrim who presented to ED after outpatient labs showed hyperkalemia and elevated creatinine. On Entresto, spironolactone, empagliflozin, carvedilol for HFrEF but has not been taking any of his medications for the past 1 week. On presentation VSS, Cr 1.77 (baseline 1.1), BUN 40, K 5.6, Na 129. No EKG changes. Received Lokelma and shifting agents in the ED Nephro following    Interval Followup:   Afebrile, blood pressures 120s over 70s  Feeling better.  Tolerating oral intake.  Ambulating independently.  No specific complaint    Objective   Objective     Vitals:   Temp:  [97.3 °F (36.3 °C)-98.6 °F (37 °C)] 97.3 °F (36.3 °C)  Heart Rate:  [69-91] 78  Resp:  [16-22] 17  BP: ()/(58-79) 128/79  Physical Exam    Constitutional: conversant, NAD   Respiratory:  nonlabored respirations    Cardiovascular:  RRR, no edema   Gastrointestinal: soft, nondistended   Neurologic: Alert, speech clear   Skin: Extremities warm    Result Review    Result Review:  I have personally reviewed the following over the last 24 hours (07:00 to 07:00) and agree with the following findings  [x]  Laboratory  CBC          2024    13:38 2024    17:38 2024    04:44 2024    05:28   CBC   WBC 12.65     13.20  12.51  11.63    RBC 4.16     4.09  3.76  3.70    Hemoglobin 13.4     12.9  11.9  11.6    Hematocrit 40.1     38.9  35.9  35.8    MCV 96.4     95.1  95.5  96.8    MCH 32.2     31.5  31.6  31.4    MCHC 33.4     33.2  33.1  32.4    RDW 12.0     12.1  12.1  12.0    Platelets 299     310  293  276       Details           This result is from an external source.             BMP          9/6/2024    17:38 9/6/2024    21:00 9/7/2024    04:44 9/8/2024    05:28   BMP   BUN 40  35  32  31    Creatinine 1.77  1.51  1.51  1.24    Sodium 129  128  126  134    Potassium 5.6  4.7  5.3  5.0    Chloride 99  101  99  103    CO2 19.9  19.1  20.5  20.1    Calcium 9.5  8.0  8.8  8.9        []  Microbiology  []  Radiology   [x]  EKG/Telemetry monitor personally reviewed and independently interpreted:  NSR  []  Cardiology/Vascular   []  Pathology  []  Old records  [x]  Other:    Intake/Output Summary (Last 24 hours) at 9/8/2024 0749  Last data filed at 9/8/2024 0100  Gross per 24 hour   Intake 2160 ml   Output 1325 ml   Net 835 ml         Assessment & Plan   Assessment / Plan     Assessment/Plan:  Hyperkalemia/resolved  Adverse reaction to Bactrim  CARIDAD on CKD stage II  Hyponatremia, clinically significant  Mild metabolic acidosis  Dehydration  Chronic HFrEF  Hypertension  Hyperlipidemia  Nonischemic cardiomyopathy   Chronic normocytic anemia  S/p right reverse total shoulder arthroplasty on 08/29/24 at [facility]  Chronic osteomyelitis of the right shoulder joint         Potassium down to 5.0.  Creatinine back within normal range 1.24.  BUN 31.  Sodium improved 134.  1.3 L urine output last 24 hours  UA unremarkable. No evidence of retention  Hold further fluid  Blood pressure is also looking better.  Restart Coreg at low-dose 3.125 mg 2 times daily  Continue to hold Entresto  Will keep off of spironolactone.   Continue Jardiance 10 mg daily  Repeat limited TTE to reassess EF  Bactrim discontinued.  Continue doxycycline 100 mg every 12 hours  Continue baby aspirin and statin  VTE ppx: subcu heparin 5000 units every 8 hours  Low potassium diet  BMP in AM    Discussed with Dr Thurman  Likely home in 1 to 2 days with cardiology follow up    Discussed plan with RN.    VTE Prophylaxis:  Pharmacologic VTE prophylaxis orders are  present.        CODE STATUS:   Level Of Support Discussed With: Patient  Code Status (Patient has no pulse and is not breathing): CPR (Attempt to Resuscitate)  Medical Interventions (Patient has pulse or is breathing): Full Support    Electronically signed by Paul Gong DO, 09/08/24, 10:48 AM EDT.

## 2024-09-08 NOTE — PLAN OF CARE
Goal Outcome Evaluation:  Plan of Care Reviewed With: patient, spouse        Progress: improving  Outcome Evaluation: Patient is alert and oriented on room air. VSS. Patient has intermittent right shoulder pain, treated per MAR. Had echco completed this shift. Resumed coreg per MD this shift. Will continue with POC.

## 2024-09-08 NOTE — PROGRESS NOTES
[facility] Progress Note      Patient Name: Yfn Ray  : 1970  MRN: [id number]  Primary Care Physician:  Marilyn Eugene APRN  Date of admission: 2024    Subjective     Interval History:   F/u CARIDAD    Review of Systems:   Feels well no dyspnea or nausea     Objective     Vitals:   Temp:  [97.3 °F (36.3 °C)-98.6 °F (37 °C)] 97.3 °F (36.3 °C)  Heart Rate:  [71-91] 71  Resp:  [16-22] 18  BP: ()/(58-79) 101/62    Intake/Output Summary (Last 24 hours) at 2024 1447  Last data filed at 2024 0100  Gross per 24 hour   Intake 1680 ml   Output 1325 ml   Net 355 ml       Physical Exam:    General Appearance: alert, comfortable no distress  Neck: supple, no JVD  Lungs: CTA bilat no rales  Heart: RRR, normal S1 and S2  Abdomen: soft, nontender, nondistended  Extremities: no edema, cyanosis or clubbing      Scheduled Meds:     aspirin, 81 mg, Oral, Daily  atorvastatin, 20 mg, Oral, Daily  doxycycline, 100 mg, Oral, Q12H  empagliflozin, 10 mg, Oral, Daily  heparin (porcine), 5,000 Units, Subcutaneous, Q8H  sodium chloride, 10 mL, Intravenous, Q12H      IV Meds:        Results Reviewed:   I have personally reviewed the results from the time of this admission to 2024 14:47 EDT     Results from last 7 days   Lab Units 24  0528 24  0444 24  2100 24  1738   SODIUM mmol/L 134* 126* 128* 129*   POTASSIUM mmol/L 5.0 5.3* 4.7 5.6*   CHLORIDE mmol/L 103 99 101 99   CO2 mmol/L 20.1* 20.5* 19.1* 19.9*   BUN mg/dL 31* 32* 35* 40*   CREATININE mg/dL 1.24 1.51* 1.51* 1.77*   CALCIUM mg/dL 8.9 8.8 8.0* 9.5   BILIRUBIN mg/dL  --   --   --  0.2   ALK PHOS U/L  --   --   --  273*   ALT (SGPT) U/L  --   --   --  26   AST (SGOT) U/L  --   --   --  29   GLUCOSE mg/dL 89 91 99 88     Estimated Creatinine Clearance: 80.2 mL/min (by C-G formula based on SCr of 1.24 mg/dL).  Results from last 7 days   Lab Units 24  0444   MAGNESIUM mg/dL 2.3   PHOSPHORUS mg/dL 3.9          Results from last 7 days   Lab Units 09/08/24  0528 09/07/24  0444 09/06/24  1738 09/06/24  1338   WBC 10*3/mm3 11.63* 12.51* 13.20* 12.65*   HEMOGLOBIN g/dL 11.6* 11.9* 12.9* 13.4*   PLATELETS 10*3/mm3 276 293 310 299           Assessment / Plan     ASSESSMENT:  Non olig CARDIAD - surely AIN from bactrim.  Had marked periph eosinophilia.  Cr improving further to 1.2 (peak 1.7), back to BL.  UA NEG.    CKD stage 2 - BL Cr 1.1 to 1.3 range in assoc with CHF/HTN  Hyperkalemia - due to CARIDAD + bactrim + CHF meds (entresto, aldactone, coreg).  K down to 5.0 now, hold off additional lokelma   Hyponatremia, in relation to hypovolemia and CARIDAD; Na up to 134 now  HFrEF/NICM - compensated/euvolemic.  Prior echo noted, EF 45 to 50%  S/P R total shoulder arthroplasty on 8/29/24 for MRSA osteomyelitis - bactrim -> doxy now  HTN - BP been on low side lately, 100 to 120 systolic here.  No orthostatic sx at home recently.  Coreg & entresto are on hold    PLAN:  No further IVF  Note plan for discharge soon, ok from nephrology standpoint  I would favor resuming coreg first, then entresto next (ie in office) if tolerated   Agree with staying off aldactone for now  Note plan for repeat TTE to assess LV fcn  Will d/w Dr Farideh Thurman MD  09/08/24  14:47 EDT    [facility]  [phone]

## 2024-09-09 ENCOUNTER — READMISSION MANAGEMENT (OUTPATIENT)
Dept: CALL CENTER | Facility: HOSPITAL | Age: 54
End: 2024-09-09
Payer: COMMERCIAL

## 2024-09-09 VITALS
HEIGHT: 71 IN | RESPIRATION RATE: 18 BRPM | OXYGEN SATURATION: 97 % | SYSTOLIC BLOOD PRESSURE: 108 MMHG | WEIGHT: 204.59 LBS | DIASTOLIC BLOOD PRESSURE: 69 MMHG | HEART RATE: 77 BPM | BODY MASS INDEX: 28.64 KG/M2 | TEMPERATURE: 97.9 F

## 2024-09-09 LAB
ANION GAP SERPL CALCULATED.3IONS-SCNC: 10.1 MMOL/L (ref 5–15)
BUN SERPL-MCNC: 30 MG/DL (ref 6–20)
BUN/CREAT SERPL: 23.4 (ref 7–25)
CALCIUM SPEC-SCNC: 8.9 MG/DL (ref 8.6–10.5)
CHLORIDE SERPL-SCNC: 102 MMOL/L (ref 98–107)
CO2 SERPL-SCNC: 20.9 MMOL/L (ref 22–29)
CREAT SERPL-MCNC: 1.28 MG/DL (ref 0.76–1.27)
EGFRCR SERPLBLD CKD-EPI 2021: 66.9 ML/MIN/1.73
GLUCOSE SERPL-MCNC: 87 MG/DL (ref 65–99)
POTASSIUM SERPL-SCNC: 4.5 MMOL/L (ref 3.5–5.2)
SODIUM SERPL-SCNC: 133 MMOL/L (ref 136–145)
WHOLE BLOOD HOLD SPECIMEN: NORMAL

## 2024-09-09 PROCEDURE — 80048 BASIC METABOLIC PNL TOTAL CA: CPT | Performed by: INTERNAL MEDICINE

## 2024-09-09 PROCEDURE — 99239 HOSP IP/OBS DSCHRG MGMT >30: CPT | Performed by: INTERNAL MEDICINE

## 2024-09-09 PROCEDURE — 25010000002 HEPARIN (PORCINE) PER 1000 UNITS: Performed by: STUDENT IN AN ORGANIZED HEALTH CARE EDUCATION/TRAINING PROGRAM

## 2024-09-09 RX ORDER — DOXYCYCLINE 100 MG/1
100 CAPSULE ORAL EVERY 12 HOURS SCHEDULED
Qty: 60 CAPSULE | Refills: 0 | Status: SHIPPED | OUTPATIENT
Start: 2024-09-09 | End: 2024-10-09

## 2024-09-09 RX ORDER — CARVEDILOL 3.12 MG/1
3.12 TABLET ORAL 2 TIMES DAILY WITH MEALS
Qty: 60 TABLET | Refills: 0 | Status: SHIPPED | OUTPATIENT
Start: 2024-09-09 | End: 2024-09-11 | Stop reason: SDUPTHER

## 2024-09-09 RX ADMIN — OXYCODONE HYDROCHLORIDE 5 MG: 5 TABLET ORAL at 16:12

## 2024-09-09 RX ADMIN — DOXYCYCLINE 100 MG: 100 CAPSULE ORAL at 09:03

## 2024-09-09 RX ADMIN — CARVEDILOL 3.12 MG: 3.12 TABLET, FILM COATED ORAL at 09:03

## 2024-09-09 RX ADMIN — ATORVASTATIN CALCIUM 20 MG: 20 TABLET, FILM COATED ORAL at 09:03

## 2024-09-09 RX ADMIN — HEPARIN SODIUM 5000 UNITS: 5000 INJECTION INTRAVENOUS; SUBCUTANEOUS at 06:12

## 2024-09-09 RX ADMIN — EMPAGLIFLOZIN 10 MG: 10 TABLET, FILM COATED ORAL at 09:03

## 2024-09-09 RX ADMIN — OXYCODONE HYDROCHLORIDE 5 MG: 5 TABLET ORAL at 06:12

## 2024-09-09 RX ADMIN — ASPIRIN 81 MG: 81 TABLET, COATED ORAL at 09:03

## 2024-09-09 NOTE — DISCHARGE SUMMARY
[facility]         HOSPITALIST  DISCHARGE SUMMARY    Patient Name: Yfn Ray  : 1970  MRN: [id number]    Date of Admission: 2024  Date of Discharge:  24  Primary Care Physician: Marilyn Eugene APRN    Consults       Date and Time Order Name Status Description    2024  6:54 PM Hospitalist (on-call MD unless specified)      2024  6:40 PM Nephrology  (on-call MD unless specified) Completed             Active and Resolved Hospital Problems:  Hyperkalemia  Adverse reaction to Bactrim  CARIDAD on CKD stage II  Hyponatremia, clinically significant  Mild metabolic acidosis  Chronic HFrEF  Hypertension  Hyperlipidemia  Nonischemic cardiomyopathy   Chronic normocytic anemia  S/p right reverse total shoulder arthroplasty on 24 at [facility]  Chronic osteomyelitis of the right shoulder joint    Hospital Course     Hospital Course:  Yfn Rya is a 53 y.o. male hypertension, heart failure with reduced ejection fraction, CKD stage II, right shoulder osteomyelitis secondary to MRSA status post total shoulder arthroplasty on Bactrim who presented to the hospital after outpatient labs showed hyperkalemia and elevated creatinine 1.77 with baseline around 1.1.  BUN 40, potassium 5.6, sodium 129.  No EKG changes.  Received IV fluids, Lokelma and shifting agents in the emergency room.  Heart failure medications including Coreg, spironolactone, Entresto held.  Bactrim discontinued and switched to doxycycline.  Seen by nephrology.  UA negative.  No retention.  Had peripheral eosinophilia supporting AIN from Bactrim.  Creatinine improved near baseline.  Remained nonoliguric.  Restarted on low-dose Coreg.  Aldactone/Entresto remain on hold. Compensated from HF standpoint.  Will follow-up with cardiology next week for blood work.  Will continue doxycycline with plan to follow-up with infectious disease as previously scheduled.  Education on low potassium diet and blood pressure  monitoring provided.  Discharged home in stable condition    DISCHARGE Follow Up Recommendations for labs and diagnostics:   BMP 1 week  TTE report pending    Day of Discharge     Vital Signs:  Temp:  [97.3 °F (36.3 °C)-98.4 °F (36.9 °C)] 97.9 °F (36.6 °C)  Heart Rate:  [] 77  Resp:  [18] 18  BP: (103-108)/(64-73) 108/69  Physical Exam:   GENERAL: conversant and nontoxic.  HEART: RRR. No edema  LUNGS: Clear. nonlabored  ABDOMEN: Soft, nondistended  NEUROLOGIC: Alert, CN intact    Discharge Details        Discharge Medications        New Medications        Instructions Start Date   doxycycline 100 MG capsule  Commonly known as: MONODOX   100 mg, Oral, Every 12 Hours Scheduled             Changes to Medications        Instructions Start Date   carvedilol 3.125 MG tablet  Commonly known as: COREG  What changed:   medication strength  how much to take   3.125 mg, Oral, 2 Times Daily With Meals             Continue These Medications        Instructions Start Date   aspirin 81 MG EC tablet   81 mg, Oral, Daily      atorvastatin 20 MG tablet  Commonly known as: LIPITOR   20 mg, Oral, Daily      empagliflozin 10 MG tablet tablet  Commonly known as: JARDIANCE   10 mg, Oral, Daily      oxyCODONE 10 MG tablet  Commonly known as: ROXICODONE   10-20 mg, Oral, Every 6 Hours PRN             Stop These Medications      Entresto  MG tablet  Generic drug: sacubitril-valsartan     ferrous sulfate 325 (65 FE) MG tablet     multivitamin with minerals tablet tablet     promethazine 25 MG tablet  Commonly known as: PHENERGAN     spironolactone 25 MG tablet  Commonly known as: ALDACTONE              Allergies   Allergen Reactions   • Acetaminophen Other (See Comments)     Patient has Alpha-1 and should avoid medications with acetaminophen (Tylenol).       Discharge Disposition:  Home or Self Care    Diet:  Hospital:  Diet Order   Procedures   • Diet: Renal; Low Sodium (2-3g), Low Potassium, Low Phosphorus; Fluid Consistency:  Thin (IDDSI 0)       Discharge Activity:   Activity Instructions       Activity as Tolerated              CODE STATUS:  Code Status and Medical Interventions: CPR (Attempt to Resuscitate); Full Support   Ordered at: 09/06/24 2038     Level Of Support Discussed With:    Patient     Code Status (Patient has no pulse and is not breathing):    CPR (Attempt to Resuscitate)     Medical Interventions (Patient has pulse or is breathing):    Full Support         Future Appointments   Date Time Provider Department Center   2/12/2025 11:45 AM Cristiano Holliday MD [facility]       Additional Instructions for the Follow-ups that You Need to Schedule       Discharge Follow-up with Specified Provider: Dr Holliday; 1 Week   As directed      To: Dr Holliday   Follow Up: 1 Week                Pertinent  and/or Most Recent Results     PROCEDURES:   NONE    LAB RESULTS:      Lab 09/08/24 0528 09/07/24 0444 09/06/24 1738 09/06/24  1338   WBC 11.63* 12.51* 13.20* 12.65*   HEMOGLOBIN 11.6* 11.9* 12.9* 13.4*   HEMATOCRIT 35.8* 35.9* 38.9 40.1*   PLATELETS 276 293 310 299   NEUTROS ABS  --  5.78 7.52* 7.10   IMMATURE GRANS (ABS)  --  0.06* 0.07* 0.06   LYMPHS ABS  --  3.59* 2.78 2.86   MONOS ABS  --  1.22* 1.12* 1.13   EOS ABS  --  1.73* 1.59* 1.45*   MCV 96.8 95.5 95.1 96.4         Lab 09/09/24  0429 09/08/24 0528 09/07/24  0444 09/06/24  2100 09/06/24  1738   SODIUM 133* 134* 126* 128* 129*   POTASSIUM 4.5 5.0 5.3* 4.7 5.6*   CHLORIDE 102 103 99 101 99   CO2 20.9* 20.1* 20.5* 19.1* 19.9*   ANION GAP 10.1 10.9 6.5 7.9 10.1   BUN 30* 31* 32* 35* 40*   CREATININE 1.28* 1.24 1.51* 1.51* 1.77*   EGFR 66.9 69.5 54.9* 54.9* 45.4*   GLUCOSE 87 89 91 99 88   CALCIUM 8.9 8.9 8.8 8.0* 9.5   MAGNESIUM  --   --  2.3  --   --    PHOSPHORUS  --   --  3.9  --   --          Lab 09/07/24  0444 09/06/24  1738   TOTAL PROTEIN  --  7.9   ALBUMIN 3.3* 3.9   GLOBULIN  --  4.0   ALT (SGPT)  --  26   AST (SGOT)  --  29   BILIRUBIN  --  0.2   ALK PHOS  --  273*                      Brief Urine Lab Results  (Last result in the past 365 days)        Color   Clarity   Blood   Leuk Est   Nitrite   Protein   CREAT   Urine HCG        09/07/24 1558 Yellow   Clear   Negative   Negative   Negative   Negative                 Microbiology Results (last 10 days)       ** No results found for the last 240 hours. **            No radiology results for the last 7 days     Results for orders placed during the hospital encounter of 07/10/23    Duplex Venous Upper Extremity - Right CAR    Interpretation Summary  •  Normal right upper extremity venous duplex scan.      Results for orders placed during the hospital encounter of 07/10/23    Duplex Venous Upper Extremity - Right CAR    Interpretation Summary  •  Normal right upper extremity venous duplex scan.      Results for orders placed in visit on 11/03/23    Adult Transthoracic Echo Complete W/ Cont if Necessary Per Protocol    Interpretation Summary  •  Left ventricular systolic function is mildly decreased. Left ventricular ejection fraction appears to be 46 - 50%.  •  The left ventricular cavity is mildly dilated.  •  Left ventricular diastolic dysfunction is noted.  •  The left atrial cavity is mildly dilated.      Labs Pending at Discharge:        Time spent on Discharge including face to face service: >30 minutes    Electronically signed by Paul Gong DO, 09/09/24, 3:19 PM EDT.

## 2024-09-09 NOTE — PLAN OF CARE
Problem: Adult Inpatient Plan of Care  Goal: Plan of Care Review  9/9/2024 1616 by Arlette Edmonds RN  Outcome: Met  9/9/2024 1616 by Arlette Edmonds RN  Outcome: Ongoing, Progressing  Flowsheets  Taken 9/9/2024 1616 by Arlette Edmonds RN  Progress: no change  Taken 9/8/2024 1655 by Richard Curry RN  Plan of Care Reviewed With:   patient   spouse  Goal: Patient-Specific Goal (Individualized)  9/9/2024 1616 by Arlette Edomnds RN  Outcome: Met  9/9/2024 1616 by Arlette Edmonds RN  Outcome: Ongoing, Progressing  Goal: Absence of Hospital-Acquired Illness or Injury  9/9/2024 1616 by Arlette Edmonds RN  Outcome: Met  9/9/2024 1616 by Arlette Edmonds RN  Outcome: Ongoing, Progressing  Intervention: Identify and Manage Fall Risk  Recent Flowsheet Documentation  Taken 9/9/2024 0900 by Arlette Edmonds RN  Safety Promotion/Fall Prevention: safety round/check completed  Intervention: Prevent Infection  Recent Flowsheet Documentation  Taken 9/9/2024 0900 by Arlette Edmonds RN  Infection Prevention: single patient room provided  Goal: Optimal Comfort and Wellbeing  9/9/2024 1616 by Arlette Edmonds RN  Outcome: Met  9/9/2024 1616 by Arlette Edmonds RN  Outcome: Ongoing, Progressing  Intervention: Monitor Pain and Promote Comfort  Recent Flowsheet Documentation  Taken 9/9/2024 0900 by Arlette Edmonds RN  Pain Management Interventions: see MAR  Goal: Readiness for Transition of Care  9/9/2024 1616 by Arlette Edmonds RN  Outcome: Met  9/9/2024 1616 by Arlette Edmonds RN  Outcome: Ongoing, Progressing     Problem: Pain Acute  Goal: Acceptable Pain Control and Functional Ability  9/9/2024 1616 by Arlette Edmonds RN  Outcome: Met  9/9/2024 1616 by Arlette Edmonds RN  Outcome: Ongoing, Progressing  Intervention: Develop Pain Management Plan  Recent Flowsheet Documentation  Taken 9/9/2024 0900 by Arlette Edmonds RN  Pain Management Interventions: see MAR     Problem: Heart Failure Comorbidity  Goal:  Maintenance of Heart Failure Symptom Control  9/9/2024 1616 by Arlette Edmonds RN  Outcome: Met  9/9/2024 1616 by Arlette Edmonds RN  Outcome: Ongoing, Progressing     Problem: Hypertension Comorbidity  Goal: Blood Pressure in Desired Range  9/9/2024 1616 by Arlette Edmonds RN  Outcome: Met  9/9/2024 1616 by Arlette Edmonds RN  Outcome: Ongoing, Progressing     Problem: Osteoarthritis Comorbidity  Goal: Maintenance of Osteoarthritis Symptom Control  9/9/2024 1616 by Arlette Edmonds RN  Outcome: Met  9/9/2024 1616 by Arlette Edmonds RN  Outcome: Ongoing, Progressing   Goal Outcome Evaluation:           Progress: no change       No acute changes throughout shift, pt to AR home. IV removed, no signs of bleeding.

## 2024-09-09 NOTE — SIGNIFICANT NOTE
09/09/24 1000   Coping/Psychosocial   Observed Emotional State calm;cooperative   Verbalized Emotional State relief;hopefulness   Trust Relationship/Rapport empathic listening provided   Involvement in Care interacting with patient   Additional Documentation Spiritual Care (Group)   Spiritual Care   Use of Spiritual Resources non-Zoroastrianism use of spiritual care   Spiritual Care Source  initiative   Spiritual Care Follow-Up follow-up, none required as presently assessed   Response to Spiritual Care receptive of support   Spiritual Care Interventions supportive conversation provided   Spiritual Care Visit Type initial   Receptivity to Spiritual Care visit welcomed

## 2024-09-09 NOTE — DISCHARGE INSTR - APPOINTMENTS
PCP APPT: IS ON 9/16/24 AT 8:40 C-TIME  AND HIS CARDIOLOGY IS ON 10/22/24 AT 2 PM  OFFICE WILL CALL IF THEY CAN GET HIM EARLY APPT.

## 2024-09-09 NOTE — PLAN OF CARE
Goal Outcome Evaluation:            Patient alert and oriented and able to make needs known, wife at bedside, c/o pain, treated per MAR. Will continue with plan of care

## 2024-09-10 ENCOUNTER — TELEPHONE (OUTPATIENT)
Dept: CARDIOLOGY | Facility: CLINIC | Age: 54
End: 2024-09-10
Payer: COMMERCIAL

## 2024-09-10 LAB — GLUCOSE BLDC GLUCOMTR-MCNC: 92 MG/DL (ref 70–99)

## 2024-09-10 NOTE — TELEPHONE ENCOUNTER
SW patient wife. Wife states patient dc from hospital yesterday and medications were adjusted due to low BP. Wife states BP today 139/99 with . Patient only taking Coreg 3.125 BID    Advised I would return call with recommendations

## 2024-09-10 NOTE — TELEPHONE ENCOUNTER
Increase dose to 6.25 mg BID and monitor BP/HR for the next 5 days, keep log, and turn in for review

## 2024-09-10 NOTE — OUTREACH NOTE
Prep Survey      Flowsheet Row Responses   [facility] facility patient discharged from? [facility]   Is LACE score < 7 ? No   Eligibility Readm Mgmt   Discharge diagnosis Hyperkalemia   Does the patient have one of the following disease processes/diagnoses(primary or secondary)? Other   Does the patient have Home health ordered? No   Is there a DME ordered? No   Prep survey completed? Yes            JUAN GIPSON - Registered Nurse

## 2024-09-11 ENCOUNTER — OFFICE VISIT (OUTPATIENT)
Dept: CARDIOLOGY | Facility: CLINIC | Age: 54
End: 2024-09-11
Payer: COMMERCIAL

## 2024-09-11 VITALS
BODY MASS INDEX: 28.7 KG/M2 | WEIGHT: 205 LBS | SYSTOLIC BLOOD PRESSURE: 128 MMHG | HEIGHT: 71 IN | HEART RATE: 95 BPM | DIASTOLIC BLOOD PRESSURE: 92 MMHG

## 2024-09-11 DIAGNOSIS — N18.2 CKD (CHRONIC KIDNEY DISEASE) STAGE 2, GFR 60-89 ML/MIN: ICD-10-CM

## 2024-09-11 DIAGNOSIS — I50.22 CHRONIC HFREF (HEART FAILURE WITH REDUCED EJECTION FRACTION): Primary | ICD-10-CM

## 2024-09-11 DIAGNOSIS — E78.5 HYPERLIPIDEMIA LDL GOAL <100: ICD-10-CM

## 2024-09-11 LAB
BH CV ECHO MEAS - EDV(MOD-SP4): 100.2 ML
BH CV ECHO MEAS - EF(MOD-BP): 72.3 %
BH CV ECHO MEAS - EF(MOD-SP4): 77.1 %
BH CV ECHO MEAS - ESV(MOD-SP4): 22.9 ML
BH CV ECHO MEAS - IVS/LVPW: 1.21 CM
BH CV ECHO MEAS - IVSD: 1.15 CM
BH CV ECHO MEAS - LV DIASTOLIC VOL/BSA (35-75): 47.2 CM2
BH CV ECHO MEAS - LV SYSTOLIC VOL/BSA (12-30): 10.8 CM2
BH CV ECHO MEAS - LVIDD: 4.6 CM
BH CV ECHO MEAS - LVIDS: 2.8 CM
BH CV ECHO MEAS - LVPWD: 0.95 CM
BH CV ECHO MEAS - SV(MOD-SP4): 77.3 ML
BH CV ECHO MEAS - SVI(MOD-SP4): 36.4 ML/M2

## 2024-09-11 RX ORDER — CARVEDILOL 3.12 MG/1
6.25 TABLET ORAL 2 TIMES DAILY WITH MEALS
Start: 2024-09-11 | End: 2024-10-11

## 2024-09-11 RX ORDER — SACUBITRIL AND VALSARTAN 24; 26 MG/1; MG/1
1 TABLET, FILM COATED ORAL 2 TIMES DAILY
Qty: 28 TABLET | Refills: 0 | COMMUNITY
Start: 2024-09-11

## 2024-09-11 RX ORDER — SACUBITRIL AND VALSARTAN 97; 103 MG/1; MG/1
0.5 TABLET, FILM COATED ORAL 2 TIMES DAILY
Start: 2024-09-11

## 2024-09-11 RX ORDER — SPIRONOLACTONE 25 MG/1
25 TABLET ORAL EVERY OTHER DAY
Start: 2024-09-11

## 2024-09-11 NOTE — PROGRESS NOTES
"Office Visit    Chief Complaint  Chronic HFrEF (heart failure with reduced ejection fraction)    Kinza Ray presents to [facility] CARDIOLOGY  History of Present Illness  Mr. Yfn Antoine is a 53-year-old male that presented to the office today after being sent to the ER by infectious disease for a potassium of 5.6 on outpatient labs.  Patient's heart failure medications were changed due to hyperkalemia.  Patient's heart rate and blood pressure has been elevated over the past few days.  He presents today to get his medications started back.  His most recent potassium was 4.5 and sodium has increased to 133.  Patient reports that he has been drinking a lot of fluid.  Mr. Antoine last dose of medical directed therapy was last Friday.  Heart rate has run  and blood pressure is elevated as well.  I will start him back at lower doses and titrate medications as tolerated.  He denies any chest pain, dizziness, orthopnea or syncopal episodes.      Past Medical History:   Diagnosis Date    Abnormal ECG 7-23    Alpha 1-antitrypsin PiMS phenotype     \"alpha 1\" per pt and wife. unsure of what type.    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        Past Surgical History:   Procedure Laterality Date    CARDIAC CATHETERIZATION N/A 10/31/2023    Procedure: Left Heart Cath;  Surgeon: Cristiano Holliday MD;  Location: [facility] CATH INVASIVE LOCATION;  Service: Cardiovascular;  Laterality: N/A;    LIVER BIOPSY      SHOULDER ARTHROSCOPY Right 07/14/2023 " "   Procedure: SHOULDER ARTHROSCOPY WITH WASHOUT AND DEBRIDMENT;  Surgeon: Orville Gallegos MD;  Location: [facility] OR [facility];  Service: Orthopedics;  Laterality: Right;    SHOULDER ARTHROSCOPY Right 01/18/2024    SHOULDER ARTHROSCOPY W/ ROTATOR CUFF REPAIR Right 2024 8/29/2024    TOOTH EXTRACTION          Social History     Tobacco Use    Smoking status: Never    Smokeless tobacco: Never   Vaping Use    Vaping status: Never Used   Substance Use Topics    Alcohol use: Never    Drug use: Never       Family History   Problem Relation Age of Onset    Malig Hyperthermia Neg Hx         Prior to Admission medications    Medication Sig Start Date End Date Taking? Authorizing Provider   aspirin 81 MG EC tablet Take 1 tablet by mouth Daily. 8/9/24  Yes Magaly Mcelroy APRN   atorvastatin (LIPITOR) 20 MG tablet Take 1 tablet by mouth Daily. 8/9/24  Yes Magaly Mcelroy APRN   carvedilol (COREG) 3.125 MG tablet Take 1 tablet by mouth 2 (Two) Times a Day With Meals for 30 days. 9/9/24 10/9/24 Yes Paul Gong DO   doxycycline (MONODOX) 100 MG capsule Take 1 capsule by mouth Every 12 (Twelve) Hours for 30 days. Indications: Bone and/or Joint Infection 9/9/24 10/9/24 Yes Paul Gong DO   empagliflozin (JARDIANCE) 10 MG tablet tablet Take 1 tablet by mouth Daily.   Yes Provider, MD Ruiba        Review of Systems   Constitutional:  Negative for fatigue.   Respiratory:  Negative for cough and shortness of breath.    Cardiovascular:  Negative for chest pain, palpitations and leg swelling.   Neurological:  Negative for dizziness.        Objective     /92   Pulse 95   Ht 180.3 cm (70.98\")   Wt 93 kg (205 lb)   BMI 28.60 kg/m²       Physical Exam  Constitutional:       General: He is awake.      Appearance: Normal appearance.   Neck:      Thyroid: No thyromegaly.      Vascular: No carotid bruit or JVD.   Cardiovascular:      Rate and Rhythm: Normal rate and regular rhythm.      Chest Wall: PMI is not displaced. "      Pulses: Normal pulses.      Heart sounds: Normal heart sounds, S1 normal and S2 normal. No murmur heard.     No friction rub. No gallop. No S3 or S4 sounds.   Pulmonary:      Effort: Pulmonary effort is normal.      Breath sounds: Normal breath sounds and air entry. No wheezing, rhonchi or rales.   Abdominal:      General: Bowel sounds are normal.      Palpations: Abdomen is soft. There is no mass.      Tenderness: There is no abdominal tenderness.   Musculoskeletal:      Cervical back: Neck supple.      Right lower leg: No edema.      Left lower leg: No edema.   Neurological:      Mental Status: He is alert and oriented to person, place, and time.   Psychiatric:         Mood and Affect: Mood normal.         Behavior: Behavior is cooperative.           Result Review :                      Lab Results   Component Value Date    PROBNP 219.6 01/26/2024    PROBNP 786.8 07/24/2023    PROBNP 2,809.0 (H) 07/14/2023     CMP          9/7/2024    04:44 9/8/2024    05:28 9/9/2024    04:29   CMP   Glucose 91  89  87    BUN 32  31  30    Creatinine 1.51  1.24  1.28    EGFR 54.9  69.5  66.9    Sodium 126  134  133    Potassium 5.3  5.0  4.5    Chloride 99  103  102    Calcium 8.8  8.9  8.9    Albumin 3.3      BUN/Creatinine Ratio 21.2  25.0  23.4    Anion Gap 6.5  10.9  10.1      CBC w/diff          9/6/2024    13:38 9/6/2024    17:38 9/7/2024    04:44 9/8/2024    05:28   CBC w/Diff   WBC 12.65     13.20  12.51  11.63    RBC 4.16     4.09  3.76  3.70    Hemoglobin 13.4     12.9  11.9  11.6    Hematocrit 40.1     38.9  35.9  35.8    MCV 96.4     95.1  95.5  96.8    MCH 32.2     31.5  31.6  31.4    MCHC 33.4     33.2  33.1  32.4    RDW 12.0     12.1  12.1  12.0    Platelets 299     310  293  276    Neutrophil Rel % 55.7     57.0  46.2     Immature Granulocyte Rel % 0.5     0.5  0.5     Lymphocyte Rel % 22.6     21.1  28.7     Monocyte Rel % 8.9     8.5  9.8     Eosinophil Rel % 11.5     12.0  13.8     Basophil Rel % 0.8      "0.9  1.0        Details          This result is from an external source.                 No results found for: \"TSH\"   No results found for: \"FREET4\"   No results found for: \"DDIMERQUANT\"  Magnesium   Date Value Ref Range Status   09/07/2024 2.3 1.6 - 2.6 mg/dL Final      No results found for: \"DIGOXIN\"   A1C Last 3 Results          1/20/2024    04:15   HGBA1C Last 3 Results   Hemoglobin A1C 5.2          Details          This result is from an external source.                  Results for orders placed in visit on 11/03/23    Adult Transthoracic Echo Complete W/ Cont if Necessary Per Protocol    Interpretation Summary    Left ventricular systolic function is mildly decreased. Left ventricular ejection fraction appears to be 46 - 50%.    The left ventricular cavity is mildly dilated.    Left ventricular diastolic dysfunction is noted.    The left atrial cavity is mildly dilated.        Assessment and Plan        Diagnoses and all orders for this visit:    1. Chronic HFrEF (heart failure with reduced ejection fraction) (Primary)  Assessment & Plan:  Patient has heart failure with reduced ejection fraction that had improved on appropriate GDMT from less than 20% to 46 - 50%.  He was previously on goal dose GDMT, but since he has not had any medications since Friday and his systolic has been anywhere from 92-1 41 per his home log I will restart at lower doses and titrate medications as tolerated.  Will make the following changes to his heart failure medications:      1.  Increase carvedilol 3.125 mg take 2 tablets twice a day.  2.  Restart Entresto 24/26 mg take 1 tablet twice a day for 1 week, then increase to 2 tablets twice daily.  Once current supply is complete take Entresto 97/103 mg a half a tablet twice a day.  3.  Restart spironolactone 25 mg take 1 tablet every other day.  4.  Abide by a 1500 cc fluid restriction.  5.  Do blood work 1 to 2 days prior to next visit.  6.  Do a heart rate blood pressure and " weight log and bring it to next appointment.    Orders:  -     Comprehensive Metabolic Panel; Future  -     Magnesium; Future  -     CBC & Differential; Future  -     proBNP; Future    2. Hyperlipidemia LDL goal <100  Assessment & Plan:  Mr. Antoine has known hyperlipidemia.  He is currently on atorvastatin 20 mg.  Will continue the same      3. CKD (chronic kidney disease) stage 2, GFR 60-89 ml/min  Assessment & Plan:  Patient's kidney function remains stable.  Will continue to monitor with labs.    Orders:  -     Comprehensive Metabolic Panel; Future  -     Magnesium; Future  -     CBC & Differential; Future  -     proBNP; Future    Other orders  -     carvedilol (COREG) 3.125 MG tablet; Take 2 tablets by mouth 2 (Two) Times a Day With Meals for 30 days.  -     spironolactone (ALDACTONE) 25 MG tablet; Take 1 tablet by mouth Every Other Day.  -     sacubitril-valsartan (Entresto)  MG tablet; Take 0.5 tablets by mouth 2 (Two) Times a Day.  -     sacubitril-valsartan (Entresto) 24-26 MG tablet; Take 1 tablet by mouth 2 (Two) Times a Day. Indications: Cardiac Failure, lot [id number]  exp 8/30/2026  Dispense: 28 tablet; Refill: 0            Follow Up     Return for 2-3 weeks.    Patient was given instructions and counseling regarding his condition or for health maintenance advice. Please see specific information pulled into the AVS if appropriate.     Electronically signed by SMITA Pina, 09/11/24, 12:35 PM EDT.

## 2024-09-11 NOTE — PATIENT INSTRUCTIONS
1.  Increase carvedilol 3.125 mg take 2 tablets twice a day.  2.  Restart Entresto 24/26 mg take 1 tablet twice a day for 1 week, then increase to 2 tablets twice daily.  Once current supply is complete take Entresto 97/103 mg a half a tablet twice a day.  3.  Restart spironolactone 25 mg take 1 tablet every other day.  4.  Abide by a 1500 cc fluid restriction.  5.  Do blood work 1 to 2 days prior to next visit.  6.  Do a heart rate blood pressure and weight log and bring it to next appointment.

## 2024-09-12 ENCOUNTER — READMISSION MANAGEMENT (OUTPATIENT)
Dept: CALL CENTER | Facility: HOSPITAL | Age: 54
End: 2024-09-12
Payer: COMMERCIAL

## 2024-09-12 NOTE — OUTREACH NOTE
Medical Week 1 Survey      Flowsheet Row Responses   [facility] facility patient discharged from? [facility]   Does the patient have one of the following disease processes/diagnoses(primary or secondary)? Other   Week 1 attempt successful? No   Unsuccessful attempts Attempt 1            Felipa DIAZ - Registered Nurse

## 2024-09-15 LAB
QT INTERVAL: 349 MS
QT INTERVAL: 366 MS
QTC INTERVAL: 404 MS
QTC INTERVAL: 410 MS

## 2024-09-19 ENCOUNTER — OFFICE VISIT (OUTPATIENT)
Dept: CARDIOLOGY | Facility: CLINIC | Age: 54
End: 2024-09-19
Payer: COMMERCIAL

## 2024-09-19 VITALS
DIASTOLIC BLOOD PRESSURE: 71 MMHG | HEIGHT: 71 IN | HEART RATE: 79 BPM | WEIGHT: 201.2 LBS | SYSTOLIC BLOOD PRESSURE: 98 MMHG | BODY MASS INDEX: 28.17 KG/M2

## 2024-09-19 DIAGNOSIS — I50.22 CHRONIC HFREF (HEART FAILURE WITH REDUCED EJECTION FRACTION): Primary | ICD-10-CM

## 2024-09-19 PROBLEM — M86.219: Status: ACTIVE | Noted: 2024-08-29

## 2024-09-19 PROCEDURE — 99213 OFFICE O/P EST LOW 20 MIN: CPT | Performed by: NURSE PRACTITIONER

## 2024-09-19 RX ORDER — LANCETS 28 GAUGE
1 EACH MISCELLANEOUS AS NEEDED
COMMUNITY
Start: 2024-09-04

## 2024-09-19 RX ORDER — BLOOD-GLUCOSE METER
1 KIT MISCELLANEOUS AS NEEDED
COMMUNITY
Start: 2024-09-04

## 2024-09-19 RX ORDER — SACUBITRIL AND VALSARTAN 97; 103 MG/1; MG/1
0.5 TABLET, FILM COATED ORAL 2 TIMES DAILY
Qty: 45 TABLET | Refills: 1 | Status: SHIPPED | OUTPATIENT
Start: 2024-09-19 | End: 2024-10-02 | Stop reason: SDUPTHER

## 2024-09-19 RX ORDER — PROMETHAZINE HYDROCHLORIDE 25 MG/1
1 TABLET ORAL EVERY 6 HOURS PRN
COMMUNITY
Start: 2024-08-27

## 2024-09-19 RX ORDER — OXYCODONE HYDROCHLORIDE 10 MG/1
1-2 TABLET ORAL EVERY 6 HOURS PRN
COMMUNITY
Start: 2024-08-29

## 2024-09-19 NOTE — PROGRESS NOTES
"Chief Complaint  Follow-up and Hyperlipidemia    Subjective            History of Present Illness  Yfn Ray is a 54-year-old male patient who presents to the office today for hospital follow-up.  He was admitted to [facility] from 9/6/2024 to 9/9/2024 for hyperkalemia.  He received IV fluids and a dose of Lokelma.  His heart failure medications were held and antibiotic was switched from Bactrim to doxycycline.  His renal function and potassium level improved.  Upon discharge his carvedilol was reinitiated and Jardiance was continued.  His Entresto and Xarelto were discontinued. On 9/10, he called our office with complaint of elevated blood pressure and heart rate so his carvedilol dose were decreased to 6.25 mg twice daily. On 9/11 he was seen by the heart failure clinic who restart the entresto and spironolactone at lower doses than previously prescribed. Today he reports he is tolerating medications well. He denies any new or worsening cardiac symptoms today.    PMH  Past Medical History:   Diagnosis Date    Abnormal ECG 7-23    Alpha 1-antitrypsin PiMS phenotype     \"alpha 1\" per pt and wife. unsure of what type.    Arthritis     Chest pain     2 months ago    CHF (congestive heart failure) 7 10 2023    Coronary artery disease 7-23    H/o Back injury     HFrEF (heart failure with reduced ejection fraction)     Hypertension     Methicillin resis staph infct causing diseases classd elswhr 09/22/2023    Rash     due to antibiotic currently on         ALLERGY  Allergies   Allergen Reactions    Acetaminophen Other (See Comments)     Patient has Alpha-1 and should avoid medications with acetaminophen (Tylenol).          SURGICALHX  Past Surgical History:   Procedure Laterality Date    CARDIAC CATHETERIZATION N/A 10/31/2023    Procedure: Left Heart Cath;  Surgeon: Cristiano Holliday MD;  Location: [facility] CATH INVASIVE LOCATION;  Service: Cardiovascular;  Laterality: N/A;    LIVER BIOPSY      SHOULDER " ARTHROSCOPY Right 07/14/2023    Procedure: SHOULDER ARTHROSCOPY WITH WASHOUT AND DEBRIDMENT;  Surgeon: Orville Gallegos MD;  [place] [facility] [place] [facility] [place]  Service: Orthopedics;  Laterality: Right;    SHOULDER ARTHROSCOPY Right 01/18/2024    SHOULDER ARTHROSCOPY W/ ROTATOR CUFF REPAIR Right 2024 8/29/2024    TOOTH EXTRACTION            SOC  Social History     Socioeconomic History    Marital status:    Tobacco Use    Smoking status: Never    Smokeless tobacco: Never   Vaping Use    Vaping status: Never Used   Substance and Sexual Activity    Alcohol use: Never    Drug use: Never    Sexual activity: Yes     Partners: Female     Birth control/protection: Partner of same sex     Comment:          FAMHX  Family History   Problem Relation Age of Onset    Malig Hyperthermia Neg Hx           MEDSIGONLY  Current Outpatient Medications on File Prior to Visit   Medication Sig    atorvastatin (LIPITOR) 20 MG tablet Take 1 tablet by mouth Daily.    doxycycline (MONODOX) 100 MG capsule Take 1 capsule by mouth Every 12 (Twelve) Hours for 30 days. Indications: Bone and/or Joint Infection    FREESTYLE LITE test strip 1 each by Other route As Needed.    Lancets (freestyle) lancets 1 each by Other route As Needed.    oxyCODONE (ROXICODONE) 10 MG tablet Take 1-2 tablets by mouth Every 6 (Six) Hours As Needed.    promethazine (PHENERGAN) 25 MG tablet Take 1 tablet by mouth Every 6 (Six) Hours As Needed.    aspirin 81 MG EC tablet Take 1 tablet by mouth Daily.    carvedilol (COREG) 3.125 MG tablet Take 2 tablets by mouth 2 (Two) Times a Day With Meals for 30 days.    empagliflozin (JARDIANCE) 10 MG tablet tablet Take 1 tablet by mouth Daily.    sacubitril-valsartan (Entresto) 24-26 MG tablet Take 1 tablet by mouth 2 (Two) Times a Day. Indications: Cardiac Failure, lot [id number]  exp 8/30/2026    sacubitril-valsartan (Entresto)  MG tablet Take 0.5 tablets by mouth 2 (Two) Times a Day.    spironolactone (ALDACTONE) 25 MG  "tablet Take 1 tablet by mouth Every Other Day.     No current facility-administered medications on file prior to visit.         Objective   BP 98/71   Pulse 79   Ht 180.3 cm (70.98\")   Wt 91.3 kg (201 lb 3.2 oz)   BMI 28.07 kg/m²       Physical Exam  HENT:      Head: Normocephalic.   Neck:      Vascular: No carotid bruit.   Cardiovascular:      Rate and Rhythm: Normal rate and regular rhythm.      Pulses: Normal pulses.      Heart sounds: Normal heart sounds. No murmur heard.  Pulmonary:      Effort: Pulmonary effort is normal.      Breath sounds: Normal breath sounds.   Musculoskeletal:      Cervical back: Neck supple.      Right lower leg: No edema.      Left lower leg: No edema.   Skin:     General: Skin is dry.   Neurological:      Mental Status: He is alert and oriented to person, place, and time.   Psychiatric:         Behavior: Behavior normal.       Result Review :   The following data was reviewed by: SMITA Peterson on 09/19/2024:  proBNP   Date Value Ref Range Status   01/26/2024 219.6 0.0 - 900.0 pg/mL Final     CMP          9/8/2024    05:28 9/9/2024    04:29   CMP   Glucose 89  87    BUN 31  30    Creatinine 1.24  1.28    EGFR 69.5  66.9    Sodium 134  133    Potassium 5.0  4.5    Chloride 103  102    Calcium 8.9  8.9    Albumin     BUN/Creatinine Ratio 25.0  23.4    Anion Gap 10.9  10.1          9/13/2024    11:03   CBC w/Diff   WBC 14.02       RBC 4.01       Hemoglobin 12.8       Hematocrit 39.6       MCV 98.8       MCH 31.9       MCHC 32.3       RDW 12.3       Platelets 362       Neutrophil Rel % 60.9       Immature Granulocyte Rel % 0.5       Lymphocyte Rel % 21.3       Monocyte Rel % 6.1       Eosinophil Rel % 10.6       Basophil Rel % 0.6         No results found for: \"TSH\"   No results found for: \"FREET4\"   No results found for: \"DDIMERQUANT\"  Magnesium   Date Value Ref Range Status   09/07/2024 2.3 1.6 - 2.6 mg/dL Final      No results found for: \"DIGOXIN\"   Lab Results   Component " Value Date    TROPONINT 9 07/10/2023           Results for orders placed during the hospital encounter of 09/06/24    Adult Transthoracic Echo Limited W/ Cont if Necessary Per Protocol    Interpretation Summary    Left ventricular systolic function is normal. Left ventricular ejection fraction appears to be 61 - 65%.    Left ventricular diastolic function was not assessed.    Limited study, unable to evaluate valvular function due to limited images           Assessment and Plan    Diagnoses and all orders for this visit:    1. Chronic HFrEF (heart failure with reduced ejection fraction) (Primary)  Continue with current medical plan per heart failure clinic.  His blood pressure is on the lower end of normal range, he is euvolemic on exam today check blood pressure twice a day for the next two weeks, blood pressure log provided for patient..  We discussed restricting fluids and sodium, compression, and daily weight.    Other orders  -     sacubitril-valsartan (Entresto)  MG tablet; Take 0.5 tablets by mouth 2 (Two) Times a Day.  Dispense: 45 tablet; Refill: 1            Follow Up   Return for Follow up with Dr Holliday as scheduled.    Patient was given instructions and counseling regarding his condition or for health maintenance advice. Please see specific information pulled into the AVS if appropriate.     Yfn Flor  reports that he has never smoked. He has never used smokeless tobacco.            Magaly Mcelroy, APRN  09/19/24  13:12 EDT    Dictated Utilizing Dragon Dictation

## 2024-09-24 ENCOUNTER — TELEPHONE (OUTPATIENT)
Dept: CARDIOLOGY | Facility: CLINIC | Age: 54
End: 2024-09-24
Payer: COMMERCIAL

## 2024-09-24 RX ORDER — CARVEDILOL 6.25 MG/1
6.25 TABLET ORAL 2 TIMES DAILY
Qty: 180 TABLET | Refills: 3 | Status: SHIPPED | OUTPATIENT
Start: 2024-09-24

## 2024-09-26 ENCOUNTER — READMISSION MANAGEMENT (OUTPATIENT)
Dept: CALL CENTER | Facility: HOSPITAL | Age: 54
End: 2024-09-26
Payer: COMMERCIAL

## 2024-09-30 ENCOUNTER — LAB (OUTPATIENT)
Dept: LAB | Facility: HOSPITAL | Age: 54
End: 2024-09-30
Payer: COMMERCIAL

## 2024-09-30 DIAGNOSIS — I50.22 CHRONIC HFREF (HEART FAILURE WITH REDUCED EJECTION FRACTION): ICD-10-CM

## 2024-09-30 DIAGNOSIS — N18.2 CKD (CHRONIC KIDNEY DISEASE) STAGE 2, GFR 60-89 ML/MIN: ICD-10-CM

## 2024-09-30 LAB
ALBUMIN SERPL-MCNC: 4.1 G/DL (ref 3.5–5.2)
ALBUMIN/GLOB SERPL: 1.3 G/DL
ALP SERPL-CCNC: 275 U/L (ref 39–117)
ALT SERPL W P-5'-P-CCNC: 26 U/L (ref 1–41)
ANION GAP SERPL CALCULATED.3IONS-SCNC: 8 MMOL/L (ref 5–15)
AST SERPL-CCNC: 19 U/L (ref 1–40)
BASOPHILS # BLD AUTO: 0.13 10*3/MM3 (ref 0–0.2)
BASOPHILS NFR BLD AUTO: 1.3 % (ref 0–1.5)
BILIRUB SERPL-MCNC: 0.2 MG/DL (ref 0–1.2)
BUN SERPL-MCNC: 23 MG/DL (ref 6–20)
BUN/CREAT SERPL: 17.8 (ref 7–25)
CALCIUM SPEC-SCNC: 9.7 MG/DL (ref 8.6–10.5)
CHLORIDE SERPL-SCNC: 104 MMOL/L (ref 98–107)
CO2 SERPL-SCNC: 25 MMOL/L (ref 22–29)
CREAT SERPL-MCNC: 1.29 MG/DL (ref 0.76–1.27)
DEPRECATED RDW RBC AUTO: 43.7 FL (ref 37–54)
EGFRCR SERPLBLD CKD-EPI 2021: 65.9 ML/MIN/1.73
EOSINOPHIL # BLD AUTO: 1.34 10*3/MM3 (ref 0–0.4)
EOSINOPHIL NFR BLD AUTO: 12.9 % (ref 0.3–6.2)
ERYTHROCYTE [DISTWIDTH] IN BLOOD BY AUTOMATED COUNT: 12.3 % (ref 12.3–15.4)
GLOBULIN UR ELPH-MCNC: 3.2 GM/DL
GLUCOSE SERPL-MCNC: 82 MG/DL (ref 65–99)
HCT VFR BLD AUTO: 41.7 % (ref 37.5–51)
HGB BLD-MCNC: 13.3 G/DL (ref 13–17.7)
IMM GRANULOCYTES # BLD AUTO: 0.03 10*3/MM3 (ref 0–0.05)
IMM GRANULOCYTES NFR BLD AUTO: 0.3 % (ref 0–0.5)
LYMPHOCYTES # BLD AUTO: 3.71 10*3/MM3 (ref 0.7–3.1)
LYMPHOCYTES NFR BLD AUTO: 35.7 % (ref 19.6–45.3)
MAGNESIUM SERPL-MCNC: 1.9 MG/DL (ref 1.6–2.6)
MCH RBC QN AUTO: 30.7 PG (ref 26.6–33)
MCHC RBC AUTO-ENTMCNC: 31.9 G/DL (ref 31.5–35.7)
MCV RBC AUTO: 96.3 FL (ref 79–97)
MONOCYTES # BLD AUTO: 0.86 10*3/MM3 (ref 0.1–0.9)
MONOCYTES NFR BLD AUTO: 8.3 % (ref 5–12)
NEUTROPHILS NFR BLD AUTO: 4.33 10*3/MM3 (ref 1.7–7)
NEUTROPHILS NFR BLD AUTO: 41.5 % (ref 42.7–76)
NRBC BLD AUTO-RTO: 0 /100 WBC (ref 0–0.2)
NT-PROBNP SERPL-MCNC: 59.8 PG/ML (ref 0–900)
PLATELET # BLD AUTO: 270 10*3/MM3 (ref 140–450)
PMV BLD AUTO: 10 FL (ref 6–12)
POTASSIUM SERPL-SCNC: 4.7 MMOL/L (ref 3.5–5.2)
PROT SERPL-MCNC: 7.3 G/DL (ref 6–8.5)
RBC # BLD AUTO: 4.33 10*6/MM3 (ref 4.14–5.8)
SODIUM SERPL-SCNC: 137 MMOL/L (ref 136–145)
WBC NRBC COR # BLD AUTO: 10.4 10*3/MM3 (ref 3.4–10.8)

## 2024-09-30 PROCEDURE — 83735 ASSAY OF MAGNESIUM: CPT

## 2024-09-30 PROCEDURE — 83880 ASSAY OF NATRIURETIC PEPTIDE: CPT

## 2024-09-30 PROCEDURE — 36415 COLL VENOUS BLD VENIPUNCTURE: CPT

## 2024-09-30 PROCEDURE — 80053 COMPREHEN METABOLIC PANEL: CPT

## 2024-09-30 PROCEDURE — 85025 COMPLETE CBC W/AUTO DIFF WBC: CPT

## 2024-10-02 ENCOUNTER — OFFICE VISIT (OUTPATIENT)
Dept: CARDIOLOGY | Facility: CLINIC | Age: 54
End: 2024-10-02
Payer: COMMERCIAL

## 2024-10-02 VITALS
WEIGHT: 204 LBS | BODY MASS INDEX: 28.56 KG/M2 | DIASTOLIC BLOOD PRESSURE: 68 MMHG | HEIGHT: 71 IN | HEART RATE: 80 BPM | SYSTOLIC BLOOD PRESSURE: 102 MMHG

## 2024-10-02 DIAGNOSIS — N18.2 CKD (CHRONIC KIDNEY DISEASE) STAGE 2, GFR 60-89 ML/MIN: ICD-10-CM

## 2024-10-02 DIAGNOSIS — I50.22 CHRONIC HFREF (HEART FAILURE WITH REDUCED EJECTION FRACTION): Primary | ICD-10-CM

## 2024-10-02 DIAGNOSIS — E78.5 HYPERLIPIDEMIA LDL GOAL <100: ICD-10-CM

## 2024-10-02 RX ORDER — CARVEDILOL 6.25 MG/1
12.5 TABLET ORAL 2 TIMES DAILY
Start: 2024-10-02

## 2024-10-02 RX ORDER — SACUBITRIL AND VALSARTAN 24; 26 MG/1; MG/1
2 TABLET, FILM COATED ORAL 2 TIMES DAILY
COMMUNITY

## 2024-10-02 NOTE — PROGRESS NOTES
"Office Visit    Chief Complaint  Chronic HFrEF (heart failure with reduced ejection fraction)    Subjective            Yfn Ray presents to [facility] CARDIOLOGY  History of Present Illness  Mr. 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  He returned after not seeing us for quite some time due to multiple hospitalizations infection of his shoulders.  His most recent hospitalization was for elevated potassium, at which time all of his heart failure medications were stopped.  Patient called and we restarted medications at last visit.  Today he is doing very well tolerating all of his heart failure medications.  We will slowly titrate them to his goal doses.  He denies any chest pain, dizziness, orthopnea or syncopal episodes.      Past Medical History:   Diagnosis Date    Abnormal ECG 7-23    Alpha 1-antitrypsin PiMS phenotype     \"alpha 1\" per pt and wife. unsure of what type.    Arthritis     Chest pain     2 months ago    CHF (congestive heart failure) 7 10 2023    Coronary artery disease 7-23    H/o Back injury     HFrEF (heart failure with reduced ejection fraction)     Hypertension     Methicillin resis staph infct causing diseases [facility]d [facility] 09/22/2023    Rash     due to antibiotic currently on       Allergies   Allergen Reactions    Acetaminophen Other (See Comments)     Patient has Alpha-1 and should avoid medications with acetaminophen (Tylenol).    Bactrim [Sulfamethoxazole-Trimethoprim] Other (See Comments)     hyperkalemia        Past Surgical History:   Procedure Laterality Date    CARDIAC CATHETERIZATION N/A 10/31/2023    Procedure: Left Heart Cath;  Surgeon: Cristiano Holliday MD;  Location: [facility] CATH INVASIVE LOCATION;  Service: Cardiovascular;  " Laterality: N/A;    LIVER BIOPSY      SHOULDER ARTHROSCOPY Right 07/14/2023    Procedure: SHOULDER ARTHROSCOPY WITH WASHOUT AND DEBRIDMENT;  Surgeon: Orville Gallegos MD;  Location: [facility];  Service: Orthopedics;  Laterality: Right;    SHOULDER ARTHROSCOPY Right 01/18/2024    SHOULDER ARTHROSCOPY W/ ROTATOR CUFF REPAIR Right 2024 8/29/2024    TOOTH EXTRACTION          Social History     Tobacco Use    Smoking status: Never    Smokeless tobacco: Never   Vaping Use    Vaping status: Never Used   Substance Use Topics    Alcohol use: Never    Drug use: Never       Family History   Problem Relation Age of Onset    Malig Hyperthermia Neg Hx         Prior to Admission medications    Medication Sig Start Date End Date Taking? Authorizing Provider   aspirin 81 MG EC tablet Take 1 tablet by mouth Daily. 8/9/24  Yes Magaly Mcelroy APRN   atorvastatin (LIPITOR) 20 MG tablet Take 1 tablet by mouth Daily. 8/9/24  Yes Magaly Mcelroy APRN   carvedilol (COREG) 6.25 MG tablet Take 1 tablet by mouth 2 (Two) Times a Day. 9/24/24  Yes Mira Triplett APRN   doxycycline (MONODOX) 100 MG capsule Take 1 capsule by mouth Every 12 (Twelve) Hours for 30 days. Indications: Bone and/or Joint Infection 9/9/24 10/9/24 Yes Paul Gong DO   empagliflozin (JARDIANCE) 10 MG tablet tablet Take 1 tablet by mouth Daily.   Yes ProviderRubia MD   Lancets (freestyle) lancets 1 each by Other route As Needed. 9/4/24  Yes Rubia Kahn MD   oxyCODONE (ROXICODONE) 10 MG tablet Take 1-2 tablets by mouth Every 6 (Six) Hours As Needed. 8/29/24  Yes Rubia Kahn MD   promethazine (PHENERGAN) 25 MG tablet Take 1 tablet by mouth Every 6 (Six) Hours As Needed. 8/27/24  Yes Rubia Kahn MD   sacubitril-valsartan (Entresto) 24-26 MG tablet Take 1 tablet by mouth 2 (Two) Times a Day.   Yes Rubia Kahn MD   spironolactone (ALDACTONE) 25 MG tablet Take 1 tablet by mouth Every Other Day. 9/11/24  Yes  "Mira Triplett, APRN   FREESTYLE LITE test strip 1 each by Other route As Needed. 9/4/24   Provider, MD Rubia   sacubitril-valsartan (Entresto)  MG tablet Take 0.5 tablets by mouth 2 (Two) Times a Day. 9/19/24   Magaly Mcelroy APRN        Review of Systems   Constitutional:  Negative for fatigue.   Respiratory:  Negative for cough and shortness of breath.    Cardiovascular:  Negative for chest pain, palpitations and leg swelling.   Neurological:  Negative for dizziness.        Symptom Course: Been Stable    Weight Trend: Fluctuating Minimally     Objective     /68   Pulse 80   Ht 180.3 cm (70.98\")   Wt 92.5 kg (204 lb)   BMI 28.47 kg/m²       Physical Exam  Constitutional:       General: He is awake.      Appearance: Normal appearance.   Neck:      Thyroid: No thyromegaly.      Vascular: No carotid bruit or JVD.   Cardiovascular:      Rate and Rhythm: Normal rate and regular rhythm.      Chest Wall: PMI is not displaced.      Pulses: Normal pulses.      Heart sounds: Normal heart sounds, S1 normal and S2 normal. No murmur heard.     No friction rub. No gallop. No S3 or S4 sounds.   Pulmonary:      Effort: Pulmonary effort is normal.      Breath sounds: Normal breath sounds and air entry. No wheezing, rhonchi or rales.   Abdominal:      General: Bowel sounds are normal.      Palpations: Abdomen is soft. There is no mass.      Tenderness: There is no abdominal tenderness.   Musculoskeletal:      Cervical back: Neck supple.      Right lower leg: No edema.      Left lower leg: No edema.   Neurological:      Mental Status: He is alert and oriented to person, place, and time.   Psychiatric:         Mood and Affect: Mood normal.         Behavior: Behavior is cooperative.           Result Review :                      Lab Results   Component Value Date    PROBNP 59.8 09/30/2024    PROBNP 219.6 01/26/2024    PROBNP 786.8 07/24/2023     CMP          9/8/2024    05:28 9/9/2024    04:29 9/30/2024 " "   12:22   CMP   Glucose 89  87  82    BUN 31  30  23    Creatinine 1.24  1.28  1.29    EGFR 69.5  66.9  65.9    Sodium 134  133  137    Potassium 5.0  4.5  4.7    Chloride 103  102  104    Calcium 8.9  8.9  9.7    Total Protein   7.3    Albumin   4.1    Globulin   3.2    Total Bilirubin   0.2    Alkaline Phosphatase   275    AST (SGOT)   19    ALT (SGPT)   26    Albumin/Globulin Ratio   1.3    BUN/Creatinine Ratio 25.0  23.4  17.8    Anion Gap 10.9  10.1  8.0      CBC w/diff          9/13/2024    11:03 9/27/2024    15:34 9/30/2024    12:22   CBC w/Diff   WBC 14.02     10.03     10.40    RBC 4.01     4.35     4.33    Hemoglobin 12.8     13.7     13.3    Hematocrit 39.6     41.8     41.7    MCV 98.8     96.1     96.3    MCH 31.9     31.5     30.7    MCHC 32.3     32.8     31.9    RDW 12.3     12.1     12.3    Platelets 362     297     270    Neutrophil Rel % 60.9     37.2     41.5    Immature Granulocyte Rel % 0.5     0.2     0.3    Lymphocyte Rel % 21.3     38.1     35.7    Monocyte Rel % 6.1     9.2     8.3    Eosinophil Rel % 10.6     14.1     12.9    Basophil Rel % 0.6     1.2     1.3       Details          This result is from an external source.                 No results found for: \"TSH\"   No results found for: \"FREET4\"   No results found for: \"DDIMERQUANT\"  Magnesium   Date Value Ref Range Status   09/30/2024 1.9 1.6 - 2.6 mg/dL Final      No results found for: \"DIGOXIN\"   A1C Last 3 Results          1/20/2024    04:15   HGBA1C Last 3 Results   Hemoglobin A1C 5.2          Details          This result is from an external source.                  Results for orders placed during the hospital encounter of 09/06/24    Adult Transthoracic Echo Limited W/ Cont if Necessary Per Protocol    Interpretation Summary    Left ventricular systolic function is normal. Left ventricular ejection fraction appears to be 61 - 65%.    Left ventricular diastolic function was not assessed.    Limited study, unable to evaluate " valvular function due to limited images        Assessment and Plan        Diagnoses and all orders for this visit:    1. Chronic HFrEF (heart failure with reduced ejection fraction) (Primary)  Assessment & Plan:  Mr. Ramirez has heart failure with reduced ejection fraction that continues to improve with appropriate GDMT.  Last recorded EF was 60 to 65%.  Patient recently was hospitalized and all medication was discontinued.  We are slowly reintroducing GDMT.  Will make the following changes to his heart failure medications.    1.  Increase carvedilol 6.25 mg take 1 tablet in the morning and 2 at night for 1 week followed by 2 tablets twice daily.  2.  Increase Entresto 24/26 mg take 1 tablet in the morning and 2 at night for 1 week starting Sunday then take 2 tablets twice daily.  3.  Do a heart rate blood pressure and weight log and bring it to next appointment.  4.  Do blood work 1 to 2 days prior to next visit.    Orders:  -     Comprehensive Metabolic Panel; Future  -     Magnesium; Future  -     CBC & Differential; Future  -     proBNP; Future    2. Hyperlipidemia LDL goal <100  Assessment & Plan:  Patient utilizes atorvastatin 20 mg daily.  Will continue the same.      3. CKD (chronic kidney disease) stage 2, GFR 60-89 ml/min  Assessment & Plan:  Patient kidney function is stable.  Will continue to monitor.    Orders:  -     Comprehensive Metabolic Panel; Future  -     Magnesium; Future  -     CBC & Differential; Future  -     proBNP; Future    Other orders  -     carvedilol (COREG) 6.25 MG tablet; Take 2 tablets by mouth 2 (Two) Times a Day.            Follow Up     Return in about 3 weeks (around 10/23/2024).    Patient was given instructions and counseling regarding his condition or for health maintenance advice. Please see specific information pulled into the AVS if appropriate.     Patient has a chronic health physician that requires close follow-up.    Electronically signed by SMITA Pina,  10/02/24, 11:56 AM EDT.

## 2024-10-02 NOTE — ASSESSMENT & PLAN NOTE
Mr. Ramirez has heart failure with reduced ejection fraction that continues to improve with appropriate GDMT.  Last recorded EF was 60 to 65%.  Patient recently was hospitalized and all medication was discontinued.  We are slowly reintroducing GDMT.  Will make the following changes to his heart failure medications.    1.  Increase carvedilol 6.25 mg take 1 tablet in the morning and 2 at night for 1 week followed by 2 tablets twice daily.  2.  Increase Entresto 24/26 mg take 1 tablet in the morning and 2 at night for 1 week starting Sunday then take 2 tablets twice daily.  3.  Do a heart rate blood pressure and weight log and bring it to next appointment.  4.  Do blood work 1 to 2 days prior to next visit.

## 2024-10-19 ENCOUNTER — TRANSCRIBE ORDERS (OUTPATIENT)
Dept: ADMINISTRATIVE | Facility: HOSPITAL | Age: 54
End: 2024-10-19
Payer: COMMERCIAL

## 2024-10-19 DIAGNOSIS — N18.2 CHRONIC KIDNEY DISEASE, STAGE II (MILD): Primary | ICD-10-CM

## 2024-10-19 DIAGNOSIS — I10 HYPERTENSION, UNSPECIFIED TYPE: ICD-10-CM

## 2024-10-22 ENCOUNTER — LAB (OUTPATIENT)
Dept: LAB | Facility: HOSPITAL | Age: 54
End: 2024-10-22
Payer: COMMERCIAL

## 2024-10-22 DIAGNOSIS — I10 HYPERTENSION, UNSPECIFIED TYPE: ICD-10-CM

## 2024-10-22 DIAGNOSIS — N18.2 CHRONIC KIDNEY DISEASE, STAGE II (MILD): ICD-10-CM

## 2024-10-22 DIAGNOSIS — N18.2 CKD (CHRONIC KIDNEY DISEASE) STAGE 2, GFR 60-89 ML/MIN: ICD-10-CM

## 2024-10-22 DIAGNOSIS — I50.22 CHRONIC HFREF (HEART FAILURE WITH REDUCED EJECTION FRACTION): ICD-10-CM

## 2024-10-22 LAB
ALBUMIN SERPL-MCNC: 3.8 G/DL (ref 3.5–5.2)
ALBUMIN/GLOB SERPL: 1.1 G/DL
ALP SERPL-CCNC: 239 U/L (ref 39–117)
ALT SERPL W P-5'-P-CCNC: 26 U/L (ref 1–41)
ANION GAP SERPL CALCULATED.3IONS-SCNC: 8.7 MMOL/L (ref 5–15)
AST SERPL-CCNC: 22 U/L (ref 1–40)
BACTERIA UR QL AUTO: NORMAL /HPF
BASOPHILS # BLD AUTO: 0.07 10*3/MM3 (ref 0–0.2)
BASOPHILS NFR BLD AUTO: 0.8 % (ref 0–1.5)
BILIRUB SERPL-MCNC: 0.6 MG/DL (ref 0–1.2)
BILIRUB UR QL STRIP: NEGATIVE
BUN SERPL-MCNC: 18 MG/DL (ref 6–20)
BUN/CREAT SERPL: 14.8 (ref 7–25)
CALCIUM SPEC-SCNC: 9.4 MG/DL (ref 8.6–10.5)
CHLORIDE SERPL-SCNC: 102 MMOL/L (ref 98–107)
CLARITY UR: CLEAR
CO2 SERPL-SCNC: 25.3 MMOL/L (ref 22–29)
COLOR UR: YELLOW
CREAT SERPL-MCNC: 1.22 MG/DL (ref 0.76–1.27)
CREAT UR-MCNC: 204 MG/DL
DEPRECATED RDW RBC AUTO: 43.1 FL (ref 37–54)
EGFRCR SERPLBLD CKD-EPI 2021: 70.5 ML/MIN/1.73
EOSINOPHIL # BLD AUTO: 1.09 10*3/MM3 (ref 0–0.4)
EOSINOPHIL NFR BLD AUTO: 12 % (ref 0.3–6.2)
ERYTHROCYTE [DISTWIDTH] IN BLOOD BY AUTOMATED COUNT: 12.1 % (ref 12.3–15.4)
GLOBULIN UR ELPH-MCNC: 3.4 GM/DL
GLUCOSE SERPL-MCNC: 88 MG/DL (ref 65–99)
GLUCOSE UR STRIP-MCNC: ABNORMAL MG/DL
HCT VFR BLD AUTO: 46.1 % (ref 37.5–51)
HGB BLD-MCNC: 14.7 G/DL (ref 13–17.7)
HGB UR QL STRIP.AUTO: NEGATIVE
HYALINE CASTS UR QL AUTO: NORMAL /LPF
IMM GRANULOCYTES # BLD AUTO: 0.03 10*3/MM3 (ref 0–0.05)
IMM GRANULOCYTES NFR BLD AUTO: 0.3 % (ref 0–0.5)
KETONES UR QL STRIP: ABNORMAL
LEUKOCYTE ESTERASE UR QL STRIP.AUTO: NEGATIVE
LYMPHOCYTES # BLD AUTO: 2.54 10*3/MM3 (ref 0.7–3.1)
LYMPHOCYTES NFR BLD AUTO: 28 % (ref 19.6–45.3)
MAGNESIUM SERPL-MCNC: 1.8 MG/DL (ref 1.6–2.6)
MCH RBC QN AUTO: 30.6 PG (ref 26.6–33)
MCHC RBC AUTO-ENTMCNC: 31.9 G/DL (ref 31.5–35.7)
MCV RBC AUTO: 96 FL (ref 79–97)
MONOCYTES # BLD AUTO: 0.56 10*3/MM3 (ref 0.1–0.9)
MONOCYTES NFR BLD AUTO: 6.2 % (ref 5–12)
NEUTROPHILS NFR BLD AUTO: 4.77 10*3/MM3 (ref 1.7–7)
NEUTROPHILS NFR BLD AUTO: 52.7 % (ref 42.7–76)
NITRITE UR QL STRIP: NEGATIVE
NRBC BLD AUTO-RTO: 0 /100 WBC (ref 0–0.2)
NT-PROBNP SERPL-MCNC: 76.2 PG/ML (ref 0–900)
PH UR STRIP.AUTO: 6 [PH] (ref 5–8)
PHOSPHATE SERPL-MCNC: 3 MG/DL (ref 2.5–4.5)
PLATELET # BLD AUTO: 223 10*3/MM3 (ref 140–450)
PMV BLD AUTO: 11 FL (ref 6–12)
POTASSIUM SERPL-SCNC: 4 MMOL/L (ref 3.5–5.2)
PROT ?TM UR-MCNC: 49 MG/DL
PROT SERPL-MCNC: 7.2 G/DL (ref 6–8.5)
PROT UR QL STRIP: ABNORMAL
PROT/CREAT UR: 0.24 MG/G{CREAT}
RBC # BLD AUTO: 4.8 10*6/MM3 (ref 4.14–5.8)
RBC # UR STRIP: NORMAL /HPF
REF LAB TEST METHOD: NORMAL
SODIUM SERPL-SCNC: 136 MMOL/L (ref 136–145)
SP GR UR STRIP: >1.03 (ref 1–1.03)
SQUAMOUS #/AREA URNS HPF: NORMAL /HPF
UROBILINOGEN UR QL STRIP: ABNORMAL
WBC # UR STRIP: NORMAL /HPF
WBC NRBC COR # BLD AUTO: 9.06 10*3/MM3 (ref 3.4–10.8)

## 2024-10-22 PROCEDURE — 85025 COMPLETE CBC W/AUTO DIFF WBC: CPT

## 2024-10-22 PROCEDURE — 36415 COLL VENOUS BLD VENIPUNCTURE: CPT

## 2024-10-22 PROCEDURE — 83880 ASSAY OF NATRIURETIC PEPTIDE: CPT

## 2024-10-22 PROCEDURE — 83735 ASSAY OF MAGNESIUM: CPT

## 2024-10-22 PROCEDURE — 84100 ASSAY OF PHOSPHORUS: CPT

## 2024-10-22 PROCEDURE — 84156 ASSAY OF PROTEIN URINE: CPT

## 2024-10-22 PROCEDURE — 81001 URINALYSIS AUTO W/SCOPE: CPT

## 2024-10-22 PROCEDURE — 80053 COMPREHEN METABOLIC PANEL: CPT

## 2024-10-22 PROCEDURE — 82570 ASSAY OF URINE CREATININE: CPT

## 2024-10-23 ENCOUNTER — TELEPHONE (OUTPATIENT)
Dept: CARDIOLOGY | Facility: CLINIC | Age: 54
End: 2024-10-23
Payer: COMMERCIAL

## 2024-10-23 NOTE — TELEPHONE ENCOUNTER
----- Message from Magaly Mcelroy sent at 10/23/2024  8:06 AM EDT -----  Labs are stable, continue current meds

## 2024-10-24 ENCOUNTER — OFFICE VISIT (OUTPATIENT)
Dept: CARDIOLOGY | Facility: CLINIC | Age: 54
End: 2024-10-24
Payer: COMMERCIAL

## 2024-10-24 VITALS
WEIGHT: 208 LBS | HEIGHT: 71 IN | HEART RATE: 75 BPM | BODY MASS INDEX: 29.12 KG/M2 | DIASTOLIC BLOOD PRESSURE: 62 MMHG | SYSTOLIC BLOOD PRESSURE: 92 MMHG

## 2024-10-24 DIAGNOSIS — N18.2 CKD (CHRONIC KIDNEY DISEASE) STAGE 2, GFR 60-89 ML/MIN: ICD-10-CM

## 2024-10-24 DIAGNOSIS — E78.5 HYPERLIPIDEMIA LDL GOAL <100: ICD-10-CM

## 2024-10-24 DIAGNOSIS — I50.22 CHRONIC HFREF (HEART FAILURE WITH REDUCED EJECTION FRACTION): Primary | ICD-10-CM

## 2024-10-24 PROCEDURE — 99214 OFFICE O/P EST MOD 30 MIN: CPT

## 2024-10-24 RX ORDER — DOXYCYCLINE 100 MG/1
100 CAPSULE ORAL 2 TIMES DAILY
COMMUNITY

## 2024-10-24 RX ORDER — CARVEDILOL 25 MG/1
25 TABLET ORAL 2 TIMES DAILY
Qty: 180 TABLET | Refills: 1 | Status: SHIPPED | OUTPATIENT
Start: 2024-10-24

## 2024-10-24 NOTE — PATIENT INSTRUCTIONS
1.  Increase carvedilol 6.25 mg take 2 tablets in the morning and 3 at night for 1 week followed by 3 tablets twice a day.  The new prescription is going to be for 25 mg take 1 tablet twice daily.  2.  Continue rest of the medication as prescribed.  3.  Do heart rate blood pressure and weight log and bring it to next appointment.  4.  Do blood work 1 to 2 days prior to next visit.

## 2024-10-24 NOTE — ASSESSMENT & PLAN NOTE
Patient's kidney function has improved dramatically since discontinuing the antibiotics.  Will continue to monitor with labs.

## 2024-10-24 NOTE — PROGRESS NOTES
"Office Visit    Chief Complaint  Chronic HFrEF (heart failure with reduced ejection fraction)    Subjective            Yfn Ray presents to [facility] CARDIOLOGY  History of Present Illness  Mr. 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  He is doing much better.  Any function has improved since discontinuing the antibiotics.  Patient's blood pressure is soft today but per his home logs he does not have a systolic blood pressure less than 103.  Heart rate tends to run elevated as well.  He denies any chest pain, dizziness, orthopnea, or syncopal episodes.      Past Medical History:   Diagnosis Date    Abnormal ECG 7-23    Alpha 1-antitrypsin PiMS phenotype     \"alpha 1\" per pt and wife. unsure of what type.    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    Bactrim [Sulfamethoxazole-Trimethoprim] Other (See Comments)     hyperkalemia        Past Surgical History:   Procedure Laterality Date    CARDIAC CATHETERIZATION N/A 10/31/2023    Procedure: Left Heart Cath;  Surgeon: Cristiano Holliday MD;  Location: [facility] CATH INVASIVE LOCATION;  Service: Cardiovascular;  Laterality: N/A;    LIVER BIOPSY      SHOULDER ARTHROSCOPY Right 07/14/2023    Procedure: SHOULDER ARTHROSCOPY WITH WASHOUT AND DEBRIDMENT;  Surgeon: Orville Gallegos MD;  Location: "  [facility];  Service: Orthopedics;  Laterality: Right;    SHOULDER ARTHROSCOPY Right 01/18/2024    SHOULDER ARTHROSCOPY W/ ROTATOR CUFF REPAIR Right 2024 8/29/2024    TOOTH EXTRACTION          Social History     Tobacco Use    Smoking status: Never    Smokeless tobacco: Never   Vaping Use    Vaping status: Never Used   Substance Use Topics    Alcohol use: Never    Drug use: Never       Family History   Problem Relation Age of Onset    Malig Hyperthermia Neg Hx         Prior to Admission medications    Medication Sig Start Date End Date Taking? Authorizing Provider   aspirin 81 MG EC tablet Take 1 tablet by mouth Daily. 8/9/24  Yes Magaly Mcelroy APRN   atorvastatin (LIPITOR) 20 MG tablet Take 1 tablet by mouth Daily. 8/9/24  Yes Magaly Mcelroy APRN   carvedilol (COREG) 6.25 MG tablet Take 2 tablets by mouth 2 (Two) Times a Day. 10/2/24  Yes Mira Triplett APRN   doxycycline (VIBRAMYCIN) 100 MG capsule Take 1 capsule by mouth 2 (Two) Times a Day.   Yes Rubia Kahn MD   empagliflozin (JARDIANCE) 10 MG tablet tablet Take 1 tablet by mouth Daily.   Yes ProviderRubia MD   FREESTYLE LITE test strip 1 each by Other route As Needed. 9/4/24  Yes Rubia Kahn MD   Lancets (freestyle) lancets 1 each by Other route As Needed. 9/4/24  Yes Rubia Kahn MD   oxyCODONE (ROXICODONE) 10 MG tablet Take 1-2 tablets by mouth Every 6 (Six) Hours As Needed. 8/29/24  Yes Rubia Kahn MD   promethazine (PHENERGAN) 25 MG tablet Take 1 tablet by mouth Every 6 (Six) Hours As Needed. 8/27/24  Yes Rubia Kahn MD   sacubitril-valsartan (Entresto) 24-26 MG tablet Take 2 tablets by mouth 2 (Two) Times a Day.   Yes Rubia Kahn MD   spironolactone (ALDACTONE) 25 MG tablet Take 1 tablet by mouth Every Other Day. 9/11/24  Yes Mira Triplett APRN        Review of Systems   Constitutional:  Negative for fatigue.   Respiratory:  Negative for cough and shortness of  "breath.    Cardiovascular:  Negative for chest pain, palpitations and leg swelling.   Neurological:  Negative for dizziness.        Symptom Course: Improved    Weight Trend: Stable     Objective     BP 92/62   Pulse 75   Ht 180.3 cm (70.98\")   Wt 94.3 kg (208 lb)   BMI 29.03 kg/m²       Physical Exam  Constitutional:       General: He is awake.      Appearance: Normal appearance.   Neck:      Thyroid: No thyromegaly.      Vascular: No carotid bruit or JVD.   Cardiovascular:      Rate and Rhythm: Normal rate and regular rhythm.      Chest Wall: PMI is not displaced.      Pulses: Normal pulses.      Heart sounds: Normal heart sounds, S1 normal and S2 normal. No murmur heard.     No friction rub. No gallop. No S3 or S4 sounds.   Pulmonary:      Effort: Pulmonary effort is normal.      Breath sounds: Normal breath sounds and air entry. No wheezing, rhonchi or rales.   Abdominal:      General: Bowel sounds are normal.      Palpations: Abdomen is soft. There is no mass.      Tenderness: There is no abdominal tenderness.   Musculoskeletal:      Cervical back: Neck supple.      Right lower leg: No edema.      Left lower leg: No edema.   Neurological:      Mental Status: He is alert and oriented to person, place, and time.   Psychiatric:         Mood and Affect: Mood normal.         Behavior: Behavior is cooperative.           Result Review :                      Lab Results   Component Value Date    PROBNP 76.2 10/22/2024    PROBNP 59.8 09/30/2024    PROBNP 219.6 01/26/2024     CMP          9/9/2024    04:29 9/30/2024    12:22 10/22/2024    11:42   CMP   Glucose 87  82  88    BUN 30  23  18    Creatinine 1.28  1.29  1.22    EGFR 66.9  65.9  70.5    Sodium 133  137  136    Potassium 4.5  4.7  4.0    Chloride 102  104  102    Calcium 8.9  9.7  9.4    Total Protein  7.3  7.2    Albumin  4.1  3.8    Globulin  3.2  3.4    Total Bilirubin  0.2  0.6    Alkaline Phosphatase  275  239    AST (SGOT)  19  22    ALT (SGPT)  26  26  " "  Albumin/Globulin Ratio  1.3  1.1    BUN/Creatinine Ratio 23.4  17.8  14.8    Anion Gap 10.1  8.0  8.7      CBC w/diff          9/27/2024    15:34 9/30/2024    12:22 10/22/2024    11:42   CBC w/Diff   WBC 10.03     10.40  9.06    RBC 4.35     4.33  4.80    Hemoglobin 13.7     13.3  14.7    Hematocrit 41.8     41.7  46.1    MCV 96.1     96.3  96.0    MCH 31.5     30.7  30.6    MCHC 32.8     31.9  31.9    RDW 12.1     12.3  12.1    Platelets 297     270  223    Neutrophil Rel % 37.2     41.5  52.7    Immature Granulocyte Rel % 0.2     0.3  0.3    Lymphocyte Rel % 38.1     35.7  28.0    Monocyte Rel % 9.2     8.3  6.2    Eosinophil Rel % 14.1     12.9  12.0    Basophil Rel % 1.2     1.3  0.8       Details          This result is from an external source.                 No results found for: \"TSH\"   No results found for: \"FREET4\"   No results found for: \"DDIMERQUANT\"  Magnesium   Date Value Ref Range Status   10/22/2024 1.8 1.6 - 2.6 mg/dL Final      No results found for: \"DIGOXIN\"   A1C Last 3 Results          1/20/2024    04:15   HGBA1C Last 3 Results   Hemoglobin A1C 5.2          Details          This result is from an external source.                  Results for orders placed during the hospital encounter of 09/06/24    Adult Transthoracic Echo Limited W/ Cont if Necessary Per Protocol    Interpretation Summary    Left ventricular systolic function is normal. Left ventricular ejection fraction appears to be 61 - 65%.    Left ventricular diastolic function was not assessed.    Limited study, unable to evaluate valvular function due to limited images        Assessment and Plan        Diagnoses and all orders for this visit:    1. Chronic HFrEF (heart failure with reduced ejection fraction) (Primary)  Assessment & Plan:  Mr. Yfn Ray has heart failure with previously reduced ejection fraction that has improved.  EF previously was less than 20% and most recent echo showed an EF of 60 to 65%.  He is currently " tolerating restarting his GDMT since his most recent surgery.  Pressures are little soft today but per his home log generally stay above 103.  Will continue mid dose Entresto and attempt to titrate carvedilol to max dose.  Continue Jardiance and spironolactone at the current doses.    1.  Increase carvedilol 6.25 mg take 2 tablets in the morning and 3 at night for 1 week followed by 3 tablets twice a day.  The new prescription is going to be for 25 mg take 1 tablet twice daily.  2.  Continue rest of the medication as prescribed.  3.  Do heart rate blood pressure and weight log and bring it to next appointment.  4.  Do blood work 1 to 2 days prior to next visit.    Orders:  -     Comprehensive Metabolic Panel; Future  -     Magnesium; Future  -     CBC & Differential; Future  -     proBNP; Future    2. Hyperlipidemia LDL goal <100  Assessment & Plan:  Patient utilizes atorvastatin 20 mg daily.  Will continue the same      3. CKD (chronic kidney disease) stage 2, GFR 60-89 ml/min  Assessment & Plan:  Patient's kidney function has improved dramatically since discontinuing the antibiotics.  Will continue to monitor with labs.    Orders:  -     Comprehensive Metabolic Panel; Future  -     Magnesium; Future  -     CBC & Differential; Future  -     proBNP; Future    Other orders  -     carvedilol (COREG) 25 MG tablet; Take 1 tablet by mouth 2 (Two) Times a Day.  Dispense: 180 tablet; Refill: 1            Follow Up     Return in about 4 weeks (around 11/21/2024).    Patient was given instructions and counseling regarding his condition or for health maintenance advice. Please see specific information pulled into the AVS if appropriate.     Electronically signed by SMITA Pina, 10/24/24, 8:27 AM EDT.

## 2024-10-24 NOTE — ASSESSMENT & PLAN NOTE
Mr. Yfn Ray has heart failure with previously reduced ejection fraction that has improved.  EF previously was less than 20% and most recent echo showed an EF of 60 to 65%.  He is currently tolerating restarting his GDMT since his most recent surgery.  Pressures are little soft today but per his home log generally stay above 103.  Will continue mid dose Entresto and attempt to titrate carvedilol to max dose.  Continue Jardiance and spironolactone at the current doses.    1.  Increase carvedilol 6.25 mg take 2 tablets in the morning and 3 at night for 1 week followed by 3 tablets twice a day.  The new prescription is going to be for 25 mg take 1 tablet twice daily.  2.  Continue rest of the medication as prescribed.  3.  Do heart rate blood pressure and weight log and bring it to next appointment.  4.  Do blood work 1 to 2 days prior to next visit.

## 2024-11-14 RX ORDER — SACUBITRIL AND VALSARTAN 49; 51 MG/1; MG/1
1 TABLET, FILM COATED ORAL 2 TIMES DAILY
Qty: 60 TABLET | Refills: 1 | Status: SHIPPED | OUTPATIENT
Start: 2024-11-14

## 2024-11-14 RX ORDER — CARVEDILOL 12.5 MG/1
12.5 TABLET ORAL 2 TIMES DAILY
Qty: 60 TABLET | Refills: 1 | Status: SHIPPED | OUTPATIENT
Start: 2024-11-14

## 2024-11-14 NOTE — TELEPHONE ENCOUNTER
Patient wife called stating when he increased coreg to 6.25mg 3 tablets twice daily he became very dizzy and very fatigued. She states he was fine with 2 tablets twice daily. Spoke with provider MALLORY Triplett np who states for him to return to 2 tablets twice daily until appointment. Patient also requesting refill on coreg and entresto. Chart review complete and medication sent to pharmacy.

## 2024-11-19 ENCOUNTER — LAB (OUTPATIENT)
Facility: HOSPITAL | Age: 54
End: 2024-11-19
Payer: COMMERCIAL

## 2024-11-19 DIAGNOSIS — I50.22 CHRONIC HFREF (HEART FAILURE WITH REDUCED EJECTION FRACTION): ICD-10-CM

## 2024-11-19 DIAGNOSIS — N18.2 CKD (CHRONIC KIDNEY DISEASE) STAGE 2, GFR 60-89 ML/MIN: ICD-10-CM

## 2024-11-19 LAB
ALBUMIN SERPL-MCNC: 4 G/DL (ref 3.5–5.2)
ALBUMIN/GLOB SERPL: 1.3 G/DL
ALP SERPL-CCNC: 226 U/L (ref 39–117)
ALT SERPL W P-5'-P-CCNC: 28 U/L (ref 1–41)
ANION GAP SERPL CALCULATED.3IONS-SCNC: 9.5 MMOL/L (ref 5–15)
AST SERPL-CCNC: 26 U/L (ref 1–40)
BASOPHILS # BLD AUTO: 0.12 10*3/MM3 (ref 0–0.2)
BASOPHILS NFR BLD AUTO: 1.1 % (ref 0–1.5)
BILIRUB SERPL-MCNC: 0.4 MG/DL (ref 0–1.2)
BUN SERPL-MCNC: 22 MG/DL (ref 6–20)
BUN/CREAT SERPL: 17.9 (ref 7–25)
CALCIUM SPEC-SCNC: 9.1 MG/DL (ref 8.6–10.5)
CHLORIDE SERPL-SCNC: 106 MMOL/L (ref 98–107)
CO2 SERPL-SCNC: 22.5 MMOL/L (ref 22–29)
CREAT SERPL-MCNC: 1.23 MG/DL (ref 0.76–1.27)
DEPRECATED RDW RBC AUTO: 41.2 FL (ref 37–54)
EGFRCR SERPLBLD CKD-EPI 2021: 69.8 ML/MIN/1.73
EOSINOPHIL # BLD AUTO: 1.04 10*3/MM3 (ref 0–0.4)
EOSINOPHIL NFR BLD AUTO: 9.5 % (ref 0.3–6.2)
ERYTHROCYTE [DISTWIDTH] IN BLOOD BY AUTOMATED COUNT: 12.1 % (ref 12.3–15.4)
GLOBULIN UR ELPH-MCNC: 3.2 GM/DL
GLUCOSE SERPL-MCNC: 76 MG/DL (ref 65–99)
HCT VFR BLD AUTO: 44.3 % (ref 37.5–51)
HGB BLD-MCNC: 14.8 G/DL (ref 13–17.7)
IMM GRANULOCYTES # BLD AUTO: 0.03 10*3/MM3 (ref 0–0.05)
IMM GRANULOCYTES NFR BLD AUTO: 0.3 % (ref 0–0.5)
LYMPHOCYTES # BLD AUTO: 3.34 10*3/MM3 (ref 0.7–3.1)
LYMPHOCYTES NFR BLD AUTO: 30.5 % (ref 19.6–45.3)
MAGNESIUM SERPL-MCNC: 2 MG/DL (ref 1.6–2.6)
MCH RBC QN AUTO: 30.8 PG (ref 26.6–33)
MCHC RBC AUTO-ENTMCNC: 33.4 G/DL (ref 31.5–35.7)
MCV RBC AUTO: 92.1 FL (ref 79–97)
MONOCYTES # BLD AUTO: 0.78 10*3/MM3 (ref 0.1–0.9)
MONOCYTES NFR BLD AUTO: 7.1 % (ref 5–12)
NEUTROPHILS NFR BLD AUTO: 5.64 10*3/MM3 (ref 1.7–7)
NEUTROPHILS NFR BLD AUTO: 51.5 % (ref 42.7–76)
NRBC BLD AUTO-RTO: 0 /100 WBC (ref 0–0.2)
NT-PROBNP SERPL-MCNC: 107 PG/ML (ref 0–900)
PLATELET # BLD AUTO: 200 10*3/MM3 (ref 140–450)
PMV BLD AUTO: 10.3 FL (ref 6–12)
POTASSIUM SERPL-SCNC: 4.4 MMOL/L (ref 3.5–5.2)
PROT SERPL-MCNC: 7.2 G/DL (ref 6–8.5)
RBC # BLD AUTO: 4.81 10*6/MM3 (ref 4.14–5.8)
SODIUM SERPL-SCNC: 138 MMOL/L (ref 136–145)
WBC NRBC COR # BLD AUTO: 10.95 10*3/MM3 (ref 3.4–10.8)

## 2024-11-19 PROCEDURE — 36415 COLL VENOUS BLD VENIPUNCTURE: CPT

## 2024-11-19 PROCEDURE — 83735 ASSAY OF MAGNESIUM: CPT

## 2024-11-19 PROCEDURE — 85025 COMPLETE CBC W/AUTO DIFF WBC: CPT

## 2024-11-19 PROCEDURE — 83880 ASSAY OF NATRIURETIC PEPTIDE: CPT

## 2024-11-19 PROCEDURE — 80053 COMPREHEN METABOLIC PANEL: CPT

## 2024-11-25 ENCOUNTER — OFFICE VISIT (OUTPATIENT)
Dept: CARDIOLOGY | Facility: CLINIC | Age: 54
End: 2024-11-25
Payer: COMMERCIAL

## 2024-11-25 VITALS
WEIGHT: 214 LBS | DIASTOLIC BLOOD PRESSURE: 66 MMHG | BODY MASS INDEX: 29.96 KG/M2 | HEIGHT: 71 IN | SYSTOLIC BLOOD PRESSURE: 99 MMHG | HEART RATE: 80 BPM

## 2024-11-25 DIAGNOSIS — I50.22 CHRONIC HFREF (HEART FAILURE WITH REDUCED EJECTION FRACTION): Primary | ICD-10-CM

## 2024-11-25 DIAGNOSIS — E78.5 HYPERLIPIDEMIA LDL GOAL <100: ICD-10-CM

## 2024-11-25 PROCEDURE — 99214 OFFICE O/P EST MOD 30 MIN: CPT

## 2024-11-25 RX ORDER — METOPROLOL SUCCINATE 50 MG/1
50 TABLET, EXTENDED RELEASE ORAL 2 TIMES DAILY
Qty: 90 TABLET | Refills: 3 | Status: SHIPPED | OUTPATIENT
Start: 2024-11-25

## 2024-11-25 NOTE — PROGRESS NOTES
"Office Visit    Chief Complaint  Chronic HFrEF (heart failure with reduced ejection fraction)    Subjective            Yfn Ray presents to [facility] CARDIOLOGY  History of Present Illness  Mr. 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  Mr. Antoine is doing well although he continues to experience fatigue.  At last visit carvedilol was slowly titrated up but patient was unable to do so due to hypotension.  They did not bring the home blood pressure log but his wife reports that systolic has been in the low 80s with the titration of the carvedilol.  Will change carvedilol to metoprolol succinate/Toprol-XL to see if pressures improved.  Patient denies any chest pain, dizziness, orthopnea or syncopal episodes.      Past Medical History:   Diagnosis Date    Abnormal ECG 7-23    Alpha 1-antitrypsin PiMS phenotype     \"alpha 1\" per pt and wife. unsure of what type.    Arthritis     Chest pain     2 months ago    CHF (congestive heart failure) 7 10 2023    Coronary artery disease 7-23    H/o Back injury     HFrEF (heart failure with reduced ejection fraction)     Hypertension     [facility]eth[facility]    Rash     due to antibiotic currently on       Allergies   Allergen Reactions    Acetaminophen Other (See Comments)     Patient has Alpha-1 and should avoid medications with acetaminophen (Tylenol).    Bactrim [Sulfamethoxazole-Trimethoprim] Other (See Comments)     hyperkalemia        Past Surgical History:   Procedure Laterality Date    CARDIAC CATHETERIZATION N/A 10/31/2023    Procedure: Left Heart Cath;  Surgeon: Cristiano Holliday MD;  Location: [facility] CATH INVASIVE LOCATION;  Service: Cardiovascular;  Laterality: N/A;    LIVER BIOPSY      " SHOULDER ARTHROSCOPY Right 07/14/2023    Procedure: SHOULDER ARTHROSCOPY WITH WASHOUT AND DEBRIDMENT;  Surgeon: Orville Gallegos MD;  [place] [facility] [place] [facility] [place]  Service: [place]opedics;  Laterality: Right;    SHOULDER ARTHROSCOPY Right 01/18/2024    SHOULDER ARTHROSCOPY W/ ROTATOR CUFF REPAIR Right 2024 8/29/2024    TOOTH EXTRACTION          Social History     Tobacco Use    Smoking status: Never    Smokeless tobacco: Never   Vaping Use    Vaping status: Never Used   Substance Use Topics    Alcohol use: Never    Drug use: Never       Family History   Problem Relation Age of Onset    Malig Hyperthermia Neg Hx         Prior to Admission medications    Medication Sig Start Date End Date Taking? Authorizing Provider   aspirin 81 MG EC tablet Take 1 tablet by mouth Daily. 8/9/24  Yes Magaly Mcelroy APRN   atorvastatin (LIPITOR) 20 MG tablet Take 1 tablet by mouth Daily. 8/9/24  Yes Magaly Mcelroy APRN   carvedilol (COREG) 12.5 MG tablet Take 1 tablet by mouth 2 (Two) Times a Day. 11/14/24  Yes Mira Triplett APRN   doxycycline (VIBRAMYCIN) 100 MG capsule Take 1 capsule by mouth 2 (Two) Times a Day.   Yes ProviderRubia MD   empagliflozin (JARDIANCE) 10 MG tablet tablet Take 1 tablet by mouth Daily.   Yes Rubia Kahn MD   oxyCODONE (ROXICODONE) 10 MG tablet Take 1-2 tablets by mouth Every 6 (Six) Hours As Needed. 8/29/24  Yes Rubia Kahn MD   sacubitril-valsartan (Entresto) 49-51 MG tablet Take 1 tablet by mouth 2 (Two) Times a Day. 11/14/24  Yes Mira Triplett APRN   spironolactone (ALDACTONE) 25 MG tablet Take 1 tablet by mouth Every Other Day. 9/11/24  Yes Mira Triplett APRN   FREESTYLE LITE test strip 1 each by Other route As Needed. 9/4/24   Rubia Kahn MD   Lancets (freestyle) lancets 1 each by Other route As Needed. 9/4/24   Rubia Kahn MD   promethazine (PHENERGAN) 25 MG tablet Take 1 tablet by mouth Every 6 (Six) Hours As Needed. 8/27/24    "Provider, Historical, MD        Review of Systems   Constitutional:  Positive for fatigue.   Respiratory:  Negative for cough and shortness of breath.    Cardiovascular:  Negative for chest pain, palpitations and leg swelling.   Neurological:  Negative for dizziness.        Symptom Course: Been Stable    Weight Trend: Fluctuating Minimally     Objective     BP 99/66   Pulse 80   Ht 180.3 cm (70.98\")   Wt 97.1 kg (214 lb)   BMI 29.86 kg/m²       Physical Exam  Constitutional:       General: He is awake.      Appearance: Normal appearance.   Neck:      Thyroid: No thyromegaly.      Vascular: No carotid bruit or JVD.   Cardiovascular:      Rate and Rhythm: Normal rate and regular rhythm.      Chest Wall: PMI is not displaced.      Pulses: Normal pulses.      Heart sounds: Normal heart sounds, S1 normal and S2 normal. No murmur heard.     No friction rub. No gallop. No S3 or S4 sounds.   Pulmonary:      Effort: Pulmonary effort is normal.      Breath sounds: Normal breath sounds and air entry. No wheezing, rhonchi or rales.   Abdominal:      General: Bowel sounds are normal.      Palpations: Abdomen is soft. There is no mass.      Tenderness: There is no abdominal tenderness.   Musculoskeletal:      Cervical back: Neck supple.      Right lower leg: No edema.      Left lower leg: No edema.   Neurological:      Mental Status: He is alert and oriented to person, place, and time.   Psychiatric:         Mood and Affect: Mood normal.         Behavior: Behavior is cooperative.           Result Review :                      Lab Results   Component Value Date    PROBNP 107.0 11/19/2024    PROBNP 76.2 10/22/2024    PROBNP 59.8 09/30/2024     CMP          9/30/2024    12:22 10/22/2024    11:42 11/19/2024    15:53   CMP   Glucose 82  88  76    BUN 23  18  22    Creatinine 1.29  1.22  1.23    EGFR 65.9  70.5  69.8    Sodium 137  136  138    Potassium 4.7  4.0  4.4    Chloride 104  102  106    Calcium 9.7  9.4  9.1    Total " "Protein 7.3  7.2  7.2    Albumin 4.1  3.8  4.0    Globulin 3.2  3.4  3.2    Total Bilirubin 0.2  0.6  0.4    Alkaline Phosphatase 275  239  226    AST (SGOT) 19  22  26    ALT (SGPT) 26  26  28    Albumin/Globulin Ratio 1.3  1.1  1.3    BUN/Creatinine Ratio 17.8  14.8  17.9    Anion Gap 8.0  8.7  9.5      CBC w/diff          9/30/2024    12:22 10/22/2024    11:42 11/19/2024    15:53   CBC w/Diff   WBC 10.40  9.06  10.95    RBC 4.33  4.80  4.81    Hemoglobin 13.3  14.7  14.8    Hematocrit 41.7  46.1  44.3    MCV 96.3  96.0  92.1    MCH 30.7  30.6  30.8    MCHC 31.9  31.9  33.4    RDW 12.3  12.1  12.1    Platelets 270  223  200    Neutrophil Rel % 41.5  52.7  51.5    Immature Granulocyte Rel % 0.3  0.3  0.3    Lymphocyte Rel % 35.7  28.0  30.5    Monocyte Rel % 8.3  6.2  7.1    Eosinophil Rel % 12.9  12.0  9.5    Basophil Rel % 1.3  0.8  1.1          No results found for: \"TSH\"   No results found for: \"FREET4\"   No results found for: \"DDIMERQUANT\"  Magnesium   Date Value Ref Range Status   11/19/2024 2.0 1.6 - 2.6 mg/dL Final      No results found for: \"DIGOXIN\"   A1C Last 3 Results          1/20/2024    04:15   HGBA1C Last 3 Results   Hemoglobin A1C 5.2          Details          This result is from an external source.                  Results for orders placed during the hospital encounter of 09/06/24    Adult Transthoracic Echo Limited W/ Cont if Necessary Per Protocol    Interpretation Summary    Left ventricular systolic function is normal. Left ventricular ejection fraction appears to be 61 - 65%.    Left ventricular diastolic function was not assessed.    Limited study, unable to evaluate valvular function due to limited images        Assessment and Plan        Diagnoses and all orders for this visit:    1. Chronic HFrEF (heart failure with reduced ejection fraction) (Primary)  Assessment & Plan:  Mr. Antoine has heart failure with previously reduced ejection fraction.  His EF has increased from 20% up to 60 to " 65% with recent echo.  He is not tolerating titration of the carvedilol or Entresto at this time due to hypotension and fatigue.  Will change carvedilol to metoprolol succinate to address the hypotension.  Will continue mid dose Entresto, spironolactone and Jardiance.    1.  Stop carvedilol and start metoprolol 50 mg twice daily.  2.  Continue rest of the medication as prescribed.  3.  Do heart rate blood pressure and weight log and bring it to next appointment.  4.  Do blood work 1 to 2 days prior to next visit.    Orders:  -     Magnesium; Future  -     CBC & Differential; Future  -     Comprehensive Metabolic Panel; Future  -     proBNP; Future    2. Hyperlipidemia LDL goal <100  Assessment & Plan:  Mr. Antoine utilizes atorvastatin 20 mg daily.  Continue the same.      Other orders  -     metoprolol succinate XL (TOPROL-XL) 50 MG 24 hr tablet; Take 1 tablet by mouth 2 (Two) Times a Day.  Dispense: 90 tablet; Refill: 3            Follow Up     Return in about 6 weeks (around 1/6/2025).    Patient was given instructions and counseling regarding his condition or for health maintenance advice. Please see specific information pulled into the AVS if appropriate.     Electronically signed by SMITA Pina, 11/25/24, 8:29 AM EST.

## 2024-11-25 NOTE — PATIENT INSTRUCTIONS
1.  Stop carvedilol and start metoprolol 50 mg twice daily.  2.  Continue rest of the medication as prescribed.  3.  Do heart rate blood pressure and weight log and bring it to next appointment.  4.  Do blood work 1 to 2 days prior to next visit.

## 2024-11-25 NOTE — ASSESSMENT & PLAN NOTE
Mr. Antoine has heart failure with previously reduced ejection fraction.  His EF has increased from 20% up to 60 to 65% with recent echo.  He is not tolerating titration of the carvedilol or Entresto at this time due to hypotension and fatigue.  Will change carvedilol to metoprolol succinate to address the hypotension.  Will continue mid dose Entresto, spironolactone and Jardiance.    1.  Stop carvedilol and start metoprolol 50 mg twice daily.  2.  Continue rest of the medication as prescribed.  3.  Do heart rate blood pressure and weight log and bring it to next appointment.  4.  Do blood work 1 to 2 days prior to next visit.

## 2024-12-04 ENCOUNTER — TELEPHONE (OUTPATIENT)
Dept: CARDIOLOGY | Facility: CLINIC | Age: 54
End: 2024-12-04
Payer: COMMERCIAL

## 2024-12-04 RX ORDER — SPIRONOLACTONE 25 MG/1
25 TABLET ORAL EVERY OTHER DAY
Qty: 45 TABLET | Refills: 1 | Status: SHIPPED | OUTPATIENT
Start: 2024-12-04

## 2024-12-04 NOTE — TELEPHONE ENCOUNTER
Patient pharmacy requested a refill on attached medication. Chart review complete medication sent.

## 2024-12-11 NOTE — PATIENT INSTRUCTIONS
1.  Increase carvedilol 6.25 mg take 1 tablet in the morning and 2 at night for 1 week followed by 2 tablets twice daily.  2.  Increase Entresto 24/26 mg take 1 tablet in the morning and 2 at night for 1 week starting Sunday then take 2 tablets twice daily.  3.  Do a heart rate blood pressure and weight log and bring it to next appointment.  4.  Do blood work 1 to 2 days prior to next visit.   Attending and PA/NP shared services statement (NON-critical care):

## 2024-12-27 ENCOUNTER — TELEPHONE (OUTPATIENT)
Dept: CARDIOLOGY | Facility: CLINIC | Age: 54
End: 2024-12-27
Payer: COMMERCIAL

## 2024-12-27 NOTE — TELEPHONE ENCOUNTER
Lvm for patient to call office to reschedule appointment that is on 1/7/2025 due to the provider being out on that day.

## 2025-01-07 ENCOUNTER — LAB (OUTPATIENT)
Facility: HOSPITAL | Age: 55
End: 2025-01-07
Payer: COMMERCIAL

## 2025-01-07 DIAGNOSIS — I50.22 CHRONIC HFREF (HEART FAILURE WITH REDUCED EJECTION FRACTION): ICD-10-CM

## 2025-01-07 LAB
ALBUMIN SERPL-MCNC: 3.9 G/DL (ref 3.5–5.2)
ALBUMIN/GLOB SERPL: 1.1 G/DL
ALP SERPL-CCNC: 219 U/L (ref 39–117)
ALT SERPL W P-5'-P-CCNC: 25 U/L (ref 1–41)
ANION GAP SERPL CALCULATED.3IONS-SCNC: 10.6 MMOL/L (ref 5–15)
AST SERPL-CCNC: 20 U/L (ref 1–40)
BASOPHILS # BLD AUTO: 0.1 10*3/MM3 (ref 0–0.2)
BASOPHILS NFR BLD AUTO: 1.1 % (ref 0–1.5)
BILIRUB SERPL-MCNC: 0.4 MG/DL (ref 0–1.2)
BUN SERPL-MCNC: 18 MG/DL (ref 6–20)
BUN/CREAT SERPL: 14.8 (ref 7–25)
CALCIUM SPEC-SCNC: 9.3 MG/DL (ref 8.6–10.5)
CHLORIDE SERPL-SCNC: 103 MMOL/L (ref 98–107)
CO2 SERPL-SCNC: 21.4 MMOL/L (ref 22–29)
CREAT SERPL-MCNC: 1.22 MG/DL (ref 0.76–1.27)
DEPRECATED RDW RBC AUTO: 44.6 FL (ref 37–54)
EGFRCR SERPLBLD CKD-EPI 2021: 70.5 ML/MIN/1.73
EOSINOPHIL # BLD AUTO: 0.39 10*3/MM3 (ref 0–0.4)
EOSINOPHIL NFR BLD AUTO: 4.3 % (ref 0.3–6.2)
ERYTHROCYTE [DISTWIDTH] IN BLOOD BY AUTOMATED COUNT: 13.1 % (ref 12.3–15.4)
GLOBULIN UR ELPH-MCNC: 3.7 GM/DL
GLUCOSE SERPL-MCNC: 100 MG/DL (ref 65–99)
HCT VFR BLD AUTO: 46.1 % (ref 37.5–51)
HGB BLD-MCNC: 14.9 G/DL (ref 13–17.7)
IMM GRANULOCYTES # BLD AUTO: 0.02 10*3/MM3 (ref 0–0.05)
IMM GRANULOCYTES NFR BLD AUTO: 0.2 % (ref 0–0.5)
LYMPHOCYTES # BLD AUTO: 2.27 10*3/MM3 (ref 0.7–3.1)
LYMPHOCYTES NFR BLD AUTO: 25.2 % (ref 19.6–45.3)
MAGNESIUM SERPL-MCNC: 2 MG/DL (ref 1.6–2.6)
MCH RBC QN AUTO: 29.8 PG (ref 26.6–33)
MCHC RBC AUTO-ENTMCNC: 32.3 G/DL (ref 31.5–35.7)
MCV RBC AUTO: 92.2 FL (ref 79–97)
MONOCYTES # BLD AUTO: 0.66 10*3/MM3 (ref 0.1–0.9)
MONOCYTES NFR BLD AUTO: 7.3 % (ref 5–12)
NEUTROPHILS NFR BLD AUTO: 5.58 10*3/MM3 (ref 1.7–7)
NEUTROPHILS NFR BLD AUTO: 61.9 % (ref 42.7–76)
NRBC BLD AUTO-RTO: 0 /100 WBC (ref 0–0.2)
NT-PROBNP SERPL-MCNC: 68.5 PG/ML (ref 0–900)
PLATELET # BLD AUTO: 251 10*3/MM3 (ref 140–450)
PMV BLD AUTO: 9.8 FL (ref 6–12)
POTASSIUM SERPL-SCNC: 4.3 MMOL/L (ref 3.5–5.2)
PROT SERPL-MCNC: 7.6 G/DL (ref 6–8.5)
RBC # BLD AUTO: 5 10*6/MM3 (ref 4.14–5.8)
SODIUM SERPL-SCNC: 135 MMOL/L (ref 136–145)
WBC NRBC COR # BLD AUTO: 9.02 10*3/MM3 (ref 3.4–10.8)

## 2025-01-07 PROCEDURE — 85025 COMPLETE CBC W/AUTO DIFF WBC: CPT

## 2025-01-07 PROCEDURE — 83735 ASSAY OF MAGNESIUM: CPT

## 2025-01-07 PROCEDURE — 83880 ASSAY OF NATRIURETIC PEPTIDE: CPT

## 2025-01-07 PROCEDURE — 80053 COMPREHEN METABOLIC PANEL: CPT

## 2025-01-07 PROCEDURE — 36415 COLL VENOUS BLD VENIPUNCTURE: CPT

## 2025-01-09 RX ORDER — SACUBITRIL AND VALSARTAN 49; 51 MG/1; MG/1
1 TABLET, FILM COATED ORAL 2 TIMES DAILY
Qty: 60 TABLET | Refills: 1 | Status: SHIPPED | OUTPATIENT
Start: 2025-01-09

## 2025-01-14 ENCOUNTER — HOSPITAL ENCOUNTER (OUTPATIENT)
Facility: HOSPITAL | Age: 55
Discharge: HOME | End: 2025-01-14
Payer: COMMERCIAL

## 2025-01-14 VITALS
SYSTOLIC BLOOD PRESSURE: 113 MMHG | WEIGHT: 215 LBS | DIASTOLIC BLOOD PRESSURE: 81 MMHG | BODY MASS INDEX: 30.1 KG/M2 | HEART RATE: 74 BPM | HEIGHT: 71 IN

## 2025-01-14 DIAGNOSIS — I50.22 CHRONIC HFREF (HEART FAILURE WITH REDUCED EJECTION FRACTION): Primary | ICD-10-CM

## 2025-01-14 DIAGNOSIS — E87.1 ACUTE HYPONATREMIA: ICD-10-CM

## 2025-01-14 DIAGNOSIS — N18.2 CKD (CHRONIC KIDNEY DISEASE) STAGE 2, GFR 60-89 ML/MIN: ICD-10-CM

## 2025-01-14 DIAGNOSIS — E78.5 HYPERLIPIDEMIA LDL GOAL <100: ICD-10-CM

## 2025-01-14 RX ORDER — METOPROLOL SUCCINATE 50 MG/1
75 TABLET, EXTENDED RELEASE ORAL 2 TIMES DAILY
Qty: 270 TABLET | Refills: 0 | Status: SHIPPED | OUTPATIENT
Start: 2025-01-14

## 2025-01-14 RX ORDER — METOPROLOL SUCCINATE 50 MG/1
50 TABLET, EXTENDED RELEASE ORAL 2 TIMES DAILY
Qty: 270 TABLET | Refills: 0 | Status: SHIPPED | OUTPATIENT
Start: 2025-01-14 | End: 2025-01-14

## 2025-01-14 NOTE — ASSESSMENT & PLAN NOTE
Patient has heart failure with previously reduced ejection fraction that has improved from 20% up to 60 to 65%.  At last visit he did experience some hypotension.  Systolic remains soft.  Will attempt to titrate up oral prior to increasing Entresto.  Continue Jardiance and spironolactone at the current doses and make the following changes to his heart failure medications:    Increase metoprolol succinate XL 50 mg take 1 tab in the morning and 1.5 at night for one week, then 1.5 tabs twice a day.  Continue the rest of the meds   Do heart rate and blood pressure daily  Do blood work before next mansi.

## 2025-01-14 NOTE — PATIENT INSTRUCTIONS
Increase metoprolol succinate XL 50 mg take 1 tab in the morning and 1.5 at night for one week, then 1.5 tabs twice a day.  Continue the rest of the meds   Do heart rate and blood pressure daily  Do blood work before next mansi.

## 2025-01-14 NOTE — PROGRESS NOTES
"Office Visit    Chief Complaint  Chronic HFrEF (heart failure with reduced ejection fraction)    Subjective            Yfn Ray presents to [facility] CLINIC  History of Present Illness  Mr. 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  Patient's white count is normal but he remains on antibiotics for his shoulder.  Patient's blood pressure has proved since changing carvedilol to metoprolol.  Heart rate remains stable in the 70s per patient's wife.  Overall he is doing well.  He denies any chest pain, dizziness, orthopnea or syncopal episodes.      Past Medical History:   Diagnosis Date    Abnormal ECG 7-23    Alpha 1-antitrypsin PiMS phenotype     \"alpha 1\" per pt and wife. unsure of what type.    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    Bactrim [Sulfamethoxazole-Trimethoprim] Other (See Comments)     hyperkalemia        Past Surgical History:   Procedure Laterality Date    CARDIAC CATHETERIZATION N/A 10/31/2023    Procedure: Left Heart Cath;  Surgeon: Cristiano Holliday MD;  Location: [facility] CATH INVASIVE LOCATION;  Service: Cardiovascular;  Laterality: N/A;    LIVER BIOPSY      SHOULDER ARTHROSCOPY Right 07/14/2023    Procedure: SHOULDER ARTHROSCOPY WITH WASHOUT AND DEBRIDMENT;  Surgeon: Orville Gallegos MD;  [place] [facility] [place] [facility] [place]  Service: " Orthopedics;  Laterality: Right;    SHOULDER ARTHROSCOPY Right 01/18/2024    SHOULDER ARTHROSCOPY W/ ROTATOR CUFF REPAIR Right 2024 8/29/2024    TOOTH EXTRACTION          Social History     Tobacco Use    Smoking status: Never    Smokeless tobacco: Never   Vaping Use    Vaping status: Never Used   Substance Use Topics    Alcohol use: Never    Drug use: Never       Family History   Problem Relation Age of Onset    Malig Hyperthermia Neg Hx         Prior to Admission medications    Medication Sig Start Date End Date Taking? Authorizing Provider   aspirin 81 MG EC tablet Take 1 tablet by mouth Daily. 8/9/24   Magaly Mcelroy APRN   atorvastatin (LIPITOR) 20 MG tablet Take 1 tablet by mouth Daily. 8/9/24   Magaly Mcelroy APRN   doxycycline (VIBRAMYCIN) 100 MG capsule Take 1 capsule by mouth 2 (Two) Times a Day.    Rubia Kahn MD   empagliflozin (JARDIANCE) 10 MG tablet tablet Take 1 tablet by mouth Daily.    Rubia Kahn MD   FREESTYLE LITE test strip 1 each by Other route As Needed. 9/4/24   Rubia Kahn MD   Lancets (freestyle) lancets 1 each by Other route As Needed. 9/4/24   Rubia Kahn MD   metoprolol succinate XL (TOPROL-XL) 50 MG 24 hr tablet Take 1 tablet by mouth 2 (Two) Times a Day. 11/25/24   Mira Triplett APRN   oxyCODONE (ROXICODONE) 10 MG tablet Take 1-2 tablets by mouth Every 6 (Six) Hours As Needed. 8/29/24   Rubia Kahn MD   promethazine (PHENERGAN) 25 MG tablet Take 1 tablet by mouth Every 6 (Six) Hours As Needed. 8/27/24   Rubia Kahn MD   sacubitril-valsartan (Entresto) 49-51 MG tablet Take 1 tablet by mouth 2 (Two) Times a Day. 1/9/25   Mira Triplett APRN   spironolactone (ALDACTONE) 25 MG tablet Take 1 tablet by mouth Every Other Day. 12/4/24   Mira Triplett APRN        Review of Systems   Constitutional:  Positive for fatigue.   Respiratory:  Negative for cough and shortness of breath.    Cardiovascular:   "Negative for chest pain, palpitations and leg swelling.   Neurological:  Negative for dizziness.        Symptom Course: Been Stable    Weight Trend: Increasing Steadily      Objective     /81   Pulse 74   Ht 180.3 cm (70.98\")   Wt 97.5 kg (215 lb)   BMI 30.00 kg/m²       Physical Exam  Constitutional:       General: He is awake.      Appearance: Normal appearance.   Neck:      Thyroid: No thyromegaly.      Vascular: No carotid bruit or JVD.   Cardiovascular:      Rate and Rhythm: Normal rate and regular rhythm.      Chest Wall: PMI is not displaced.      Pulses: Normal pulses.      Heart sounds: Normal heart sounds, S1 normal and S2 normal. No murmur heard.     No friction rub. No gallop. No S3 or S4 sounds.   Pulmonary:      Effort: Pulmonary effort is normal.      Breath sounds: Normal breath sounds and air entry. No wheezing, rhonchi or rales.   Abdominal:      General: Bowel sounds are normal.      Palpations: Abdomen is soft. There is no mass.      Tenderness: There is no abdominal tenderness.   Musculoskeletal:      Cervical back: Neck supple.      Right lower leg: No edema.      Left lower leg: No edema.   Neurological:      Mental Status: He is alert and oriented to person, place, and time.   Psychiatric:         Mood and Affect: Mood normal.         Behavior: Behavior is cooperative.           Result Review :                      Lab Results   Component Value Date    PROBNP 68.5 01/07/2025    PROBNP 107.0 11/19/2024    PROBNP 76.2 10/22/2024     CMP          10/22/2024    11:42 11/19/2024    15:53 1/7/2025    13:01   CMP   Glucose 88  76  100    BUN 18  22  18    Creatinine 1.22  1.23  1.22    EGFR 70.5  69.8  70.5    Sodium 136  138  135    Potassium 4.0  4.4  4.3    Chloride 102  106  103    Calcium 9.4  9.1  9.3    Total Protein 7.2  7.2  7.6    Albumin 3.8  4.0  3.9    Globulin 3.4  3.2  3.7    Total Bilirubin 0.6  0.4  0.4    Alkaline Phosphatase 239  226  219    AST (SGOT) 22  26  20    ALT " "(SGPT) 26  28  25    Albumin/Globulin Ratio 1.1  1.3  1.1    BUN/Creatinine Ratio 14.8  17.9  14.8    Anion Gap 8.7  9.5  10.6      CBC w/diff          10/22/2024    11:42 11/19/2024    15:53 1/7/2025    13:01   CBC w/Diff   WBC 9.06  10.95  9.02    RBC 4.80  4.81  5.00    Hemoglobin 14.7  14.8  14.9    Hematocrit 46.1  44.3  46.1    MCV 96.0  92.1  92.2    MCH 30.6  30.8  29.8    MCHC 31.9  33.4  32.3    RDW 12.1  12.1  13.1    Platelets 223  200  251    Neutrophil Rel % 52.7  51.5  61.9    Immature Granulocyte Rel % 0.3  0.3  0.2    Lymphocyte Rel % 28.0  30.5  25.2    Monocyte Rel % 6.2  7.1  7.3    Eosinophil Rel % 12.0  9.5  4.3    Basophil Rel % 0.8  1.1  1.1          No results found for: \"TSH\"   No results found for: \"FREET4\"   No results found for: \"DDIMERQUANT\"  Magnesium   Date Value Ref Range Status   01/07/2025 2.0 1.6 - 2.6 mg/dL Final      No results found for: \"DIGOXIN\"          Results for orders placed during the hospital encounter of 09/06/24    Adult Transthoracic Echo Limited W/ Cont if Necessary Per Protocol    Interpretation Summary    Left ventricular systolic function is normal. Left ventricular ejection fraction appears to be 61 - 65%.    Left ventricular diastolic function was not assessed.    Limited study, unable to evaluate valvular function due to limited images        Assessment and Plan        Diagnoses and all orders for this visit:    1. Chronic HFrEF (heart failure with reduced ejection fraction) (Primary)  Assessment & Plan:  Patient has heart failure with previously reduced ejection fraction that has improved from 20% up to 60 to 65%.  At last visit he did experience some hypotension.  Systolic remains soft.  Will attempt to titrate up oral prior to increasing Entresto.  Continue Jardiance and spironolactone at the current doses and make the following changes to his heart failure medications:    Increase metoprolol succinate XL 50 mg take 1 tab in the morning and 1.5 at night " for one week, then 1.5 tabs twice a day.  Continue the rest of the meds   Do heart rate and blood pressure daily  Do blood work before next mansi.    Orders:  -     Magnesium; Future  -     CBC & Differential; Future  -     Comprehensive Metabolic Panel; Future  -     proBNP; Future    2. CKD (chronic kidney disease) stage 2, GFR 60-89 ml/min  Assessment & Plan:   Patient's kidney function continues to improve.  Will continue to monitor with labs.      3. Hyperlipidemia LDL goal <100  Assessment & Plan:   Patient utilizes atorvastatin 20 mg daily, continue same      4. Acute hyponatremia  Assessment & Plan:  Patient's sodium is slightly low.  We have discussed decreasing his fluid intake.      Other orders  -     Discontinue: metoprolol succinate XL (TOPROL-XL) 50 MG 24 hr tablet; Take 1 tablet by mouth 2 (Two) Times a Day.  Dispense: 270 tablet; Refill: 0  -     metoprolol succinate XL (TOPROL-XL) 50 MG 24 hr tablet; Take 1.5 tablets by mouth 2 (Two) Times a Day.  Dispense: 270 tablet; Refill: 0            Follow Up     Return in about 6 weeks (around 2/25/2025).    Patient was given instructions and counseling regarding his condition or for health maintenance advice. Please see specific information pulled into the AVS if appropriate.     HEART FAIL CLIN [facility]   01/14/25 07:56 EST    Patient has a chronic health condition that requires close follow-up    Electronically signed by SMITA Pina, 01/14/25, 8:30 AM EST.

## 2025-02-10 ENCOUNTER — LAB (OUTPATIENT)
Facility: HOSPITAL | Age: 55
End: 2025-02-10
Payer: COMMERCIAL

## 2025-02-10 DIAGNOSIS — E78.5 HYPERLIPIDEMIA LDL GOAL <100: ICD-10-CM

## 2025-02-10 LAB
ALBUMIN SERPL-MCNC: 3.9 G/DL (ref 3.5–5.2)
ALP SERPL-CCNC: 245 U/L (ref 39–117)
ALT SERPL W P-5'-P-CCNC: 41 U/L (ref 1–41)
AST SERPL-CCNC: 29 U/L (ref 1–40)
BILIRUB CONJ SERPL-MCNC: 0.2 MG/DL (ref 0–0.3)
BILIRUB INDIRECT SERPL-MCNC: 0.3 MG/DL
BILIRUB SERPL-MCNC: 0.5 MG/DL (ref 0–1.2)
CHOLEST SERPL-MCNC: 131 MG/DL (ref 0–200)
HDLC SERPL-MCNC: 45 MG/DL (ref 40–60)
LDLC SERPL CALC-MCNC: 70 MG/DL (ref 0–100)
LDLC/HDLC SERPL: 1.55 {RATIO}
PROT SERPL-MCNC: 7.3 G/DL (ref 6–8.5)
TRIGL SERPL-MCNC: 82 MG/DL (ref 0–150)
VLDLC SERPL-MCNC: 16 MG/DL (ref 5–40)

## 2025-02-10 PROCEDURE — 80076 HEPATIC FUNCTION PANEL: CPT

## 2025-02-10 PROCEDURE — 80061 LIPID PANEL: CPT

## 2025-02-10 PROCEDURE — 36415 COLL VENOUS BLD VENIPUNCTURE: CPT

## 2025-02-12 ENCOUNTER — OFFICE VISIT (OUTPATIENT)
Dept: CARDIOLOGY | Facility: CLINIC | Age: 55
End: 2025-02-12
Payer: COMMERCIAL

## 2025-02-12 VITALS
HEIGHT: 71 IN | SYSTOLIC BLOOD PRESSURE: 101 MMHG | DIASTOLIC BLOOD PRESSURE: 74 MMHG | HEART RATE: 78 BPM | WEIGHT: 210.4 LBS | BODY MASS INDEX: 29.46 KG/M2

## 2025-02-12 DIAGNOSIS — I50.22 CHRONIC HFREF (HEART FAILURE WITH REDUCED EJECTION FRACTION): Primary | ICD-10-CM

## 2025-02-12 PROCEDURE — 99214 OFFICE O/P EST MOD 30 MIN: CPT | Performed by: INTERNAL MEDICINE

## 2025-02-12 PROCEDURE — 93000 ELECTROCARDIOGRAM COMPLETE: CPT | Performed by: INTERNAL MEDICINE

## 2025-02-12 NOTE — PROGRESS NOTES
"Chief Complaint  Chronic HFrEF (heart failure with reduced ejection fraction) (6 month)    Subjective    Patient is doing very well symptomatically he has not been experiencing any changes in breathing ability denies any chest pain problems.  He has been noticing the sensation of fatigue and not being able to do as much as he used to.  His weight is down about 5 pounds since last visit.  Past Medical History:   Diagnosis Date    Abnormal ECG 7-23    Alpha 1-antitrypsin PiMS phenotype     \"alpha 1\" per pt and wife. unsure of what type.    Arthritis     Chest pain     2 months ago    CHF (congestive heart failure) 7 10 2023    Coronary artery disease 7-23    H/o Back injury     HFrEF (heart failure with reduced ejection fraction)     Hypertension     [facility]    Rash     due to antibiotic currently on         Current Outpatient Medications:     aspirin 81 MG EC tablet, Take 1 tablet by mouth Daily., Disp: 90 tablet, Rfl: 3    atorvastatin (LIPITOR) 20 MG tablet, Take 1 tablet by mouth Daily., Disp: 90 tablet, Rfl: 3    doxycycline (VIBRAMYCIN) 100 MG capsule, Take 1 capsule by mouth 2 (Two) Times a Day., Disp: , Rfl:     empagliflozin (JARDIANCE) 10 MG tablet tablet, Take 1 tablet by mouth Daily., Disp: , Rfl:     metoprolol succinate XL (TOPROL-XL) 50 MG 24 hr tablet, Take 1.5 tablets by mouth 2 (Two) Times a Day., Disp: 270 tablet, Rfl: 0    sacubitril-valsartan (Entresto) 49-51 MG tablet, Take 1 tablet by mouth 2 (Two) Times a Day., Disp: 60 tablet, Rfl: 1    spironolactone (ALDACTONE) 25 MG tablet, Take 1 tablet by mouth Every Other Day., Disp: 45 tablet, Rfl: 1    FREESTYLE LITE test strip, 1 each by Other route As Needed. (Patient not taking: Reported on 2/12/2025), Disp: , Rfl:     Lancets (freestyle) lancets, 1 each by Other route As Needed. (Patient not taking: Reported on 2/12/2025), Disp: , Rfl:     There are no discontinued " "medications.  Allergies   Allergen Reactions    Acetaminophen Other (See Comments)     Patient has Alpha-1 and should avoid medications with acetaminophen (Tylenol).    Bactrim [Sulfamethoxazole-Trimethoprim] Other (See Comments)     hyperkalemia        Social History     Tobacco Use    Smoking status: Never     Passive exposure: Never    Smokeless tobacco: Never   Vaping Use    Vaping status: Never Used   Substance Use Topics    Alcohol use: Never    Drug use: Never       Family History   Problem Relation Age of Onset    Malig Hyperthermia Neg Hx         Objective     /74 (BP Location: Left arm, Patient Position: Sitting, Cuff Size: Adult)   Pulse 78   Ht 180.3 cm (70.98\")   Wt 95.4 kg (210 lb 6.4 oz)   BMI 29.36 kg/m²       Physical Exam    General Appearance:   no acute distress  Alert and oriented x3  HENT:   lips not cyanotic  Atraumatic  Neck:  No jvd   supple  Respiratory:  no respiratory distress  normal breath sounds  no rales  Cardiovascular:  Regular rate and rhythm  no S3, no S4   no murmur  no rub  Extremities  No cyanosis  lower extremity edema: None  Skin:   warm, dry  No rashes      Result Review :     proBNP   Date Value Ref Range Status   01/07/2025 68.5 0.0 - 900.0 pg/mL Final     CMP          11/19/2024    15:53 1/7/2025    13:01 2/10/2025    13:30   CMP   Glucose 76  100     BUN 22  18     Creatinine 1.23  1.22     EGFR 69.8  70.5     Sodium 138  135     Potassium 4.4  4.3     Chloride 106  103     Calcium 9.1  9.3     Total Protein 7.2  7.6  7.3    Albumin 4.0  3.9  3.9    Globulin 3.2  3.7     Total Bilirubin 0.4  0.4  0.5    Alkaline Phosphatase 226  219  245    AST (SGOT) 26  20  29    ALT (SGPT) 28  25  41    Albumin/Globulin Ratio 1.3  1.1     BUN/Creatinine Ratio 17.9  14.8     Anion Gap 9.5  10.6       CBC w/diff          10/22/2024    11:42 11/19/2024    15:53 1/7/2025    13:01   CBC w/Diff   WBC 9.06  10.95  9.02    RBC 4.80  4.81  5.00    Hemoglobin 14.7  14.8  14.9  " "  Hematocrit 46.1  44.3  46.1    MCV 96.0  92.1  92.2    MCH 30.6  30.8  29.8    MCHC 31.9  33.4  32.3    RDW 12.1  12.1  13.1    Platelets 223  200  251    Neutrophil Rel % 52.7  51.5  61.9    Immature Granulocyte Rel % 0.3  0.3  0.2    Lymphocyte Rel % 28.0  30.5  25.2    Monocyte Rel % 6.2  7.1  7.3    Eosinophil Rel % 12.0  9.5  4.3    Basophil Rel % 0.8  1.1  1.1       No results found for: \"TSH\"   No results found for: \"FREET4\"   No results found for: \"DDIMERQUANT\"  Magnesium   Date Value Ref Range Status   01/07/2025 2.0 1.6 - 2.6 mg/dL Final      No results found for: \"DIGOXIN\"   Lab Results   Component Value Date    TROPONINT 9 07/10/2023           Lipid Panel          2/10/2025    13:30   Lipid Panel   Total Cholesterol 131    Triglycerides 82    HDL Cholesterol 45    VLDL Cholesterol 16    LDL Cholesterol  70    LDL/HDL Ratio 1.55      No results found for: \"POCTROP\"    Results for orders placed during the hospital encounter of 09/06/24    Adult Transthoracic Echo Limited W/ Cont if Necessary Per Protocol    Interpretation Summary    Left ventricular systolic function is normal. Left ventricular ejection fraction appears to be 61 - 65%.    Left ventricular diastolic function was not assessed.    Limited study, unable to evaluate valvular function due to limited images       ECG 12 Lead    Date/Time: 2/12/2025 12:33 PM  Performed by: Cristiano Holliday MD    Authorized by: Cristiano Holliday MD  Comparison: compared with previous ECG   Similar to previous ECG                 Diagnoses and all orders for this visit:    1. Chronic HFrEF (heart failure with reduced ejection fraction) (Primary)  Assessment & Plan:  patient with normalization of his ejection fraction on his last echocardiogram he has been experiencing fatigue problems and the capacity to do is much as he used to.  Discussed with him this could be multifactorial possibly due to his age deconditioning and somewhat medication related.  Will go ahead and " repeat echocardiogram to make sure no change in ejection fraction from his last 1.  Feel that since his blood pressure is low normal and he is experiencing fatigue symptoms do not favor further titration up of GDMT and will continue on Jardiance 10, metoprolol 75 twice daily, Entresto 49-51 twice daily, and spironolactone 25 mg daily    Orders:  -     Adult Transthoracic Echo Complete W/ Cont if Necessary Per Protocol; Future  -     ECG 12 Lead            Follow Up     No follow-ups on file.          Patient was given instructions and counseling regarding his condition or for health maintenance advice. Please see specific information pulled into the AVS if appropriate.

## 2025-02-12 NOTE — ASSESSMENT & PLAN NOTE
patient with normalization of his ejection fraction on his last echocardiogram he has been experiencing fatigue problems and the capacity to do is much as he used to.  Discussed with him this could be multifactorial possibly due to his age deconditioning and somewhat medication related.  Will go ahead and repeat echocardiogram to make sure no change in ejection fraction from his last 1.  Feel that since his blood pressure is low normal and he is experiencing fatigue symptoms do not favor further titration up of GDMT and will continue on Jardiance 10, metoprolol 75 twice daily, Entresto 49-51 twice daily, and spironolactone 25 mg daily

## 2025-02-24 ENCOUNTER — LAB (OUTPATIENT)
Facility: HOSPITAL | Age: 55
End: 2025-02-24
Payer: COMMERCIAL

## 2025-02-24 ENCOUNTER — TELEPHONE (OUTPATIENT)
Facility: HOSPITAL | Age: 55
End: 2025-02-24
Payer: COMMERCIAL

## 2025-02-24 DIAGNOSIS — I50.22 CHRONIC HFREF (HEART FAILURE WITH REDUCED EJECTION FRACTION): ICD-10-CM

## 2025-02-24 LAB
ALBUMIN SERPL-MCNC: 3.9 G/DL (ref 3.5–5.2)
ALBUMIN/GLOB SERPL: 1.3 G/DL
ALP SERPL-CCNC: 213 U/L (ref 39–117)
ALT SERPL W P-5'-P-CCNC: 29 U/L (ref 1–41)
ANION GAP SERPL CALCULATED.3IONS-SCNC: 12 MMOL/L (ref 5–15)
AST SERPL-CCNC: 20 U/L (ref 1–40)
BASOPHILS # BLD AUTO: 0.1 10*3/MM3 (ref 0–0.2)
BASOPHILS NFR BLD AUTO: 1 % (ref 0–1.5)
BILIRUB SERPL-MCNC: 0.6 MG/DL (ref 0–1.2)
BUN SERPL-MCNC: 18 MG/DL (ref 6–20)
BUN/CREAT SERPL: 13.4 (ref 7–25)
CALCIUM SPEC-SCNC: 9.2 MG/DL (ref 8.6–10.5)
CHLORIDE SERPL-SCNC: 108 MMOL/L (ref 98–107)
CO2 SERPL-SCNC: 19 MMOL/L (ref 22–29)
CREAT SERPL-MCNC: 1.34 MG/DL (ref 0.76–1.27)
DEPRECATED RDW RBC AUTO: 43.9 FL (ref 37–54)
EGFRCR SERPLBLD CKD-EPI 2021: 63 ML/MIN/1.73
EOSINOPHIL # BLD AUTO: 0.97 10*3/MM3 (ref 0–0.4)
EOSINOPHIL NFR BLD AUTO: 9.6 % (ref 0.3–6.2)
ERYTHROCYTE [DISTWIDTH] IN BLOOD BY AUTOMATED COUNT: 13 % (ref 12.3–15.4)
GLOBULIN UR ELPH-MCNC: 2.9 GM/DL
GLUCOSE SERPL-MCNC: 74 MG/DL (ref 65–99)
HCT VFR BLD AUTO: 43.1 % (ref 37.5–51)
HGB BLD-MCNC: 13.8 G/DL (ref 13–17.7)
IMM GRANULOCYTES # BLD AUTO: 0.04 10*3/MM3 (ref 0–0.05)
IMM GRANULOCYTES NFR BLD AUTO: 0.4 % (ref 0–0.5)
LYMPHOCYTES # BLD AUTO: 2.43 10*3/MM3 (ref 0.7–3.1)
LYMPHOCYTES NFR BLD AUTO: 23.9 % (ref 19.6–45.3)
MAGNESIUM SERPL-MCNC: 2.1 MG/DL (ref 1.6–2.6)
MCH RBC QN AUTO: 29.8 PG (ref 26.6–33)
MCHC RBC AUTO-ENTMCNC: 32 G/DL (ref 31.5–35.7)
MCV RBC AUTO: 93.1 FL (ref 79–97)
MONOCYTES # BLD AUTO: 0.94 10*3/MM3 (ref 0.1–0.9)
MONOCYTES NFR BLD AUTO: 9.3 % (ref 5–12)
NEUTROPHILS NFR BLD AUTO: 5.67 10*3/MM3 (ref 1.7–7)
NEUTROPHILS NFR BLD AUTO: 55.8 % (ref 42.7–76)
NRBC BLD AUTO-RTO: 0 /100 WBC (ref 0–0.2)
NT-PROBNP SERPL-MCNC: 66.5 PG/ML (ref 0–900)
PLATELET # BLD AUTO: 221 10*3/MM3 (ref 140–450)
PMV BLD AUTO: 11 FL (ref 6–12)
POTASSIUM SERPL-SCNC: 4.1 MMOL/L (ref 3.5–5.2)
PROT SERPL-MCNC: 6.8 G/DL (ref 6–8.5)
RBC # BLD AUTO: 4.63 10*6/MM3 (ref 4.14–5.8)
SODIUM SERPL-SCNC: 139 MMOL/L (ref 136–145)
WBC NRBC COR # BLD AUTO: 10.15 10*3/MM3 (ref 3.4–10.8)

## 2025-02-24 PROCEDURE — 36415 COLL VENOUS BLD VENIPUNCTURE: CPT

## 2025-02-24 PROCEDURE — 83880 ASSAY OF NATRIURETIC PEPTIDE: CPT

## 2025-02-24 PROCEDURE — 80053 COMPREHEN METABOLIC PANEL: CPT

## 2025-02-24 PROCEDURE — 83735 ASSAY OF MAGNESIUM: CPT

## 2025-02-24 PROCEDURE — 85025 COMPLETE CBC W/AUTO DIFF WBC: CPT

## 2025-02-25 ENCOUNTER — HOSPITAL ENCOUNTER (OUTPATIENT)
Facility: HOSPITAL | Age: 55
Discharge: HOME | End: 2025-02-25
Payer: COMMERCIAL

## 2025-02-25 VITALS
DIASTOLIC BLOOD PRESSURE: 81 MMHG | WEIGHT: 212 LBS | SYSTOLIC BLOOD PRESSURE: 112 MMHG | BODY MASS INDEX: 29.68 KG/M2 | HEIGHT: 71 IN | HEART RATE: 72 BPM

## 2025-02-25 DIAGNOSIS — E78.5 HYPERLIPIDEMIA LDL GOAL <100: ICD-10-CM

## 2025-02-25 DIAGNOSIS — I50.22 CHRONIC HFREF (HEART FAILURE WITH REDUCED EJECTION FRACTION): ICD-10-CM

## 2025-02-25 DIAGNOSIS — I50.22 CHRONIC HFREF (HEART FAILURE WITH REDUCED EJECTION FRACTION): Primary | ICD-10-CM

## 2025-02-25 LAB
ASCENDING AORTA: 2.7 CM
AV MEAN PRESS GRAD SYS DOP V1V2: 5.2 MMHG
AV VMAX SYS DOP: 153.9 CM/SEC
BH CV ECHO MEAS - ACS: 1.72 CM
BH CV ECHO MEAS - AO MAX PG: 9.5 MMHG
BH CV ECHO MEAS - AO ROOT DIAM: 2.8 CM
BH CV ECHO MEAS - AO V2 VTI: 28.2 CM
BH CV ECHO MEAS - IVS/LVPW: 0.9 CM
BH CV ECHO MEAS - IVSD: 0.9 CM
BH CV ECHO MEAS - LA DIMENSION: 3.3 CM
BH CV ECHO MEAS - LAT PEAK E' VEL: 12.6 CM/SEC
BH CV ECHO MEAS - LV MAX PG: 4.8 MMHG
BH CV ECHO MEAS - LV MEAN PG: 2.7 MMHG
BH CV ECHO MEAS - LV V1 MAX: 110 CM/SEC
BH CV ECHO MEAS - LV V1 VTI: 21.7 CM
BH CV ECHO MEAS - LVIDD: 5 CM
BH CV ECHO MEAS - LVIDS: 3.6 CM
BH CV ECHO MEAS - LVOT DIAM: 1.9 CM
BH CV ECHO MEAS - LVPWD: 1 CM
BH CV ECHO MEAS - MED PEAK E' VEL: 13.1 CM/SEC
BH CV ECHO MEAS - MV A MAX VEL: 64.1 CM/SEC
BH CV ECHO MEAS - MV E MAX VEL: 68.9 CM/SEC
BH CV ECHO MEAS - MV E/A: 1.07
BH CV ECHO MEAS - MV MAX PG: 4 MMHG
BH CV ECHO MEAS - MV MEAN PG: 2.1 MMHG
BH CV ECHO MEAS - MV V2 VTI: 23.5 CM
BH CV ECHO MEAS - RAP SYSTOLE: 5 MMHG
BH CV ECHO MEAS - RVDD: 3.9 CM
BH CV ECHO MEAS - RVSP: 38.2 MMHG
BH CV ECHO MEAS - TAPSE (>1.6): 2.2 CM
BH CV ECHO MEAS - TR MAX PG: 33.2 MMHG
BH CV ECHO MEAS - TR MAX VEL: 287.8 CM/SEC
BH CV ECHO MEASUREMENTS AVERAGE E/E' RATIO: 5.36
BH CV XLRA - TDI S': 13.1 CM/SEC
IVRT: 61 MS
LEFT ATRIUM VOLUME INDEX: 23.4 ML/M2
LV EF BIPLANE MOD: 56.4 %

## 2025-02-25 PROCEDURE — G0463 HOSPITAL OUTPT CLINIC VISIT: HCPCS

## 2025-02-25 PROCEDURE — 93306 TTE W/DOPPLER COMPLETE: CPT

## 2025-02-25 NOTE — PROGRESS NOTES
"Office Visit    Chief Complaint  Chronic HFrEF (heart failure with reduced ejection fraction)    Subjective            Yfn Ray presents to [facility]  History of Present Illness  Mr. 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  His white count is normal but he remains on antibiotics for his shoulder.  Heart rate and blood pressure remained stable with current medication regimen.  Patient main complaint is fatigue.We have discussed a heart healthy diet and progressive ambulation.  Per Dr. Holliday's note it is not recommended to titrate GDMT at this time.  He does have echo scheduled for later today.  Patient denies any chest pain, dizziness, orthopnea or syncopal episodes.      Past Medical History:   Diagnosis Date    Abnormal ECG 7-23    Alpha 1-antitrypsin PiMS phenotype     \"alpha 1\" per pt and wife. unsure of what type.    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    Bactrim [Sulfamethoxazole-Trimethoprim] Other (See Comments)     hyperkalemia        Past Surgical History:   Procedure Laterality Date    CARDIAC CATHETERIZATION N/A 10/31/2023    Procedure: Left Heart Cath;  Surgeon: Cristiano Holliday MD;  Location: [facility] CATH INVASIVE LOCATION;  Service: Cardiovascular;  Laterality: N/A;    LIVER BIOPSY      SHOULDER ARTHROSCOPY Right " 07/14/2023    Procedure: SHOULDER ARTHROSCOPY WITH WASHOUT AND DEBRIDMENT;  Surgeon: Orville Gallegos MD;  Location: [facility];  Service: Orthopedics;  Laterality: Right;    SHOULDER ARTHROSCOPY Right 01/18/2024    SHOULDER ARTHROSCOPY W/ ROTATOR CUFF REPAIR Right 2024 8/29/2024    TOOTH EXTRACTION          Social History     Tobacco Use    Smoking status: Never     Passive exposure: Never    Smokeless tobacco: Never   Vaping Use    Vaping status: Never Used   Substance Use Topics    Alcohol use: Never    Drug use: Never       Family History   Problem Relation Age of Onset    Malig Hyperthermia Neg Hx         Prior to Admission medications    Medication Sig Start Date End Date Taking? Authorizing Provider   aspirin 81 MG EC tablet Take 1 tablet by mouth Daily. 8/9/24   Magaly Meclroy APRN   atorvastatin (LIPITOR) 20 MG tablet Take 1 tablet by mouth Daily. 8/9/24   Magaly Mcelroy APRN   doxycycline (VIBRAMYCIN) 100 MG capsule Take 1 capsule by mouth 2 (Two) Times a Day.    ProviderRubia MD   empagliflozin (JARDIANCE) 10 MG tablet tablet Take 1 tablet by mouth Daily.    Provider, MD Rubia   FREESTYLE LITE test strip 1 each by Other route As Needed.  Patient not taking: Reported on 2/12/2025 9/4/24   Rubia Kahn MD   Lancets (freestyle) lancets 1 each by Other route As Needed.  Patient not taking: Reported on 2/12/2025 9/4/24   Rubia Kahn MD   metoprolol succinate XL (TOPROL-XL) 50 MG 24 hr tablet Take 1.5 tablets by mouth 2 (Two) Times a Day. 1/14/25   Mira Triplett APRN   sacubitril-valsartan (Entresto) 49-51 MG tablet Take 1 tablet by mouth 2 (Two) Times a Day. 1/9/25   Mira Triplett APRN   spironolactone (ALDACTONE) 25 MG tablet Take 1 tablet by mouth Every Other Day. 12/4/24   Mira Triplett APRN        Review of Systems   Constitutional:  Positive for fatigue.   Respiratory:  Negative for cough and shortness of breath.    Cardiovascular:  Negative  "for chest pain, palpitations and leg swelling.   Neurological:  Negative for dizziness.        Symptom Course: Been Stable    Weight Trend: Fluctuating Minimally     Objective     /81   Pulse 72   Ht 180.3 cm (70.98\")   Wt 96.2 kg (212 lb)   BMI 29.58 kg/m²       Physical Exam  Constitutional:       General: He is awake.      Appearance: Normal appearance.   Neck:      Thyroid: No thyromegaly.      Vascular: No carotid bruit or JVD.   Cardiovascular:      Rate and Rhythm: Normal rate and regular rhythm.      Chest Wall: PMI is not displaced.      Pulses: Normal pulses.      Heart sounds: Normal heart sounds, S1 normal and S2 normal. No murmur heard.     No friction rub. No gallop. No S3 or S4 sounds.   Pulmonary:      Effort: Pulmonary effort is normal.      Breath sounds: Normal breath sounds and air entry. No wheezing, rhonchi or rales.   Abdominal:      General: Bowel sounds are normal.      Palpations: Abdomen is soft. There is no mass.      Tenderness: There is no abdominal tenderness.   Musculoskeletal:      Cervical back: Neck supple.      Right lower leg: No edema.      Left lower leg: No edema.   Neurological:      Mental Status: He is alert and oriented to person, place, and time.   Psychiatric:         Mood and Affect: Mood normal.         Behavior: Behavior is cooperative.           Result Review :                      Lab Results   Component Value Date    PROBNP 66.5 02/24/2025    PROBNP 68.5 01/07/2025    PROBNP 107.0 11/19/2024     CMP          1/7/2025    13:01 2/10/2025    13:30 2/24/2025    12:13   CMP   Glucose 100   74    BUN 18   18    Creatinine 1.22   1.34    EGFR 70.5   63.0    Sodium 135   139    Potassium 4.3   4.1    Chloride 103   108    Calcium 9.3   9.2    Total Protein 7.6  7.3  6.8    Albumin 3.9  3.9  3.9    Globulin 3.7   2.9    Total Bilirubin 0.4  0.5  0.6    Alkaline Phosphatase 219  245  213    AST (SGOT) 20  29  20    ALT (SGPT) 25  41  29    Albumin/Globulin Ratio " "1.1   1.3    BUN/Creatinine Ratio 14.8   13.4    Anion Gap 10.6   12.0      CBC w/diff          11/19/2024    15:53 1/7/2025    13:01 2/24/2025    12:13   CBC w/Diff   WBC 10.95  9.02  10.15    RBC 4.81  5.00  4.63    Hemoglobin 14.8  14.9  13.8    Hematocrit 44.3  46.1  43.1    MCV 92.1  92.2  93.1    MCH 30.8  29.8  29.8    MCHC 33.4  32.3  32.0    RDW 12.1  13.1  13.0    Platelets 200  251  221    Neutrophil Rel % 51.5  61.9  55.8    Immature Granulocyte Rel % 0.3  0.2  0.4    Lymphocyte Rel % 30.5  25.2  23.9    Monocyte Rel % 7.1  7.3  9.3    Eosinophil Rel % 9.5  4.3  9.6    Basophil Rel % 1.1  1.1  1.0       Lipid Panel          2/10/2025    13:30   Lipid Panel   Total Cholesterol 131    Triglycerides 82    HDL Cholesterol 45    VLDL Cholesterol 16    LDL Cholesterol  70    LDL/HDL Ratio 1.55       No results found for: \"TSH\"   No results found for: \"FREET4\"   No results found for: \"DDIMERQUANT\"  Magnesium   Date Value Ref Range Status   02/24/2025 2.1 1.6 - 2.6 mg/dL Final      No results found for: \"DIGOXIN\"          Results for orders placed during the hospital encounter of 09/06/24    Adult Transthoracic Echo Limited W/ Cont if Necessary Per Protocol    Interpretation Summary    Left ventricular systolic function is normal. Left ventricular ejection fraction appears to be 61 - 65%.    Left ventricular diastolic function was not assessed.    Limited study, unable to evaluate valvular function due to limited images        Assessment and Plan        Diagnoses and all orders for this visit:    1. Chronic HFrEF (heart failure with reduced ejection fraction) (Primary)  Assessment & Plan:  Patient has heart failure with previously reduced ejection fraction that has improved from 20% to 60/65%.  He is doing much better although continues to experience fatigue.  He is currently taking metoprolol 50 mg 1 and half tablets in the morning and 1 tablet in the evening.  Will  trial 50 mg to twice daily and if fatigue " does not improve we will go back to 75 in the morning and 50 in the evening.  Entresto, Jardiance, and spironolactone at the current doses    1.  May decrease metoprolol 50 mg to 1 tablet twice daily.  If fatigue does not improve or heart rate increases may go back to 75 mg, and 50 at night..  2.  Continue rest of medication as prescribed  3.  Do heart rate blood pressure and weights periodically.      2. Hyperlipidemia LDL goal <100  Assessment & Plan:  Patient utilizes atorvastatin 20 mg daily, will continue the same.              Follow Up     Return for Follow-up with Dr. Holliday and/or Kaitlin..    Patient was given instructions and counseling regarding his condition or for health maintenance advice. Please see specific information pulled into the AVS if appropriate.     HEART FAIL CLIN [facility]   02/25/25 07:55 EST     Dictation done with Dragon    Electronically signed by SMITA Pina, 02/25/25, 8:50 AM EST.

## 2025-02-25 NOTE — ADDENDUM NOTE
Encounter addended by: Vanessa Mathews RN on: 2/25/2025 3:35 PM   Actions taken: Charge Capture section accepted

## 2025-02-25 NOTE — PATIENT INSTRUCTIONS
1.  May decrease metoprolol 50 mg to 1 tablet twice daily.  If fatigue does not improve or heart rate increases may go back to 75 mg, and 50 at night..  2.  Continue rest of medication as prescribed  3.  Do heart rate blood pressure and weights periodically.

## 2025-02-25 NOTE — ASSESSMENT & PLAN NOTE
Patient has heart failure with previously reduced ejection fraction that has improved from 20% to 60/65%.  He is doing much better although continues to experience fatigue.  He is currently taking metoprolol 50 mg 1 and half tablets in the morning and 1 tablet in the evening.  Will  trial 50 mg to twice daily and if fatigue does not improve we will go back to 75 in the morning and 50 in the evening.  Entresto, Jardiance, and spironolactone at the current doses    1.  May decrease metoprolol 50 mg to 1 tablet twice daily.  If fatigue does not improve or heart rate increases may go back to 75 mg, and 50 at night..  2.  Continue rest of medication as prescribed  3.  Do heart rate blood pressure and weights periodically.

## 2025-02-26 ENCOUNTER — TELEPHONE (OUTPATIENT)
Dept: CARDIOLOGY | Facility: CLINIC | Age: 55
End: 2025-02-26
Payer: COMMERCIAL

## 2025-02-26 NOTE — TELEPHONE ENCOUNTER
----- Message from Magaly Mcelroy sent at 2/25/2025  9:02 PM EST -----  Echocardiogram shows normal heart muscle function and no significant valve disease noted. Follow up in six months. Notify office of any new/worsening cardiac symptoms

## 2025-02-26 NOTE — TELEPHONE ENCOUNTER
Per Magaly:  Echocardiogram shows normal heart muscle function and no significant valve disease noted. Follow up in six months. Notify office of any new/worsening cardiac symptoms.    Left message for patient to call office.

## 2025-03-04 ENCOUNTER — TELEPHONE (OUTPATIENT)
Dept: CARDIOLOGY | Facility: CLINIC | Age: 55
End: 2025-03-04
Payer: COMMERCIAL

## 2025-03-04 DIAGNOSIS — I50.22 CHRONIC HFREF (HEART FAILURE WITH REDUCED EJECTION FRACTION): Primary | ICD-10-CM

## 2025-03-04 RX ORDER — SACUBITRIL AND VALSARTAN 49; 51 MG/1; MG/1
1 TABLET, FILM COATED ORAL 2 TIMES DAILY
Qty: 180 TABLET | Refills: 1 | Status: SHIPPED | OUTPATIENT
Start: 2025-03-04

## 2025-03-04 NOTE — TELEPHONE ENCOUNTER
[facility] received a fax that requires your attention. The document has been indexed to the patient’s chart for your review.      Reason for sending: EXTERNAL MEDICAL RECORD NOTIFICATION     Documents Description: [employer]-3.4.25    Name of Sender: DIPESH     Date Indexed: 3.4.25

## 2025-04-09 ENCOUNTER — TELEPHONE (OUTPATIENT)
Dept: CARDIOLOGY | Facility: CLINIC | Age: 55
End: 2025-04-09
Payer: COMMERCIAL

## 2025-04-09 NOTE — TELEPHONE ENCOUNTER
Patient needs 4 month follow up appointment with Dr. Holliday per last note. Please contact patient and schedule.

## 2025-04-28 ENCOUNTER — HOSPITAL ENCOUNTER (EMERGENCY)
Facility: HOSPITAL | Age: 55
Discharge: HOME | End: 2025-04-28
Attending: EMERGENCY MEDICINE | Admitting: EMERGENCY MEDICINE
Payer: COMMERCIAL

## 2025-04-28 ENCOUNTER — APPOINTMENT (OUTPATIENT)
Dept: GENERAL RADIOLOGY | Facility: HOSPITAL | Age: 55
End: 2025-04-28
Payer: COMMERCIAL

## 2025-04-28 ENCOUNTER — APPOINTMENT (OUTPATIENT)
Dept: CT IMAGING | Facility: HOSPITAL | Age: 55
End: 2025-04-28
Payer: COMMERCIAL

## 2025-04-28 VITALS
WEIGHT: 190.04 LBS | RESPIRATION RATE: 20 BRPM | BODY MASS INDEX: 26.6 KG/M2 | HEIGHT: 71 IN | HEART RATE: 71 BPM | SYSTOLIC BLOOD PRESSURE: 104 MMHG | OXYGEN SATURATION: 95 % | DIASTOLIC BLOOD PRESSURE: 86 MMHG | TEMPERATURE: 97.7 F

## 2025-04-28 DIAGNOSIS — R19.7 DIARRHEA, UNSPECIFIED TYPE: ICD-10-CM

## 2025-04-28 DIAGNOSIS — N39.0 ACUTE UTI: Primary | ICD-10-CM

## 2025-04-28 LAB
ALBUMIN SERPL-MCNC: 3.9 G/DL (ref 3.5–5.2)
ALBUMIN/GLOB SERPL: 1 G/DL
ALP SERPL-CCNC: 261 U/L (ref 39–117)
ALT SERPL W P-5'-P-CCNC: 18 U/L (ref 1–41)
ANION GAP SERPL CALCULATED.3IONS-SCNC: 12.9 MMOL/L (ref 5–15)
AST SERPL-CCNC: 22 U/L (ref 1–40)
BACTERIA UR QL AUTO: ABNORMAL /HPF
BASOPHILS # BLD AUTO: 0.1 10*3/MM3 (ref 0–0.2)
BASOPHILS NFR BLD AUTO: 0.9 % (ref 0–1.5)
BILIRUB SERPL-MCNC: 0.3 MG/DL (ref 0–1.2)
BILIRUB UR QL STRIP: ABNORMAL
BUN SERPL-MCNC: 15 MG/DL (ref 6–20)
BUN/CREAT SERPL: 9.5 (ref 7–25)
CALCIUM SPEC-SCNC: 9.6 MG/DL (ref 8.6–10.5)
CHLORIDE SERPL-SCNC: 100 MMOL/L (ref 98–107)
CLARITY UR: ABNORMAL
CO2 SERPL-SCNC: 23.1 MMOL/L (ref 22–29)
COLOR UR: ABNORMAL
CREAT SERPL-MCNC: 1.58 MG/DL (ref 0.76–1.27)
D-LACTATE SERPL-SCNC: 1.6 MMOL/L (ref 0.5–2)
DEPRECATED RDW RBC AUTO: 42.5 FL (ref 37–54)
EGFRCR SERPLBLD CKD-EPI 2021: 51.7 ML/MIN/1.73
EOSINOPHIL # BLD AUTO: 0.21 10*3/MM3 (ref 0–0.4)
EOSINOPHIL NFR BLD AUTO: 1.9 % (ref 0.3–6.2)
ERYTHROCYTE [DISTWIDTH] IN BLOOD BY AUTOMATED COUNT: 12.3 % (ref 12.3–15.4)
FLUAV RNA RESP QL NAA+PROBE: NOT DETECTED
FLUBV RNA RESP QL NAA+PROBE: NOT DETECTED
GEN 5 1HR TROPONIN T REFLEX: 10 NG/L
GLOBULIN UR ELPH-MCNC: 4.1 GM/DL
GLUCOSE SERPL-MCNC: 115 MG/DL (ref 65–99)
GLUCOSE UR STRIP-MCNC: NEGATIVE MG/DL
GRAN CASTS URNS QL MICRO: ABNORMAL /LPF
HCT VFR BLD AUTO: 48.9 % (ref 37.5–51)
HGB BLD-MCNC: 16.3 G/DL (ref 13–17.7)
HGB UR QL STRIP.AUTO: ABNORMAL
HOLD SPECIMEN: NORMAL
HOLD SPECIMEN: NORMAL
HYALINE CASTS UR QL AUTO: ABNORMAL /LPF
IMM GRANULOCYTES # BLD AUTO: 0.04 10*3/MM3 (ref 0–0.05)
IMM GRANULOCYTES NFR BLD AUTO: 0.4 % (ref 0–0.5)
KETONES UR QL STRIP: ABNORMAL
LEUKOCYTE ESTERASE UR QL STRIP.AUTO: ABNORMAL
LYMPHOCYTES # BLD AUTO: 1.05 10*3/MM3 (ref 0.7–3.1)
LYMPHOCYTES NFR BLD AUTO: 9.6 % (ref 19.6–45.3)
MAGNESIUM SERPL-MCNC: 1.8 MG/DL (ref 1.6–2.6)
MCH RBC QN AUTO: 31 PG (ref 26.6–33)
MCHC RBC AUTO-ENTMCNC: 33.3 G/DL (ref 31.5–35.7)
MCV RBC AUTO: 93.1 FL (ref 79–97)
MONOCYTES # BLD AUTO: 1.56 10*3/MM3 (ref 0.1–0.9)
MONOCYTES NFR BLD AUTO: 14.3 % (ref 5–12)
NEUTROPHILS NFR BLD AUTO: 7.94 10*3/MM3 (ref 1.7–7)
NEUTROPHILS NFR BLD AUTO: 72.9 % (ref 42.7–76)
NITRITE UR QL STRIP: NEGATIVE
NRBC BLD AUTO-RTO: 0 /100 WBC (ref 0–0.2)
PH UR STRIP.AUTO: 5.5 [PH] (ref 5–8)
PLATELET # BLD AUTO: 225 10*3/MM3 (ref 140–450)
PMV BLD AUTO: 10 FL (ref 6–12)
POTASSIUM SERPL-SCNC: 3.5 MMOL/L (ref 3.5–5.2)
PROT SERPL-MCNC: 8 G/DL (ref 6–8.5)
PROT UR QL STRIP: ABNORMAL
RBC # BLD AUTO: 5.25 10*6/MM3 (ref 4.14–5.8)
RBC # UR STRIP: ABNORMAL /HPF
REF LAB TEST METHOD: ABNORMAL
RSV RNA RESP QL NAA+PROBE: NOT DETECTED
SARS-COV-2 RNA RESP QL NAA+PROBE: NOT DETECTED
SODIUM SERPL-SCNC: 136 MMOL/L (ref 136–145)
SP GR UR STRIP: >1.03 (ref 1–1.03)
SQUAMOUS #/AREA URNS HPF: ABNORMAL /HPF
TROPONIN T NUMERIC DELTA: -3 NG/L
TROPONIN T SERPL HS-MCNC: 13 NG/L
UROBILINOGEN UR QL STRIP: ABNORMAL
WBC # UR STRIP: ABNORMAL /HPF
WBC NRBC COR # BLD AUTO: 10.9 10*3/MM3 (ref 3.4–10.8)
WHOLE BLOOD HOLD COAG: NORMAL
WHOLE BLOOD HOLD SPECIMEN: NORMAL

## 2025-04-28 PROCEDURE — 71045 X-RAY EXAM CHEST 1 VIEW: CPT

## 2025-04-28 PROCEDURE — 85025 COMPLETE CBC W/AUTO DIFF WBC: CPT | Performed by: EMERGENCY MEDICINE

## 2025-04-28 PROCEDURE — 87637 SARSCOV2&INF A&B&RSV AMP PRB: CPT | Performed by: EMERGENCY MEDICINE

## 2025-04-28 PROCEDURE — 83735 ASSAY OF MAGNESIUM: CPT | Performed by: EMERGENCY MEDICINE

## 2025-04-28 PROCEDURE — 96374 THER/PROPH/DIAG INJ IV PUSH: CPT

## 2025-04-28 PROCEDURE — 74176 CT ABD & PELVIS W/O CONTRAST: CPT

## 2025-04-28 PROCEDURE — 25810000003 SODIUM CHLORIDE 0.9 % SOLUTION: Performed by: EMERGENCY MEDICINE

## 2025-04-28 PROCEDURE — 84484 ASSAY OF TROPONIN QUANT: CPT | Performed by: EMERGENCY MEDICINE

## 2025-04-28 PROCEDURE — 80053 COMPREHEN METABOLIC PANEL: CPT | Performed by: EMERGENCY MEDICINE

## 2025-04-28 PROCEDURE — 99284 EMERGENCY DEPT VISIT MOD MDM: CPT

## 2025-04-28 PROCEDURE — 93005 ELECTROCARDIOGRAM TRACING: CPT | Performed by: EMERGENCY MEDICINE

## 2025-04-28 PROCEDURE — 36415 COLL VENOUS BLD VENIPUNCTURE: CPT

## 2025-04-28 PROCEDURE — 93010 ELECTROCARDIOGRAM REPORT: CPT | Performed by: INTERNAL MEDICINE

## 2025-04-28 PROCEDURE — 81001 URINALYSIS AUTO W/SCOPE: CPT | Performed by: EMERGENCY MEDICINE

## 2025-04-28 PROCEDURE — 83605 ASSAY OF LACTIC ACID: CPT | Performed by: EMERGENCY MEDICINE

## 2025-04-28 PROCEDURE — 25010000002 CEFTRIAXONE PER 250 MG: Performed by: EMERGENCY MEDICINE

## 2025-04-28 RX ORDER — METRONIDAZOLE 500 MG/1
500 TABLET ORAL 3 TIMES DAILY
Qty: 21 TABLET | Refills: 0 | Status: SHIPPED | OUTPATIENT
Start: 2025-04-28

## 2025-04-28 RX ORDER — ONDANSETRON 4 MG/1
4 TABLET, ORALLY DISINTEGRATING ORAL EVERY 8 HOURS PRN
Qty: 15 TABLET | Refills: 0 | Status: SHIPPED | OUTPATIENT
Start: 2025-04-28

## 2025-04-28 RX ORDER — SILDENAFIL 100 MG/1
1 TABLET, FILM COATED ORAL DAILY
COMMUNITY
Start: 2025-03-31

## 2025-04-28 RX ORDER — CEPHALEXIN 500 MG/1
500 CAPSULE ORAL 3 TIMES DAILY
Qty: 21 CAPSULE | Refills: 0 | Status: SHIPPED | OUTPATIENT
Start: 2025-04-28

## 2025-04-28 RX ORDER — DICYCLOMINE HCL 20 MG
20 TABLET ORAL EVERY 6 HOURS
Qty: 20 TABLET | Refills: 0 | Status: SHIPPED | OUTPATIENT
Start: 2025-04-28

## 2025-04-28 RX ORDER — SODIUM CHLORIDE 0.9 % (FLUSH) 0.9 %
10 SYRINGE (ML) INJECTION AS NEEDED
Status: DISCONTINUED | OUTPATIENT
Start: 2025-04-28 | End: 2025-04-28 | Stop reason: HOSPADM

## 2025-04-28 RX ADMIN — SODIUM CHLORIDE 500 ML: 9 INJECTION, SOLUTION INTRAVENOUS at 10:53

## 2025-04-28 RX ADMIN — CEFTRIAXONE SODIUM 1000 MG: 1 INJECTION, POWDER, FOR SOLUTION INTRAMUSCULAR; INTRAVENOUS at 12:03

## 2025-04-28 NOTE — ED PROVIDER NOTES
"Time: 2:32 PM EDT  Date of encounter:  4/28/2025  Independent Historian/Clinical History and Information was obtained by:   Patient    History is limited by: N/A    Chief Complaint: Diarrhea      History of Present Illness:  Patient is a 54 y.o. year old male who presents to the emergency department for evaluation of diarrhea for the past 2 weeks.  Patient denies fever and chills.  Patient reports nausea with no vomiting.  Patient has no cough or hemoptysis.  Patient denies abdominal pain.      Patient Care Team  Primary Care Provider: Marilyn Eugene APRN    Past Medical History:     Allergies   Allergen Reactions    Acetaminophen Other (See Comments)     Patient has Alpha-1 and should avoid medications with acetaminophen (Tylenol).    Bactrim [Sulfamethoxazole-Trimethoprim] Other (See Comments)     hyperkalemia     Past Medical History:   Diagnosis Date    Abnormal ECG 7-23    Alpha 1-antitrypsin PiMS phenotype     \"alpha 1\" per pt and wife. unsure of what type.    Arthritis     Chest pain     2 months ago    CHF (congestive heart failure) 7 10 2023    Coronary artery disease 7-23    H/o Back injury     HFrEF (heart failure with reduced ejection fraction)     Hypertension     Methicillin resis staph infct causing diseases classd elswhr 09/22/2023    Rash     due to antibiotic currently on     Past Surgical History:   Procedure Laterality Date    CARDIAC CATHETERIZATION N/A 10/31/2023    Procedure: Left Heart Cath;  Surgeon: Cristiano Holliday MD;  Location: [facility] CATH INVASIVE LOCATION;  Service: Cardiovascular;  Laterality: N/A;    LIVER BIOPSY      SHOULDER ARTHROSCOPY Right 07/14/2023    Procedure: SHOULDER ARTHROSCOPY WITH WASHOUT AND DEBRIDMENT;  Surgeon: Orville Gallegos MD;  Location: [facility] OR [facility];  Service: Orthopedics;  Laterality: Right;    SHOULDER ARTHROSCOPY Right 01/18/2024    SHOULDER ARTHROSCOPY W/ ROTATOR CUFF REPAIR Right 2024 8/29/2024    TOOTH EXTRACTION       Family History   Problem Relation " Age of Onset    Malig Hyperthermia Neg Hx        Home Medications:  Prior to Admission medications    Medication Sig Start Date End Date Taking? Authorizing Provider   sildenafil (VIAGRA) 100 MG tablet Take 1 tablet by mouth Daily. 3/31/25  Yes Rubia Kahn MD   aspirin 81 MG EC tablet Take 1 tablet by mouth Daily. 8/9/24   Magaly Mcelroy APRN   atorvastatin (LIPITOR) 20 MG tablet Take 1 tablet by mouth Daily. 8/9/24   Magaly Mcelroy APRN   cephalexin (KEFLEX) 500 MG capsule Take 1 capsule by mouth 3 (Three) Times a Day. 4/28/25   Rolan Ramirez MD   dicyclomine (BENTYL) 20 MG tablet Take 1 tablet by mouth Every 6 (Six) Hours. 4/28/25   Rolan Ramirez MD   doxycycline (VIBRAMYCIN) 100 MG capsule Take 1 capsule by mouth 2 (Two) Times a Day.    Rubia Kahn MD   empagliflozin (JARDIANCE) 10 MG tablet tablet Take 1 tablet by mouth Daily.    Rubia Kahn MD   FREESTYLE LITE test strip 1 each by Other route As Needed.  Patient not taking: Reported on 2/12/2025 9/4/24   Rubia Kahn MD   Lancets (freestyle) lancets 1 each by Other route As Needed.  Patient not taking: Reported on 2/12/2025 9/4/24   Rubia Kahn MD   metoprolol succinate XL (TOPROL-XL) 50 MG 24 hr tablet Take 1.5 tablets by mouth 2 (Two) Times a Day.  Patient taking differently: Take 1.5 tablets by mouth 2 (Two) Times a Day. Taking 1.5 tabs in the am, 1 tab at night 1/14/25   Mira Triplett APRN   metroNIDAZOLE (FLAGYL) 500 MG tablet Take 1 tablet by mouth 3 (Three) Times a Day. 4/28/25   Rolan Ramirez MD   ondansetron ODT (ZOFRAN-ODT) 4 MG disintegrating tablet Place 1 tablet on the tongue Every 8 (Eight) Hours As Needed for Nausea or Vomiting. 4/28/25   Rolan Ramirez MD   sacubitril-valsartan (Entresto) 49-51 MG tablet Take 1 tablet by mouth 2 (Two) Times a Day. 3/4/25   Cristiano Holliday MD   spironolactone (ALDACTONE) 25 MG tablet Take 1 tablet by mouth Every Other Day.  "12/4/24   Mira Triplett JANNA, SMITA        Social History:   Social History     Tobacco Use    Smoking status: Never     Passive exposure: Never    Smokeless tobacco: Never   Vaping Use    Vaping status: Never Used   Substance Use Topics    Alcohol use: Never    Drug use: Never         Review of Systems:  Review of Systems   Constitutional:  Negative for chills and fever.   HENT:  Negative for congestion, rhinorrhea and sore throat.    Eyes:  Negative for pain and visual disturbance.   Respiratory:  Negative for apnea, cough, chest tightness and shortness of breath.    Cardiovascular:  Negative for chest pain and palpitations.   Gastrointestinal:  Positive for diarrhea and nausea. Negative for abdominal pain and vomiting.   Genitourinary:  Negative for difficulty urinating and dysuria.   Musculoskeletal:  Negative for joint swelling and myalgias.   Skin:  Negative for color change.   Neurological:  Negative for seizures and headaches.   Psychiatric/Behavioral: Negative.     All other systems reviewed and are negative.       Physical Exam:  /86 (Patient Position: Sitting)   Pulse 71   Temp 97.7 °F (36.5 °C) (Oral)   Resp 20   Ht 180.3 cm (71\")   Wt 86.2 kg (190 lb 0.6 oz)   SpO2 95%   BMI 26.50 kg/m²     Physical Exam  Vitals and nursing note reviewed.   Constitutional:       General: He is not in acute distress.     Appearance: Normal appearance. He is not toxic-appearing.   HENT:      Head: Normocephalic and atraumatic.      Jaw: There is normal jaw occlusion.   Eyes:      General: Lids are normal.      Extraocular Movements: Extraocular movements intact.      Conjunctiva/sclera: Conjunctivae normal.      Pupils: Pupils are equal, round, and reactive to light.   Cardiovascular:      Rate and Rhythm: Normal rate and regular rhythm.      Pulses: Normal pulses.      Heart sounds: Normal heart sounds.   Pulmonary:      Effort: Pulmonary effort is normal. No respiratory distress.      Breath sounds: Normal " breath sounds. No wheezing or rhonchi.   Abdominal:      General: Abdomen is flat.      Palpations: Abdomen is soft.      Tenderness: There is no abdominal tenderness. There is no guarding or rebound.   Musculoskeletal:         General: Normal range of motion.      Cervical back: Normal range of motion and neck supple.      Right lower leg: No edema.      Left lower leg: No edema.   Skin:     General: Skin is warm and dry.   Neurological:      Mental Status: He is alert and oriented to person, place, and time. Mental status is at baseline.   Psychiatric:         Mood and Affect: Mood normal.                    Medical Decision Making:      Comorbidities that affect care:    Diabetes    External Notes reviewed:    Previous Labs: Previous labs were reviewed and the patient has a history of mild renal insufficiency.      The following orders were placed and all results were independently analyzed by me:  Orders Placed This Encounter   Procedures    Clostridioides difficile Toxin - Stool, Per Rectum    Clostridioides difficile Toxin, PCR - Stool, Per Rectum    COVID PRE-OP / PRE-PROCEDURE SCREENING ORDER (NO ISOLATION) - Swab, Nasopharynx    COVID-19, FLU A/B, RSV PCR 1 HR TAT - Swab, Nasopharynx    XR Chest 1 View    CT Abdomen Pelvis Without Contrast    Woodman Draw    Comprehensive Metabolic Panel    High Sensitivity Troponin T    Magnesium    Urinalysis With Microscopic If Indicated (No Culture) - Urine, Clean Catch    CBC Auto Differential    Urinalysis, Microscopic Only - Urine, Clean Catch    Lactic Acid, Plasma    High Sensitivity Troponin T 1Hr    NPO Diet NPO Type: Strict NPO    Undress & Gown    Continuous Pulse Oximetry    Vital Signs    Oxygen Therapy- Nasal Cannula; Titrate 1-6 LPM Per SpO2; 90 - 95%    ECG 12 Lead Other; Weakness    Insert Peripheral IV    Fall Precautions    CBC & Differential    Green Top (Gel)    Lavender Top    Gold Top - SST    Light Blue Top       Medications Given in the Emergency  Department:  Medications   sodium chloride 0.9 % flush 10 mL (has no administration in time range)   sodium chloride 0.9 % bolus 500 mL (0 mL Intravenous Stopped 4/28/25 1123)   cefTRIAXone (ROCEPHIN) in NS 1 gram/10ml IV PUSH syringe (1,000 mg Intravenous Given 4/28/25 1203)        ED Course:         Labs:    Lab Results (last 24 hours)       Procedure Component Value Units Date/Time    Urinalysis With Microscopic If Indicated (No Culture) - Urine, Clean Catch [[id number]]  (Abnormal) Collected: 04/28/25 1034    Specimen: Urine, Clean Catch Updated: 04/28/25 1113     Color, UA Dark Yellow     Appearance, UA Cloudy     pH, UA 5.5     Specific Gravity, UA >1.030     Glucose, UA Negative     Ketones, UA Trace     Bilirubin, UA Small (1+)     Blood, UA Moderate (2+)     Protein, UA >=300 mg/dL (3+)     Leuk Esterase, UA Trace     Nitrite, UA Negative     Urobilinogen, UA 1.0 E.U./dL    Urinalysis, Microscopic Only - Urine, Clean Catch [[id number]]  (Abnormal) Collected: 04/28/25 1034    Specimen: Urine, Clean Catch Updated: 04/28/25 1113     RBC, UA 3-5 /HPF      WBC, UA 0-2 /HPF      Bacteria, UA 2+ /HPF      Squamous Epithelial Cells, UA 0-2 /HPF      Hyaline Casts, UA Too Numerous to Count /LPF      Granular Casts, UA 3-6 /LPF      Methodology Manual Light Microscopy    CBC & Differential [[id number]]  (Abnormal) Collected: 04/28/25 1045    Specimen: Blood Updated: 04/28/25 1054    Narrative:      The following orders were created for panel order CBC & Differential.  Procedure                               Abnormality         Status                     ---------                               -----------         ------                     CBC Auto Differential[[id number]]        Abnormal            Final result                 Please view results for these tests on the individual orders.    Comprehensive Metabolic Panel [[id number]]  (Abnormal) Collected: 04/28/25 1045    Specimen: Blood Updated: 04/28/25 1112     Glucose  115 mg/dL      BUN 15 mg/dL      Creatinine 1.58 mg/dL      Sodium 136 mmol/L      Potassium 3.5 mmol/L      Chloride 100 mmol/L      CO2 23.1 mmol/L      Calcium 9.6 mg/dL      Total Protein 8.0 g/dL      Albumin 3.9 g/dL      ALT (SGPT) 18 U/L      AST (SGOT) 22 U/L      Alkaline Phosphatase 261 U/L      Total Bilirubin 0.3 mg/dL      Globulin 4.1 gm/dL      A/G Ratio 1.0 g/dL      BUN/Creatinine Ratio 9.5     Anion Gap 12.9 mmol/L      eGFR 51.7 mL/min/1.73     Narrative:      GFR Categories in Chronic Kidney Disease (CKD)      GFR Category          GFR (mL/min/1.73)    Interpretation  G1                     90 or greater         Normal or high (1)  G2                      60-89                Mild decrease (1)  G3a                   45-59                Mild to moderate decrease  G3b                   30-44                Moderate to severe decrease  G4                    15-29                Severe decrease  G5                    14 or less           Kidney failure          (1)In the absence of evidence of kidney disease, neither GFR category G1 or G2 fulfill the criteria for CKD.    eGFR calculation 2021 CKD-EPI creatinine equation, which does not include race as a factor    High Sensitivity Troponin T [[id number]]  (Normal) Collected: 04/28/25 1045    Specimen: Blood Updated: 04/28/25 1112     HS Troponin T 13 ng/L     Narrative:      High Sensitive Troponin T Reference Range:  <14.0 ng/L- Negative Female for AMI  <22.0 ng/L- Negative Male for AMI  >=14 - Abnormal Female indicating possible myocardial injury.  >=22 - Abnormal Male indicating possible myocardial injury.   Clinicians would have to utilize clinical acumen, EKG, Troponin, and serial changes to determine if it is an Acute Myocardial Infarction or myocardial injury due to an underlying chronic condition.         Magnesium [[id number]]  (Normal) Collected: 04/28/25 1045    Specimen: Blood Updated: 04/28/25 1112     Magnesium 1.8 mg/dL     CBC Auto  Differential [[id number]]  (Abnormal) Collected: 04/28/25 1045    Specimen: Blood Updated: 04/28/25 1054     WBC 10.90 10*3/mm3      RBC 5.25 10*6/mm3      Hemoglobin 16.3 g/dL      Hematocrit 48.9 %      MCV 93.1 fL      MCH 31.0 pg      MCHC 33.3 g/dL      RDW 12.3 %      RDW-SD 42.5 fl      MPV 10.0 fL      Platelets 225 10*3/mm3      Neutrophil % 72.9 %      Lymphocyte % 9.6 %      Monocyte % 14.3 %      Eosinophil % 1.9 %      Basophil % 0.9 %      Immature Grans % 0.4 %      Neutrophils, Absolute 7.94 10*3/mm3      Lymphocytes, Absolute 1.05 10*3/mm3      Monocytes, Absolute 1.56 10*3/mm3      Eosinophils, Absolute 0.21 10*3/mm3      Basophils, Absolute 0.10 10*3/mm3      Immature Grans, Absolute 0.04 10*3/mm3      nRBC 0.0 /100 WBC     COVID PRE-OP / PRE-PROCEDURE SCREENING ORDER (NO ISOLATION) - Swab, Nasopharynx [[id number]]  (Normal) Collected: 04/28/25 1047    Specimen: Swab from Nasopharynx Updated: 04/28/25 1130    Narrative:      The following orders were created for panel order COVID PRE-OP / PRE-PROCEDURE SCREENING ORDER (NO ISOLATION) - Swab, Nasopharynx.  Procedure                               Abnormality         Status                     ---------                               -----------         ------                     COVID-19, FLU A/B, RSV P...[[id number]]  Normal              Final result                 Please view results for these tests on the individual orders.    COVID-19, FLU A/B, RSV PCR 1 HR TAT - Swab, Nasopharynx [[id number]]  (Normal) Collected: 04/28/25 1047    Specimen: Swab from Nasopharynx Updated: 04/28/25 1130     COVID19 Not Detected     Influenza A PCR Not Detected     Influenza B PCR Not Detected     RSV, PCR Not Detected    Narrative:      Fact sheet for providers: https://www.fda.gov/media/476421/download    Fact sheet for patients: https://www.fda.gov/media/590603/download    Test performed by PCR.    Lactic Acid, Plasma [[id number]]  (Normal) Collected: 04/28/25 7996     Specimen: Blood Updated: 04/28/25 1234     Lactate 1.6 mmol/L     High Sensitivity Troponin T 1Hr [[id number]]  (Normal) Collected: 04/28/25 1202    Specimen: Blood Updated: 04/28/25 1239     HS Troponin T 10 ng/L      Troponin T Numeric Delta -3 ng/L     Narrative:      High Sensitive Troponin T Reference Range:  <14.0 ng/L- Negative Female for AMI  <22.0 ng/L- Negative Male for AMI  >=14 - Abnormal Female indicating possible myocardial injury.  >=22 - Abnormal Male indicating possible myocardial injury.   Clinicians would have to utilize clinical acumen, EKG, Troponin, and serial changes to determine if it is an Acute Myocardial Infarction or myocardial injury due to an underlying chronic condition.                  Imaging:    CT Abdomen Pelvis Without Contrast  Result Date: 4/28/2025  CT ABDOMEN PELVIS WO CONTRAST Date of Exam: 4/28/2025 12:20 PM EDT Indication: Flank pain, kidney stone suspected Abdominal pain flank pain. Comparison: CT chest 7/10/2023 Technique: Axial CT images were obtained of the abdomen and pelvis without the administration of contrast. Reconstructed coronal and sagittal images were also obtained. Automated exposure control and iterative construction methods were used. FINDINGS: Abdomen/Pelvis: Lower Chest: 2 mm noncalcified nodule at the periphery of the right lung base image 2 likely present on prior study although somewhat limited in evaluation due to motion. Stable 2 mm peripheral nodule right middle lobe unchanged from 2023 comparison no suspicious lung nodules noted. No free air noted below the diaphragm. Organs: Limited noncontrast imaging of the liver, gallbladder, pancreas, spleen and adrenal glands are grossly unremarkable in appearance. Perinephric stranding noted of the kidneys bilaterally. Increased density in the renal medullary pyramids without discrete renal calculi noted. No hydronephrosis noted. No suspicious calculi along the course of the ureters bilaterally GI/Bowel:  There is a small hiatal hernia. Small lymph nodes are noted at the epigastric region which are likely reactive. The limited noncontrast imaging of the stomach is otherwise grossly unremarkable in appearance. Small bowel demonstrates no evidence  of obstruction. Relative wall thickening and mild stranding of the distal aspect of the terminal ileum extending into the ileocecal valve noted. There is wall thickening of the colon extending to at least the distal descending colon with mild associated  adjacent stranding. Mild wall thickening and stranding to a lesser degree noted of the sigmoid colon. Liquid stool is noted to the distal rectum. Lymph nodes within the mesentery are likely reactive greatest in the right lower quadrant. Pelvis: Bladder is incompletely distended. Diffuse wall thickening likely artifactual. Prostate and pelvic structures demonstrate no definite acute abnormality. Small lymph nodes within the pelvis likely reactive.. Peritoneum/Retroperitoneum: The aorta is normal in caliber. Limited noncontrast imaging of the aorta demonstrates no definite acute abnormality. No suspicious retroperitoneal adenopathy Bones/Soft Tissues: No acute osseous abnormality is noted. Wedge-shaped deformity of L1 again noted similar to prior CT of the chest. Associated moderate degenerative changes are noted.     1.No evidence of discrete renal calculi or obstructive uropathy. 2.Wall thickening and stranding of the distal aspect of the terminal ileum extending into the ileocecal valve. There is also wall thickening and stranding of the colon extending to the distal descending colon. Findings are compatible with an inflammatory  or infectious enterocolitis. Please correlate with any known history of inflammatory bowel disease. 3.Other findings as above. Electronically Signed: Tex Tello MD  4/28/2025 12:39 PM EDT  Workstation ID: [id number]    XR Chest 1 View  Result Date: 4/28/2025  XR CHEST 1 VW Date of Exam:  4/28/2025 10:30 AM EDT Indication: Weak/Dizzy/AMS triage protocol Comparison: Chest x-ray 8/21/2024 Findings: The heart is stable in size. Calcified hilar lymph nodes are noted and there is a calcified granuloma in the left upper lung. No evidence for pneumothorax. No pleural effusion or focal infiltrate. Surgical hardware is noted in the right shoulder.     Impression: Stable chest without acute cardiopulmonary process. Electronically Signed: Layla Woodward MD  4/28/2025 10:43 AM EDT  Workstation ID: [id number]        Differential Diagnosis and Discussion:    Diarrhea: Differential diagnosis includes but is not limited to malabsorption syndrome, bacterial infection, carcinoid syndrome, pancreatic hypersecretion, viral infection, celiac sprue, Crohn's disease, ulcerative colitis, ischemic colitis, colitis, hypermotility, and irritable bowel syndrome.    PROCEDURES:    Labs were collected in the emergency department and all labs were reviewed and interpreted by me.  CT scan was performed in the emergency department and the CT scan radiology impression was interpreted by me.    ECG 12 Lead Other; Weakness   Preliminary Result   HEART RATE=95  bpm   RR Kpubrfiz=886  ms   TN Dpcwbots=870  ms   P Horizontal Axis=22  deg   P Front Axis=0  deg   QRSD Interval=96  ms   QT Nclvrfna=856  ms   XUbN=934  ms   QRS Axis=-17  deg   T Wave Axis=5  deg   - ABNORMAL ECG -   Sinus rhythm   Inferior infarct, old   Date and Time of Study:2025-04-28 10:08:55          Procedures    MDM       The patient´s CBC that was reviewed and interpreted by me shows no abnormalities of critical concern. Of note, there is no anemia requiring a blood transfusion and the platelet count is acceptable.  The patient´s CMP that was reviewed and interpretted by me shows no abnormalities of critical concern. Of note, the patient´s sodium and potassium are acceptable. The patient´s liver enzymes are unremarkable. The patient´s renal function (creatinine) is  preserved. The patient has a normal anion gap.  CT scan shows possible colitis.  The patient has a soft and benign abdominal exam. The patient is now resting comfortably and feels better, is alert, and is in no distress. The patient is able to tolerate po intake in the ED. The patient's labs were reviewed and hydration status was assessed. The patient has no signs of acute renal failure, sepsis, or dehydration that warrants admission to the hospital. The vital signs have been stable. The patient's condition is stable and appropriate for discharge. The patient will pursue further outpatient evaluation with the primary care physician or designated physician. The patient and/or caregivers have expressed a clear and thorough understanding and agreed to follow-up as instructed.              Patient Care Considerations:    None      Consultants/Shared Management Plan:    None    Social Determinants of Health:    Patient is independent, reliable, and has access to care.       Disposition and Care Coordination:    Discharged: I considered escalation of care by admitting this patient to the hospital, however patient reports improvement with ED treatment.    I have explained the patient´s condition, diagnoses and treatment plan based on the information available to me at this time. I have answered questions and addressed any concerns. The patient has a good  understanding of the patient´s diagnosis, condition, and treatment plan as can be expected at this point. The vital signs have been stable. The patient´s condition is stable and appropriate for discharge from the emergency department.      The patient will pursue further outpatient evaluation with the primary care physician or other designated or consulting physician as outlined in the discharge instructions. They are agreeable to this plan of care and follow-up instructions have been explained in detail. The patient has received these instructions in written format and  has expressed an understanding of the discharge instructions. The patient is aware that any significant change in condition or worsening of symptoms should prompt an immediate return to this or the closest emergency department or call to 1.  I have explained discharge medications and the need for follow up with the patient/caretakers. This was also printed in the discharge instructions. Patient was discharged with the following medications and follow up:      Medication List        New Prescriptions      cephalexin 500 MG capsule  Commonly known as: KEFLEX  Take 1 capsule by mouth 3 (Three) Times a Day.     dicyclomine 20 MG tablet  Commonly known as: BENTYL  Take 1 tablet by mouth Every 6 (Six) Hours.     metroNIDAZOLE 500 MG tablet  Commonly known as: FLAGYL  Take 1 tablet by mouth 3 (Three) Times a Day.     ondansetron ODT 4 MG disintegrating tablet  Commonly known as: ZOFRAN-ODT  Place 1 tablet on the tongue Every 8 (Eight) Hours As Needed for Nausea or Vomiting.            Changed      metoprolol succinate XL 50 MG 24 hr tablet  Commonly known as: TOPROL-XL  Take 1.5 tablets by mouth 2 (Two) Times a Day.  What changed: additional instructions               Where to Get Your Medications        These medications were sent to [facility] [address] [place] [address] - [phone]  - [phone] [address] [address]      Phone: [phone]   cephalexin 500 MG capsule  dicyclomine 20 MG tablet  metroNIDAZOLE 500 MG tablet  ondansetron ODT 4 MG disintegrating tablet      Marilyn Eugene, SMITA  [address] [facility] [address] [phone]    In 2 days         Final diagnoses:   Acute UTI   Diarrhea, unspecified type        ED Disposition       ED Disposition   Discharge    Condition   Stable    Comment   --               This medical record created using voice recognition software.             Rolan Ramirez MD  04/28/25 [id number]

## 2025-05-01 LAB
QT INTERVAL: 360 MS
QTC INTERVAL: 454 MS

## 2025-07-23 DIAGNOSIS — I50.22 CHRONIC SYSTOLIC (CONGESTIVE) HEART FAILURE: ICD-10-CM

## 2025-07-23 RX ORDER — EMPAGLIFLOZIN 10 MG/1
10 TABLET, FILM COATED ORAL DAILY
Qty: 60 TABLET | Refills: 0 | Status: SHIPPED | OUTPATIENT
Start: 2025-07-23

## (undated) DEVICE — STERILE POLYISOPRENE POWDER-FREE SURGICAL GLOVES: Brand: PROTEXIS

## (undated) DEVICE — BLD CUT FORMLA AGGR PLS 4.0MM

## (undated) DEVICE — 450 ML BOTTLE OF 0.05% CHLORHEXIDINE GLUCONATE IN 99.95% STERILE WATER FOR IRRIGATION, USP AND APPLICATOR.: Brand: IRRISEPT ANTIMICROBIAL WOUND LAVAGE

## (undated) DEVICE — SOL IRR NACL 0.9PCT 3000ML

## (undated) DEVICE — BURSECTOR 5.5 MM X 125 MM.  DO NOT RESTERILIZE, DO NOT USE IF PACKAGE IS DAMAGED, KEEP DRY, KEEP AWAY FROM DIRECT SUNLIGHT: Brand: CROSSBLADE

## (undated) DEVICE — INTENDED FOR TISSUE SEPARATION, AND OTHER PROCEDURES THAT REQUIRE A SHARP SURGICAL BLADE TO PUNCTURE OR CUT.: Brand: BARD-PARKER ® CARBON RIB-BACK BLADES

## (undated) DEVICE — GLIDESHEATH SLENDER STAINLESS STEEL KIT: Brand: GLIDESHEATH SLENDER

## (undated) DEVICE — APPL CHLORAPREP HI/LITE 26ML ORNG

## (undated) DEVICE — STERILE POLYISOPRENE POWDER-FREE SURGICAL GLOVES WITH EMOLLIENT COATING: Brand: PROTEXIS

## (undated) DEVICE — SHOULDER ARTHROSCOPY-LF: Brand: MEDLINE INDUSTRIES, INC.

## (undated) DEVICE — COMFORT ARM SLING: Brand: DEROYAL

## (undated) DEVICE — SUT ETHLN 3-0 FS118IN 663H

## (undated) DEVICE — CATH 4F INF PIG 145Â° 110 CM: Brand: INFINITI

## (undated) DEVICE — SUT PROLN 0 CT1 30IN 8424H

## (undated) DEVICE — GLV SURG SENSICARE SLT PF LF 6 STRL

## (undated) DEVICE — CANN TRPL DAM 7X7MM

## (undated) DEVICE — SLV SCD KN/LEN ADJ EXPRSS BLENDED MD 1P/U

## (undated) DEVICE — DRSNG GZ PETROLTM XEROFORM CURAD 1X8IN STRL

## (undated) DEVICE — GLV SURG SENSICARE PI ORTHO SZ8 LF STRL

## (undated) DEVICE — MAT FLR ABS W/BLU/LINER 56X72IN WHT

## (undated) DEVICE — GW FC FLOP/TP .035 260CM 3MM

## (undated) DEVICE — INTEGRATED CASSETTE TUBING, DO NOT USE IF PACKAGE IS DAMAGED: Brand: CROSSFLOW

## (undated) DEVICE — RADIFOCUS OPTITORQUE ANGIOGRAPHIC CATHETER: Brand: OPTITORQUE

## (undated) DEVICE — 90-S CRUISE, SUCTION PROBE, NON-BENDABLE, MAX CUT LEVEL 1: Brand: SERFAS ENERGY

## (undated) DEVICE — SLV DISTRACTION STAR VELCRO XL DISP